# Patient Record
Sex: FEMALE | Race: WHITE | NOT HISPANIC OR LATINO | Employment: OTHER | URBAN - METROPOLITAN AREA
[De-identification: names, ages, dates, MRNs, and addresses within clinical notes are randomized per-mention and may not be internally consistent; named-entity substitution may affect disease eponyms.]

---

## 2018-03-10 ENCOUNTER — HOSPITAL ENCOUNTER (INPATIENT)
Facility: HOSPITAL | Age: 73
LOS: 3 days | Discharge: HOME | End: 2018-03-14
Attending: EMERGENCY MEDICINE | Admitting: FAMILY MEDICINE

## 2018-03-10 ENCOUNTER — APPOINTMENT (OUTPATIENT)
Dept: CT IMAGING | Facility: HOSPITAL | Age: 73
End: 2018-03-10

## 2018-03-10 ENCOUNTER — APPOINTMENT (OUTPATIENT)
Dept: GENERAL RADIOLOGY | Facility: HOSPITAL | Age: 73
End: 2018-03-10

## 2018-03-10 DIAGNOSIS — G47.30 SLEEP APNEA, UNSPECIFIED TYPE: Chronic | ICD-10-CM

## 2018-03-10 DIAGNOSIS — J96.01 ACUTE RESPIRATORY FAILURE WITH HYPOXIA AND HYPERCAPNIA (HCC): ICD-10-CM

## 2018-03-10 DIAGNOSIS — I50.21 ACUTE SYSTOLIC CONGESTIVE HEART FAILURE (HCC): ICD-10-CM

## 2018-03-10 DIAGNOSIS — I48.91 ATRIAL FIBRILLATION WITH RVR (HCC): Primary | ICD-10-CM

## 2018-03-10 DIAGNOSIS — J96.91 RESPIRATORY FAILURE WITH HYPOXIA, UNSPECIFIED CHRONICITY (HCC): ICD-10-CM

## 2018-03-10 DIAGNOSIS — I42.8 NONISCHEMIC CARDIOMYOPATHY (HCC): ICD-10-CM

## 2018-03-10 DIAGNOSIS — I48.0 PAROXYSMAL ATRIAL FIBRILLATION (HCC): ICD-10-CM

## 2018-03-10 DIAGNOSIS — I16.0 HYPERTENSIVE URGENCY: ICD-10-CM

## 2018-03-10 DIAGNOSIS — I50.9 CONGESTIVE HEART FAILURE, UNSPECIFIED CONGESTIVE HEART FAILURE CHRONICITY, UNSPECIFIED CONGESTIVE HEART FAILURE TYPE: ICD-10-CM

## 2018-03-10 DIAGNOSIS — J96.02 ACUTE RESPIRATORY FAILURE WITH HYPOXIA AND HYPERCAPNIA (HCC): ICD-10-CM

## 2018-03-10 LAB
ALBUMIN SERPL-MCNC: 4.5 G/DL (ref 3.2–4.8)
ALBUMIN/GLOB SERPL: 1.3 G/DL (ref 1.5–2.5)
ALP SERPL-CCNC: 73 U/L (ref 25–100)
ALT SERPL W P-5'-P-CCNC: 37 U/L (ref 7–40)
ANION GAP SERPL CALCULATED.3IONS-SCNC: 7 MMOL/L (ref 3–11)
AST SERPL-CCNC: 67 U/L (ref 0–33)
BASOPHILS # BLD AUTO: 0.04 10*3/MM3 (ref 0–0.2)
BASOPHILS NFR BLD AUTO: 0.4 % (ref 0–1)
BILIRUB SERPL-MCNC: 0.6 MG/DL (ref 0.3–1.2)
BNP SERPL-MCNC: 251 PG/ML (ref 0–100)
BUN BLD-MCNC: 21 MG/DL (ref 9–23)
BUN/CREAT SERPL: 21 (ref 7–25)
CALCIUM SPEC-SCNC: 9.1 MG/DL (ref 8.7–10.4)
CHLORIDE SERPL-SCNC: 106 MMOL/L (ref 99–109)
CO2 SERPL-SCNC: 26 MMOL/L (ref 20–31)
CREAT BLD-MCNC: 1 MG/DL (ref 0.6–1.3)
D-LACTATE SERPL-SCNC: 1.5 MMOL/L (ref 0.5–2)
DEPRECATED RDW RBC AUTO: 50 FL (ref 37–54)
EOSINOPHIL # BLD AUTO: 0.4 10*3/MM3 (ref 0–0.3)
EOSINOPHIL NFR BLD AUTO: 4 % (ref 0–3)
ERYTHROCYTE [DISTWIDTH] IN BLOOD BY AUTOMATED COUNT: 13.9 % (ref 11.3–14.5)
GFR SERPL CREATININE-BSD FRML MDRD: 54 ML/MIN/1.73
GLOBULIN UR ELPH-MCNC: 3.4 GM/DL
GLUCOSE BLD-MCNC: 135 MG/DL (ref 70–100)
HCT VFR BLD AUTO: 46.8 % (ref 34.5–44)
HGB BLD-MCNC: 15.4 G/DL (ref 11.5–15.5)
HOLD SPECIMEN: NORMAL
HOLD SPECIMEN: NORMAL
IMM GRANULOCYTES # BLD: 0.04 10*3/MM3 (ref 0–0.03)
IMM GRANULOCYTES NFR BLD: 0.4 % (ref 0–0.6)
LYMPHOCYTES # BLD AUTO: 1.97 10*3/MM3 (ref 0.6–4.8)
LYMPHOCYTES NFR BLD AUTO: 19.8 % (ref 24–44)
MCH RBC QN AUTO: 32.1 PG (ref 27–31)
MCHC RBC AUTO-ENTMCNC: 32.9 G/DL (ref 32–36)
MCV RBC AUTO: 97.5 FL (ref 80–99)
MONOCYTES # BLD AUTO: 0.48 10*3/MM3 (ref 0–1)
MONOCYTES NFR BLD AUTO: 4.8 % (ref 0–12)
NEUTROPHILS # BLD AUTO: 7 10*3/MM3 (ref 1.5–8.3)
NEUTROPHILS NFR BLD AUTO: 70.6 % (ref 41–71)
PLATELET # BLD AUTO: 160 10*3/MM3 (ref 150–450)
PMV BLD AUTO: 12 FL (ref 6–12)
POTASSIUM BLD-SCNC: 5.5 MMOL/L (ref 3.5–5.5)
PROT SERPL-MCNC: 7.9 G/DL (ref 5.7–8.2)
RBC # BLD AUTO: 4.8 10*6/MM3 (ref 3.89–5.14)
SODIUM BLD-SCNC: 139 MMOL/L (ref 132–146)
TROPONIN I SERPL-MCNC: 0.04 NG/ML (ref 0–0.07)
WBC NRBC COR # BLD: 9.93 10*3/MM3 (ref 3.5–10.8)
WHOLE BLOOD HOLD SPECIMEN: NORMAL
WHOLE BLOOD HOLD SPECIMEN: NORMAL

## 2018-03-10 PROCEDURE — 25010000002 FUROSEMIDE PER 20 MG: Performed by: EMERGENCY MEDICINE

## 2018-03-10 PROCEDURE — 99285 EMERGENCY DEPT VISIT HI MDM: CPT

## 2018-03-10 PROCEDURE — 0 IOPAMIDOL 61 % SOLUTION: Performed by: EMERGENCY MEDICINE

## 2018-03-10 PROCEDURE — 71045 X-RAY EXAM CHEST 1 VIEW: CPT

## 2018-03-10 PROCEDURE — 87040 BLOOD CULTURE FOR BACTERIA: CPT | Performed by: EMERGENCY MEDICINE

## 2018-03-10 PROCEDURE — 25010000002 ONDANSETRON PER 1 MG: Performed by: EMERGENCY MEDICINE

## 2018-03-10 PROCEDURE — 25010000002 FENTANYL CITRATE (PF) 100 MCG/2ML SOLUTION: Performed by: EMERGENCY MEDICINE

## 2018-03-10 PROCEDURE — 93005 ELECTROCARDIOGRAM TRACING: CPT | Performed by: EMERGENCY MEDICINE

## 2018-03-10 PROCEDURE — 84484 ASSAY OF TROPONIN QUANT: CPT

## 2018-03-10 PROCEDURE — 83880 ASSAY OF NATRIURETIC PEPTIDE: CPT | Performed by: EMERGENCY MEDICINE

## 2018-03-10 PROCEDURE — 71275 CT ANGIOGRAPHY CHEST: CPT

## 2018-03-10 PROCEDURE — 83605 ASSAY OF LACTIC ACID: CPT | Performed by: EMERGENCY MEDICINE

## 2018-03-10 PROCEDURE — 74177 CT ABD & PELVIS W/CONTRAST: CPT

## 2018-03-10 PROCEDURE — 85025 COMPLETE CBC W/AUTO DIFF WBC: CPT | Performed by: EMERGENCY MEDICINE

## 2018-03-10 PROCEDURE — 80053 COMPREHEN METABOLIC PANEL: CPT | Performed by: EMERGENCY MEDICINE

## 2018-03-10 RX ORDER — FENTANYL CITRATE 50 UG/ML
50 INJECTION, SOLUTION INTRAMUSCULAR; INTRAVENOUS
Status: COMPLETED | OUTPATIENT
Start: 2018-03-10 | End: 2018-03-11

## 2018-03-10 RX ORDER — NITROGLYCERIN 0.4 MG/1
0.4 TABLET SUBLINGUAL ONCE
Status: COMPLETED | OUTPATIENT
Start: 2018-03-10 | End: 2018-03-10

## 2018-03-10 RX ORDER — ONDANSETRON 2 MG/ML
4 INJECTION INTRAMUSCULAR; INTRAVENOUS EVERY 6 HOURS PRN
Status: DISCONTINUED | OUTPATIENT
Start: 2018-03-10 | End: 2018-03-14

## 2018-03-10 RX ORDER — SODIUM CHLORIDE 0.9 % (FLUSH) 0.9 %
10 SYRINGE (ML) INJECTION AS NEEDED
Status: DISCONTINUED | OUTPATIENT
Start: 2018-03-10 | End: 2018-03-14 | Stop reason: HOSPADM

## 2018-03-10 RX ORDER — GABAPENTIN 300 MG/1
300 CAPSULE ORAL 2 TIMES DAILY
Status: ON HOLD | COMMUNITY
End: 2018-03-14

## 2018-03-10 RX ORDER — FUROSEMIDE 10 MG/ML
40 INJECTION INTRAMUSCULAR; INTRAVENOUS ONCE
Status: COMPLETED | OUTPATIENT
Start: 2018-03-10 | End: 2018-03-10

## 2018-03-10 RX ORDER — LABETALOL HYDROCHLORIDE 5 MG/ML
10 INJECTION, SOLUTION INTRAVENOUS ONCE
Status: DISCONTINUED | OUTPATIENT
Start: 2018-03-10 | End: 2018-03-10

## 2018-03-10 RX ADMIN — ONDANSETRON 4 MG: 2 INJECTION INTRAMUSCULAR; INTRAVENOUS at 22:32

## 2018-03-10 RX ADMIN — NITROGLYCERIN 0.4 MG: 0.4 TABLET SUBLINGUAL at 23:55

## 2018-03-10 RX ADMIN — FUROSEMIDE 40 MG: 10 INJECTION, SOLUTION INTRAMUSCULAR; INTRAVENOUS at 23:56

## 2018-03-10 RX ADMIN — FENTANYL CITRATE 50 MCG: 50 INJECTION INTRAMUSCULAR; INTRAVENOUS at 22:32

## 2018-03-10 RX ADMIN — IOPAMIDOL 100 ML: 612 INJECTION, SOLUTION INTRAVENOUS at 23:43

## 2018-03-11 ENCOUNTER — APPOINTMENT (OUTPATIENT)
Dept: CARDIOLOGY | Facility: HOSPITAL | Age: 73
End: 2018-03-11

## 2018-03-11 ENCOUNTER — APPOINTMENT (OUTPATIENT)
Dept: ULTRASOUND IMAGING | Facility: HOSPITAL | Age: 73
End: 2018-03-11

## 2018-03-11 PROBLEM — I48.91 ATRIAL FIBRILLATION WITH RVR (HCC): Status: ACTIVE | Noted: 2018-03-11

## 2018-03-11 LAB
ALBUMIN SERPL-MCNC: 4.1 G/DL (ref 3.2–4.8)
ALBUMIN/GLOB SERPL: 1.4 G/DL (ref 1.5–2.5)
ALP SERPL-CCNC: 73 U/L (ref 25–100)
ALT SERPL W P-5'-P-CCNC: 28 U/L (ref 7–40)
ANION GAP SERPL CALCULATED.3IONS-SCNC: 6 MMOL/L (ref 3–11)
ARTERIAL PATENCY WRIST A: ABNORMAL
ARTICHOKE IGE QN: 109 MG/DL (ref 0–130)
AST SERPL-CCNC: 24 U/L (ref 0–33)
ATMOSPHERIC PRESS: ABNORMAL MMHG
BASE EXCESS BLDA CALC-SCNC: 2.9 MMOL/L (ref 0–2)
BASOPHILS # BLD AUTO: 0.03 10*3/MM3 (ref 0–0.2)
BASOPHILS NFR BLD AUTO: 0.3 % (ref 0–1)
BDY SITE: ABNORMAL
BH CV ECHO MEAS - AO ROOT AREA (BSA CORRECTED): 1.4
BH CV ECHO MEAS - AO ROOT AREA: 6.9 CM^2
BH CV ECHO MEAS - AO ROOT DIAM: 3 CM
BH CV ECHO MEAS - BSA(HAYCOCK): 2.2 M^2
BH CV ECHO MEAS - BSA: 2.1 M^2
BH CV ECHO MEAS - BZI_BMI: 35 KILOGRAMS/M^2
BH CV ECHO MEAS - BZI_METRIC_HEIGHT: 167.6 CM
BH CV ECHO MEAS - BZI_METRIC_WEIGHT: 98.4 KG
BH CV ECHO MEAS - CONTRAST EF (2CH): 66.7 ML/M^2
BH CV ECHO MEAS - CONTRAST EF 4CH: 38.3 ML/M^2
BH CV ECHO MEAS - EDV(CUBED): 115.2 ML
BH CV ECHO MEAS - EDV(MOD-SP2): 135 ML
BH CV ECHO MEAS - EDV(MOD-SP4): 154 ML
BH CV ECHO MEAS - EDV(TEICH): 111 ML
BH CV ECHO MEAS - EF(CUBED): 57.1 %
BH CV ECHO MEAS - EF(MOD-SP2): 66.7 %
BH CV ECHO MEAS - EF(MOD-SP4): 38.3 %
BH CV ECHO MEAS - EF(TEICH): 48.7 %
BH CV ECHO MEAS - ESV(CUBED): 49.4 ML
BH CV ECHO MEAS - ESV(MOD-SP2): 45 ML
BH CV ECHO MEAS - ESV(MOD-SP4): 95 ML
BH CV ECHO MEAS - ESV(TEICH): 57 ML
BH CV ECHO MEAS - FS: 24.6 %
BH CV ECHO MEAS - IVS/LVPW: 1
BH CV ECHO MEAS - IVSD: 1.4 CM
BH CV ECHO MEAS - LA DIMENSION: 4.2 CM
BH CV ECHO MEAS - LA/AO: 1.4
BH CV ECHO MEAS - LAT PEAK E' VEL: 7.3 CM/SEC
BH CV ECHO MEAS - LV DIASTOLIC VOL/BSA (35-75): 74.4 ML/M^2
BH CV ECHO MEAS - LV MASS(C)D: 265.7 GRAMS
BH CV ECHO MEAS - LV MASS(C)DI: 128.3 GRAMS/M^2
BH CV ECHO MEAS - LV SYSTOLIC VOL/BSA (12-30): 45.9 ML/M^2
BH CV ECHO MEAS - LVIDD: 4.9 CM
BH CV ECHO MEAS - LVIDS: 3.7 CM
BH CV ECHO MEAS - LVLD AP2: 9.3 CM
BH CV ECHO MEAS - LVLD AP4: 8.1 CM
BH CV ECHO MEAS - LVLS AP2: 6.4 CM
BH CV ECHO MEAS - LVLS AP4: 7.9 CM
BH CV ECHO MEAS - LVPWD: 1.3 CM
BH CV ECHO MEAS - MED PEAK E' VEL: 5.5 CM/SEC
BH CV ECHO MEAS - PA ACC SLOPE: 794.4 CM/SEC^2
BH CV ECHO MEAS - PA ACC TIME: 0.1 SEC
BH CV ECHO MEAS - PA PR(ACCEL): 36.2 MMHG
BH CV ECHO MEAS - RAP SYSTOLE: 15 MMHG
BH CV ECHO MEAS - RVSP: 54 MMHG
BH CV ECHO MEAS - SI(CUBED): 31.8 ML/M^2
BH CV ECHO MEAS - SI(MOD-SP2): 43.5 ML/M^2
BH CV ECHO MEAS - SI(MOD-SP4): 28.5 ML/M^2
BH CV ECHO MEAS - SI(TEICH): 26.1 ML/M^2
BH CV ECHO MEAS - SV(CUBED): 65.8 ML
BH CV ECHO MEAS - SV(MOD-SP2): 90 ML
BH CV ECHO MEAS - SV(MOD-SP4): 59 ML
BH CV ECHO MEAS - SV(TEICH): 54 ML
BH CV ECHO MEAS - TAPSE (>1.6): 0.9 CM2
BH CV ECHO MEAS - TR MAX V: 39 MMHG
BH CV ECHO MEAS - TR MAX VEL: 268.6 CM/SEC
BH CV VAS BP LEFT ARM: NORMAL MMHG
BH CV XLRA - RV BASE: 4.3 CM
BH CV XLRA - RV LENGTH: 8.3 CM
BH CV XLRA - RV MID: 2.9 CM
BH CV XLRA - TDI S': 9.8 CM/SEC
BILIRUB SERPL-MCNC: 0.8 MG/DL (ref 0.3–1.2)
BUN BLD-MCNC: 19 MG/DL (ref 9–23)
BUN/CREAT SERPL: 19 (ref 7–25)
CALCIUM SPEC-SCNC: 9 MG/DL (ref 8.7–10.4)
CHLORIDE SERPL-SCNC: 103 MMOL/L (ref 99–109)
CHOLEST SERPL-MCNC: 175 MG/DL (ref 0–200)
CO2 BLDA-SCNC: 30.9 MMOL/L (ref 22–33)
CO2 SERPL-SCNC: 31 MMOL/L (ref 20–31)
COHGB MFR BLD: 1.4 % (ref 0–2)
CREAT BLD-MCNC: 1 MG/DL (ref 0.6–1.3)
D-LACTATE SERPL-SCNC: 0.9 MMOL/L (ref 0.5–2)
DEPRECATED RDW RBC AUTO: 51.3 FL (ref 37–54)
DIGOXIN SERPL-MCNC: <0.01 NG/ML (ref 0.8–2)
EOSINOPHIL # BLD AUTO: 0.51 10*3/MM3 (ref 0–0.3)
EOSINOPHIL NFR BLD AUTO: 5.1 % (ref 0–3)
ERYTHROCYTE [DISTWIDTH] IN BLOOD BY AUTOMATED COUNT: 14.3 % (ref 11.3–14.5)
GFR SERPL CREATININE-BSD FRML MDRD: 54 ML/MIN/1.73
GLOBULIN UR ELPH-MCNC: 3 GM/DL
GLUCOSE BLD-MCNC: 131 MG/DL (ref 70–100)
GLUCOSE BLDC GLUCOMTR-MCNC: 139 MG/DL (ref 70–130)
GLUCOSE BLDC GLUCOMTR-MCNC: 146 MG/DL (ref 70–130)
GLUCOSE BLDC GLUCOMTR-MCNC: 151 MG/DL (ref 70–130)
GLUCOSE BLDC GLUCOMTR-MCNC: 178 MG/DL (ref 70–130)
HBA1C MFR BLD: 7.3 % (ref 4.8–5.6)
HCO3 BLDA-SCNC: 29.3 MMOL/L (ref 20–26)
HCT VFR BLD AUTO: 45 % (ref 34.5–44)
HCT VFR BLD CALC: 45.7 %
HDLC SERPL-MCNC: 55 MG/DL (ref 40–60)
HGB BLD-MCNC: 14.5 G/DL (ref 11.5–15.5)
HGB BLDA-MCNC: 14.9 G/DL (ref 14–18)
HOROWITZ INDEX BLD+IHG-RTO: 32 %
IMM GRANULOCYTES # BLD: 0.02 10*3/MM3 (ref 0–0.03)
IMM GRANULOCYTES NFR BLD: 0.2 % (ref 0–0.6)
LIPASE SERPL-CCNC: 60 U/L (ref 6–51)
LV EF 2D ECHO EST: 36 %
LYMPHOCYTES # BLD AUTO: 1.63 10*3/MM3 (ref 0.6–4.8)
LYMPHOCYTES NFR BLD AUTO: 16.2 % (ref 24–44)
MAGNESIUM SERPL-MCNC: 2.1 MG/DL (ref 1.3–2.7)
MAXIMAL PREDICTED HEART RATE: 147 BPM
MCH RBC QN AUTO: 31.9 PG (ref 27–31)
MCHC RBC AUTO-ENTMCNC: 32.2 G/DL (ref 32–36)
MCV RBC AUTO: 99.1 FL (ref 80–99)
METHGB BLD QL: 0.8 % (ref 0–1.5)
MODALITY: ABNORMAL
MONOCYTES # BLD AUTO: 0.65 10*3/MM3 (ref 0–1)
MONOCYTES NFR BLD AUTO: 6.5 % (ref 0–12)
NEUTROPHILS # BLD AUTO: 7.21 10*3/MM3 (ref 1.5–8.3)
NEUTROPHILS NFR BLD AUTO: 71.7 % (ref 41–71)
OXYHGB MFR BLDV: 94 % (ref 94–99)
PCO2 BLDA: 50.8 MM HG (ref 35–45)
PH BLDA: 7.37 PH UNITS (ref 7.35–7.45)
PLATELET # BLD AUTO: 124 10*3/MM3 (ref 150–450)
PMV BLD AUTO: 11.6 FL (ref 6–12)
PO2 BLDA: 84.4 MM HG (ref 83–108)
POTASSIUM BLD-SCNC: 4 MMOL/L (ref 3.5–5.5)
PROT SERPL-MCNC: 7.1 G/DL (ref 5.7–8.2)
RBC # BLD AUTO: 4.54 10*6/MM3 (ref 3.89–5.14)
SODIUM BLD-SCNC: 140 MMOL/L (ref 132–146)
STRESS TARGET HR: 125 BPM
TRIGL SERPL-MCNC: 122 MG/DL (ref 0–150)
TROPONIN I SERPL-MCNC: 0.05 NG/ML
TROPONIN I SERPL-MCNC: 0.05 NG/ML
WBC NRBC COR # BLD: 10.05 10*3/MM3 (ref 3.5–10.8)

## 2018-03-11 PROCEDURE — 63710000001 INSULIN LISPRO (HUMAN) PER 5 UNITS: Performed by: NURSE PRACTITIONER

## 2018-03-11 PROCEDURE — 25010000002 SULFUR HEXAFLUORIDE MICROSPH 60.7-25 MG RECONSTITUTED SUSPENSION: Performed by: HOSPITALIST

## 2018-03-11 PROCEDURE — 82962 GLUCOSE BLOOD TEST: CPT

## 2018-03-11 PROCEDURE — 85025 COMPLETE CBC W/AUTO DIFF WBC: CPT | Performed by: NURSE PRACTITIONER

## 2018-03-11 PROCEDURE — 25010000002 FUROSEMIDE PER 20 MG: Performed by: NURSE PRACTITIONER

## 2018-03-11 PROCEDURE — 80162 ASSAY OF DIGOXIN TOTAL: CPT | Performed by: NURSE PRACTITIONER

## 2018-03-11 PROCEDURE — 93306 TTE W/DOPPLER COMPLETE: CPT | Performed by: INTERNAL MEDICINE

## 2018-03-11 PROCEDURE — 94660 CPAP INITIATION&MGMT: CPT

## 2018-03-11 PROCEDURE — 93005 ELECTROCARDIOGRAM TRACING: CPT | Performed by: INTERNAL MEDICINE

## 2018-03-11 PROCEDURE — 82805 BLOOD GASES W/O2 SATURATION: CPT | Performed by: EMERGENCY MEDICINE

## 2018-03-11 PROCEDURE — 83735 ASSAY OF MAGNESIUM: CPT | Performed by: NURSE PRACTITIONER

## 2018-03-11 PROCEDURE — 93306 TTE W/DOPPLER COMPLETE: CPT

## 2018-03-11 PROCEDURE — 25010000002 FENTANYL CITRATE (PF) 100 MCG/2ML SOLUTION: Performed by: EMERGENCY MEDICINE

## 2018-03-11 PROCEDURE — 76705 ECHO EXAM OF ABDOMEN: CPT

## 2018-03-11 PROCEDURE — 93010 ELECTROCARDIOGRAM REPORT: CPT | Performed by: INTERNAL MEDICINE

## 2018-03-11 PROCEDURE — 36600 WITHDRAWAL OF ARTERIAL BLOOD: CPT | Performed by: EMERGENCY MEDICINE

## 2018-03-11 PROCEDURE — 25010000002 ENOXAPARIN PER 10 MG: Performed by: NURSE PRACTITIONER

## 2018-03-11 PROCEDURE — 80061 LIPID PANEL: CPT | Performed by: NURSE PRACTITIONER

## 2018-03-11 PROCEDURE — 25010000002 ONDANSETRON PER 1 MG: Performed by: NURSE PRACTITIONER

## 2018-03-11 PROCEDURE — 80053 COMPREHEN METABOLIC PANEL: CPT | Performed by: NURSE PRACTITIONER

## 2018-03-11 PROCEDURE — 83036 HEMOGLOBIN GLYCOSYLATED A1C: CPT | Performed by: NURSE PRACTITIONER

## 2018-03-11 PROCEDURE — 93005 ELECTROCARDIOGRAM TRACING: CPT | Performed by: NURSE PRACTITIONER

## 2018-03-11 PROCEDURE — 94799 UNLISTED PULMONARY SVC/PX: CPT

## 2018-03-11 PROCEDURE — 25010000002 ONDANSETRON PER 1 MG: Performed by: EMERGENCY MEDICINE

## 2018-03-11 PROCEDURE — 83690 ASSAY OF LIPASE: CPT | Performed by: NURSE PRACTITIONER

## 2018-03-11 PROCEDURE — 84484 ASSAY OF TROPONIN QUANT: CPT | Performed by: NURSE PRACTITIONER

## 2018-03-11 PROCEDURE — 83605 ASSAY OF LACTIC ACID: CPT | Performed by: HOSPITALIST

## 2018-03-11 PROCEDURE — 99223 1ST HOSP IP/OBS HIGH 75: CPT | Performed by: INTERNAL MEDICINE

## 2018-03-11 RX ORDER — DOCUSATE SODIUM 100 MG/1
100 CAPSULE, LIQUID FILLED ORAL 2 TIMES DAILY PRN
Status: DISCONTINUED | OUTPATIENT
Start: 2018-03-11 | End: 2018-03-14 | Stop reason: HOSPADM

## 2018-03-11 RX ORDER — ACETAMINOPHEN 325 MG/1
650 TABLET ORAL EVERY 4 HOURS PRN
Status: DISCONTINUED | OUTPATIENT
Start: 2018-03-11 | End: 2018-03-14 | Stop reason: HOSPADM

## 2018-03-11 RX ORDER — PANTOPRAZOLE SODIUM 40 MG/1
40 TABLET, DELAYED RELEASE ORAL
Status: DISCONTINUED | OUTPATIENT
Start: 2018-03-11 | End: 2018-03-11

## 2018-03-11 RX ORDER — ONDANSETRON 2 MG/ML
4 INJECTION INTRAMUSCULAR; INTRAVENOUS EVERY 6 HOURS PRN
Status: DISCONTINUED | OUTPATIENT
Start: 2018-03-11 | End: 2018-03-14 | Stop reason: HOSPADM

## 2018-03-11 RX ORDER — DEXTROSE MONOHYDRATE 25 G/50ML
25 INJECTION, SOLUTION INTRAVENOUS
Status: DISCONTINUED | OUTPATIENT
Start: 2018-03-11 | End: 2018-03-14 | Stop reason: HOSPADM

## 2018-03-11 RX ORDER — TRAZODONE HYDROCHLORIDE 50 MG/1
25 TABLET ORAL NIGHTLY PRN
Status: DISCONTINUED | OUTPATIENT
Start: 2018-03-11 | End: 2018-03-14 | Stop reason: HOSPADM

## 2018-03-11 RX ORDER — FUROSEMIDE 10 MG/ML
20 INJECTION INTRAMUSCULAR; INTRAVENOUS DAILY
Status: DISCONTINUED | OUTPATIENT
Start: 2018-03-11 | End: 2018-03-13

## 2018-03-11 RX ORDER — BISACODYL 5 MG/1
5 TABLET, DELAYED RELEASE ORAL DAILY PRN
Status: DISCONTINUED | OUTPATIENT
Start: 2018-03-11 | End: 2018-03-14 | Stop reason: HOSPADM

## 2018-03-11 RX ORDER — ASPIRIN 81 MG/1
81 TABLET ORAL DAILY
Status: DISCONTINUED | OUTPATIENT
Start: 2018-03-11 | End: 2018-03-13

## 2018-03-11 RX ORDER — LOSARTAN POTASSIUM 50 MG/1
50 TABLET ORAL DAILY
Status: DISCONTINUED | OUTPATIENT
Start: 2018-03-11 | End: 2018-03-13

## 2018-03-11 RX ORDER — SODIUM CHLORIDE 0.9 % (FLUSH) 0.9 %
1-10 SYRINGE (ML) INJECTION AS NEEDED
Status: DISCONTINUED | OUTPATIENT
Start: 2018-03-11 | End: 2018-03-14 | Stop reason: HOSPADM

## 2018-03-11 RX ORDER — NICOTINE POLACRILEX 4 MG
15 LOZENGE BUCCAL
Status: DISCONTINUED | OUTPATIENT
Start: 2018-03-11 | End: 2018-03-14 | Stop reason: HOSPADM

## 2018-03-11 RX ORDER — PANTOPRAZOLE SODIUM 40 MG/1
40 TABLET, DELAYED RELEASE ORAL EVERY 12 HOURS
Status: DISCONTINUED | OUTPATIENT
Start: 2018-03-11 | End: 2018-03-14 | Stop reason: HOSPADM

## 2018-03-11 RX ORDER — GABAPENTIN 300 MG/1
300 CAPSULE ORAL 2 TIMES DAILY
Status: DISCONTINUED | OUTPATIENT
Start: 2018-03-11 | End: 2018-03-12

## 2018-03-11 RX ADMIN — PANTOPRAZOLE SODIUM 40 MG: 40 TABLET, DELAYED RELEASE ORAL at 20:33

## 2018-03-11 RX ADMIN — ONDANSETRON 4 MG: 2 INJECTION INTRAMUSCULAR; INTRAVENOUS at 08:12

## 2018-03-11 RX ADMIN — ACETAMINOPHEN 650 MG: 325 TABLET ORAL at 23:55

## 2018-03-11 RX ADMIN — ONDANSETRON 4 MG: 2 INJECTION INTRAMUSCULAR; INTRAVENOUS at 23:55

## 2018-03-11 RX ADMIN — GABAPENTIN 300 MG: 300 CAPSULE ORAL at 08:10

## 2018-03-11 RX ADMIN — ONDANSETRON 4 MG: 2 INJECTION INTRAMUSCULAR; INTRAVENOUS at 16:17

## 2018-03-11 RX ADMIN — TRAZODONE HYDROCHLORIDE 25 MG: 50 TABLET, FILM COATED ORAL at 23:14

## 2018-03-11 RX ADMIN — METOPROLOL TARTRATE 25 MG: 25 TABLET ORAL at 08:10

## 2018-03-11 RX ADMIN — LOSARTAN POTASSIUM 50 MG: 50 TABLET ORAL at 08:10

## 2018-03-11 RX ADMIN — ACETAMINOPHEN 650 MG: 325 TABLET ORAL at 16:42

## 2018-03-11 RX ADMIN — ENOXAPARIN SODIUM 40 MG: 40 INJECTION SUBCUTANEOUS at 05:15

## 2018-03-11 RX ADMIN — SULFUR HEXAFLUORIDE 2 ML: KIT at 08:00

## 2018-03-11 RX ADMIN — FENTANYL CITRATE 50 MCG: 50 INJECTION INTRAMUSCULAR; INTRAVENOUS at 03:09

## 2018-03-11 RX ADMIN — GABAPENTIN 300 MG: 300 CAPSULE ORAL at 20:33

## 2018-03-11 RX ADMIN — ROPINIROLE 3 MG: 0.5 TABLET, FILM COATED ORAL at 20:33

## 2018-03-11 RX ADMIN — ACETAMINOPHEN 650 MG: 325 TABLET ORAL at 05:15

## 2018-03-11 RX ADMIN — METOPROLOL TARTRATE 25 MG: 25 TABLET ORAL at 20:33

## 2018-03-11 RX ADMIN — FUROSEMIDE 20 MG: 10 INJECTION, SOLUTION INTRAMUSCULAR; INTRAVENOUS at 08:11

## 2018-03-11 RX ADMIN — ASPIRIN 81 MG: 81 TABLET, COATED ORAL at 08:10

## 2018-03-11 RX ADMIN — PANTOPRAZOLE SODIUM 40 MG: 40 TABLET, DELAYED RELEASE ORAL at 08:10

## 2018-03-11 RX ADMIN — FENTANYL CITRATE 50 MCG: 50 INJECTION INTRAMUSCULAR; INTRAVENOUS at 00:05

## 2018-03-11 NOTE — PLAN OF CARE
Problem: Patient Care Overview  Goal: Plan of Care Review  Outcome: Ongoing (interventions implemented as appropriate)   03/11/18 0401   Coping/Psychosocial   Plan of Care Reviewed With patient   Plan of Care Review   Progress no change

## 2018-03-11 NOTE — PLAN OF CARE
Problem: Pain, Acute (Adult)  Goal: Identify Related Risk Factors and Signs and Symptoms  Outcome: Ongoing (interventions implemented as appropriate)   03/11/18 1310   Pain, Acute (Adult)   Related Risk Factors (Acute Pain) psychosocial factor;procedure/treatment;positioning;patient perception   Signs and Symptoms (Acute Pain) sleep pattern alteration;verbalization of pain descriptors       Problem: Skin Injury Risk (Adult)  Goal: Identify Related Risk Factors and Signs and Symptoms  Outcome: Ongoing (interventions implemented as appropriate)   03/11/18 1310   Skin Injury Risk (Adult)   Related Risk Factors (Skin Injury Risk) advanced age;mobility impaired

## 2018-03-11 NOTE — ED PROVIDER NOTES
"Subjective   Ms. Angelita Shay is a 72 y.o. female who presents to the ED with c/o SoA with onset 2 weeks ago. She reports that a few weeks ago she had a virus for 3 weeks with a cough and while she had the virus she developed upper abdominal distension, which was painful with coughing. However, for the last 2 weeks she has been SoA, which has worsened in the last 2-3 days and prompted presentation to the ED. She notes that her abdominal distension and pain has moved down from where it was before and that the pain feels like \"there's something blocking\" her. She also complains of left sided sharp CP, which has now resolved. She denies vomiting. She does not wear oxygen at home. She has a hx of DM, HTN, HLD, a-fib, a cardiac stent in 2012, and PNA in 2015. She has no hx of COPD, emphysema, or anxiety. No other acute complaints at this time.        History provided by:  Patient  Shortness of Breath   Severity:  Moderate  Onset quality:  Gradual  Duration:  2 weeks  Timing:  Constant  Progression:  Worsening  Chronicity:  New  Associated symptoms: abdominal pain and chest pain        Review of Systems   Respiratory: Positive for shortness of breath.    Cardiovascular: Positive for chest pain.   Gastrointestinal: Positive for abdominal distention and abdominal pain.   All other systems reviewed and are negative.      Past Medical History:   Diagnosis Date   • Anxiety    • Arthritis    • Cellulitis of both lower extremities    • CKD (chronic kidney disease), stage III    • Colon cancer 2000   • Coronary artery disease     1 stent   • Corrosive esophagitis    • Depression    • Diabetes mellitus     dx 2 years ago- checks fsbs bid   • GERD (gastroesophageal reflux disease)    • GI bleed    • Gout    • H/O cardiac catheterization    • H/O colonoscopy 2008    incomplete prep   • H/O esophagogastroduodenoscopy 2008    hiatal hernia, gastritis   • H/O hiatal hernia    • Hyperlipidemia    • Hypertension    • Knee pain    • PAF " (paroxysmal atrial fibrillation) 2007    postoperative total knee replacement   • RLS (restless legs syndrome)    • Sleep apnea    • Wears eyeglasses        Allergies   Allergen Reactions   • Oxycodone Shortness Of Breath   • Zolpidem Other (See Comments)     nightmares       Past Surgical History:   Procedure Laterality Date   • APPENDECTOMY     • CARDIAC CATHETERIZATION  08/2015    no stents   • COLON RESECTION     • COLONOSCOPY  2000   • CORONARY ANGIOPLASTY WITH STENT PLACEMENT  12/2015   • IN TOTAL KNEE ARTHROPLASTY Left 11/3/2016    Procedure: LEFT TOTAL KNEE REPLACEMENT;  Surgeon: Laurent Zuniga MD;  Location: [facility];  Service: Orthopedics   • TONSILLECTOMY     • TOTAL KNEE ARTHROPLASTY Right 2008    revision 2010   • TOTAL SHOULDER ARTHROPLASTY Bilateral        Family History   Problem Relation Age of Onset   • Colon cancer Other    • Diabetes Other    • Heart disease Other    • Hypertension Other    • Stroke Other    • Tuberculosis Other    • Hypertension Mother    • Colon cancer Father    • Stroke Father        Social History     Social History   • Marital status:    • Number of children: 5     Occupational History   • Retired       Social History Main Topics   • Smoking status: Former Smoker     Packs/day: 1.00     Years: 22.00     Types: Cigarettes   • Smokeless tobacco: Never Used      Comment: quit 1979   • Alcohol use No   • Drug use: No     Other Topics Concern   • Not on file         Objective   Physical Exam   Constitutional: She is oriented to person, place, and time. She appears well-developed and well-nourished. No distress.   Patient appears mildly anxious.   HENT:   Head: Normocephalic and atraumatic.   Nose: Nose normal.   Eyes: Conjunctivae are normal. No scleral icterus.   Neck: Normal range of motion. Neck supple.   Cardiovascular: Normal heart sounds.  An irregularly irregular rhythm present. Tachycardia present.    No murmur heard.  Mild tachycardia.    Pulmonary/Chest: Effort normal and breath sounds normal. No respiratory distress.   Patient has shallow but clear breath sounds. She appears to be in discomfort upon taking deep breaths.    Abdominal: Soft. Bowel sounds are normal. There is tenderness. There is guarding. There is no rebound.   Patient is diffusely tender. Mild guarding with no rebound.   Musculoskeletal: Normal range of motion. She exhibits edema.   1+ bilateral lower extremity edema.   Neurological: She is alert and oriented to person, place, and time.   Skin: Skin is warm and dry. She is not diaphoretic.   Psychiatric: She has a normal mood and affect. Her behavior is normal.   Nursing note and vitals reviewed.      Procedures         ED Course  ED Course   Comment By Time   Discussed the case with Dr. Ty, hospitalist, who agrees to admit the patient to a telemetry bed. -IVETT Emery 03/11 0046     Recent Results (from the past 24 hour(s))   POC Troponin, Rapid    Collection Time: 03/10/18 10:00 PM   Result Value Ref Range    Troponin I 0.04 0.00 - 0.07 ng/mL   Comprehensive Metabolic Panel    Collection Time: 03/10/18 10:01 PM   Result Value Ref Range    Glucose 135 (H) 70 - 100 mg/dL    BUN 21 9 - 23 mg/dL    Creatinine 1.00 0.60 - 1.30 mg/dL    Sodium 139 132 - 146 mmol/L    Potassium 5.5 3.5 - 5.5 mmol/L    Chloride 106 99 - 109 mmol/L    CO2 26.0 20.0 - 31.0 mmol/L    Calcium 9.1 8.7 - 10.4 mg/dL    Total Protein 7.9 5.7 - 8.2 g/dL    Albumin 4.50 3.20 - 4.80 g/dL    ALT (SGPT) 37 7 - 40 U/L    AST (SGOT) 67 (H) 0 - 33 U/L    Alkaline Phosphatase 73 25 - 100 U/L    Total Bilirubin 0.6 0.3 - 1.2 mg/dL    eGFR Non African Amer 54 (L) >60 mL/min/1.73    Globulin 3.4 gm/dL    A/G Ratio 1.3 (L) 1.5 - 2.5 g/dL    BUN/Creatinine Ratio 21.0 7.0 - 25.0    Anion Gap 7.0 3.0 - 11.0 mmol/L   Light Blue Top    Collection Time: 03/10/18 10:01 PM   Result Value Ref Range    Extra Tube hold for add-on    Green Top (Gel)    Collection Time: 03/10/18  10:01 PM   Result Value Ref Range    Extra Tube Hold for add-ons.    Lavender Top    Collection Time: 03/10/18 10:01 PM   Result Value Ref Range    Extra Tube hold for add-on    Gold Top - SST    Collection Time: 03/10/18 10:01 PM   Result Value Ref Range    Extra Tube Hold for add-ons.    CBC Auto Differential    Collection Time: 03/10/18 10:01 PM   Result Value Ref Range    WBC 9.93 3.50 - 10.80 10*3/mm3    RBC 4.80 3.89 - 5.14 10*6/mm3    Hemoglobin 15.4 11.5 - 15.5 g/dL    Hematocrit 46.8 (H) 34.5 - 44.0 %    MCV 97.5 80.0 - 99.0 fL    MCH 32.1 (H) 27.0 - 31.0 pg    MCHC 32.9 32.0 - 36.0 g/dL    RDW 13.9 11.3 - 14.5 %    RDW-SD 50.0 37.0 - 54.0 fl    MPV 12.0 6.0 - 12.0 fL    Platelets 160 150 - 450 10*3/mm3    Neutrophil % 70.6 41.0 - 71.0 %    Lymphocyte % 19.8 (L) 24.0 - 44.0 %    Monocyte % 4.8 0.0 - 12.0 %    Eosinophil % 4.0 (H) 0.0 - 3.0 %    Basophil % 0.4 0.0 - 1.0 %    Immature Grans % 0.4 0.0 - 0.6 %    Neutrophils, Absolute 7.00 1.50 - 8.30 10*3/mm3    Lymphocytes, Absolute 1.97 0.60 - 4.80 10*3/mm3    Monocytes, Absolute 0.48 0.00 - 1.00 10*3/mm3    Eosinophils, Absolute 0.40 (H) 0.00 - 0.30 10*3/mm3    Basophils, Absolute 0.04 0.00 - 0.20 10*3/mm3    Immature Grans, Absolute 0.04 (H) 0.00 - 0.03 10*3/mm3   Lactic Acid, Plasma    Collection Time: 03/10/18 10:01 PM   Result Value Ref Range    Lactate 1.5 0.5 - 2.0 mmol/L     Note: In addition to lab results from this visit, the labs listed above may include labs taken at another facility or during a different encounter within the last 24 hours. Please correlate lab times with ED admission and discharge times for further clarification of the services performed during this visit.    XR Chest 1 View   Final Result     Findings suggestive of CHF with pulmonary edema and trace pleural effusion.        Possibility of associated pneumonia cannot be excluded.  Recommend followup    to complete resolution.          THIS DOCUMENT HAS BEEN ELECTRONICALLY SIGNED  BY ILENE CRUZ MD      CT Abdomen Pelvis With Contrast    (Results Pending)   CT Angiogram Chest With Contrast    (Results Pending)     Vitals:    03/10/18 2150 03/10/18 2152 03/10/18 2200 03/10/18 2240   BP: (!) 171/120 (!) 171/120 (!) 162/122 (!) 165/118   Pulse:   106 101   Resp:       Temp:       TempSrc:       SpO2: 97% 97% 99% 96%   Weight:       Height:         Medications   sodium chloride 0.9 % flush 10 mL (not administered)   fentaNYL citrate (PF) (SUBLIMAZE) injection 50 mcg (50 mcg Intravenous Given 3/10/18 2232)   ondansetron (ZOFRAN) injection 4 mg (4 mg Intravenous Given 3/10/18 2232)   furosemide (LASIX) injection 40 mg (not administered)   nitroglycerin (NITROSTAT) SL tablet 0.4 mg (not administered)     ECG/EMG Results (last 24 hours)     Procedure Component Value Units Date/Time    ECG 12 Lead [[id number]] Collected:  03/10/18 2153     Updated:  03/10/18 2155                          [facility]    Final diagnoses:   Atrial fibrillation with RVR   Congestive heart failure, unspecified congestive heart failure chronicity, unspecified congestive heart failure type   Respiratory failure with hypoxia, unspecified chronicity   Hypertensive urgency       Documentation assistance provided by tejal Emery.  Information recorded by the scribe was done at my direction and has been verified and validated by me.     Christina Emery  03/10/18 2223       Christina Emery  03/10/18 2324       Simba Lopez DO  03/11/18 8121

## 2018-03-11 NOTE — H&P
DILEEP/ Medicine History and Physical    Primary Care Physician: Sharon Saldivar MD    Chief complaint   Exertional dyspnea.     History of Present Illness  72-year-old female presented to ER with the chief complaint of exertional dyspnea-going on for last few weeks worse over last week  Do not complain of any cough, does complain of pedal edema, no clear PND symptoms, no complain of chest pain.  No complain of palpitations lightheadedness or dizziness.  She did complain of headache after nitroglycerin she got in the ER.  Do not complain of any upper respiratory tract symptoms, no wheezing, nonsmoker since 1979.  She also complains of abdominal pain-worse in the right upper quadrant, describing a sharp not radiating, worse with food, last BM was yesterday morning-loose, she does complain of generalized abdominal cramping also.no co of worsening reflux sx.    Patient was recently seen by gastroenterology for this ongoing abdominal pain and cramping and loose bowel movements-was diagnosed with norvo virus.  Do not complain of any bleeding per GI or , did have nausea but no vomiting, decreased appetite.    Also patient states that she has not been taking any of her blood pressure medication, her long-acting insulin, her Lasix for almost last month, secondary to the side effects and transitioning her primary care.    Patient's last admission was 2016 for cellulitis of left lower leg.    Review of Systems   Complete ROS done, which is negative except as above and HPI.    Past Medical History:       Past Medical History:   Diagnosis Date   • Anxiety/depression-Amitriptyline and 100 mg daily at bedtime.                • CKD (chronic kidney disease), stage III    • Colon cancer-Status post right hemicolectomy in 2001,  2000   • Coronary artery disease/Hypertension-Aspirin 81, Lopressor 25 every 12, losartan 50 mg daily, HCTZ 25 daily.       1 stent             • Diabetes mellitus/Neuropathy-Lyrica 75 daily, Neurontin  "300 every 12      dx 2 years ago- checks fsbs bid  -Glimepiride 2 mg daily, Levemir 20 units daily at bedtime, picked those up 0.6 daily      • GERD (gastroesophageal reflux disease)-Protonix 40 daily     • GI bleed-Upper admitted to [place] in 2016.  IBS-on Linzness.    • Gout-allopurinol 100 daily            •     • Hyperlipidemia            Degenerative disease-on opiates.      • PAF (paroxysmal atrial fibrillation)-Date to 50 µg by mouth daily  2007    postoperative total knee replacement     • RLS (restless legs syndrome)    • Sleep apnea        Past Surgical History:  Past Surgical History:   Procedure Laterality Date   • APPENDECTOMY     • CARDIAC CATHETERIZATION  08/2015    no stents   • COLON RESECTION     • COLONOSCOPY  2000   • CORONARY ANGIOPLASTY WITH STENT PLACEMENT  12/2015   • [place] TOTAL KNEE ARTHROPLASTY Left 11/3/2016    Procedure: LEFT TOTAL KNEE REPLACEMENT;  Surgeon: Laurent Zuniga MD;  Location: [facility];  Service: Orthopedics   • TONSILLECTOMY     • TOTAL KNEE ARTHROPLASTY Right 2008    revision 2010   • TOTAL SHOULDER ARTHROPLASTY Bilateral        Family History:   family history includes Colon cancer in her father and other; Diabetes in her other; Heart disease in her other; Hypertension in her mother and other; Stroke in her father and other; Tuberculosis in her other.    Social History:    reports that she has quit smoking. Her smoking use included Cigarettes. She has a 22.00 pack-year smoking history. She has never used smokeless tobacco. She reports that she does not drink alcohol or use drugs.    Medications:    (Not in a hospital admission)  Allergies   Allergen Reactions   • Oxycodone Shortness Of Breath   • Zolpidem Other (See Comments)     nightmares           Physical Exam:    /87   Pulse 101   Temp 98.3 °F (36.8 °C) (Oral)   Resp (!) 30   Ht 167.6 cm (66\")   Wt 94.3 kg (208 lb)   SpO2 94%   BMI 33.57 kg/m²   VITALS-   AS ABOVE.  GENERAL-In tears, " well-nourished.  RS-inspiratory crackles at both bases, no wheezing.  CVS- s1s2 Regular, no murmur.  ABD- distended upper abdomen, the tenderness worse in the righ uq.  EXT- 1+ pedal edema, mild erythema on the left lower shin, nontender, good pulses.  NEURO- AAO-3, power 5/5 in all ext, no gross sensory deficit, cranial nerves intact.  EYES- Conjunctivae are normal. Pupils are equal, round, and reactive to light. No scleral icterus.   ENT- no external ear nose lesions, mucosa dry.  NECK-  No tracheal deviation present. No thyromegaly present,No cervical lymphadenopathy.  JOINTS/MSK- no deformity, no swelling.  SKIN- no rash , warm to touch.  PSYCHIATRIC- very anxious in tears       Sarwat Reviewed:         Data.  Lab Results   Component Value Date    GLUCOSE 135 (H) 03/10/2018    BUN 21 03/10/2018    CREATININE 1.00 03/10/2018    EGFRIFNONA 54 (L) 03/10/2018    BCR 21.0 03/10/2018    CO2 26.0 03/10/2018    CALCIUM 9.1 03/10/2018    ALBUMIN 4.50 03/10/2018    LABIL2 1.3 (L) 03/10/2018    AST 67 (H) 03/10/2018    ALT 37 03/10/2018     Lab Results   Component Value Date    WBC 9.93 03/10/2018    HGB 15.4 03/10/2018    HCT 46.8 (H) 03/10/2018    MCV 97.5 03/10/2018     03/10/2018     Lab Results   Component Value Date    CKTOTAL 1,250 (H) 11/05/2016    TROPONINI <0.006 12/11/2016       Imaging Results (last 24 hours)     Procedure Component Value Units Date/Time    CT Abdomen Pelvis With Contrast [[id number]] Collected:  03/10/18 2213     Updated:  03/11/18 0026            Impression:         Cholelithiasis without evidence of acute cholecystitis.  Followup with   sonography may be considered if clinically indicated.      Numerous other nonemergent findings as described.       THIS DOCUMENT HAS BEEN ELECTRONICALLY SIGNED BY ILENE CRUZ MD    CT Angiogram Chest With Contrast [[id number]] Collected:  03/10/18 2214     Updated:  03/11/18 0016    Narrative:                 No evidence of acute pulmonary  thromboembolism.      Findings suggestive of CHF with pulmonary edema and small bilateral pleural   effusion.      Mild to moderately enlarged predominantly hilar lymph nodes likely   inflammatory rather than neoplastic/metastatic.      Coronary arterial calcification suggestive of CAD.      Other nonemergent findings as described.       THIS DOCUMENT HAS BEEN ELECTRONICALLY SIGNED BY ILENE CRUZ MD    XR Chest 1 View [[id number]] Collected:  03/10/18 2151     Updated:  03/10/18 2306    Narrative:       EXAM:    XR Chest, 1 View    CLINICAL HISTORY:    72 years old, female; Signs and symptoms; Dyspnea; Additional info: SOA   triage protocol    TECHNIQUE:    Frontal view of the chest.    COMPARISON:    CR - XR CHEST 1 VW 2016-12-11 18:50    FINDINGS:    Confluent and focal air space opacities in the lung bases. Blunting of the   costophrenic angles suggestive of small pleural effusion/thickening.    Cardiomegaly with pulmonary vascular congestion.  Unfolding of the aortic arch   with a tortuous descending thoracic aorta.       Impression:         Findings suggestive of CHF with pulmonary edema and trace pleural effusion.      Possibility of associated pneumonia cannot be excluded.  Recommend followup   to complete resolution.       THIS DOCUMENT HAS BEEN ELECTRONICALLY SIGNED BY ILENE CRUZ MD                I have personally reviewed current lab, radiology, and ekg .      Assessment/Plan   Old records reviewed and summarized in PM hx    Abdominal pain-somewhat chronic, seen by gastroenterology recently-Differential diagnosis peptic ulcer disease versus gallstone.  Versus anxiety.    -CT abdomen-Reviewed shows gallstones without any inflammation, has thickening of distal esophagus, borderline enlarged upper abdomen and retroperitoneal lymph nodes without any bulky lymphadenopathy.  suggesting esophagitis reflux   -LFTs stable  -ultrasound in a.m. to rule out acute cholecystitis, continue PPI every 12     Dyspnea/mild  hypoxia-no cough, no white count, hypercarbic, hypoxemic-likely combination of  /atelectasis/CHF from elevated blood pressure-not taking her medications. And lasix.  -Lasix 40 given in the ER, echocardiogram in a.m., Lasix 20 IV every daily, will rule out ACS,  last cardiac catheter was in 2016 with nonobstructive coronary artery disease and her cardiologist is at [place].  -Continue O2 supplementation, does not appear to be any infectious process.    -ABG 7.36, PCO2 of 50, PO2 of 84 this is on 32% FiO2  -BNP of 251, troponin of 0.04  -EKG sinus rhythm at 97 bpm with no acute ST-T wave changes.    -Chest x-ray-blunting of costophrenic angle-suggestive of small pleural effusion versus thickening, airspace opacities worse on the left side-but similar to old chest x-ray.  -CT PE-peribronchial and septal thickening prominent in the lower lobes-atelectasis volume loss, mild dependent ground glass opacity is worse on the right side with trace pleural effusions-?  CHF, mild to moderately enlarged hilar lymph nodes, no acute PE  -Need to address her compliance issue.    -Echo from 2016 shows normal ejection fraction  • Anxiety/depression-Amitriptyline and 100 mg daily at bedtime.       History of recurrent left leg cellulitis.           • CKD (chronic kidney disease), stage III-BUN/creatinine stable, potassium of 5.5  -Recheck electrolytes in a.m.      • Colon cancer-Status post right hemicolectomy in 2001,  2000   • Coronary artery disease/Hypertension-Aspirin 81, Lopressor 25 every 12, losartan 50 mg daily, HCTZ 25 daily.       Last cardiac catheter  in 2015-shows mild nonobstructive CAD, LAD stent patent,             • Diabetes mellitus/Neuropathy-Lyrica 75 daily, Neurontin 300 every 12      dx 2 years ago- checks fsbs bid  -Glimepiride 2 mg daily, Levemir 20 units daily at bedtime, picked those up 0.6 daily   -Patient is not taking any of her medication will continue fingerstick coverage.     • GERD (gastroesophageal  reflux disease)-Protonix 40 daily     • GI bleed-Upper admitted to [place] in 2016.  IBS-on Linzness.    • Gout-allopurinol 100 daily            •     • Hyperlipidemia            Degenerative disease-on opiates.      • PAF (paroxysmal atrial fibrillation)-Date to 50 µg by mouth daily  2007    postoperative total knee replacement  -patient not taking her digoxin also, patient's first EKG there was some worry about A. fib but the second EKG is sinus rhythm with PAC, not on anticoagulation secondary to history of GI bleed,    • RLS (restless legs syndrome)    • Sleep apnea          DVT PXL  -figueroa's/scds  -lovenox.        I discussed the patients findings and my recommendations with: patient/family.    I believe this patient meets INPATIENT status due to the need for care which can only be reasonably provided in an hospital setting such as aggressive/expedited ancillary services and/or consultation services, the necessity for IV medications, close physician monitoring and/or the possible need for procedures.  In such, I feel patient’s risk for adverse outcomes and need for care warrant INPATIENT evaluation and predict the patient’s care encounter to likely last beyond 2 midnights.        Rina Ty MD  03/11/18  1:04 AM

## 2018-03-12 LAB
GLUCOSE BLDC GLUCOMTR-MCNC: 154 MG/DL (ref 70–130)
GLUCOSE BLDC GLUCOMTR-MCNC: 158 MG/DL (ref 70–130)
GLUCOSE BLDC GLUCOMTR-MCNC: 185 MG/DL (ref 70–130)
GLUCOSE BLDC GLUCOMTR-MCNC: 193 MG/DL (ref 70–130)

## 2018-03-12 PROCEDURE — 99233 SBSQ HOSP IP/OBS HIGH 50: CPT | Performed by: HOSPITALIST

## 2018-03-12 PROCEDURE — 25010000002 ONDANSETRON PER 1 MG: Performed by: NURSE PRACTITIONER

## 2018-03-12 PROCEDURE — 25010000002 FUROSEMIDE PER 20 MG: Performed by: NURSE PRACTITIONER

## 2018-03-12 PROCEDURE — 25010000002 ENOXAPARIN PER 10 MG: Performed by: NURSE PRACTITIONER

## 2018-03-12 PROCEDURE — 82962 GLUCOSE BLOOD TEST: CPT

## 2018-03-12 RX ORDER — CARVEDILOL 3.12 MG/1
3.12 TABLET ORAL EVERY 12 HOURS SCHEDULED
Status: DISCONTINUED | OUTPATIENT
Start: 2018-03-12 | End: 2018-03-12

## 2018-03-12 RX ORDER — GABAPENTIN 100 MG/1
100 CAPSULE ORAL 2 TIMES DAILY
Status: DISCONTINUED | OUTPATIENT
Start: 2018-03-12 | End: 2018-03-14 | Stop reason: HOSPADM

## 2018-03-12 RX ORDER — CYCLOBENZAPRINE HCL 10 MG
5 TABLET ORAL NIGHTLY
Status: DISCONTINUED | OUTPATIENT
Start: 2018-03-12 | End: 2018-03-13

## 2018-03-12 RX ADMIN — ACETAMINOPHEN 650 MG: 325 TABLET ORAL at 05:19

## 2018-03-12 RX ADMIN — BISACODYL 5 MG: 5 TABLET, COATED ORAL at 18:04

## 2018-03-12 RX ADMIN — ACETAMINOPHEN 650 MG: 325 TABLET ORAL at 09:37

## 2018-03-12 RX ADMIN — FUROSEMIDE 20 MG: 10 INJECTION, SOLUTION INTRAMUSCULAR; INTRAVENOUS at 09:38

## 2018-03-12 RX ADMIN — DOCUSATE SODIUM 100 MG: 100 CAPSULE, LIQUID FILLED ORAL at 18:04

## 2018-03-12 RX ADMIN — ASPIRIN 81 MG: 81 TABLET, COATED ORAL at 09:38

## 2018-03-12 RX ADMIN — INSULIN LISPRO 2 UNITS: 100 INJECTION, SOLUTION INTRAVENOUS; SUBCUTANEOUS at 18:04

## 2018-03-12 RX ADMIN — BISACODYL 5 MG: 5 TABLET, COATED ORAL at 09:38

## 2018-03-12 RX ADMIN — INSULIN LISPRO 2 UNITS: 100 INJECTION, SOLUTION INTRAVENOUS; SUBCUTANEOUS at 05:15

## 2018-03-12 RX ADMIN — LOSARTAN POTASSIUM 50 MG: 50 TABLET ORAL at 09:38

## 2018-03-12 RX ADMIN — ONDANSETRON 4 MG: 2 INJECTION INTRAMUSCULAR; INTRAVENOUS at 23:05

## 2018-03-12 RX ADMIN — METOPROLOL TARTRATE 25 MG: 25 TABLET ORAL at 20:26

## 2018-03-12 RX ADMIN — TRAZODONE HYDROCHLORIDE 25 MG: 50 TABLET, FILM COATED ORAL at 23:09

## 2018-03-12 RX ADMIN — ROPINIROLE 3 MG: 0.5 TABLET, FILM COATED ORAL at 20:26

## 2018-03-12 RX ADMIN — ONDANSETRON 4 MG: 2 INJECTION INTRAMUSCULAR; INTRAVENOUS at 05:19

## 2018-03-12 RX ADMIN — GABAPENTIN 100 MG: 100 CAPSULE ORAL at 20:26

## 2018-03-12 RX ADMIN — INSULIN LISPRO 2 UNITS: 100 INJECTION, SOLUTION INTRAVENOUS; SUBCUTANEOUS at 12:18

## 2018-03-12 RX ADMIN — METOPROLOL TARTRATE 25 MG: 25 TABLET ORAL at 09:38

## 2018-03-12 RX ADMIN — ACETAMINOPHEN 650 MG: 325 TABLET ORAL at 15:30

## 2018-03-12 RX ADMIN — PANTOPRAZOLE SODIUM 40 MG: 40 TABLET, DELAYED RELEASE ORAL at 20:26

## 2018-03-12 RX ADMIN — GABAPENTIN 300 MG: 300 CAPSULE ORAL at 09:38

## 2018-03-12 RX ADMIN — ENOXAPARIN SODIUM 40 MG: 40 INJECTION SUBCUTANEOUS at 05:14

## 2018-03-12 RX ADMIN — PANTOPRAZOLE SODIUM 40 MG: 40 TABLET, DELAYED RELEASE ORAL at 09:37

## 2018-03-12 RX ADMIN — CYCLOBENZAPRINE HYDROCHLORIDE 5 MG: 10 TABLET, FILM COATED ORAL at 20:26

## 2018-03-12 NOTE — PROGRESS NOTES
[facility] Medicine Services  PROGRESS NOTE    Patient Name: Angelita Shay  : 1945  MRN: [id number]    Date of Admission: 3/10/2018  Length of Stay: 1  Primary Care Physician: Sharon Saldivar MD    Subjective   Subjective     CC:  Shortness of breath    HPI:  Says she is claustrophobic.  No overnight events.  Says she tried machine for 2 hours    Review of Systems  Gen- No fevers, chills  CV- No chest pain, palpitations  Resp- No cough, dyspnea  GI- No N/V/D, abd pain    Otherwise ROS is negative except as mentioned in the HPI.    Objective   Objective     Vital Signs:   Temp:  [97.4 °F (36.3 °C)-98.3 °F (36.8 °C)] 97.5 °F (36.4 °C)  Heart Rate:  [74-85] 74  Resp:  [16-20] 16  BP: (122-143)/(77-94) 122/77  FiO2 (%):  [28 %] 28 %     Physical Exam:    Constitutional: No acute distress, awake, alert  HENT: NCAT, dry  Respiratory: poor inspiratory effort, distant breath sounds, no wheezes  Cardiovascular: RRR, no murmurs, rubs, or gallops, palpable pedal pulses bilaterally  Gastrointestinal: Positive bowel sounds, soft, obese abdomen, tenderness to palpation RUQ/LUQ  Musculoskeletal: No bilateral ankle edema  Psychiatric: Appropriate affect, cooperative  Neurologic: Oriented x 3, strength symmetric in all extremities, Cranial Nerves grossly intact to confrontation, speech clear  Skin: No rashes    Results Reviewed:  I have personally reviewed current lab, radiology, and data and agree.      Results from last 7 days  Lab Units 18  0511 03/10/18  2201   WBC 10*3/mm3 10.05 9.93   HEMOGLOBIN g/dL 14.5 15.4   HEMATOCRIT % 45.0* 46.8*   PLATELETS 10*3/mm3 124* 160       Results from last 7 days  Lab Units 18  1140 18  0640 03/10/18  2201   SODIUM mmol/L  --  140 139   POTASSIUM mmol/L  --  4.0 5.5   CHLORIDE mmol/L  --  103 106   CO2 mmol/L  --  31.0 26.0   BUN mg/dL  --  19 21   CREATININE mg/dL  --  1.00 1.00   GLUCOSE mg/dL  --  131* 135*   CALCIUM mg/dL  --  9.0  9.1   ALT (SGPT) U/L  --  28 37   AST (SGOT) U/L  --  24 67*   TROPONIN I ng/mL 0.047* 0.052*  --      Estimated Creatinine Clearance: 59.3 mL/min (by C-G formula based on SCr of 1 mg/dL).  BNP   Date Value Ref Range Status   03/10/2018 251.0 (H) 0.0 - 100.0 pg/mL Final     Comment:     Results may be falsely decreased if patient taking Biotin.     pH, Arterial   Date Value Ref Range Status   03/11/2018 7.369 7.350 - 7.450 pH units Final       Microbiology Results Abnormal     Procedure Component Value - Date/Time    Blood Culture - Blood, [[id number]]  (Normal) Collected:  03/10/18 2229    Lab Status:  Preliminary result Specimen:  Blood from Arm, Left Updated:  03/11/18 2346     Blood Culture No growth at 24 hours    Blood Culture - Blood, [[id number]]  (Normal) Collected:  03/10/18 2229    Lab Status:  Preliminary result Specimen:  Blood from Arm, Right Updated:  03/11/18 2346     Blood Culture No growth at 24 hours          Imaging Results (last 24 hours)     Procedure Component Value Units Date/Time    US Gallbladder [[id number]] Collected:  03/11/18 1702     Updated:  03/11/18 1820    Narrative:       EXAMINATION: US GALLBLADDER - 03/11/2018     INDICATION:  I48.91-Unspecified atrial fibrillation; I50.9-Heart  failure, unspecified; J96.91-Respiratory failure, unspecified with  hypoxia; I16.0-Hypertensive urgency.     COMPARISON: None.     FINDINGS:   1. Pancreas is somewhat limited by gaseous interference and fat. The  portions of the pancreas that are identified appear within normal  limits. There is minimal ectasia of the pancreatic duct, a typical  senile change.     2. The echo architecture of the liver is within normal limits. Focal  liver mass is not identified. There is no free fluid around the liver.     3. The gallbladder examination is abnormal. There are somewhat subtle  but definite echogenic foci near the neck of the gallbladder which move  and shadow. These are most indicative of cholelithiasis.      Gallbladder wall thickness is normal. Common bile duct is normal and  measures 4.2 mm.     4. The right kidney measures 11.4 x 4.2 x 5.2 cm. There is 1 cm of  cortical thickness. The right kidney is smooth with no evidence of mass  or hydronephrosis.       Impression:       Cholelithiasis. Normal common bile duct and normal-appearing  liver.     Incomplete pancreatic exam because of gaseous interference.      DICTATED:     03/11/2018  EDITED/ls :     03/11/2018      This report was finalized on 3/11/2018 6:18 PM by Dr. Wilbur Rosado MD.           Results for orders placed during the hospital encounter of 03/10/18   Adult Transthoracic Echo Complete W/ Cont if Necessary Per Protocol    Narrative · Left atrial cavity size is mildly dilated.  · Left ventricular wall thickness is consistent with mild concentric   hypertrophy.  · Left ventricular systolic function is moderately decreased. Estimated EF   = 36%.  · Mild mitral valve regurgitation is present  · Mild tricuspid valve regurgitation is present.  · Estimated right ventricular systolic pressure is moderately elevated   (45-55 mmHg).          I have reviewed the medications.    Assessment/Plan   Assessment / Plan     Hospital Problem List     Atrial fibrillation with RVR          Brief Hospital Course to date:  Angelita Shay is a 72 y.o. female with acute hypoxia / shortness of breath.    Assessment & Plan:    Acute Hypoxic Respiratory Failure  - possibly due to new onset systolic heart failure  Left Sided Chest Pain / Shortness of Breath  - recorded in ER records  - CTA done on admission - no PE, but findings of heart failure  New Onset Systolic CHF  - chest pain and shortness of breath  - history of CAD with coronary artery calcifications on imaging  - may need cardiac cath  - BB / diuretic  - Cardiology Consultation  Atrial Fibrillation RVR  - history of parox atrial fibrillation  - may be associated with sleep apnea  History of DENIS  - non compliant /  untreated  - encouraged CPAP therapy while here  - needs machine at home  Chronic Kidney Disease  - baseline creat 1.1-1.2  - appears to be stable  - monitor creat on lasix and gabapentin  Hypertensive Urgency  - /120 on admission  DM  - close to goal at A1C a little above 7  - insulin SS for now  Cholelithiasis  - No evidence of acute cholecystitis      DVT Prophylaxis:  Lovenox SC    CODE STATUS: Full Code    Disposition: I expect the patient to be discharged TBD      Electronically signed by Suresh Barone MD, 03/12/18, 4:39 PM.

## 2018-03-12 NOTE — PLAN OF CARE
Problem: Patient Care Overview  Goal: Plan of Care Review  Outcome: Ongoing (interventions implemented as appropriate)    Goal: Individualization and Mutuality  Outcome: Ongoing (interventions implemented as appropriate)    Goal: Discharge Needs Assessment  Outcome: Ongoing (interventions implemented as appropriate)    Goal: Interprofessional Rounds/Family Conf  Outcome: Ongoing (interventions implemented as appropriate)      Problem: Fall Risk (Adult)  Goal: Identify Related Risk Factors and Signs and Symptoms  Outcome: Ongoing (interventions implemented as appropriate)    Goal: Absence of Fall  Outcome: Ongoing (interventions implemented as appropriate)      Problem: Pain, Acute (Adult)  Goal: Identify Related Risk Factors and Signs and Symptoms  Outcome: Ongoing (interventions implemented as appropriate)    Goal: Acceptable Pain Control/Comfort Level  Outcome: Ongoing (interventions implemented as appropriate)      Problem: Skin Injury Risk (Adult)  Goal: Identify Related Risk Factors and Signs and Symptoms  Outcome: Ongoing (interventions implemented as appropriate)    Goal: Skin Health and Integrity  Outcome: Ongoing (interventions implemented as appropriate)

## 2018-03-13 PROBLEM — I42.8 NONISCHEMIC CARDIOMYOPATHY: Status: ACTIVE | Noted: 2018-03-13

## 2018-03-13 PROBLEM — I48.0 PAROXYSMAL ATRIAL FIBRILLATION: Status: ACTIVE | Noted: 2018-03-11

## 2018-03-13 LAB
ANION GAP SERPL CALCULATED.3IONS-SCNC: 12 MMOL/L (ref 3–11)
BNP SERPL-MCNC: 91 PG/ML (ref 0–100)
BUN BLD-MCNC: 24 MG/DL (ref 9–23)
BUN/CREAT SERPL: 21.8 (ref 7–25)
CALCIUM SPEC-SCNC: 9.2 MG/DL (ref 8.7–10.4)
CHLORIDE SERPL-SCNC: 99 MMOL/L (ref 99–109)
CK MB SERPL-CCNC: 2.36 NG/ML (ref 0–5)
CK SERPL-CCNC: 48 U/L (ref 26–174)
CO2 SERPL-SCNC: 27 MMOL/L (ref 20–31)
CREAT BLD-MCNC: 1.1 MG/DL (ref 0.6–1.3)
DEPRECATED RDW RBC AUTO: 51.4 FL (ref 37–54)
ERYTHROCYTE [DISTWIDTH] IN BLOOD BY AUTOMATED COUNT: 14.1 % (ref 11.3–14.5)
GFR SERPL CREATININE-BSD FRML MDRD: 49 ML/MIN/1.73
GLUCOSE BLD-MCNC: 125 MG/DL (ref 70–100)
GLUCOSE BLDC GLUCOMTR-MCNC: 117 MG/DL (ref 70–130)
GLUCOSE BLDC GLUCOMTR-MCNC: 122 MG/DL (ref 70–130)
GLUCOSE BLDC GLUCOMTR-MCNC: 144 MG/DL (ref 70–130)
GLUCOSE BLDC GLUCOMTR-MCNC: 159 MG/DL (ref 70–130)
GLUCOSE BLDC GLUCOMTR-MCNC: 174 MG/DL (ref 70–130)
GLUCOSE BLDC GLUCOMTR-MCNC: 210 MG/DL (ref 70–130)
HCT VFR BLD AUTO: 46.6 % (ref 34.5–44)
HGB BLD-MCNC: 14.8 G/DL (ref 11.5–15.5)
INR PPP: 0.95 (ref 0.91–1.09)
MAGNESIUM SERPL-MCNC: 2.4 MG/DL (ref 1.3–2.7)
MCH RBC QN AUTO: 31.8 PG (ref 27–31)
MCHC RBC AUTO-ENTMCNC: 31.8 G/DL (ref 32–36)
MCV RBC AUTO: 100.2 FL (ref 80–99)
PHOSPHATE SERPL-MCNC: 3.2 MG/DL (ref 2.4–5.1)
PLATELET # BLD AUTO: 146 10*3/MM3 (ref 150–450)
PMV BLD AUTO: 12 FL (ref 6–12)
POTASSIUM BLD-SCNC: 4.5 MMOL/L (ref 3.5–5.5)
PROTHROMBIN TIME: 10 SECONDS (ref 9.6–11.5)
RBC # BLD AUTO: 4.65 10*6/MM3 (ref 3.89–5.14)
SODIUM BLD-SCNC: 138 MMOL/L (ref 132–146)
TROPONIN I SERPL-MCNC: 0.03 NG/ML
WBC NRBC COR # BLD: 7.71 10*3/MM3 (ref 3.5–10.8)

## 2018-03-13 PROCEDURE — 0 IOPAMIDOL PER 1 ML: Performed by: INTERNAL MEDICINE

## 2018-03-13 PROCEDURE — 25010000002 FENTANYL CITRATE (PF) 100 MCG/2ML SOLUTION: Performed by: INTERNAL MEDICINE

## 2018-03-13 PROCEDURE — 93458 L HRT ARTERY/VENTRICLE ANGIO: CPT | Performed by: INTERNAL MEDICINE

## 2018-03-13 PROCEDURE — B211YZZ FLUOROSCOPY OF MULTIPLE CORONARY ARTERIES USING OTHER CONTRAST: ICD-10-PCS | Performed by: INTERNAL MEDICINE

## 2018-03-13 PROCEDURE — 94799 UNLISTED PULMONARY SVC/PX: CPT

## 2018-03-13 PROCEDURE — 4A023N7 MEASUREMENT OF CARDIAC SAMPLING AND PRESSURE, LEFT HEART, PERCUTANEOUS APPROACH: ICD-10-PCS | Performed by: INTERNAL MEDICINE

## 2018-03-13 PROCEDURE — 94660 CPAP INITIATION&MGMT: CPT

## 2018-03-13 PROCEDURE — C1894 INTRO/SHEATH, NON-LASER: HCPCS | Performed by: INTERNAL MEDICINE

## 2018-03-13 PROCEDURE — 82553 CREATINE MB FRACTION: CPT | Performed by: HOSPITALIST

## 2018-03-13 PROCEDURE — 99233 SBSQ HOSP IP/OBS HIGH 50: CPT | Performed by: HOSPITALIST

## 2018-03-13 PROCEDURE — 25010000002 HEPARIN (PORCINE) PER 1000 UNITS: Performed by: INTERNAL MEDICINE

## 2018-03-13 PROCEDURE — 85610 PROTHROMBIN TIME: CPT | Performed by: HOSPITALIST

## 2018-03-13 PROCEDURE — C1769 GUIDE WIRE: HCPCS | Performed by: INTERNAL MEDICINE

## 2018-03-13 PROCEDURE — 82962 GLUCOSE BLOOD TEST: CPT

## 2018-03-13 PROCEDURE — 80048 BASIC METABOLIC PNL TOTAL CA: CPT | Performed by: HOSPITALIST

## 2018-03-13 PROCEDURE — 25010000002 ENOXAPARIN PER 10 MG: Performed by: NURSE PRACTITIONER

## 2018-03-13 PROCEDURE — 25010000002 ONDANSETRON PER 1 MG: Performed by: NURSE PRACTITIONER

## 2018-03-13 PROCEDURE — 85027 COMPLETE CBC AUTOMATED: CPT | Performed by: HOSPITALIST

## 2018-03-13 PROCEDURE — 25010000002 MIDAZOLAM PER 1 MG: Performed by: INTERNAL MEDICINE

## 2018-03-13 PROCEDURE — 83735 ASSAY OF MAGNESIUM: CPT | Performed by: HOSPITALIST

## 2018-03-13 PROCEDURE — 84484 ASSAY OF TROPONIN QUANT: CPT | Performed by: HOSPITALIST

## 2018-03-13 PROCEDURE — 25010000002 FUROSEMIDE PER 20 MG: Performed by: NURSE PRACTITIONER

## 2018-03-13 PROCEDURE — S0260 H&P FOR SURGERY: HCPCS | Performed by: INTERNAL MEDICINE

## 2018-03-13 PROCEDURE — 82550 ASSAY OF CK (CPK): CPT | Performed by: HOSPITALIST

## 2018-03-13 PROCEDURE — 84100 ASSAY OF PHOSPHORUS: CPT | Performed by: HOSPITALIST

## 2018-03-13 PROCEDURE — 83880 ASSAY OF NATRIURETIC PEPTIDE: CPT | Performed by: HOSPITALIST

## 2018-03-13 RX ORDER — LIDOCAINE HYDROCHLORIDE 10 MG/ML
INJECTION, SOLUTION EPIDURAL; INFILTRATION; INTRACAUDAL; PERINEURAL AS NEEDED
Status: DISCONTINUED | OUTPATIENT
Start: 2018-03-13 | End: 2018-03-13 | Stop reason: HOSPADM

## 2018-03-13 RX ORDER — METHOCARBAMOL 750 MG/1
1500 TABLET, FILM COATED ORAL EVERY 8 HOURS SCHEDULED
Status: DISCONTINUED | OUTPATIENT
Start: 2018-03-13 | End: 2018-03-14 | Stop reason: HOSPADM

## 2018-03-13 RX ORDER — FUROSEMIDE 20 MG/1
20 TABLET ORAL 3 TIMES WEEKLY
Status: ON HOLD | COMMUNITY
End: 2018-03-14

## 2018-03-13 RX ORDER — FENTANYL CITRATE 50 UG/ML
INJECTION, SOLUTION INTRAMUSCULAR; INTRAVENOUS AS NEEDED
Status: DISCONTINUED | OUTPATIENT
Start: 2018-03-13 | End: 2018-03-13 | Stop reason: HOSPADM

## 2018-03-13 RX ORDER — FUROSEMIDE 40 MG/1
40 TABLET ORAL DAILY
Status: DISCONTINUED | OUTPATIENT
Start: 2018-03-14 | End: 2018-03-14

## 2018-03-13 RX ORDER — SODIUM CHLORIDE 9 MG/ML
INJECTION, SOLUTION INTRAVENOUS CONTINUOUS PRN
Status: DISCONTINUED | OUTPATIENT
Start: 2018-03-13 | End: 2018-03-13 | Stop reason: HOSPADM

## 2018-03-13 RX ORDER — CARVEDILOL 12.5 MG/1
12.5 TABLET ORAL EVERY 12 HOURS SCHEDULED
Status: DISCONTINUED | OUTPATIENT
Start: 2018-03-13 | End: 2018-03-14 | Stop reason: HOSPADM

## 2018-03-13 RX ORDER — MIDAZOLAM HYDROCHLORIDE 1 MG/ML
INJECTION INTRAMUSCULAR; INTRAVENOUS AS NEEDED
Status: DISCONTINUED | OUTPATIENT
Start: 2018-03-13 | End: 2018-03-13 | Stop reason: HOSPADM

## 2018-03-13 RX ORDER — ASPIRIN 81 MG/1
324 TABLET, CHEWABLE ORAL ONCE
Status: COMPLETED | OUTPATIENT
Start: 2018-03-13 | End: 2018-03-13

## 2018-03-13 RX ORDER — HYDROCODONE BITARTRATE AND ACETAMINOPHEN 5; 325 MG/1; MG/1
1 TABLET ORAL ONCE AS NEEDED
Status: COMPLETED | OUTPATIENT
Start: 2018-03-13 | End: 2018-03-13

## 2018-03-13 RX ORDER — ASPIRIN 81 MG/1
81 TABLET ORAL DAILY
Status: DISCONTINUED | OUTPATIENT
Start: 2018-03-14 | End: 2018-03-14 | Stop reason: HOSPADM

## 2018-03-13 RX ORDER — TRAZODONE HYDROCHLORIDE 50 MG/1
50 TABLET ORAL NIGHTLY
Status: ON HOLD | COMMUNITY
End: 2018-03-14

## 2018-03-13 RX ADMIN — METOPROLOL TARTRATE 25 MG: 25 TABLET ORAL at 09:31

## 2018-03-13 RX ADMIN — ASPIRIN 81 MG 324 MG: 81 TABLET ORAL at 12:45

## 2018-03-13 RX ADMIN — ACETAMINOPHEN 650 MG: 325 TABLET ORAL at 21:33

## 2018-03-13 RX ADMIN — FUROSEMIDE 20 MG: 10 INJECTION, SOLUTION INTRAMUSCULAR; INTRAVENOUS at 09:31

## 2018-03-13 RX ADMIN — ASPIRIN 81 MG: 81 TABLET, COATED ORAL at 09:31

## 2018-03-13 RX ADMIN — TRAZODONE HYDROCHLORIDE 25 MG: 50 TABLET, FILM COATED ORAL at 23:23

## 2018-03-13 RX ADMIN — GABAPENTIN 100 MG: 100 CAPSULE ORAL at 09:31

## 2018-03-13 RX ADMIN — APIXABAN 5 MG: 5 TABLET, FILM COATED ORAL at 21:12

## 2018-03-13 RX ADMIN — LOSARTAN POTASSIUM 50 MG: 50 TABLET ORAL at 09:31

## 2018-03-13 RX ADMIN — INSULIN LISPRO 2 UNITS: 100 INJECTION, SOLUTION INTRAVENOUS; SUBCUTANEOUS at 00:26

## 2018-03-13 RX ADMIN — ENOXAPARIN SODIUM 40 MG: 40 INJECTION SUBCUTANEOUS at 05:17

## 2018-03-13 RX ADMIN — INSULIN LISPRO 3 UNITS: 100 INJECTION, SOLUTION INTRAVENOUS; SUBCUTANEOUS at 23:50

## 2018-03-13 RX ADMIN — ONDANSETRON 4 MG: 2 INJECTION INTRAMUSCULAR; INTRAVENOUS at 09:43

## 2018-03-13 RX ADMIN — DOCUSATE SODIUM 100 MG: 100 CAPSULE, LIQUID FILLED ORAL at 09:40

## 2018-03-13 RX ADMIN — HYDROCODONE BITARTRATE AND ACETAMINOPHEN 1 TABLET: 5; 325 TABLET ORAL at 23:23

## 2018-03-13 RX ADMIN — PANTOPRAZOLE SODIUM 40 MG: 40 TABLET, DELAYED RELEASE ORAL at 21:13

## 2018-03-13 RX ADMIN — CARVEDILOL 12.5 MG: 12.5 TABLET, FILM COATED ORAL at 21:17

## 2018-03-13 RX ADMIN — PANTOPRAZOLE SODIUM 40 MG: 40 TABLET, DELAYED RELEASE ORAL at 09:31

## 2018-03-13 RX ADMIN — BISACODYL 5 MG: 5 TABLET, COATED ORAL at 09:40

## 2018-03-13 RX ADMIN — ROPINIROLE 3 MG: 0.5 TABLET, FILM COATED ORAL at 21:43

## 2018-03-13 RX ADMIN — GABAPENTIN 100 MG: 100 CAPSULE ORAL at 21:12

## 2018-03-13 RX ADMIN — METHOCARBAMOL 1500 MG: 750 TABLET ORAL at 21:18

## 2018-03-13 NOTE — CONSULTS
Angelita Shay  [id number]  1945   LOS: 2 days   Patient Care Team:  PHYSICIAN: Sharon TURNER MD   REMOTE CARDIOLOGIST: Christian Lacey MD  ANGIOGRAPHER: Robbie Bowie MD, [facility]  ORTHOPAEDIC SURGEON: Laurent Zuniga MD  GASTROENTEROLOGIST:   NEUROLOGIST: Wilbur Chino MD    Ms. Shay is a 72-year-old  white female from [place], [place]    Chief Complaint:  SOB, atrial fib    Problem List:  1. Systolic heart failure  1. Echocardiogram 2016: Normal EF   2. Echocardiogram 3/11/18: LVEF 0.36, mild MR, TR, RVSP 45-55 mmHg, LA mildly dilated, LV wall thickness consistent with mild concentric hypertrophy  2. Paroxysmal atrial fibrillation  1. Postoperatively after total knee replacement in 2007   2. CHADsVasc 5, no anticoagulation  3. Coronary artery disease  1. Left heart catheterization with PCI in 2012-data deficit, Dr. Lacey  2. Left heart catheterization 8/2/15: Mild nonobstructive coronary artery disease, previously placed stent in the LAD was widely patent, normal LVEF, mildly elevated systemic hypertension and mildly elevated LV filling pressures   3. CCS class I chest discomfort/NYHA class III dyspnea on exertion  4. Hypertension  5. Hyperlipidemia  6. Type 2 diabetes mellitus  7. Obstructive sleep apnea  8. GERD  9. IBS  10. Gout  11. Degenerative disc disease  12. Anxiety and depression  13. Restless leg syndrome  14. Chronic kidney disease stage III  15. Moderate obesity: BMI 35  16. History of cellulitis  17. History of colon cancer with chemotherapy  18. Chronic knee pain  19. Claustrophobia  20. Surgical history  1. Tonsillectomy  2. Appendectomy  3. Knee replacement surgeries ×2, right 2008, left in 2016  4. Shoulder surgeries ×2  5. Hemicolectomy    Allergies   Allergen Reactions   • Oxycodone Shortness Of Breath   • Zolpidem Other (See Comments)     nightmares     Prescriptions Prior to Admission   Medication Sig Dispense Refill Last Dose   • aspirin 81 MG EC tablet Take 1 tablet by  mouth Daily. Resume in 2 weeks      • furosemide (LASIX) 40 MG tablet Take 40 mg by mouth Daily.   11/2/2016 at 0900   • gabapentin (NEURONTIN) 300 MG capsule Take 300 mg by mouth 2 (Two) Times a Day.      • insulin aspart (NOVOLOG) 100 UNIT/ML injection Inject  under the skin 3 (Three) Times a Day Before Meals.   11/1/2016   • polyethylene glycol (MIRALAX) powder Take 17 g by mouth Daily.   11/1/2016   • pregabalin (LYRICA) 75 MG capsule Take 75 mg by mouth Daily.   11/1/2016   • rOPINIRole (REQUIP) 3 MG tablet Take 3 mg by mouth Every Night. Pt states she takes 1-3 pills nightly of the 1mg, but usually take 2mg      • TRAZODONE HCL PO Take  by mouth.      • allopurinol (ZYLOPRIM) 100 MG tablet Take 100 mg by mouth Daily.   11/2/2016 at 2230   • amitriptyline (ELAVIL) 100 MG tablet Take 100 mg by mouth Every Night.   11/2/2016 at 2230   • cefTRIAXone (ROCEPHIN) 40 MG/ML IVPB Infuse 50 mL into a venous catheter Daily. Indications: Skin and Soft Tissue Infection  0    • colchicine 0.6 MG tablet Take 0.6 mg by mouth Daily.   11/2/2016 at 2230   • digoxin (LANOXIN) 250 MCG tablet Take 1 tablet by mouth Daily. 30 tablet 0    • fluocinonide (LIDEX) 0.05 % cream Apply 1 application topically Daily. Apply sparingly to affected area(s) two times a day   11/2/2016 at 2230   • glimepiride (AMARYL) 2 MG tablet Take 2 mg by mouth Daily.   11/1/2016   • HYDROcodone-acetaminophen (NORCO) 5-325 MG per tablet Take 1 tablet by mouth Every 4 (Four) Hours As Needed.      • insulin detemir (LEVEMIR) 100 UNIT/ML injection Inject 20 Units under the skin Every Night.   11/2/2016 at 2230   • linaclotide (LINZESS) 145 MCG capsule capsule Take 145 mcg by mouth Every Morning.   11/2/2016 at 2230   • Liraglutide (VICTOZA) 18 MG/3ML solution pen-injector Inject 0.6 mg under the skin Daily.      • losartan (COZAAR) 50 MG tablet Take 1 tablet by mouth Daily. 30 tablet 0    • metoprolol tartrate (LOPRESSOR) 25 MG tablet Take 1 tablet by mouth  Every 12 (Twelve) Hours. 60 tablet 0    • ondansetron (ZOFRAN) 8 MG tablet Take 8 mg by mouth Every 8 (Eight) Hours As Needed for nausea or vomiting.   Past Week at Unknown time   • pantoprazole (PROTONIX) 40 MG EC tablet Take 40 mg by mouth Daily.      • potassium chloride (KLOR-CON) 10 MEQ CR tablet Take 10 mEq by mouth Daily As Needed.   11/2/2016 at 0900   • triamterene-hydrochlorothiazide (MAXZIDE-25) 37.5-25 MG per tablet Take 1 tablet by mouth Daily.      • Vancomycin HCl in NaCl 1.5-0.9 GM/500ML-% solution IVPB Infuse 500 mL into a venous catheter Daily. Indications: Skin and Soft Tissue Infection 25176 mL       Scheduled Meds:  aspirin 81 mg Oral Daily   cyclobenzaprine 5 mg Oral Nightly   enoxaparin 40 mg Subcutaneous Q AM   furosemide 20 mg Intravenous Daily   gabapentin 100 mg Oral BID   insulin lispro 0-7 Units Subcutaneous Q6H   losartan 50 mg Oral Daily   metoprolol tartrate 25 mg Oral Q12H   pantoprazole 40 mg Oral Q12H   rOPINIRole 3 mg Oral Nightly        History of Present Illness:    This is an 72-year-old white female who presented to [facility] ED 3/10/18 for exertional dyspnea that had worsened over the past week.  She denies any cough, chest pain, palpitations, presyncope, or syncope.  She had mild pedal edema. She denies wheezing, and has not smoked since 1979.  Today she has green sputum production. She has right upper quadrant abdominal pain that worsens with by mouth intake with last bowel movement 3/9/18.  Patient was seen by gastroenterology recently for ongoing abdominal pain and cramping is bowel movement was diagnosed with norvo virus.  She denies any melena, vomiting, decreased by mouth intake, but did have nausea.  She has not taken any of her blood pressure medication, insulin, Lasix for almost one month secondary to side effects and transitioning her primary care.She states that metoprolol makes her nauseous and her losartan she was on makes her hands tingle.  The patient has a history  "of colon cancer and has had chemotherapy before.  She is scheduled to have an outpatient colonoscopy in April 2018 with Dr. Cash.  She has some tenderness in her abdomen and the pain is fleeting.  She is awaiting GI to speak with her.  Since starting back on her Lasix, her breathing has improved.  She has not seen a cardiologist since her heart catheterization in 2015.      Cardiac risk factors: advanced age (older than 55 for men, 65 for women), diabetes mellitus, dyslipidemia, hypertension, obesity (BMI >= 30 kg/m2) and sedentary lifestyle.    Social History     Social History   • Marital status:      Spouse name: N/A   • Number of children: 5   • Years of education: N/A     Occupational History   • Retired       Social History Main Topics   • Smoking status: Former Smoker     Packs/day: 1.00     Years: 22.00     Types: Cigarettes   • Smokeless tobacco: Never Used      Comment: quit 1979   • Alcohol use No   • Drug use: No   • Sexual activity: Not on file     Other Topics Concern   • Not on file     Social History Narrative   • No narrative on file     Family History   Problem Relation Age of Onset   • Colon cancer Other    • Diabetes Other    • Heart disease Other    • Hypertension Other    • Stroke Other    • Tuberculosis Other    • Hypertension Mother    • Colon cancer Father    • Stroke Father        Review of Systems  Pertinent items are noted in HPI and problem list.     Objective:       Physical Exam  /78   Pulse 93   Temp 98.1 °F (36.7 °C)   Resp 18   Ht 167.6 cm (66\")   Wt 98.6 kg (217 lb 6.4 oz)   SpO2 95%   BMI 35.09 kg/m²   1    03/10/18  2143 03/11/18  0354   Weight: 94.3 kg (208 lb) 98.6 kg (217 lb 6.4 oz)     Body mass index is 35.09 kg/m².    Intake/Output Summary (Last 24 hours) at 03/13/18 0958  Last data filed at 03/13/18 0850   Gross per 24 hour   Intake             1240 ml   Output             2800 ml   Net            -1560 ml       General Appearance:  " Alert, cooperative, no distress, appears stated age   Head:  Normocephalic, without obvious abnormality, atraumatic   Neck: Supple, symmetrical, trachea midline, no adenopathy, thyroid: not enlarged, symmetric, no tenderness/mass/nodules, no carotid bruit or JVD   Lungs:   Bibasilar rales to auscultation bilaterally, respirations unlabored, 4 L O2 NC   Heart:  Regular rate and rhythm, S1, S2 normal, no murmur, rub or gallop   Abdomen:   Soft, non-tender, no masses, no organomegaly, bowel sounds audible x4   Extremities: No edema, normal range of motion   Pulses: 2+ and symmetric   Skin: Skin  texture, turgor normal,bilateral venous stasis   Neurologic: Normal       Cardiographics  · EKG:3/11/18:Sinus rhythm with 1st degree AV block with occasional premature ventricular  complexes and premature atrial complexes  Otherwise normal ECG  When compared with ECG of 11-MAR-2018 02:14, (Unconfirmed)  No significant change was found  · Gallbladder ultrasound 3/11/18:Cholelithiasis. Normal common bile duct and normal-appearing liver. Incomplete pancreatic exam because of gaseous interference.  · Echocardiogram 3/11/18:  · Left atrial cavity size is mildly dilated.  · Left ventricular wall thickness is consistent with mild concentric hypertrophy.  · Left ventricular systolic function is moderately decreased. Estimated EF = 36%.  · Mild mitral valve regurgitation is present  · Mild tricuspid valve regurgitation is present.  · Estimated right ventricular systolic pressure is moderately elevated (45-55 mmHg).      Imaging  · Chest x-ray:3/10/18:Findings suggestive of CHF with pulmonary edema and trace pleural effusion.  Possibility of associated pneumonia cannot be excluded.  Recommend followup   to complete resolution    Lab Review     Results from last 7 days  Lab Units 03/13/18  0439 03/11/18  0640 03/10/18  2201   SODIUM mmol/L 138 140 139   POTASSIUM mmol/L 4.5 4.0 5.5   CHLORIDE mmol/L 99 103 106   CO2 mmol/L 27.0 31.0 26.0    BUN mg/dL 24* 19 21   CREATININE mg/dL 1.10 1.00 1.00   GLUCOSE mg/dL 125* 131* 135*   CALCIUM mg/dL 9.2 9.0 9.1       Results from last 7 days  Lab Units 03/13/18  0439 03/11/18  0511 03/10/18  2201   WBC 10*3/mm3 7.71 10.05 9.93   HEMOGLOBIN g/dL 14.8 14.5 15.4   HEMATOCRIT % 46.6* 45.0* 46.8*   PLATELETS 10*3/mm3 146* 124* 160       Results from last 7 days  Lab Units 03/11/18  0640   CHOLESTEROL mg/dL 175   TRIGLYCERIDES mg/dL 122   HDL CHOL mg/dL 55   LDL CHOL mg/dL 109       Results from last 7 days  Lab Units 03/11/18  0511   HEMOGLOBIN A1C % 7.30*       Results from last 7 days  Lab Units 03/13/18  0439 03/11/18  1140 03/11/18  0640   CK TOTAL U/L 48  --   --    TROPONIN I ng/mL 0.030 0.047* 0.052*         Assessment:    Patient with CHF exacerbation in the setting of mild to moderate cardiomyopathy. She will need an ischemic evaluation to rule out causes for her EF decreasing to 36% in the past 2 years.  Left heart catheterization procedure, risks, complications discussed with patient and she is agreeable to proceed.          Plan:   1. Lasix 40 mg daily  2. Zaroxolyn 5 mg x once for increased shortness of breath  3. Discontinue metoprolol  4.  Coreg 12.5 mg twice a day  5.  Nothing by mouth after midnight except meds  6. LHC +/- CBI tomorrow  7. Discontinue losartan and begin Entresto 24/26 mg twice a day tomorrow      Scribed for Pierre Jones MD by Kristine Salinas, APRN. 3/13/2018  10:23 AM     I,Pierre Jones M.D., personally performed the services described in this documentation as scribed by the above named individual in my presence, and it is both accurate and complete.  3/13/2018  1:05 PM

## 2018-03-13 NOTE — PROGRESS NOTES
[facility]  PROGRESS NOTE    Patient Name: Angelita Shay  : 1945  MRN: [id number]    Date of Admission: 3/10/2018  Length of Stay: 2  Primary Care Physician: Sharon Saldivar MD    Subjective   Subjective     CC:  Shortness of breath    HPI:  Sleeping today.  No family.  Concerned about abdominal pain.    Review of Systems  Gen- No fevers, chills  CV- No chest pain, palpitations  Resp- No cough, dyspnea  GI- No N/V/D, abd pain    Otherwise ROS is negative except as mentioned in the HPI.    Objective   Objective     Vital Signs:   Temp:  [98.1 °F (36.7 °C)-98.3 °F (36.8 °C)] 98.1 °F (36.7 °C)  Heart Rate:  [] 90  Resp:  [18-20] 20  BP: (125-154)/(78-94) 125/78  FiO2 (%):  [28 %-32 %] 32 %     Physical Exam:    Constitutional: No acute distress, awake, alert  HENT: NCAT, dry  Respiratory: poor inspiratory effort, distant breath sounds, no wheezes  Cardiovascular: RRR, no murmurs, rubs, or gallops, palpable pedal pulses bilaterally  Gastrointestinal: Positive bowel sounds, soft, obese abdomen, tenderness to palpation RUQ/LUQ  Musculoskeletal: No bilateral ankle edema  Psychiatric: Appropriate affect, cooperative  Neurologic: Oriented x 3, strength symmetric in all extremities, Cranial Nerves grossly intact to confrontation, speech clear  Skin: No rashes    Results Reviewed:  I have personally reviewed current lab, radiology, and data and agree.      Results from last 7 days  Lab Units 18  04318  0511 03/10/18  2201   WBC 10*3/mm3 7.71 10.05 9.93   HEMOGLOBIN g/dL 14.8 14.5 15.4   HEMATOCRIT % 46.6* 45.0* 46.8*   PLATELETS 10*3/mm3 146* 124* 160   INR  0.95  --   --        Results from last 7 days  Lab Units 18  0439 18  1140 18  0640 03/10/18  2201   SODIUM mmol/L 138  --  140 139   POTASSIUM mmol/L 4.5  --  4.0 5.5   CHLORIDE mmol/L 99  --  103 106   CO2 mmol/L 27.0  --  31.0 26.0   BUN mg/dL 24*  --  19 21   CREATININE mg/dL  1.10  --  1.00 1.00   GLUCOSE mg/dL 125*  --  131* 135*   CALCIUM mg/dL 9.2  --  9.0 9.1   ALT (SGPT) U/L  --   --  28 37   AST (SGOT) U/L  --   --  24 67*   TROPONIN I ng/mL 0.030 0.047* 0.052*  --      Estimated Creatinine Clearance: 53.9 mL/min (by C-G formula based on SCr of 1.1 mg/dL).  BNP   Date Value Ref Range Status   03/13/2018 91.0 0.0 - 100.0 pg/mL Final     Comment:     Results may be falsely decreased if patient taking Biotin.     pH, Arterial   Date Value Ref Range Status   03/11/2018 7.369 7.350 - 7.450 pH units Final       Microbiology Results Abnormal     Procedure Component Value - Date/Time    Blood Culture - Blood, [[id number]]  (Normal) Collected:  03/10/18 2229    Lab Status:  Preliminary result Specimen:  Blood from Arm, Left Updated:  03/12/18 2346     Blood Culture No growth at 2 days    Blood Culture - Blood, [[id number]]  (Normal) Collected:  03/10/18 2229    Lab Status:  Preliminary result Specimen:  Blood from Arm, Right Updated:  03/12/18 2346     Blood Culture No growth at 2 days          Imaging Results (last 24 hours)     ** No results found for the last 24 hours. **        Results for orders placed during the hospital encounter of 03/10/18   Adult Transthoracic Echo Complete W/ Cont if Necessary Per Protocol    Narrative · Left atrial cavity size is mildly dilated.  · Left ventricular wall thickness is consistent with mild concentric   hypertrophy.  · Left ventricular systolic function is moderately decreased. Estimated EF   = 36%.  · Mild mitral valve regurgitation is present  · Mild tricuspid valve regurgitation is present.  · Estimated right ventricular systolic pressure is moderately elevated   (45-55 mmHg).          I have reviewed the medications.    Assessment/Plan   Assessment / Plan     Hospital Problem List     * (Principal)Acute systolic congestive heart failure    Overview Addendum 3/13/2018  5:17 PM by Festus Bowie IV, MD     · Echocardiogram (3/11/18):  LVEF  36%, mild MR  · Cardiac catheterization (3/13/18): Mild to moderate nonobstructive CAD. Previously placed LAD stent widely patent.         Atrial fibrillation with RVR    Nonischemic cardiomyopathy    Overview Signed 3/13/2018  5:17 PM by Festus Bowie IV, MD     · Echocardiogram (3/11/18):  LVEF 36%, mild MR               Brief Hospital Course to date:  Angelita Shay is a 72 y.o. female with acute hypoxia / shortness of breath.    Assessment & Plan:    Acute Hypoxic Respiratory Failure  - possibly due to new onset systolic heart failure  Left Sided Chest Pain / Shortness of Breath  - recorded in ER records  - CTA done on admission - no PE, but findings of heart failure  New Onset Systolic CHF  - chest pain and shortness of breath  - history of CAD with coronary artery calcifications on imaging  - may need cardiac cath  - BB / diuretic  - Cardiology Consultation  Atrial Fibrillation RVR  - history of parox atrial fibrillation  - may be associated with sleep apnea  History of DENIS  - non compliant / untreated  - encouraged CPAP therapy while here  - needs machine at home  Chronic Kidney Disease  - baseline creat 1.1-1.2  - appears to be stable  - monitor creat on lasix and gabapentin  Hypertensive Urgency  - /120 on admission  DM  - close to goal at A1C a little above 7  - insulin SS for now  Cholelithiasis  - No evidence of acute cholecystitis    Trial of skeletal muscle relaxant  Cardiac Cath today    DVT Prophylaxis:  Lovenox SC    CODE STATUS: Full Code    Disposition: I expect the patient to be discharged TBD      Electronically signed by Suresh Barone MD, 03/13/18, 5:24 PM.

## 2018-03-14 VITALS
BODY MASS INDEX: 35.03 KG/M2 | HEIGHT: 66 IN | HEART RATE: 75 BPM | WEIGHT: 218 LBS | RESPIRATION RATE: 16 BRPM | OXYGEN SATURATION: 91 % | DIASTOLIC BLOOD PRESSURE: 82 MMHG | TEMPERATURE: 98 F | SYSTOLIC BLOOD PRESSURE: 134 MMHG

## 2018-03-14 PROBLEM — I42.8 NONISCHEMIC CARDIOMYOPATHY: Chronic | Status: ACTIVE | Noted: 2018-03-13

## 2018-03-14 PROBLEM — K80.50 BILIARY COLIC: Status: ACTIVE | Noted: 2018-03-14

## 2018-03-14 PROBLEM — K80.20 CHOLELITHIASIS: Status: ACTIVE | Noted: 2018-03-14

## 2018-03-14 PROBLEM — I48.0 PAROXYSMAL ATRIAL FIBRILLATION: Chronic | Status: ACTIVE | Noted: 2018-03-11

## 2018-03-14 PROBLEM — K80.20 CHOLELITHIASIS: Chronic | Status: ACTIVE | Noted: 2018-03-14

## 2018-03-14 PROBLEM — K80.50 BILIARY COLIC: Chronic | Status: ACTIVE | Noted: 2018-03-14

## 2018-03-14 LAB
ANION GAP SERPL CALCULATED.3IONS-SCNC: 4 MMOL/L (ref 3–11)
BUN BLD-MCNC: 30 MG/DL (ref 9–23)
BUN/CREAT SERPL: 25 (ref 7–25)
CALCIUM SPEC-SCNC: 8.6 MG/DL (ref 8.7–10.4)
CHLORIDE SERPL-SCNC: 97 MMOL/L (ref 99–109)
CO2 SERPL-SCNC: 34 MMOL/L (ref 20–31)
CREAT BLD-MCNC: 1.2 MG/DL (ref 0.6–1.3)
GFR SERPL CREATININE-BSD FRML MDRD: 44 ML/MIN/1.73
GLUCOSE BLD-MCNC: 206 MG/DL (ref 70–100)
GLUCOSE BLDC GLUCOMTR-MCNC: 142 MG/DL (ref 70–130)
GLUCOSE BLDC GLUCOMTR-MCNC: 178 MG/DL (ref 70–130)
MAGNESIUM SERPL-MCNC: 2.1 MG/DL (ref 1.3–2.7)
POTASSIUM BLD-SCNC: 4.3 MMOL/L (ref 3.5–5.5)
SODIUM BLD-SCNC: 135 MMOL/L (ref 132–146)

## 2018-03-14 PROCEDURE — 25010000002 ONDANSETRON PER 1 MG: Performed by: NURSE PRACTITIONER

## 2018-03-14 PROCEDURE — 82962 GLUCOSE BLOOD TEST: CPT

## 2018-03-14 PROCEDURE — 99239 HOSP IP/OBS DSCHRG MGMT >30: CPT | Performed by: FAMILY MEDICINE

## 2018-03-14 PROCEDURE — 80048 BASIC METABOLIC PNL TOTAL CA: CPT | Performed by: INTERNAL MEDICINE

## 2018-03-14 PROCEDURE — 99232 SBSQ HOSP IP/OBS MODERATE 35: CPT | Performed by: INTERNAL MEDICINE

## 2018-03-14 PROCEDURE — 83735 ASSAY OF MAGNESIUM: CPT | Performed by: INTERNAL MEDICINE

## 2018-03-14 RX ORDER — SPIRONOLACTONE 25 MG/1
25 TABLET ORAL DAILY
Qty: 30 TABLET | Refills: 5 | Status: SHIPPED | OUTPATIENT
Start: 2018-03-14 | End: 2018-03-30 | Stop reason: HOSPADM

## 2018-03-14 RX ORDER — TRAZODONE HYDROCHLORIDE 50 MG/1
50 TABLET ORAL NIGHTLY
Qty: 30 TABLET | Refills: 2 | Status: SHIPPED | OUTPATIENT
Start: 2018-03-14 | End: 2018-03-21 | Stop reason: SDUPTHER

## 2018-03-14 RX ORDER — FUROSEMIDE 20 MG/1
20 TABLET ORAL DAILY
Qty: 30 TABLET | Refills: 5 | Status: SHIPPED | OUTPATIENT
Start: 2018-03-14 | End: 2018-03-30 | Stop reason: HOSPADM

## 2018-03-14 RX ORDER — SPIRONOLACTONE 25 MG/1
25 TABLET ORAL DAILY
Status: DISCONTINUED | OUTPATIENT
Start: 2018-03-14 | End: 2018-03-14 | Stop reason: HOSPADM

## 2018-03-14 RX ORDER — GABAPENTIN 300 MG/1
300 CAPSULE ORAL 2 TIMES DAILY
Qty: 60 CAPSULE | Refills: 2 | Status: SHIPPED | OUTPATIENT
Start: 2018-03-14 | End: 2018-04-06 | Stop reason: SDUPTHER

## 2018-03-14 RX ORDER — ROPINIROLE 1 MG/1
1-3 TABLET, FILM COATED ORAL NIGHTLY
Qty: 90 TABLET | Refills: 1 | Status: SHIPPED | OUTPATIENT
Start: 2018-03-14 | End: 2018-09-14 | Stop reason: SDUPTHER

## 2018-03-14 RX ORDER — POTASSIUM CHLORIDE 750 MG/1
40 CAPSULE, EXTENDED RELEASE ORAL AS NEEDED
Status: DISCONTINUED | OUTPATIENT
Start: 2018-03-14 | End: 2018-03-14 | Stop reason: HOSPADM

## 2018-03-14 RX ORDER — MAGNESIUM SULFATE HEPTAHYDRATE 40 MG/ML
4 INJECTION, SOLUTION INTRAVENOUS AS NEEDED
Status: DISCONTINUED | OUTPATIENT
Start: 2018-03-14 | End: 2018-03-14 | Stop reason: HOSPADM

## 2018-03-14 RX ORDER — FUROSEMIDE 20 MG/1
20 TABLET ORAL 3 TIMES WEEKLY
COMMUNITY
End: 2018-03-14 | Stop reason: HOSPADM

## 2018-03-14 RX ORDER — DIGOXIN 250 MCG
250 TABLET ORAL
Qty: 30 TABLET | Refills: 2 | Status: ON HOLD | OUTPATIENT
Start: 2018-03-14 | End: 2018-03-29

## 2018-03-14 RX ORDER — MAGNESIUM SULFATE HEPTAHYDRATE 40 MG/ML
2 INJECTION, SOLUTION INTRAVENOUS AS NEEDED
Status: DISCONTINUED | OUTPATIENT
Start: 2018-03-14 | End: 2018-03-14 | Stop reason: HOSPADM

## 2018-03-14 RX ORDER — HYDROCODONE BITARTRATE AND ACETAMINOPHEN 5; 325 MG/1; MG/1
1 TABLET ORAL ONCE
Status: COMPLETED | OUTPATIENT
Start: 2018-03-14 | End: 2018-03-14

## 2018-03-14 RX ORDER — FUROSEMIDE 20 MG/1
20 TABLET ORAL DAILY
Status: DISCONTINUED | OUTPATIENT
Start: 2018-03-14 | End: 2018-03-14 | Stop reason: HOSPADM

## 2018-03-14 RX ORDER — TRAZODONE HYDROCHLORIDE 50 MG/1
50 TABLET ORAL NIGHTLY
COMMUNITY
End: 2018-03-21 | Stop reason: SDUPTHER

## 2018-03-14 RX ORDER — ATORVASTATIN CALCIUM 20 MG/1
20 TABLET, FILM COATED ORAL NIGHTLY
Qty: 30 TABLET | Refills: 5 | Status: SHIPPED | OUTPATIENT
Start: 2018-03-14 | End: 2018-06-21

## 2018-03-14 RX ORDER — POTASSIUM CHLORIDE 1.5 G/1.77G
40 POWDER, FOR SOLUTION ORAL AS NEEDED
Status: DISCONTINUED | OUTPATIENT
Start: 2018-03-14 | End: 2018-03-14 | Stop reason: HOSPADM

## 2018-03-14 RX ORDER — POTASSIUM CHLORIDE 750 MG/1
10 TABLET, EXTENDED RELEASE ORAL DAILY
Qty: 30 TABLET | Refills: 5 | Status: SHIPPED | OUTPATIENT
Start: 2018-03-14 | End: 2018-03-14 | Stop reason: HOSPADM

## 2018-03-14 RX ORDER — ATORVASTATIN CALCIUM 20 MG/1
20 TABLET, FILM COATED ORAL NIGHTLY
Status: DISCONTINUED | OUTPATIENT
Start: 2018-03-14 | End: 2018-03-14 | Stop reason: HOSPADM

## 2018-03-14 RX ORDER — CARVEDILOL 12.5 MG/1
12.5 TABLET ORAL 2 TIMES DAILY WITH MEALS
Qty: 60 TABLET | Refills: 5 | Status: SHIPPED | OUTPATIENT
Start: 2018-03-14 | End: 2018-03-30 | Stop reason: HOSPADM

## 2018-03-14 RX ADMIN — SPIRONOLACTONE 25 MG: 25 TABLET, FILM COATED ORAL at 09:52

## 2018-03-14 RX ADMIN — METHOCARBAMOL 1500 MG: 750 TABLET ORAL at 13:47

## 2018-03-14 RX ADMIN — ONDANSETRON 4 MG: 2 INJECTION INTRAMUSCULAR; INTRAVENOUS at 04:44

## 2018-03-14 RX ADMIN — INSULIN LISPRO 2 UNITS: 100 INJECTION, SOLUTION INTRAVENOUS; SUBCUTANEOUS at 06:33

## 2018-03-14 RX ADMIN — APIXABAN 5 MG: 5 TABLET, FILM COATED ORAL at 08:30

## 2018-03-14 RX ADMIN — HYDROCODONE BITARTRATE AND ACETAMINOPHEN 1 TABLET: 5; 325 TABLET ORAL at 13:47

## 2018-03-14 RX ADMIN — PANTOPRAZOLE SODIUM 40 MG: 40 TABLET, DELAYED RELEASE ORAL at 08:30

## 2018-03-14 RX ADMIN — ACETAMINOPHEN 650 MG: 325 TABLET ORAL at 05:24

## 2018-03-14 RX ADMIN — ASPIRIN 81 MG: 81 TABLET, COATED ORAL at 08:30

## 2018-03-14 RX ADMIN — GABAPENTIN 100 MG: 100 CAPSULE ORAL at 08:31

## 2018-03-14 RX ADMIN — CARVEDILOL 12.5 MG: 12.5 TABLET, FILM COATED ORAL at 08:31

## 2018-03-14 RX ADMIN — FUROSEMIDE 20 MG: 40 TABLET ORAL at 08:31

## 2018-03-14 RX ADMIN — METHOCARBAMOL 1500 MG: 750 TABLET ORAL at 05:24

## 2018-03-14 NOTE — PLAN OF CARE
Problem: Patient Care Overview  Goal: Plan of Care Review  Outcome: Ongoing (interventions implemented as appropriate)   03/14/18 6066   Coping/Psychosocial   Plan of Care Reviewed With patient   Plan of Care Review   Progress no change   OTHER   Outcome Summary Pt. arrived to the floor around 22:00; had heart cath without intervention; L radial site has gauze & tegaderm; Pt.'s SBP had a 34 point difference; c/o upper abdominal pain; Loratab 5 ordered and administered; seems to have been effective; using 3L of oxygen via nc; remaining VSS; utilizing CPAP for 4 hours last night; continue to monitor.

## 2018-03-14 NOTE — PROGRESS NOTES
Continued Stay Note  [facility]     Patient Name: Angelita Shay  MRN: [id number]  Today's Date: 3/14/2018    Admit Date: 3/10/2018          Discharge Plan     Row Name 03/14/18 1304       Plan    Plan Comments Pt qualifies for oxygen and had no preference of DME company. ROMEL sent oxygen order to [employer]. ROMEL called [employer] to make sure order was received. [employer] will be bringing pt a portable oxygen tank to go home on and setting pt up with oxygen concentrator at home.                Discharge Codes    No documentation.       Expected Discharge Date and Time     Expected Discharge Date Expected Discharge Time    Mar 14, 2018             VAL Rodriguez

## 2018-03-14 NOTE — PROGRESS NOTES
[facility] at [facility]  Cardiology Progress Note      Chief Complaint/Reason for service:    · Acute systolic heart failure  · Nonischemic cardiomyopathy  · Nonobstructive coronary artery disease  · Atrial fibrillation         Patient is breathing better. She has been having abdominal pain. GI is supposed to evaluate this morning. No orthopnea, PND.    Past medical, surgical, social and family history reviewed in the patient's electronic medical record.         Vital Sign Min/Max for last 24 hours  Temp  Min: 97.4 °F (36.3 °C)  Max: 99.2 °F (37.3 °C)   BP  Min: 108/63  Max: 173/81   Pulse  Min: 66  Max: 109   Resp  Min: 18  Max: 26   SpO2  Min: 85 %  Max: 98 %   Flow (L/min)  Min: 2  Max: 4      Intake/Output Summary (Last 24 hours) at 03/14/18 0811  Last data filed at 03/13/18 0931   Gross per 24 hour   Intake              600 ml   Output              300 ml   Net              300 ml           Physical Exam   Constitutional: She is oriented to person, place, and time. She appears well-developed and well-nourished.   HENT:   Head: Normocephalic and atraumatic.   Cardiovascular: Normal rate and regular rhythm.    No murmur heard.  Pulmonary/Chest: Effort normal.   Neurological: She is alert and oriented to person, place, and time.   Skin: Skin is warm.     Telemetry: Sinus    Results Review:   I reviewed the patient's recent labs in the electronic medical record.        Results from last 7 days  Lab Units 03/13/18  0439 03/11/18  0640 03/10/18  2201   SODIUM mmol/L 138 140 139   POTASSIUM mmol/L 4.5 4.0 5.5   CHLORIDE mmol/L 99 103 106   BUN mg/dL 24* 19 21   CREATININE mg/dL 1.10 1.00 1.00   MAGNESIUM mg/dL 2.4 2.1  --        Results from last 7 days  Lab Units 03/13/18  0439 03/11/18  1140 03/11/18  0640   TROPONIN I ng/mL 0.030 0.047* 0.052*       Results from last 7 days  Lab Units 03/13/18  0439 03/11/18  0511 03/10/18  2201   WBC 10*3/mm3 7.71 10.05 9.93   HEMOGLOBIN g/dL 14.8 14.5  15.4   HEMATOCRIT % 46.6* 45.0* 46.8*   PLATELETS 10*3/mm3 146* 124* 160       Lab Results   Component Value Date    HGBA1C 7.30 (H) 03/11/2018       Lab Results   Component Value Date    CHOL 175 03/11/2018    TRIG 122 03/11/2018    HDL 55 03/11/2018    AST 24 03/11/2018    ALT 28 03/11/2018            Hospital Problem List     * (Principal)Acute systolic congestive heart failure    Overview Addendum 3/13/2018  5:17 PM by Festus Bowie IV, MD     · Echocardiogram (3/11/18):  LVEF 36%, mild MR  · Cardiac catheterization (3/13/18): Mild to moderate nonobstructive CAD. Previously placed LAD stent widely patent.         Paroxysmal atrial fibrillation    Overview Addendum 3/13/2018  6:14 PM by Festus Bowie IV, MD     · Chads VASC = 6 (age, female, CHF, CAD, HTN, DM)         Anxiety    DM type 2 (diabetes mellitus, type 2), on insulin therapy    Coronary artery disease involving native coronary artery of native heart with angina pectoris    Overview Addendum 3/13/2018  5:16 PM by Festus Bowie IV, MD     · Previous PCI to the LAD  · Cardiac catheterization (8/2/2015): Mild nonobstructive CAD. Widely patent LAD stent. Normal LVEF.  · Cardiac catheterization (3/13/18): Mild to moderate nonobstructive CAD. Previously placed LAD stent widely patent.         Sleep apnea    CKD (chronic kidney disease) stage 3, GFR 30-59 ml/min    Nonischemic cardiomyopathy    Overview Signed 3/13/2018  5:17 PM by Festus Bowie IV, MD     · Echocardiogram (3/11/18):  LVEF 36%, mild MR                      · Continue carvedilol and Entresto  · Eliquis for atrial fibrillation  · CHF nurse navigator to see today  · Follow-up with Linnea Cháevz in 4 weeks    Festus Bowie IV, MD  3/14/2018

## 2018-03-14 NOTE — DISCHARGE PLACEMENT REQUEST
"Angelita Siegel (72 y.o. Female)     Date of Birth Social Security Number Address Home Phone MRN    1945  [address]  [place] [id number] [phone] [id number]    Syn[employer] Marital Status          Church        Admission Date Admission Type Admitting Provider Attending Provider Department, Room/Bed    3/10/18 Emergency Marielos Amaro MD Hall, [place], MD [facility] 6A, N618/1    Discharge Date Discharge Disposition Discharge Destination         Home or Self Care              Attending Provider:  Marielos Amaro MD    Allergies:  Oxycodone, Zolpidem    Isolation:  None   Infection:  None   Code Status:  FULL    Ht:  167.6 cm (66\")   Wt:  98.9 kg (218 lb)    Admission Cmt:  None   Principal Problem:  Acute systolic congestive heart failure [I50.21] More...                 Active Insurance as of 3/10/2018     Primary Coverage     Payor Plan Insurance Group Employer/Plan Group    HUMANA MEDICARE REPLACEMENT HUMANA MEDICARE REPL [id number]     Payor Plan Address Payor Plan Phone Number Effective From Effective To    [address] [address] [phone] 1/1/2018     [place] [address]       Subscriber Name Subscriber Birth Date Member ID       ANGELITA SIEGEL 1945 [id number]           Secondary Coverage     Payor Plan Insurance Group Employer/Plan Group    [place]      Payor Plan Address Payor Plan Phone Number Effective From Effective To    [address] [phone] 10/27/2016     [place] [address]       Subscriber Name Subscriber Birth Date Member ID       ANGELITA SIEGEL 1945 [id number]                 Emergency Contacts      (Rel.) Home Phone Work Phone Mobile Phone    Medina Flynn (Daughter) [phone] -- --    Pat Peraza (Daughter) [phone] -- --        Oxygen Therapy [EQ60] (Order [id number])   Order   Date: 3/14/2018 Department: [address] Ordering/Authorizing: Marielos Amaro MD   Order History   Outpatient "   Date/Time Action Taken User Additional Information   03/14/18 1117 Sign Roz Howard RN Ordering Mode: Per protocol: cosign required   03/14/18 1215 Cosign Marielos Amaro MD    Order Details     Frequency Duration Priority Order Class   None None Routine External   Start Date/Time     Start Date   03/14/18   Order Questions     Question Answer Comment   Delivery Modality Nasal Cannula    Liters Per Minute: 2    Duration: Continuous    Equipment  Oxygen Concentrator &  &  Portable Gaseous Oxygen System & Portable Oxygen Contents Gaseous &  Conserving Regulator    The face to face evaluation was performed on 3/14/2018    Length of Need (99 Months = Lifetime) 99 Months = Lifetime    Note:  Custom values to enter length of need for DME          Verbal Order Info     Action Created on Order Mode Entered by Responsible Provider Signed by Signed on   Ordering 03/14/18 1117 Per protocol: cosign required Roz Howard RN  Signature Not Required    Cosign Order Info     Action Created on Responsible Provider Signed by Signed on   Ordering 03/14/18 1117 MD Marielos Mosqueda MD 03/14/18 1215   Source Order Set / Preference List     Preference List   AMB SUPPLIES [1416]   Clinical Indications      ICD-10-CM ICD-9-CM   Congestive heart failure, unspecified congestive heart failure chronicity, unspecified congestive heart failure type     I50.9 428.0   Respiratory failure with hypoxia, unspecified chronicity     J96.91 518.81   Reprint Order Requisition     [facility] (Order #[id number]) on 3/14/18   Encounter-Level Cardiology Documents:     There are no encounter-level cardiology documents.      Encounter     View Encounter               Order Provider Info         Office phone Pager E-mail   Ordering User Roz Howard RN -- -- --   Authorizing Provider Marielos Amaro [phone] -- --   Attending Provider Marielos Aamro [phone] -- --   Entered By Roz Howard RN -- -- --   Ordering  "Provider Marielos Amaro [phone] -- --   Ordering Provider Marielos Amaro [phone] -- --   Cosigned By (on 2018 1215) Marielos Amaro [phone] -- --   Linked Charges     No charges linked to this procedure   Order Transmittal Tracking     [facility] (Order #[id number]) on 3/14/18   Authorized by:  Marielos Amaro MD  (NPI: [id number])       Lab Component SmartPhrase Guide     [facility]YGEN THERAP[facility] (Order #[id number]) on 3/14/18          [address] [address]  Phone:  [phone]  Fax:   Date Ordered: Mar 14, 2018         Patient:  Angelita Shay MRN:  [id number]   [address] [facility] [address] :  1945  SSN:    Phone: [phone] Sex:  F     Weight: 98.9 kg (218 lb)         Ht Readings from Last 1 Encounters:   18 167.6 cm (66\")         Oxygen Therapy         (Order ID: [id number])    Diagnosis:  Congestive heart failure, unspecified congestive heart failure chronicity, unspecified congestive heart failure type (I50.9 [ICD-10-CM] 428.0 [ICD-9-CM])  Respiratory failure with hypoxia, unspecified chronicity (J96.91 [ICD-10-CM] 518.81 [ICD-9-CM])   Quantity:  1     Delivery Modality: Nasal Cannula  Liters Per Minute: 2  Duration: Continuous  Equipment:  Oxygen Concentrator &  &  Portable Gaseous Oxygen System & Portable Oxygen Contents Gaseous &  Conserving Regulator  The face to face evaluation was performed on: 3/14/2018  Length of Need (99 Months = Lifetime): 99 Months = Lifetime            Verbal Order Mode: Per protocol: cosign required  Authorizing Provider: Marielos Amaro MD  Authorizing Provider's NPI: [id number]     Order Entered By: Roz Howard RN 3/14/2018 11:17 AM     Electronically signed by: Marielos Amaro MD 3/14/2018 12:15 PM         18 0825  --  92  --  --  resting room air   84            History & Physical      Rina Ty MD at 3/11/2018  1:04 AM          DILEEP/ Medicine History " and Physical    Primary Care Physician: Sharon Saldivar MD    Chief complaint   Exertional dyspnea.     History of Present Illness  72-year-old female presented to ER with the chief complaint of exertional dyspnea-going on for last few weeks worse over last week  Do not complain of any cough, does complain of pedal edema, no clear PND symptoms, no complain of chest pain.  No complain of palpitations lightheadedness or dizziness.  She did complain of headache after nitroglycerin she got in the ER.  Do not complain of any upper respiratory tract symptoms, no wheezing, nonsmoker since 1979.  She also complains of abdominal pain-worse in the right upper quadrant, describing a sharp not radiating, worse with food, last BM was yesterday morning-loose, she does complain of generalized abdominal cramping also.no co of worsening reflux sx.    Patient was recently seen by gastroenterology for this ongoing abdominal pain and cramping and loose bowel movements-was diagnosed with norvo virus.  Do not complain of any bleeding per GI or , did have nausea but no vomiting, decreased appetite.    Also patient states that she has not been taking any of her blood pressure medication, her long-acting insulin, her Lasix for almost last month, secondary to the side effects and transitioning her primary care.    Patient's last admission was 2016 for cellulitis of left lower leg.    Review of Systems   Complete ROS done, which is negative except as above and HPI.    Past Medical History:       Past Medical History:   Diagnosis Date   • Anxiety/depression-Amitriptyline and 100 mg daily at bedtime.                • CKD (chronic kidney disease), stage III    • Colon cancer-Status post right hemicolectomy in 2001,  2000   • Coronary artery disease/Hypertension-Aspirin 81, Lopressor 25 every 12, losartan 50 mg daily, HCTZ 25 daily.       1 stent             • Diabetes mellitus/Neuropathy-Lyrica 75 daily, Neurontin 300 every 12      dx 2  "years ago- checks fsbs bid  -Glimepiride 2 mg daily, Levemir 20 units daily at bedtime, picked those up 0.6 daily      • GERD (gastroesophageal reflux disease)-Protonix 40 daily     • GI bleed-Upper admitted to [facility] in 2016.  IBS-on Linzness.    • Gout-allopurinol 100 daily            •     • Hyperlipidemia            Degenerative disease-on opiates.      • PAF (paroxysmal atrial fibrillation)-Date to 50 µg by mouth daily  2007    postoperative total knee replacement     • RLS (restless legs syndrome)    • Sleep apnea        Past Surgical History:  Past Surgical History:   Procedure Laterality Date   • APPENDECTOMY     • CARDIAC CATHETERIZATION  08/2015    no stents   • COLON RESECTION     • COLONOSCOPY  2000   • CORONARY ANGIOPLASTY WITH STENT PLACEMENT  12/2015   • OK TOTAL KNEE ARTHROPLASTY Left 11/3/2016    Procedure: LEFT TOTAL KNEE REPLACEMENT;  Surgeon: Laurent Zuniga MD;  Location: [facility];  Service: Orthopedics   • TONSILLECTOMY     • TOTAL KNEE ARTHROPLASTY Right 2008    revision 2010   • TOTAL SHOULDER ARTHROPLASTY Bilateral        Family History:   family history includes Colon cancer in her father and other; Diabetes in her other; Heart disease in her other; Hypertension in her mother and other; Stroke in her father and other; Tuberculosis in her other.    Social History:    reports that she has quit smoking. Her smoking use included Cigarettes. She has a 22.00 pack-year smoking history. She has never used smokeless tobacco. She reports that she does not drink alcohol or use drugs.    Medications:    (Not in a hospital admission)  Allergies   Allergen Reactions   • Oxycodone Shortness Of Breath   • Zolpidem Other (See Comments)     nightmares           Physical Exam:    /87   Pulse 101   Temp 98.3 °F (36.8 °C) (Oral)   Resp (!) 30   Ht 167.6 cm (66\")   Wt 94.3 kg (208 lb)   SpO2 94%   BMI 33.57 kg/m²    VITALS-   AS ABOVE.  GENERAL-In tears, well-nourished.  RS-inspiratory " crackles at both bases, no wheezing.  CVS- s1s2 Regular, no murmur.  ABD- distended upper abdomen, the tenderness worse in the righ uq.  EXT- 1+ pedal edema, mild erythema on the left lower shin, nontender, good pulses.  NEURO- AAO-3, power 5/5 in all ext, no gross sensory deficit, cranial nerves intact.  EYES- Conjunctivae are normal. Pupils are equal, round, and reactive to light. No scleral icterus.   ENT- no external ear nose lesions, mucosa dry.  NECK-  No tracheal deviation present. No thyromegaly present,No cervical lymphadenopathy.  JOINTS/MSK- no deformity, no swelling.  SKIN- no rash , warm to touch.  PSYCHIATRIC- very anxious in tears       Sarwat Reviewed:         Data.  Lab Results   Component Value Date    GLUCOSE 135 (H) 03/10/2018    BUN 21 03/10/2018    CREATININE 1.00 03/10/2018    EGFRIFNONA 54 (L) 03/10/2018    BCR 21.0 03/10/2018    CO2 26.0 03/10/2018    CALCIUM 9.1 03/10/2018    ALBUMIN 4.50 03/10/2018    LABIL2 1.3 (L) 03/10/2018    AST 67 (H) 03/10/2018    ALT 37 03/10/2018     Lab Results   Component Value Date    WBC 9.93 03/10/2018    HGB 15.4 03/10/2018    HCT 46.8 (H) 03/10/2018    MCV 97.5 03/10/2018     03/10/2018     Lab Results   Component Value Date    CKTOTAL 1,250 (H) 11/05/2016    TROPONINI <0.006 12/11/2016       Imaging Results (last 24 hours)     Procedure Component Value Units Date/Time    CT Abdomen Pelvis With Contrast [[id number]] Collected:  03/10/18 2213     Updated:  03/11/18 0026            Impression:         Cholelithiasis without evidence of acute cholecystitis.  Followup with   sonography may be considered if clinically indicated.      Numerous other nonemergent findings as described.       THIS DOCUMENT HAS BEEN ELECTRONICALLY SIGNED BY ILENE CRUZ MD    CT Angiogram Chest With Contrast [[id number]] Collected:  03/10/18 2214     Updated:  03/11/18 0016    Narrative:                 No evidence of acute pulmonary thromboembolism.      Findings suggestive  of CHF with pulmonary edema and small bilateral pleural   effusion.      Mild to moderately enlarged predominantly hilar lymph nodes likely   inflammatory rather than neoplastic/metastatic.      Coronary arterial calcification suggestive of CAD.      Other nonemergent findings as described.       THIS DOCUMENT HAS BEEN ELECTRONICALLY SIGNED BY ILENE CRUZ MD    XR Chest 1 View [[id number]] Collected:  03/10/18 2151     Updated:  03/10/18 2306    Narrative:       EXAM:    XR Chest, 1 View    CLINICAL HISTORY:    72 years old, female; Signs and symptoms; Dyspnea; Additional info: SOA   triage protocol    TECHNIQUE:    Frontal view of the chest.    COMPARISON:    [facility] - XR CHEST 1 VW 2016-12-11 18:50    FINDINGS:    Confluent and focal air space opacities in the lung bases. Blunting of the   costophrenic angles suggestive of small pleural effusion/thickening.    Cardiomegaly with pulmonary vascular congestion.  Unfolding of the aortic arch   with a tortuous descending thoracic aorta.       Impression:         Findings suggestive of CHF with pulmonary edema and trace pleural effusion.      Possibility of associated pneumonia cannot be excluded.  Recommend followup   to complete resolution.       THIS DOCUMENT HAS BEEN ELECTRONICALLY SIGNED BY ILENE CRUZ MD                I have personally reviewed current lab, radiology, and ekg .      Assessment/Plan   Old records reviewed and summarized in PM hx    Abdominal pain-somewhat chronic, seen by gastroenterology recently-Differential diagnosis peptic ulcer disease versus gallstone.  Versus anxiety.    -CT abdomen-Reviewed shows gallstones without any inflammation, has thickening of distal esophagus, borderline enlarged upper abdomen and retroperitoneal lymph nodes without any bulky lymphadenopathy.  suggesting esophagitis reflux   -LFTs stable  -ultrasound in a.m. to rule out acute cholecystitis, continue PPI every 12     Dyspnea/mild hypoxia-no cough, no white count,  hypercarbic, hypoxemic-likely combination of  /atelectasis/CHF from elevated blood pressure-not taking her medications. And lasix.  -Lasix 40 given in the ER, echocardiogram in a.m., Lasix 20 IV every daily, will rule out ACS,  last cardiac catheter was in 2016 with nonobstructive coronary artery disease and her cardiologist is at [place].  -Continue O2 supplementation, does not appear to be any infectious process.    -ABG 7.36, PCO2 of 50, PO2 of 84 this is on 32% FiO2  -BNP of 251, troponin of 0.04  -EKG sinus rhythm at 97 bpm with no acute ST-T wave changes.    -Chest x-ray-blunting of costophrenic angle-suggestive of small pleural effusion versus thickening, airspace opacities worse on the left side-but similar to old chest x-ray.  -CT PE-peribronchial and septal thickening prominent in the lower lobes-atelectasis volume loss, mild dependent ground glass opacity is worse on the right side with trace pleural effusions-?  CHF, mild to moderately enlarged hilar lymph nodes, no acute PE  -Need to address her compliance issue.    -Echo from 2016 shows normal ejection fraction  • Anxiety/depression-Amitriptyline and 100 mg daily at bedtime.       History of recurrent left leg cellulitis.           • CKD (chronic kidney disease), stage III-BUN/creatinine stable, potassium of 5.5  -Recheck electrolytes in a.m.      • Colon cancer-Status post right hemicolectomy in 2001,  2000   • Coronary artery disease/Hypertension-Aspirin 81, Lopressor 25 every 12, losartan 50 mg daily, HCTZ 25 daily.       Last cardiac catheter  in 2015-shows mild nonobstructive CAD, LAD stent patent,             • Diabetes mellitus/Neuropathy-Lyrica 75 daily, Neurontin 300 every 12      dx 2 years ago- checks fsbs bid  -Glimepiride 2 mg daily, Levemir 20 units daily at bedtime, picked those up 0.6 daily   -Patient is not taking any of her medication will continue fingerstick coverage.     • GERD (gastroesophageal reflux disease)-Protonix 40 daily      • GI bleed-Upper admitted to [facility] in 2016.  IBS-on Linzness.    • Gout-allopurinol 100 daily            •     • Hyperlipidemia            Degenerative disease-on opiates.      • PAF (paroxysmal atrial fibrillation)-Date to 50 µg by mouth daily  2007    postoperative total knee replacement  -patient not taking her digoxin also, patient's first EKG there was some worry about A. fib but the second EKG is sinus rhythm with PAC, not on anticoagulation secondary to history of GI bleed,    • RLS (restless legs syndrome)    • Sleep apnea          DVT PXL  -figueroa's/scds  -lovenox.        I discussed the patients findings and my recommendations with: patient/family.    I believe this patient meets INPATIENT status due to the need for care which can only be reasonably provided in an hospital setting such as aggressive/expedited ancillary services and/or consultation services, the necessity for IV medications, close physician monitoring and/or the possible need for procedures.  In such, I feel patient’s risk for adverse outcomes and need for care warrant INPATIENT evaluation and predict the patient’s care encounter to likely last beyond 2 midnights.        Rina Ty MD  03/11/18  1:04 AM            Electronically signed by Rina Ty MD at 3/11/2018  7:50 AM

## 2018-03-14 NOTE — PROGRESS NOTES
Continued Stay Note  [facility]     Patient Name: Angelita Shay  MRN: [id number]  Today's Date: 3/14/2018    Admit Date: 3/10/2018          Discharge Plan     Row Name 03/14/18 1032       Plan    Plan Comments SW was contacted by retail pharmacy explaining that they received prescriptions for pt from Cardiologist and the Lanoxin and Entresto will require a prior authorization. Prior authorizations have been completed through Cover [employer] Med[employer] and have both been approved. Approval and prior authorization number for the Entresto is [id number]. The prior authorization number for the Lanoxin is [id number]. Pharmacy also provided pt with a 30 day free trial of Entresto.              Discharge Codes    No documentation.       Expected Discharge Date and Time     Expected Discharge Date Expected Discharge Time    Mar 15, 2018             VAL Rodriguez     I will STOP taking the medications listed below when I get home from the hospital:    Keflex 500 mg oral capsule  -- 1 cap(s) by mouth 2 times a day  -- Finish all this medication unless otherwise directed by prescriber.

## 2018-03-14 NOTE — PROGRESS NOTES
Continued Stay Note  [facility]     Patient Name: Angelita Shay  MRN: [id number]  Today's Date: 3/14/2018    Admit Date: 3/10/2018          Discharge Plan  Consent obtained for the participation in the [facility] Transitions Program. Vilma Villeda RN       Row Name 03/14/18 1304       Plan    Plan Comments Pt qualifies for oxygen and had no preference of DME company.               Discharge Codes    No documentation.       Expected Discharge Date and Time     Expected Discharge Date Expected Discharge Time    Mar 14, 2018             Vilma Villeda RN

## 2018-03-14 NOTE — DISCHARGE PLACEMENT REQUEST
"Angelita Siegel (72 y.o. Female)     Date of Birth Social Security Number Address Home Phone MRN    1945  [address]  [place] [id number] [phone] [id number]    Jehovah's witness Marital Status          Mosque        Admission Date Admission Type Admitting Provider Attending Provider Department, Room/Bed    3/10/18 Emergency Marielos Amaro MD Hall, [place], MD [facility] 6A, N618/1    Discharge Date Discharge Disposition Discharge Destination                       Attending Provider:  Marielos Amaro MD    Allergies:  Oxycodone, Zolpidem    Isolation:  None   Infection:  None   Code Status:  FULL    Ht:  167.6 cm (66\")   Wt:  98.9 kg (218 lb)    Admission Cmt:  None   Principal Problem:  Acute systolic congestive heart failure [I50.21] More...                 Active Insurance as of 3/10/2018     Primary Coverage     Payor Plan Insurance Group Employer/Plan Group    HUMANA MEDICARE REPLACEMENT HUMANA MEDICARE REPL [id number]     Payor Plan Address Payor Plan Phone Number Effective From Effective To    [address] [address] [phone] 1/1/2018     [place] [address]       Subscriber Name Subscriber Birth Date Member ID       ANGELITA SIEGEL 1945 [id number]           Secondary Coverage     Payor Plan Insurance Group Employer/Plan Group    [place]      Payor Plan Address Payor Plan Phone Number Effective From Effective To    [address] [phone] 10/27/2016     [place] [address]       Subscriber Name Subscriber Birth Date Member ID       ANGELITA SIEGEL 1945 [id number]                 Emergency Contacts      (Rel.) Home Phone Work Phone Mobile Phone    Medina Flynn (Daughter) [phone] -- --    Pat Peraza (Daughter) [phone] -- --               History & Physical      Rina Ty MD at 3/11/2018  1:04 AM          DILEEP/ Medicine History and Physical    Primary Care Physician: Sharon Saldivar MD    Chief complaint "   Exertional dyspnea.     History of Present Illness  72-year-old female presented to ER with the chief complaint of exertional dyspnea-going on for last few weeks worse over last week  Do not complain of any cough, does complain of pedal edema, no clear PND symptoms, no complain of chest pain.  No complain of palpitations lightheadedness or dizziness.  She did complain of headache after nitroglycerin she got in the ER.  Do not complain of any upper respiratory tract symptoms, no wheezing, nonsmoker since 1979.  She also complains of abdominal pain-worse in the right upper quadrant, describing a sharp not radiating, worse with food, last BM was yesterday morning-loose, she does complain of generalized abdominal cramping also.no co of worsening reflux sx.    Patient was recently seen by gastroenterology for this ongoing abdominal pain and cramping and loose bowel movements-was diagnosed with norvo virus.  Do not complain of any bleeding per GI or , did have nausea but no vomiting, decreased appetite.    Also patient states that she has not been taking any of her blood pressure medication, her long-acting insulin, her Lasix for almost last month, secondary to the side effects and transitioning her primary care.    Patient's last admission was 2016 for cellulitis of left lower leg.    Review of Systems   Complete ROS done, which is negative except as above and HPI.    Past Medical History:       Past Medical History:   Diagnosis Date   • Anxiety/depression-Amitriptyline and 100 mg daily at bedtime.                • CKD (chronic kidney disease), stage III    • Colon cancer-Status post right hemicolectomy in 2001,  2000   • Coronary artery disease/Hypertension-Aspirin 81, Lopressor 25 every 12, losartan 50 mg daily, HCTZ 25 daily.       1 stent             • Diabetes mellitus/Neuropathy-Lyrica 75 daily, Neurontin 300 every 12      dx 2 years ago- checks fsbs bid  -Glimepiride 2 mg daily, Levemir 20 units daily at  "bedtime, picked those up 0.6 daily      • GERD (gastroesophageal reflux disease)-Protonix 40 daily     • GI bleed-Upper admitted to [facility] in 2016.  IBS-on [facility].    • Gout-allopurinol 100 daily            •     • Hyperlipidemia            Degenerative disease-on opiates.      • PAF (paroxysmal atrial fibrillation)-Date to 50 µg by mouth daily  2007    postoperative total knee replacement     • RLS (restless legs syndrome)    • Sleep apnea        Past Surgical History:  Past Surgical History:   Procedure Laterality Date   • APPENDECTOMY     • CARDIAC CATHETERIZATION  08/2015    no stents   • COLON RESECTION     • COLONOSCOPY  2000   • CORONARY ANGIOPLASTY WITH STENT PLACEMENT  12/2015   • NM TOTAL KNEE ARTHROPLASTY Left 11/3/2016    Procedure: LEFT TOTAL KNEE REPLACEMENT;  Surgeon: Laurent Zuniga MD;  Location: [facility];  Service: Orthopedics   • TONSILLECTOMY     • TOTAL KNEE ARTHROPLASTY Right 2008    revision 2010   • TOTAL SHOULDER ARTHROPLASTY Bilateral        Family History:   family history includes Colon cancer in her father and other; Diabetes in her other; Heart disease in her other; Hypertension in her mother and other; Stroke in her father and other; Tuberculosis in her other.    Social History:    reports that she has quit smoking. Her smoking use included Cigarettes. She has a 22.00 pack-year smoking history. She has never used smokeless tobacco. She reports that she does not drink alcohol or use drugs.    Medications:    (Not in a hospital admission)  Allergies   Allergen Reactions   • Oxycodone Shortness Of Breath   • Zolpidem Other (See Comments)     nightmares           Physical Exam:    /87   Pulse 101   Temp 98.3 °F (36.8 °C) (Oral)   Resp (!) 30   Ht 167.6 cm (66\")   Wt 94.3 kg (208 lb)   SpO2 94%   BMI 33.57 kg/m²    VITALS-   AS ABOVE.  GENERAL-In tears, well-nourished.  RS-inspiratory crackles at both bases, no wheezing.  CVS- s1s2 Regular, no murmur.  ABD- " distended upper abdomen, the tenderness worse in the righ uq.  EXT- 1+ pedal edema, mild erythema on the left lower shin, nontender, good pulses.  NEURO- AAO-3, power 5/5 in all ext, no gross sensory deficit, cranial nerves intact.  EYES- Conjunctivae are normal. Pupils are equal, round, and reactive to light. No scleral icterus.   ENT- no external ear nose lesions, mucosa dry.  NECK-  No tracheal deviation present. No thyromegaly present,No cervical lymphadenopathy.  JOINTS/MSK- no deformity, no swelling.  SKIN- no rash , warm to touch.  PSYCHIATRIC- very anxious in tears       R.esults Reviewed:         Data.  Lab Results   Component Value Date    GLUCOSE 135 (H) 03/10/2018    BUN 21 03/10/2018    CREATININE 1.00 03/10/2018    EGFRIFNONA 54 (L) 03/10/2018    BCR 21.0 03/10/2018    CO2 26.0 03/10/2018    CALCIUM 9.1 03/10/2018    ALBUMIN 4.50 03/10/2018    LABIL2 1.3 (L) 03/10/2018    AST 67 (H) 03/10/2018    ALT 37 03/10/2018     Lab Results   Component Value Date    WBC 9.93 03/10/2018    HGB 15.4 03/10/2018    HCT 46.8 (H) 03/10/2018    MCV 97.5 03/10/2018     03/10/2018     Lab Results   Component Value Date    CKTOTAL 1,250 (H) 11/05/2016    TROPONINI <0.006 12/11/2016       Imaging Results (last 24 hours)     Procedure Component Value Units Date/Time    CT Abdomen Pelvis With Contrast [[id number]] Collected:  03/10/18 2213     Updated:  03/11/18 0026            Impression:         Cholelithiasis without evidence of acute cholecystitis.  Followup with   sonography may be considered if clinically indicated.      Numerous other nonemergent findings as described.       THIS DOCUMENT HAS BEEN ELECTRONICALLY SIGNED BY ILENE CRUZ MD    CT Angiogram Chest With Contrast [[id number]] Collected:  03/10/18 2214     Updated:  03/11/18 0016    Narrative:                 No evidence of acute pulmonary thromboembolism.      Findings suggestive of CHF with pulmonary edema and small bilateral pleural   effusion.       Mild to moderately enlarged predominantly hilar lymph nodes likely   inflammatory rather than neoplastic/metastatic.      Coronary arterial calcification suggestive of CAD.      Other nonemergent findings as described.       THIS DOCUMENT HAS BEEN ELECTRONICALLY SIGNED BY ILENE CRUZ MD    XR Chest 1 View [[id number]] Collected:  03/10/18 2151     Updated:  03/10/18 2306    Narrative:       EXAM:    XR Chest, 1 View    CLINICAL HISTORY:    72 years old, female; Signs and symptoms; Dyspnea; Additional info: SOA   triage protocol    TECHNIQUE:    Frontal view of the chest.    COMPARISON:    CR - XR CHEST 1 VW 2016-12-11 18:50    FINDINGS:    Confluent and focal air space opacities in the lung bases. Blunting of the   costophrenic angles suggestive of small pleural effusion/thickening.    Cardiomegaly with pulmonary vascular congestion.  Unfolding of the aortic arch   with a tortuous descending thoracic aorta.       Impression:         Findings suggestive of CHF with pulmonary edema and trace pleural effusion.      Possibility of associated pneumonia cannot be excluded.  Recommend followup   to complete resolution.       THIS DOCUMENT HAS BEEN ELECTRONICALLY SIGNED BY ILENE CRUZ MD                I have personally reviewed current lab, radiology, and ekg .      Assessment/Plan   Old records reviewed and summarized in PM hx    Abdominal pain-somewhat chronic, seen by gastroenterology recently-Differential diagnosis peptic ulcer disease versus gallstone.  Versus anxiety.    -CT abdomen-Reviewed shows gallstones without any inflammation, has thickening of distal esophagus, borderline enlarged upper abdomen and retroperitoneal lymph nodes without any bulky lymphadenopathy.  suggesting esophagitis reflux   -LFTs stable  -ultrasound in a.m. to rule out acute cholecystitis, continue PPI every 12     Dyspnea/mild hypoxia-no cough, no white count, hypercarbic, hypoxemic-likely combination of  /atelectasis/CHF from elevated  blood pressure-not taking her medications. And lasix.  -Lasix 40 given in the ER, echocardiogram in a.m., Lasix 20 IV every daily, will rule out ACS,  last cardiac catheter was in 2016 with nonobstructive coronary artery disease and her cardiologist is at [place].  -Continue O2 supplementation, does not appear to be any infectious process.    -ABG 7.36, PCO2 of 50, PO2 of 84 this is on 32% FiO2  -BNP of 251, troponin of 0.04  -EKG sinus rhythm at 97 bpm with no acute ST-T wave changes.    -Chest x-ray-blunting of costophrenic angle-suggestive of small pleural effusion versus thickening, airspace opacities worse on the left side-but similar to old chest x-ray.  -CT PE-peribronchial and septal thickening prominent in the lower lobes-atelectasis volume loss, mild dependent ground glass opacity is worse on the right side with trace pleural effusions-?  CHF, mild to moderately enlarged hilar lymph nodes, no acute PE  -Need to address her compliance issue.    -Echo from 2016 shows normal ejection fraction  • Anxiety/depression-Amitriptyline and 100 mg daily at bedtime.       History of recurrent left leg cellulitis.           • CKD (chronic kidney disease), stage III-BUN/creatinine stable, potassium of 5.5  -Recheck electrolytes in a.m.      • Colon cancer-Status post right hemicolectomy in 2001,  2000   • Coronary artery disease/Hypertension-Aspirin 81, Lopressor 25 every 12, losartan 50 mg daily, HCTZ 25 daily.       Last cardiac catheter  in 2015-shows mild nonobstructive CAD, LAD stent patent,             • Diabetes mellitus/Neuropathy-Lyrica 75 daily, Neurontin 300 every 12      dx 2 years ago- checks fsbs bid  -Glimepiride 2 mg daily, Levemir 20 units daily at bedtime, picked those up 0.6 daily   -Patient is not taking any of her medication will continue fingerstick coverage.     • GERD (gastroesophageal reflux disease)-Protonix 40 daily     • GI bleed-Upper admitted to [place] in 2016.  IBS-on [facility].    •  "Gout-allopurinol 100 daily            •     • Hyperlipidemia            Degenerative disease-on opiates.      • PAF (paroxysmal atrial fibrillation)-Date to 50 µg by mouth daily      postoperative total knee replacement  -patient not taking her digoxin also, patient's first EKG there was some worry about A. fib but the second EKG is sinus rhythm with PAC, not on anticoagulation secondary to history of GI bleed,    • RLS (restless legs syndrome)    • Sleep apnea          DVT PXL  -figueroa's/scds  -lovenox.        I discussed the patients findings and my recommendations with: patient/family.    I believe this patient meets INPATIENT status due to the need for care which can only be reasonably provided in an hospital setting such as aggressive/expedited ancillary services and/or consultation services, the necessity for IV medications, close physician monitoring and/or the possible need for procedures.  In such, I feel patient’s risk for adverse outcomes and need for care warrant INPATIENT evaluation and predict the patient’s care encounter to likely last beyond 2 midnights.        Rina Ty MD  18  1:04 AM            Electronically signed by Rina Ty MD at 3/11/2018  7:50 AM         [address] [address]  Phone:  [phone]  Fax:   Date Ordered: Mar 14, 2018         Patient:  Angelita Shay MRN:  [id number]   [address] [facility] [address] :  1945  SSN:    Phone: [phone] Sex:  F     Weight: 98.9 kg (218 lb)         Ht Readings from Last 1 Encounters:   18 167.6 cm (66\")         Oxygen Therapy         (Order ID: [id number])    Diagnosis:  Congestive heart failure, unspecified congestive heart failure chronicity, unspecified congestive heart failure type (I50.9 [ICD-10-CM] 428.0 [ICD-9-CM])  Respiratory failure with hypoxia, unspecified chronicity (J96.91 [ICD-10-CM] 518.81 [ICD-9-CM])   Quantity:  " 1     Delivery Modality: Nasal Cannula  Liters Per Minute: 2  Duration: Continuous  Equipment:  Oxygen Concentrator &  &  Portable Gaseous Oxygen System & Portable Oxygen Contents Gaseous &  Conserving Regulator  The face to face evaluation was performed on: 3/14/2018  Length of Need (99 Months = Lifetime): 99 Months = Lifetime            Verbal Order Mode: Per protocol: cosign required  Authorizing Provider: Marielos Amaro MD  Authorizing Provider's NPI: [id number]     Order Entered By: Roz Howard RN 3/14/2018 11:17 AM     Electronically signed by:      Oxygen Therapy [EQ60] (Order [id number])   Order   Date: 3/14/2018 Department: [address] Ordering/Authorizing: Marielos Amaro MD   Order History   Outpatient   Date/Time Action Taken User Additional Information   03/14/18 1117 Sign Roz Howard RN Ordering Mode: Per protocol: cosign required   Order Details     Frequency Duration Priority Order Class   None None Routine External   Start Date/Time     Start Date   03/14/18   Order Questions     Question Answer Comment   Delivery Modality Nasal Cannula    Liters Per Minute: 2    Duration: Continuous    Equipment  Oxygen Concentrator &  &  Portable Gaseous Oxygen System & Portable Oxygen Contents Gaseous &  Conserving Regulator    The face to face evaluation was performed on 3/14/2018    Length of Need (99 Months = Lifetime) 99 Months = Lifetime    Note:  Custom values to enter length of need for DME          Verbal Order Info     Action Created on Order Mode Entered by Responsible Provider Signed by Signed on   Ordering 03/14/18 1117 Per protocol: cosign required Roz Howard RN  Signature Not Required    Cosign Order Info     Action Created on Responsible Provider Signed by Signed on   Ordering 03/14/18 1117 Marielos Amaro MD     Source Order Set / Preference List     Preference List   AMB SUPPLIES [[id number]]   Clinical Indications      ICD-10-CM ICD-9-CM    Congestive heart failure, unspecified congestive heart failure chronicity, unspecified congestive heart failure type     I50.9 428.0   Respiratory failure with hypoxia, unspecified chronicity     J96.91 518.81   Reprint Order Requisition     [facility] (Order #[id number]) on 3/14/18   Encounter-Level Cardiology Documents:     There are no encounter-level cardiology documents.      Encounter     View Encounter               Order Provider Info         Office phone Pager E-mail   Ordering User Roz Howard RN -- -- --   Authorizing Provider Marielos Amaro [phone] -- --   Attending Provider Marielos Amaro [phone] -- --   Entered By Roz Howard RN -- -- --   Ordering Provider Marielos Amaro [phone] -- --   Ordering Provider Marielos Amaro [phone] -- --   Cosign Message Sent to Marielos Amaro [phone] -- --   Linked Charges     No charges linked to this procedure   Order Transmittal Tracking     [facility] (Order #[id number]) on 3/14/18   Authorized by:  Marielos Amaro MD  (NPI: [id number])       Lab Component SmartPhrase Guide     [facility] (Order #[id number]) on 3/14/18        03/14/18 0825  --  92  --  --  resting room air   84

## 2018-03-14 NOTE — DISCHARGE SUMMARY
[facility] Medicine Services  DISCHARGE SUMMARY    Patient Name: Angelita Shay  : 1945  MRN: [id number]    Date of Admission: 3/10/2018  Date of Discharge:  18    Primary Care Physician: Sharon Saldivar MD    Consults     Date and Time Order Name Status Description    3/12/2018 1630 Inpatient Cardiology Consult Completed         Hospital Course     Presenting Problem:   Atrial fibrillation with RVR [I48.91]    Active Hospital Problems (** Indicates Principal Problem)    Diagnosis Date Noted   • **Acute systolic congestive heart failure [I50.21] 2016   • Biliary colic [K80.50] 2018   • Cholelithiasis [K80.20] 2018   • Nonischemic cardiomyopathy [I42.8] 2018   • Paroxysmal atrial fibrillation [I48.0] 2018   • Coronary artery disease involving native coronary artery of native heart with angina pectoris [I25.119] 2016   • CKD (chronic kidney disease) stage 3, GFR 30-59 ml/min [N18.3] 2016   • Sleep apnea [G47.30] 2016   • DM type 2 (diabetes mellitus, type 2), on insulin therapy [E11.9] 2016   • Anxiety [F41.9] 2016      Resolved Hospital Problems    Diagnosis Date Noted Date Resolved   No resolved problems to display.          Hospital Course:  Angelita Shay is a 72 y.o. female who presented to [facility] with hypoxia and SOA.  ECHO revealed EF 36% and severe pulmonary HTN with RVSP 45-55 mmHg of unclear chronicity but was markedly different than her prior ECHO from 2016 with normal EF >65% and no elevation of RVSP.  Patient was successfully treated with diuretics for her resumed acute systolic CHF with good improvement.  Due to new CHF symptoms she underwent LHC on 3/13/18 by Dr. Jones of Cardiology which showed:   · Patent LAD stent  · Mild nonobstructive disease.  · Mildly elevated LVEDP  · No aortic stenosis    Her cardiac medications have been optimized and new prescriptions filled with pharmacy, planning  for f/u with Cardiology in 1 month.      Procedure(s):  Left Heart Cath 3/13/18      Day of Discharge     HPI:   No CP or SOA/AYERS.  Still with dull RUQ pain consistent with biliary colic.  PAtietn advised of CT abd/pelv finding s with cholelithiasis but no cholecystitis and she will need to see PCP for outpt referral to Gen Surg for consideration of elective CCY.     Review of Systems  Otherwise ROS is negative except as mentioned in the HPI.    Vital Signs:   Temp:  [97.4 °F (36.3 °C)-99.2 °F (37.3 °C)] 98 °F (36.7 °C)  Heart Rate:  [66-93] 75  Resp:  [16-26] 16  BP: (108-173)/(63-98) 134/82     Physical Exam:  Constitutional: No acute distress, awake, alert, nontoxic, obese body habitus  Respiratory: Clear to auscultation bilaterally, good effort, nonlabored respirations   Cardiovascular: RRR, no murmur  Gastrointestinal: Soft, RUQ dull TTP, nondistended  Musculoskeletal: No peripheral edema, normal muscle tone for age  Psychiatric: Appropriate affect, good insight and judgement, cooperative        Pertinent  and/or Most Recent Results       Results from last 7 days  Lab Units 03/14/18  0927 03/13/18  0439 03/11/18  0640 03/11/18  0511 03/10/18  2201   WBC 10*3/mm3  --  7.71  --  10.05 9.93   HEMOGLOBIN g/dL  --  14.8  --  14.5 15.4   HEMATOCRIT %  --  46.6*  --  45.0* 46.8*   PLATELETS 10*3/mm3  --  146*  --  124* 160   SODIUM mmol/L 135 138 140  --  139   POTASSIUM mmol/L 4.3 4.5 4.0  --  5.5   CHLORIDE mmol/L 97* 99 103  --  106   CO2 mmol/L 34.0* 27.0 31.0  --  26.0   BUN mg/dL 30* 24* 19  --  21   CREATININE mg/dL 1.20 1.10 1.00  --  1.00   GLUCOSE mg/dL 206* 125* 131*  --  135*   CALCIUM mg/dL 8.6* 9.2 9.0  --  9.1       Results from last 7 days  Lab Units 03/13/18  0439 03/11/18  0640 03/10/18  2201   BILIRUBIN mg/dL  --  0.8 0.6   ALK PHOS U/L  --  73 73   ALT (SGPT) U/L  --  28 37   AST (SGOT) U/L  --  24 67*   PROTIME Seconds 10.0  --   --    INR  0.95  --   --        Results from last 7 days  Lab Units  03/11/18  0640   CHOLESTEROL mg/dL 175   TRIGLYCERIDES mg/dL 122   HDL CHOL mg/dL 55       Results from last 7 days  Lab Units 03/13/18  0439 03/11/18  1140 03/11/18  0640 03/11/18  0511 03/10/18  2229   HEMOGLOBIN A1C %  --   --   --  7.30*  --    BNP pg/mL 91.0  --   --   --  251.0*   TROPONIN I ng/mL 0.030 0.047* 0.052*  --   --      Brief Urine Lab Results     None          Microbiology Results Abnormal     Procedure Component Value - Date/Time    Blood Culture - Blood, [[id number]]  (Normal) Collected:  03/10/18 2229    Lab Status:  Preliminary result Specimen:  Blood from Arm, Left Updated:  03/13/18 2346     Blood Culture No growth at 3 days    Blood Culture - Blood, [[id number]]  (Normal) Collected:  03/10/18 2229    Lab Status:  Preliminary result Specimen:  Blood from Arm, Right Updated:  03/13/18 2346     Blood Culture No growth at 3 days          Imaging Results (all)     Procedure Component Value Units Date/Time    US Gallbladder [[id number]] Collected:  03/11/18 1702     Updated:  03/11/18 1820    Narrative:       EXAMINATION: US GALLBLADDER - 03/11/2018     INDICATION:  I48.91-Unspecified atrial fibrillation; I50.9-Heart  failure, unspecified; J96.91-Respiratory failure, unspecified with  hypoxia; I16.0-Hypertensive urgency.     COMPARISON: None.     FINDINGS:   1. Pancreas is somewhat limited by gaseous interference and fat. The  portions of the pancreas that are identified appear within normal  limits. There is minimal ectasia of the pancreatic duct, a typical  senile change.     2. The echo architecture of the liver is within normal limits. Focal  liver mass is not identified. There is no free fluid around the liver.     3. The gallbladder examination is abnormal. There are somewhat subtle  but definite echogenic foci near the neck of the gallbladder which move  and shadow. These are most indicative of cholelithiasis.     Gallbladder wall thickness is normal. Common bile duct is normal and  measures  4.2 mm.     4. The right kidney measures 11.4 x 4.2 x 5.2 cm. There is 1 cm of  cortical thickness. The right kidney is smooth with no evidence of mass  or hydronephrosis.       Impression:       Cholelithiasis. Normal common bile duct and normal-appearing  liver.     Incomplete pancreatic exam because of gaseous interference.      DICTATED:     03/11/2018  EDITED/ls :     03/11/2018      This report was finalized on 3/11/2018 6:18 PM by Dr. Wilbur Rosado MD.       CT Abdomen Pelvis With Contrast [[id number]] Collected:  03/10/18 2213     Updated:  03/11/18 0026    Narrative:       EXAM:    CT Abdomen and Pelvis With Intravenous  Contrast    CLINICAL HISTORY:    72 years old, female; Unspecified atrial fibrillation; Heart failure,   unspecified; Respiratory failure, unspecified with hypoxia; Hypertensive   urgency; Pain; Abdominal pain; Generalized; Prior surgery; Surgery date: <1   month; Additional info: Abdominal pain, unspecified, recent bowel resection for   colon ca    TECHNIQUE:    Axial computed tomography images of the abdomen and pelvis with intravenous   contrast.  All CT scans at this facility use one or more dose reduction   techniques, viz.: automated exposure control; ma/kV adjustment per patient size   (including targeted exams where dose is matched to indication; i.e. head); or   iterative reconstruction technique.    Coronal reformatted images were created and reviewed.    CONTRAST:    90 mL of [id number] administered intravenously.    COMPARISON:    No relevant prior studies available.    FINDINGS:    PANCREATICOHEPATOBILIARY: The liver is normal without a focal intrahepatic   mass or abnormality. No significant intra-or extrahepatic ductal dilation.   Gallbladder demonstrates gallstone(s) without evidence of surrounding   inflammation. Pancreas and spleen are unremarkable.       GENITOURINARY: No adrenal mass.  Renal cortical scarring, focal atrophy and   indeterminate renal cortical cystic  nodules.  Kidneys are otherwise   unremarkable without hydronephrosis.  Urinary bladder is within normal limits.   Uterus is normal, ovarian cysts/follicles.  No free fluid in the pelvis.      GASTROINTESTINAL:  A few colonic diverticula without evidence of acute   diverticulitis.  Evidence of RIGHT colon resection with unremarkable surgical   site.  A small size hiatal hernia with nonspecific wall thickening of the   distal thoracic esophagus suggestive of esophagitis/reflux.  No evidence of   free intraperitoneal air or fluid collection.       OTHER STRUCTURES: Atherosclerotic disease of the aorta without evidence of   aneurysm.  Normal size and borderline enlarged upper abdominal/retroperitoneal   lymph nodes without bulky abdominal or pelvic lymph node enlargement.  Small   periumbilical and supraumbilical/epigastric hernias containing fat.   Advanced   chronic degenerative changes in the lumbar spine.       Impression:         Cholelithiasis without evidence of acute cholecystitis.  Followup with   sonography may be considered if clinically indicated.      Numerous other nonemergent findings as described.       THIS DOCUMENT HAS BEEN ELECTRONICALLY SIGNED BY ILENE CRUZ MD    CT Angiogram Chest With Contrast [[id number]] Collected:  03/10/18 2214     Updated:  03/11/18 0016    Narrative:       EXAM:    CT Angiography Chest With Intravenous Contrast    CLINICAL HISTORY:    72 years old, female; Unspecified atrial fibrillation; Heart failure,   unspecified; Respiratory failure, unspecified with hypoxia; Hypertensive   urgency; Pain; Chest pain; Type not specified; Additional info: Dyspnea,   cardiac origin suspected    TECHNIQUE:    Axial computed tomographic angiography images of the chest with intravenous   contrast using pulmonary embolism protocol.  All CT scans at this facility use   one or more dose reduction techniques, viz.: automated exposure control; ma/kV   adjustment per patient size (including  targeted exams where dose is matched to   indication; i.e. head); or iterative reconstruction technique.    MIP reconstructed images were created and reviewed.    Coronal reformatted images were created and reviewed.    CONTRAST:    90 mL of [id number] administered intravenously.    COMPARISON:    CT ANGIOGRAM CHEST W CONTRAST 2016-12-10 05:56    FINDINGS:    CT ANGIOGRAM: The main, right, and left pulmonary arteries and their   visualized branches show normal enhancement.  Atherosclerotic disease of the   aorta without aortic aneurysm or dissection.  Enlarged main, right and left   pulmonary arteries with the main pulmonary artery measuring 4.1 cm suggestive   of pulmonary arterial hypertension.       LUNGS/PLEURA:  Peribronchial and septal thickening predominantly in the lower   lobe/lung bases with marked narrowing of the subsegmental airways, atelectasis   and resultant volume loss.  Subpleural bleb/air trapping in the LEFT lower   lobe.  Mild dependent ground glass opacities and trace pleural effusions. No   central obstructive endobronchial mass or abnormality.       MEDIASTINUM: Cardiomegaly without pericardial effusion.  Moderate/severe   coronary arterial calcification.  Mild to moderately enlarged mediastinal/hilar   lymph nodes.  Note is made of a hiatal hernia with wall thickening of the   thoracic esophagus suggestive of esophagitis/reflux.       OTHER STRUCTURES: Degenerative changes in the visualized spine.       Impression:         No evidence of acute pulmonary thromboembolism.      Findings suggestive of CHF with pulmonary edema and small bilateral pleural   effusion.      Mild to moderately enlarged predominantly hilar lymph nodes likely   inflammatory rather than neoplastic/metastatic.      Coronary arterial calcification suggestive of CAD.      Other nonemergent findings as described.       THIS DOCUMENT HAS BEEN ELECTRONICALLY SIGNED BY ILENE AYERS Chest 1 View [[id number]] Collected:   03/10/18 2151     Updated:  03/10/18 2306    Narrative:       EXAM:    XR Chest, 1 View    CLINICAL HISTORY:    72 years old, female; Signs and symptoms; Dyspnea; Additional info: SOA   triage protocol    TECHNIQUE:    Frontal view of the chest.    COMPARISON:    CR - XR CHEST 1  2016-12-11 18:50    FINDINGS:    Confluent and focal air space opacities in the lung bases. Blunting of the   costophrenic angles suggestive of small pleural effusion/thickening.    Cardiomegaly with pulmonary vascular congestion.  Unfolding of the aortic arch   with a tortuous descending thoracic aorta.       Impression:         Findings suggestive of CHF with pulmonary edema and trace pleural effusion.      Possibility of associated pneumonia cannot be excluded.  Recommend followup   to complete resolution.       THIS DOCUMENT HAS BEEN ELECTRONICALLY SIGNED BY ILENE CRUZ MD          Results for orders placed during the hospital encounter of 12/09/16   Duplex Venous Lower Extremity - Bilateral CAR    Narrative · Normal bilateral lower extremity venous duplex scan.  · SUBMITTED FOR INTERPRETATION 12 DECEMBER 2016.          Results for orders placed during the hospital encounter of 12/09/16   Duplex Venous Lower Extremity - Bilateral CAR    Narrative · Normal bilateral lower extremity venous duplex scan.  · SUBMITTED FOR INTERPRETATION 12 DECEMBER 2016.          Results for orders placed during the hospital encounter of 03/10/18   Adult Transthoracic Echo Complete W/ Cont if Necessary Per Protocol    Narrative · Left atrial cavity size is mildly dilated.  · Left ventricular wall thickness is consistent with mild concentric   hypertrophy.  · Left ventricular systolic function is moderately decreased. Estimated EF   = 36%.  · Mild mitral valve regurgitation is present  · Mild tricuspid valve regurgitation is present.  · Estimated right ventricular systolic pressure is moderately elevated   (45-55 mmHg).           Order Current Status     Blood Culture - Blood, Preliminary result    Blood Culture - Blood, Preliminary result        Discharge Details      Angelita Shay   Home Medication Instructions SUSAN:[id number]    Printed on:03/14/18 2651   Medication Information                      apixaban (ELIQUIS) 5 MG tablet tablet  Take 1 tablet by mouth Every 12 (Twelve) Hours for 90 days.             aspirin 81 MG EC tablet  Take 1 tablet by mouth Daily. Resume in 2 weeks             atorvastatin (LIPITOR) 20 MG tablet  Take 1 tablet by mouth Every Night for 180 days.             carvedilol (COREG) 12.5 MG tablet  Take 1 tablet by mouth 2 (Two) Times a Day With Meals for 180 days.             digoxin (LANOXIN) 250 MCG tablet  Take 1 tablet by mouth Daily for 90 days.             furosemide (LASIX) 20 MG tablet  Take 1 tablet by mouth Daily on Mondays, Wednesdays, Fridays             gabapentin (NEURONTIN) 300 MG capsule  Take 1 capsule by mouth 2 (Two) Times a Day.             insulin aspart (NOVOLOG) 100 UNIT/ML injection  **Per patient's Sliding scale**             linaclotide (LINZESS) 290 MCG capsule capsule  Take 1 capsule by mouth Every Morning Before Breakfast.             polyethylene glycol (MIRALAX) powder  Take 17 g by mouth Daily As Needed.             rOPINIRole (REQUIP) 3 MG tablet  Take 1 tablet by mouth Every Night. Pt states she takes 1-3 pills nightly of the 1mg, but usually take 2mg             sacubitril-valsartan (ENTRESTO) 24-26 MG tablet  Take 1 tablet by mouth Every 12 (Twelve) Hours for 180 days.             spironolactone (ALDACTONE) 25 MG tablet  Take 1 tablet by mouth Daily for 180 days.             traZODone (DESYREL) 50 MG tablet  Take 1 tablet by mouth Every Night.                   Discharge Disposition:  Home or Self Care    Discharge Diet:  Cardiac diabetic diet    Discharge Activity:   As tolerates      Future Appointments  Date Time Provider Department Center   4/26/2018 11:00 AM Sharon TURNER MD [facility] None    5/8/2018 2:15 PM STONEY Brewer [facility] None       Additional Instructions for the Follow-ups that You Need to Schedule     Discharge Follow-up with PCP    As directed      Follow Up Details:  has an already schedule appt with Sharon Chau for 4/26/18         Discharge Follow-up with Specialty: Linnea Chávez; 1 Month    As directed      Specialty:  Linnea Chávez    Follow Up:  1 Month    Follow Up Details:  Hosptial followup for acute systolic CHF and afib               Time Spent on Discharge:  40 minutes    Electronically signed by Marielos Amaro MD, 03/14/18, 12:15 PM.

## 2018-03-15 ENCOUNTER — TRANSITIONAL CARE MANAGEMENT TELEPHONE ENCOUNTER (OUTPATIENT)
Dept: INTERNAL MEDICINE | Facility: CLINIC | Age: 73
End: 2018-03-15

## 2018-03-15 LAB
BACTERIA SPEC AEROBE CULT: NORMAL
BACTERIA SPEC AEROBE CULT: NORMAL

## 2018-03-20 ENCOUNTER — OFFICE VISIT (OUTPATIENT)
Dept: CARDIOLOGY | Facility: HOSPITAL | Age: 73
End: 2018-03-20

## 2018-03-20 VITALS
DIASTOLIC BLOOD PRESSURE: 75 MMHG | RESPIRATION RATE: 18 BRPM | HEIGHT: 66 IN | HEART RATE: 72 BPM | TEMPERATURE: 97.8 F | WEIGHT: 212 LBS | OXYGEN SATURATION: 94 % | SYSTOLIC BLOOD PRESSURE: 122 MMHG | BODY MASS INDEX: 34.07 KG/M2

## 2018-03-20 DIAGNOSIS — I50.22 CHRONIC SYSTOLIC CONGESTIVE HEART FAILURE (HCC): Primary | ICD-10-CM

## 2018-03-20 DIAGNOSIS — I10 ESSENTIAL HYPERTENSION: ICD-10-CM

## 2018-03-20 DIAGNOSIS — I48.0 PAROXYSMAL ATRIAL FIBRILLATION (HCC): ICD-10-CM

## 2018-03-20 DIAGNOSIS — I42.8 NONISCHEMIC CARDIOMYOPATHY (HCC): ICD-10-CM

## 2018-03-20 PROCEDURE — 99214 OFFICE O/P EST MOD 30 MIN: CPT | Performed by: NURSE PRACTITIONER

## 2018-03-20 NOTE — PROGRESS NOTES
Encounter Date:03/20/2018      Patient ID: Angelita Shay is a 72 y.o. female.        Subjective:     Chief Complaint: Establish Care (s/p Hospitalization)   SHF  History of Present Illness patient presents to the heart and valve center today for ongoing evaluation of her systolic heart failure. Patient notes fatigue but reports that she is feeling much better overall. She notes compliance with her medications. She notes that her dyspnea,abdominal fullness and pedal edema has resolved. She denies chest pain.    Patient Active Problem List   Diagnosis   • Essential hypertension   • Anxiety   • DM type 2 (diabetes mellitus, type 2), on insulin therapy   • GERD (gastroesophageal reflux disease)   • Coronary artery disease involving native coronary artery of native heart with angina pectoris   • Sleep apnea   • RLS (restless legs syndrome)   • Gout   • CKD (chronic kidney disease) stage 3, GFR 30-59 ml/min   • Acute systolic congestive heart failure   • Paroxysmal atrial fibrillation   • Nonischemic cardiomyopathy   • Biliary colic   • Cholelithiasis       Past Surgical History:   Procedure Laterality Date   • APPENDECTOMY     • CARDIAC CATHETERIZATION  08/2015    no stents   • CARDIAC CATHETERIZATION  03/13/2018   • CARDIAC CATHETERIZATION N/A 3/13/2018    Procedure: Left Heart Cath;  Surgeon: Pierre Jones III, MD;  Location:  [facility];  Service: Cardiovascular   • COLON RESECTION     • COLONOSCOPY  2000   • CORONARY ANGIOPLASTY WITH STENT PLACEMENT  12/2015   • [place] TOTAL KNEE ARTHROPLASTY Left 11/3/2016    Procedure: LEFT TOTAL KNEE REPLACEMENT;  Surgeon: Laurent Zuniga MD;  Location: [facility] OR;  Service: Orthopedics   • TONSILLECTOMY     • TOTAL KNEE ARTHROPLASTY Right 2008    revision 2010   • TOTAL SHOULDER ARTHROPLASTY Bilateral        Allergies   Allergen Reactions   • Oxycodone Shortness Of Breath   • Zolpidem Other (See Comments)     nightmares         Current Outpatient  Prescriptions:   •  apixaban (ELIQUIS) 5 MG tablet tablet, Take 1 tablet by mouth Every 12 (Twelve) Hours for 90 days., Disp: 60 tablet, Rfl: 2  •  aspirin 81 MG EC tablet, Take 1 tablet by mouth Daily. Resume in 2 weeks, Disp: , Rfl:   •  atorvastatin (LIPITOR) 20 MG tablet, Take 1 tablet by mouth Every Night for 180 days., Disp: 30 tablet, Rfl: 5  •  carvedilol (COREG) 12.5 MG tablet, Take 1 tablet by mouth 2 (Two) Times a Day With Meals for 180 days., Disp: 60 tablet, Rfl: 5  •  digoxin (LANOXIN) 250 MCG tablet, Take 1 tablet by mouth Daily for 90 days., Disp: 30 tablet, Rfl: 2  •  furosemide (LASIX) 20 MG tablet, Take 1 tablet by mouth Daily on Mondays, Wednesdays, Fridays, Disp: 30 tablet, Rfl: 5  •  gabapentin (NEURONTIN) 300 MG capsule, Take 1 capsule by mouth 2 (Two) Times a Day., Disp: 60 capsule, Rfl: 2  •  insulin aspart (NOVOLOG) 100 UNIT/ML injection, **Per patient's Sliding scale**, Disp: 10 mL, Rfl: 2  •  linaclotide (LINZESS) 290 MCG capsule capsule, Take 1 capsule by mouth Every Morning Before Breakfast., Disp: 30 capsule, Rfl: 1  •  linaclotide (LINZESS) 290 MCG capsule capsule, Take 290 mcg by mouth Every Morning Before Breakfast., Disp: , Rfl:   •  polyethylene glycol (MIRALAX) powder, Take 17 g by mouth Daily As Needed., Disp: , Rfl:   •  rOPINIRole (REQUIP) 1 MG tablet, Take 1-3 tablets by mouth Every Night. Take 1 hour before bedtime., Disp: 90 tablet, Rfl: 1  •  sacubitril-valsartan (ENTRESTO) 24-26 MG tablet, Take 1 tablet by mouth Every 12 (Twelve) Hours for 180 days., Disp: 60 tablet, Rfl: 5  •  spironolactone (ALDACTONE) 25 MG tablet, Take 1 tablet by mouth Daily for 180 days., Disp: 30 tablet, Rfl: 5  •  traZODone (DESYREL) 50 MG tablet, Take 1 tablet by mouth Every Night., Disp: 10 tablet, Rfl: 0    The following portions of the chart were reviewed and updated as appropriate: Allergies, current medications, past family history, social history, past medical history.     Review of Systems  "  Constitution: Positive for malaise/fatigue. Negative for chills, decreased appetite, diaphoresis, fever, weakness, night sweats, weight gain and weight loss.   HENT: Negative for congestion, hearing loss, hoarse voice and nosebleeds.         Postnasal drip   Eyes: Positive for blurred vision. Negative for visual disturbance and visual halos.   Cardiovascular: Negative for chest pain, claudication, cyanosis, dyspnea on exertion, irregular heartbeat, leg swelling, near-syncope, orthopnea, palpitations, paroxysmal nocturnal dyspnea and syncope.   Respiratory: Positive for cough and snoring. Negative for hemoptysis, shortness of breath, sleep disturbances due to breathing, sputum production and wheezing.    Hematologic/Lymphatic: Negative for bleeding problem. Does not bruise/bleed easily.   Skin: Negative for dry skin, itching and rash.   Musculoskeletal: Negative for arthritis, falls, joint pain, joint swelling and myalgias.   Gastrointestinal: Positive for abdominal pain, constipation and nausea. Negative for diarrhea, flatus, heartburn, hematemesis, hematochezia, melena and vomiting. Bloating: improved.   Genitourinary: Negative for dysuria, frequency, hematuria, nocturia and urgency.   Neurological: Positive for excessive daytime sleepiness. Negative for dizziness, headaches, light-headedness and loss of balance.   Psychiatric/Behavioral: Positive for depression. The patient has insomnia. The patient is not nervous/anxious.    Allergic/Immunologic:        Seasonal allergies           Objective:     Vitals:    03/20/18 1443 03/20/18 1445 03/20/18 1446   BP: 137/77 101/63 122/75   BP Location: Right arm Left arm Left arm   Patient Position: Sitting Sitting Standing   Cuff Size: Adult     Pulse: 63 65 72   Resp: 18     Temp: 97.8 °F (36.6 °C)     TempSrc: Temporal Artery      SpO2: 94%     Weight: 96.2 kg (212 lb)     Height: 167.6 cm (66\")           Physical Exam   Constitutional: She is oriented to person, place, " and time. She appears well-developed and well-nourished. She is active and cooperative. No distress.   HENT:   Head: Normocephalic and atraumatic.   Mouth/Throat: Oropharynx is clear and moist.   Eyes: Conjunctivae and EOM are normal. Pupils are equal, round, and reactive to light.   Neck: Normal range of motion. Neck supple. No JVD present. No tracheal deviation present. No thyromegaly present.   Cardiovascular: Normal rate, regular rhythm, normal heart sounds and intact distal pulses.    Pulmonary/Chest: Effort normal and breath sounds normal.   Abdominal: Soft. Bowel sounds are normal. She exhibits no distension. There is no tenderness.   Musculoskeletal: Normal range of motion.   Neurological: She is alert and oriented to person, place, and time.   Skin: Skin is warm, dry and intact.   Psychiatric: She has a normal mood and affect. Her behavior is normal.   Nursing note and vitals reviewed.      Lab and Diagnostic Review:      Lab Results   Component Value Date    GLUCOSE 206 (H) 03/14/2018    CALCIUM 8.6 (L) 03/14/2018     03/14/2018    K 4.3 03/14/2018    CO2 34.0 (H) 03/14/2018    CL 97 (L) 03/14/2018    BUN 30 (H) 03/14/2018    CREATININE 1.20 03/14/2018    EGFRIFNONA 44 (L) 03/14/2018    BCR 25.0 03/14/2018    ANIONGAP 4.0 03/14/2018     Lab Results   Component Value Date    WBC 7.71 03/13/2018    HGB 14.8 03/13/2018    HCT 46.6 (H) 03/13/2018    .2 (H) 03/13/2018     (L) 03/13/2018         Assessment and Plan:         1. Chronic systolic congestive heart failure  euvolemic  Heart failure education today including signs and symptoms, the role of the heart failure center, daily weights, low sodium diet (less than 1500 mg per day), and daily physical activity. Reviewed HF Zones with patient.  Patient to continue current medications as previously ordered.     2. Nonischemic cardiomyopathy  Continue coreg, lasix, entresto, aldactone  3. Paroxysmal atrial fibrillation  Maintaining  nsr  Continue BB  CHADS-VASc Risk Assessment            5       Total Score        1 CHF    1 Hypertension    1 DM    1 Age 65-74    1 Sex: Female        Criteria that do not apply:    Age >/= 75    PRIOR STROKE/TIA/THROMBO    Vascular Disease      Anticoagulated with eliquis and denies any s/s of bleeding    4. Essential hypertension  Well controlled  HTN Education provided today including signs and symptoms, medication management, daily blood pressure monitoring. Patient encouraged to call the Heart and Valve center with any abnormal readings.       It has been a pleasure to participate in the care of this patient.  Patient was instructed to call the [facility] Val[facility] Center with any questions, concerns, or worsening symptoms.      * Please note that portions of this note were completed with a voice recognition program. Efforts were made to edit the dictation but occasionally words are transcribed.

## 2018-03-21 RX ORDER — TRAZODONE HYDROCHLORIDE 50 MG/1
50 TABLET ORAL NIGHTLY
Qty: 10 TABLET | Refills: 0 | Status: SHIPPED | OUTPATIENT
Start: 2018-03-21 | End: 2018-04-06 | Stop reason: SDUPTHER

## 2018-03-28 ENCOUNTER — OFFICE VISIT (OUTPATIENT)
Dept: INTERNAL MEDICINE | Facility: CLINIC | Age: 73
End: 2018-03-28

## 2018-03-28 ENCOUNTER — APPOINTMENT (OUTPATIENT)
Dept: GENERAL RADIOLOGY | Facility: HOSPITAL | Age: 73
End: 2018-03-28

## 2018-03-28 ENCOUNTER — HOSPITAL ENCOUNTER (INPATIENT)
Facility: HOSPITAL | Age: 73
LOS: 2 days | Discharge: HOME | End: 2018-03-30
Attending: EMERGENCY MEDICINE | Admitting: FAMILY MEDICINE

## 2018-03-28 ENCOUNTER — APPOINTMENT (OUTPATIENT)
Dept: ULTRASOUND IMAGING | Facility: HOSPITAL | Age: 73
End: 2018-03-28

## 2018-03-28 VITALS
HEART RATE: 54 BPM | TEMPERATURE: 97.8 F | BODY MASS INDEX: 33.17 KG/M2 | SYSTOLIC BLOOD PRESSURE: 92 MMHG | OXYGEN SATURATION: 96 % | HEIGHT: 66 IN | DIASTOLIC BLOOD PRESSURE: 52 MMHG | WEIGHT: 206.4 LBS

## 2018-03-28 DIAGNOSIS — I95.9 HYPOTENSION, UNSPECIFIED HYPOTENSION TYPE: ICD-10-CM

## 2018-03-28 DIAGNOSIS — R00.1 BRADYCARDIA: ICD-10-CM

## 2018-03-28 DIAGNOSIS — E87.5 HYPERKALEMIA: Primary | ICD-10-CM

## 2018-03-28 DIAGNOSIS — N18.30 CKD (CHRONIC KIDNEY DISEASE) STAGE 3, GFR 30-59 ML/MIN (HCC): Chronic | ICD-10-CM

## 2018-03-28 DIAGNOSIS — K80.20 CALCULUS OF GALLBLADDER WITHOUT CHOLECYSTITIS WITHOUT OBSTRUCTION: Chronic | ICD-10-CM

## 2018-03-28 DIAGNOSIS — K81.9 CHOLECYSTITIS: ICD-10-CM

## 2018-03-28 DIAGNOSIS — M54.6 ACUTE MIDLINE THORACIC BACK PAIN: ICD-10-CM

## 2018-03-28 DIAGNOSIS — I50.22 CHRONIC SYSTOLIC CONGESTIVE HEART FAILURE (HCC): ICD-10-CM

## 2018-03-28 DIAGNOSIS — N28.9 ACUTE ON CHRONIC RENAL INSUFFICIENCY: ICD-10-CM

## 2018-03-28 DIAGNOSIS — R10.11 RIGHT UPPER QUADRANT ABDOMINAL PAIN: ICD-10-CM

## 2018-03-28 DIAGNOSIS — I48.0 PAROXYSMAL ATRIAL FIBRILLATION (HCC): Primary | Chronic | ICD-10-CM

## 2018-03-28 DIAGNOSIS — N18.9 ACUTE ON CHRONIC RENAL INSUFFICIENCY: ICD-10-CM

## 2018-03-28 PROBLEM — E11.9 DIABETES MELLITUS (HCC): Status: ACTIVE | Noted: 2018-03-28

## 2018-03-28 PROBLEM — R42 DIZZINESS: Status: ACTIVE | Noted: 2018-03-28

## 2018-03-28 PROBLEM — I42.8 NONISCHEMIC CARDIOMYOPATHY: Status: ACTIVE | Noted: 2018-03-28

## 2018-03-28 PROBLEM — E11.9 DIABETES MELLITUS (HCC): Chronic | Status: ACTIVE | Noted: 2018-03-28

## 2018-03-28 PROBLEM — N17.9 ACUTE KIDNEY INJURY SUPERIMPOSED ON CHRONIC KIDNEY DISEASE (HCC): Status: ACTIVE | Noted: 2018-03-28

## 2018-03-28 PROBLEM — R10.9 ABDOMINAL PAIN: Status: ACTIVE | Noted: 2018-03-28

## 2018-03-28 LAB
ALBUMIN SERPL-MCNC: 4.7 G/DL (ref 3.2–4.8)
ALBUMIN/GLOB SERPL: 1.5 G/DL (ref 1.5–2.5)
ALP SERPL-CCNC: 82 U/L (ref 25–100)
ALT SERPL W P-5'-P-CCNC: 24 U/L (ref 7–40)
ANION GAP SERPL CALCULATED.3IONS-SCNC: 8 MMOL/L (ref 3–11)
AST SERPL-CCNC: 22 U/L (ref 0–33)
BASOPHILS # BLD AUTO: 0.04 10*3/MM3 (ref 0–0.2)
BASOPHILS NFR BLD AUTO: 0.5 % (ref 0–1)
BILIRUB SERPL-MCNC: 0.6 MG/DL (ref 0.3–1.2)
BNP SERPL-MCNC: 10 PG/ML (ref 0–100)
BUN BLD-MCNC: 69 MG/DL (ref 9–23)
BUN/CREAT SERPL: 34.5 (ref 7–25)
CALCIUM SPEC-SCNC: 9.8 MG/DL (ref 8.7–10.4)
CHLORIDE SERPL-SCNC: 105 MMOL/L (ref 99–109)
CO2 SERPL-SCNC: 21 MMOL/L (ref 20–31)
CREAT BLD-MCNC: 2 MG/DL (ref 0.6–1.3)
DEPRECATED RDW RBC AUTO: 48.3 FL (ref 37–54)
DIGOXIN SERPL-MCNC: 0.95 NG/ML (ref 0.8–2)
EOSINOPHIL # BLD AUTO: 0.2 10*3/MM3 (ref 0–0.3)
EOSINOPHIL NFR BLD AUTO: 2.4 % (ref 0–3)
ERYTHROCYTE [DISTWIDTH] IN BLOOD BY AUTOMATED COUNT: 13.6 % (ref 11.3–14.5)
GFR SERPL CREATININE-BSD FRML MDRD: 24 ML/MIN/1.73
GLOBULIN UR ELPH-MCNC: 3.2 GM/DL
GLUCOSE BLD-MCNC: 142 MG/DL (ref 70–100)
HCT VFR BLD AUTO: 48 % (ref 34.5–44)
HGB BLD-MCNC: 15.9 G/DL (ref 11.5–15.5)
HOLD SPECIMEN: NORMAL
HOLD SPECIMEN: NORMAL
IMM GRANULOCYTES # BLD: 0.02 10*3/MM3 (ref 0–0.03)
IMM GRANULOCYTES NFR BLD: 0.2 % (ref 0–0.6)
LYMPHOCYTES # BLD AUTO: 1.4 10*3/MM3 (ref 0.6–4.8)
LYMPHOCYTES NFR BLD AUTO: 16.7 % (ref 24–44)
MAGNESIUM SERPL-MCNC: 3.1 MG/DL (ref 1.3–2.7)
MCH RBC QN AUTO: 32.2 PG (ref 27–31)
MCHC RBC AUTO-ENTMCNC: 33.1 G/DL (ref 32–36)
MCV RBC AUTO: 97.2 FL (ref 80–99)
MONOCYTES # BLD AUTO: 0.42 10*3/MM3 (ref 0–1)
MONOCYTES NFR BLD AUTO: 5 % (ref 0–12)
NEUTROPHILS # BLD AUTO: 6.28 10*3/MM3 (ref 1.5–8.3)
NEUTROPHILS NFR BLD AUTO: 75.2 % (ref 41–71)
PLATELET # BLD AUTO: 164 10*3/MM3 (ref 150–450)
PMV BLD AUTO: 11.8 FL (ref 6–12)
POTASSIUM BLD-SCNC: 6.3 MMOL/L (ref 3.5–5.5)
PROT SERPL-MCNC: 7.9 G/DL (ref 5.7–8.2)
RBC # BLD AUTO: 4.94 10*6/MM3 (ref 3.89–5.14)
SODIUM BLD-SCNC: 134 MMOL/L (ref 132–146)
TROPONIN I SERPL-MCNC: 0.01 NG/ML (ref 0–0.07)
TROPONIN I SERPL-MCNC: 0.02 NG/ML (ref 0–0.07)
WBC NRBC COR # BLD: 8.36 10*3/MM3 (ref 3.5–10.8)
WHOLE BLOOD HOLD SPECIMEN: NORMAL
WHOLE BLOOD HOLD SPECIMEN: NORMAL

## 2018-03-28 PROCEDURE — 99214 OFFICE O/P EST MOD 30 MIN: CPT | Performed by: FAMILY MEDICINE

## 2018-03-28 PROCEDURE — G0378 HOSPITAL OBSERVATION PER HR: HCPCS

## 2018-03-28 PROCEDURE — 80053 COMPREHEN METABOLIC PANEL: CPT

## 2018-03-28 PROCEDURE — 99223 1ST HOSP IP/OBS HIGH 75: CPT | Performed by: HOSPITALIST

## 2018-03-28 PROCEDURE — 93005 ELECTROCARDIOGRAM TRACING: CPT | Performed by: EMERGENCY MEDICINE

## 2018-03-28 PROCEDURE — 71045 X-RAY EXAM CHEST 1 VIEW: CPT

## 2018-03-28 PROCEDURE — 83735 ASSAY OF MAGNESIUM: CPT

## 2018-03-28 PROCEDURE — 85025 COMPLETE CBC W/AUTO DIFF WBC: CPT

## 2018-03-28 PROCEDURE — 25010000002 CALCIUM GLUCONATE PER 10 ML: Performed by: PHYSICIAN ASSISTANT

## 2018-03-28 PROCEDURE — 84484 ASSAY OF TROPONIN QUANT: CPT

## 2018-03-28 PROCEDURE — 63710000001 INSULIN REGULAR HUMAN PER 5 UNITS: Performed by: PHYSICIAN ASSISTANT

## 2018-03-28 PROCEDURE — 76705 ECHO EXAM OF ABDOMEN: CPT

## 2018-03-28 PROCEDURE — 25010000002 PIPERACILLIN-TAZOBACTAM: Performed by: PHYSICIAN ASSISTANT

## 2018-03-28 PROCEDURE — 99284 EMERGENCY DEPT VISIT MOD MDM: CPT

## 2018-03-28 PROCEDURE — 93005 ELECTROCARDIOGRAM TRACING: CPT

## 2018-03-28 PROCEDURE — 93000 ELECTROCARDIOGRAM COMPLETE: CPT | Performed by: FAMILY MEDICINE

## 2018-03-28 PROCEDURE — 83880 ASSAY OF NATRIURETIC PEPTIDE: CPT | Performed by: PHYSICIAN ASSISTANT

## 2018-03-28 PROCEDURE — 80162 ASSAY OF DIGOXIN TOTAL: CPT

## 2018-03-28 RX ORDER — SODIUM CHLORIDE 9 MG/ML
75 INJECTION, SOLUTION INTRAVENOUS CONTINUOUS
Status: ACTIVE | OUTPATIENT
Start: 2018-03-28 | End: 2018-03-29

## 2018-03-28 RX ORDER — HYDROCODONE BITARTRATE AND ACETAMINOPHEN 5; 325 MG/1; MG/1
1 TABLET ORAL EVERY 6 HOURS PRN
Status: DISCONTINUED | OUTPATIENT
Start: 2018-03-28 | End: 2018-03-30 | Stop reason: HOSPADM

## 2018-03-28 RX ORDER — ONDANSETRON 2 MG/ML
4 INJECTION INTRAMUSCULAR; INTRAVENOUS EVERY 6 HOURS PRN
Status: DISCONTINUED | OUTPATIENT
Start: 2018-03-28 | End: 2018-03-30 | Stop reason: HOSPADM

## 2018-03-28 RX ORDER — MAGNESIUM GLUCONATE 27 MG(500)
27 TABLET ORAL DAILY
COMMUNITY
End: 2018-06-21

## 2018-03-28 RX ORDER — ONDANSETRON 4 MG/1
4 TABLET, FILM COATED ORAL EVERY 6 HOURS PRN
Status: DISCONTINUED | OUTPATIENT
Start: 2018-03-28 | End: 2018-03-30 | Stop reason: HOSPADM

## 2018-03-28 RX ORDER — SODIUM POLYSTYRENE SULFONATE 15 G/60ML
15 SUSPENSION ORAL; RECTAL ONCE
Status: COMPLETED | OUTPATIENT
Start: 2018-03-28 | End: 2018-03-28

## 2018-03-28 RX ORDER — MORPHINE SULFATE 2 MG/ML
1 INJECTION, SOLUTION INTRAMUSCULAR; INTRAVENOUS EVERY 4 HOURS PRN
Status: DISCONTINUED | OUTPATIENT
Start: 2018-03-28 | End: 2018-03-30 | Stop reason: HOSPADM

## 2018-03-28 RX ORDER — ACETAMINOPHEN 325 MG/1
650 TABLET ORAL EVERY 4 HOURS PRN
Status: DISCONTINUED | OUTPATIENT
Start: 2018-03-28 | End: 2018-03-30 | Stop reason: HOSPADM

## 2018-03-28 RX ORDER — GABAPENTIN 300 MG/1
300 CAPSULE ORAL 2 TIMES DAILY
Qty: 60 CAPSULE | Refills: 2 | Status: CANCELLED | OUTPATIENT
Start: 2018-03-28

## 2018-03-28 RX ORDER — CALCIUM GLUCONATE 94 MG/ML
1 INJECTION, SOLUTION INTRAVENOUS ONCE
Status: COMPLETED | OUTPATIENT
Start: 2018-03-28 | End: 2018-03-28

## 2018-03-28 RX ORDER — NALOXONE HCL 0.4 MG/ML
0.4 VIAL (ML) INJECTION
Status: DISCONTINUED | OUTPATIENT
Start: 2018-03-28 | End: 2018-03-30 | Stop reason: HOSPADM

## 2018-03-28 RX ORDER — DEXTROSE MONOHYDRATE 25 G/50ML
50 INJECTION, SOLUTION INTRAVENOUS ONCE
Status: COMPLETED | OUTPATIENT
Start: 2018-03-28 | End: 2018-03-28

## 2018-03-28 RX ORDER — SODIUM CHLORIDE 0.9 % (FLUSH) 0.9 %
10 SYRINGE (ML) INJECTION AS NEEDED
Status: DISCONTINUED | OUTPATIENT
Start: 2018-03-28 | End: 2018-03-30 | Stop reason: HOSPADM

## 2018-03-28 RX ORDER — ATORVASTATIN CALCIUM 40 MG/1
20 TABLET, FILM COATED ORAL NIGHTLY
Status: DISCONTINUED | OUTPATIENT
Start: 2018-03-28 | End: 2018-03-30 | Stop reason: HOSPADM

## 2018-03-28 RX ORDER — SODIUM CHLORIDE 0.9 % (FLUSH) 0.9 %
1-10 SYRINGE (ML) INJECTION AS NEEDED
Status: DISCONTINUED | OUTPATIENT
Start: 2018-03-28 | End: 2018-03-30 | Stop reason: HOSPADM

## 2018-03-28 RX ORDER — TRAZODONE HYDROCHLORIDE 50 MG/1
50 TABLET ORAL NIGHTLY
Status: DISCONTINUED | OUTPATIENT
Start: 2018-03-28 | End: 2018-03-30 | Stop reason: HOSPADM

## 2018-03-28 RX ORDER — GABAPENTIN 100 MG/1
200 CAPSULE ORAL 2 TIMES DAILY
Status: DISCONTINUED | OUTPATIENT
Start: 2018-03-28 | End: 2018-03-30 | Stop reason: HOSPADM

## 2018-03-28 RX ORDER — ROPINIROLE 0.5 MG/1
1 TABLET, FILM COATED ORAL NIGHTLY
Status: DISCONTINUED | OUTPATIENT
Start: 2018-03-28 | End: 2018-03-30 | Stop reason: HOSPADM

## 2018-03-28 RX ADMIN — SODIUM CHLORIDE 75 ML/HR: 9 INJECTION, SOLUTION INTRAVENOUS at 22:53

## 2018-03-28 RX ADMIN — CALCIUM GLUCONATE 1 G: 94 INJECTION, SOLUTION INTRAVENOUS at 21:11

## 2018-03-28 RX ADMIN — TRAZODONE HYDROCHLORIDE 50 MG: 50 TABLET ORAL at 22:52

## 2018-03-28 RX ADMIN — INSULIN HUMAN 10 UNITS: 100 INJECTION, SOLUTION PARENTERAL at 21:32

## 2018-03-28 RX ADMIN — TAZOBACTAM SODIUM AND PIPERACILLIN SODIUM 4.5 G: .5; 4 INJECTION, POWDER, LYOPHILIZED, FOR SOLUTION INTRAVENOUS at 21:31

## 2018-03-28 RX ADMIN — HYDROCODONE BITARTRATE AND ACETAMINOPHEN 1 TABLET: 5; 325 TABLET ORAL at 22:52

## 2018-03-28 RX ADMIN — SODIUM POLYSTYRENE SULFONATE 15 G: 15 SUSPENSION ORAL; RECTAL at 21:16

## 2018-03-28 RX ADMIN — ROPINIROLE 1 MG: 0.5 TABLET, FILM COATED ORAL at 22:52

## 2018-03-28 RX ADMIN — ATORVASTATIN CALCIUM 20 MG: 40 TABLET, FILM COATED ORAL at 22:52

## 2018-03-28 RX ADMIN — DEXTROSE MONOHYDRATE 50 ML: 25 INJECTION, SOLUTION INTRAVENOUS at 21:07

## 2018-03-28 RX ADMIN — GABAPENTIN 200 MG: 100 CAPSULE ORAL at 22:52

## 2018-03-28 RX ADMIN — SODIUM BICARBONATE 50 MEQ: 84 INJECTION INTRAVENOUS at 21:12

## 2018-03-28 NOTE — PROGRESS NOTES
Transitional Care Follow Up Visit  Subjective     Angelita Shay is a 72 y.o. female who presents for a transitional care management visit.    Within 48 business hours after discharge our office contacted her via telephone to coordinate her care and needs.      I reviewed and discussed the details of that call along with the discharge summary, hospital problems, inpatient lab results, inpatient diagnostic studies, and consultation reports with Angelita.     Current outpatient and discharge medications have been reconciled for the patient.    Date of TCM Phone Call 3/15/2018   [facility]   Date of Admission 3/11/2018   Date of Discharge 3/14/2018   Discharge Disposition Home or Self Care     Risk for Readmission (LACE) Score: 9 (3/29/2018  6:00 AM)    History of Present Illness     Pt recently in hospital.   Pt has hx of gallstones, without inflammation of GB by CT.  Pt has pain in the upper abdomen today.Pt has hx of CHF and low EF.  Pt has been having back pain today. Last fall 1 year ago.  Pt has DM, CKD and peripheral neuropathy.  Pt has only needed insulin 2 times in the past month.   Pt has colonoscopy with Dr Cash on 4/18/18.  Pt has 3 polyps removed in 2015.  Pt has hx of partial colectomy for colon CA.  Pt has hx of a fib.       Hospital summary below.    ___________________________________________________________________________        Course During Hospital Stay:     Date and Time Order Name Status Description     3/12/2018 1630 Inpatient Cardiology Consult Completed            Hospital Course      Presenting Problem:   Atrial fibrillation with RVR [I48.91]           Active Hospital Problems (** Indicates Principal Problem)     Diagnosis Date Noted   • **Acute systolic congestive heart failure [I50.21] 11/04/2016   • Biliary colic [K80.50] 03/14/2018   • Cholelithiasis [K80.20] 03/14/2018   • Nonischemic cardiomyopathy [I42.8] 03/13/2018   • Paroxysmal atrial fibrillation [I48.0]  03/11/2018   • Coronary artery disease involving native coronary artery of native heart with angina pectoris [I25.119] 11/04/2016   • CKD (chronic kidney disease) stage 3, GFR 30-59 ml/min [N18.3] 11/04/2016   • Sleep apnea [G47.30] 11/04/2016   • DM type 2 (diabetes mellitus, type 2), on insulin therapy [E11.9] 11/03/2016   • Anxiety [F41.9] 11/03/2016       Resolved Hospital Problems     Diagnosis Date Noted Date Resolved   No resolved problems to display.            Hospital Course:  Angelita Shay is a 72 y.o. female who presented to [facility] with hypoxia and SOA.  ECHO revealed EF 36% and severe pulmonary HTN with RVSP 45-55 mmHg of unclear chronicity but was markedly different than her prior ECHO from November of 2016 with normal EF >65% and no elevation of RVSP.  Patient was successfully treated with diuretics for her resumed acute systolic CHF with good improvement.  Due to new CHF symptoms she underwent LHC on 3/13/18 by Dr. Jones of Cardiology which showed:   · Patent LAD stent  · Mild nonobstructive disease.  · Mildly elevated LVEDP  · No aortic stenosis     Her cardiac medications have been optimized and new prescriptions filled with pharmacy, planning for f/u with Cardiology in 1 month.        Procedure(s):  Left Heart Cath 3/13/18      Day of Discharge      HPI:   No CP or SOA/AYERS.  Still with dull RUQ pain consistent with biliary colic.  PAtietn advised of CT abd/pelv finding s with cholelithiasis but no cholecystitis and she will need to see PCP for outpt referral to Gen Surg for consideration of elective CCY.      Review of Systems  Otherwise ROS is negative except as mentioned in the HPI.     Vital Signs:   Temp:  [97.4 °F (36.3 °C)-99.2 °F (37.3 °C)] 98 °F (36.7 °C)  Heart Rate:  [66-93] 75  Resp:  [16-26] 16  BP: (108-173)/(63-98) 134/82      Physical Exam:  Constitutional: No acute distress, awake, alert, nontoxic, obese body habitus  Respiratory: Clear to auscultation bilaterally, good effort,  "nonlabored respirations   Cardiovascular: RRR, no murmur  Gastrointestinal: Soft, RUQ dull TTP, nondistended  Musculoskeletal: No peripheral edema, normal muscle tone for age  Psychiatric: Appropriate affect, good insight and judgement, cooperative\"     _______________________________________________________________________________________________          The following portions of the patient's history were reviewed and updated as appropriate: allergies, current medications, past family history, past medical history, past social history, past surgical history and problem list.    Past Medical History:   Diagnosis Date   • Anxiety    • Arthritis    • Cellulitis of both lower extremities    • CKD (chronic kidney disease), stage III    • Colon cancer 2000   • Colon polyp 2015    x 3 adenomatous   • Coronary artery disease     1 stent   • Corrosive esophagitis    • Depression    • Diabetes mellitus     dx 2 years ago- checks fsbs bid   • GERD (gastroesophageal reflux disease)    • GI bleed    • Gout    • H/O cardiac catheterization    • H/O colonoscopy 2008    incomplete prep   • H/O echocardiogram 03/11/2018    EF 36%,LVH, mild MR, mild TR   • H/O esophagogastroduodenoscopy 2008    hiatal hernia, gastritis   • H/O hiatal hernia    • H/O mammogram 2012   • Hyperlipidemia    • Hypertension    • Knee pain    • PAF (paroxysmal atrial fibrillation) 2007    postoperative total knee replacement   • Pap smear for cervical cancer screening 2013   • RLS (restless legs syndrome)    • Sleep apnea    • Wears eyeglasses      Past Surgical History:   Procedure Laterality Date   • APPENDECTOMY  05/1963   • CARDIAC CATHETERIZATION  08/2015    no stents   • CARDIAC CATHETERIZATION  03/13/2018   • CARDIAC CATHETERIZATION N/A 3/13/2018    Procedure: Left Heart Cath;  Surgeon: Pierre Jones III, MD;  Location: [facility] INVASIVE LOCATION;  Service: Cardiovascular   • COLON RESECTION  04/08/2000    colon cancer   • COLONOSCOPY  2015   • " CORONARY ANGIOPLASTY WITH STENT PLACEMENT  12/2015   • [place] TOTAL KNEE ARTHROPLASTY Left 11/3/2016    Procedure: LEFT TOTAL KNEE REPLACEMENT;  Surgeon: Laurent Zuniga MD;  Location: [facility];  Service: Orthopedics   • TONSILLECTOMY  12/1961   • TOTAL KNEE ARTHROPLASTY Right 2008    revision 2010   • TOTAL SHOULDER ARTHROPLASTY Bilateral     right 2014, left 2015     Allergies   Allergen Reactions   • Oxycodone Shortness Of Breath   • Zolpidem Other (See Comments)     nightmares     Family History   Problem Relation Age of Onset   • Colon cancer Other    • Diabetes Other    • Heart disease Other    • Hypertension Other    • Stroke Other    • Tuberculosis Other    • Hypertension Mother    • Colon cancer Father    • Stroke Father    • Diabetes Father    • Colon cancer Sister    • Diabetes Sister    • Diabetes Maternal Grandmother    • Coronary artery disease Maternal Grandfather    • No Known Problems Paternal Grandmother    • No Known Problems Paternal Grandfather      Social History     Social History   • Marital status:      Spouse name: N/A   • Number of children: 5   • Years of education: N/A     Occupational History   • Retired       Social History Main Topics   • Smoking status: Former Smoker     Packs/day: 1.00     Years: 22.00     Types: Cigarettes   • Smokeless tobacco: Never Used      Comment: quit 1979   • Alcohol use No   • Drug use: No   • Sexual activity: Defer     Other Topics Concern   • Not on file     Social History Narrative    Caffeine: 0-1 servings per day    Patient lives at her home with her daughter and her family       Review of Systems   Constitutional: Positive for activity change (weak today, cleaned house yesterday), appetite change (decreased), fatigue and unexpected weight change (loosing weight). Negative for chills, diaphoresis and fever.   HENT: Positive for rhinorrhea and sneezing. Negative for congestion, dental problem, drooling, ear discharge, ear  pain, facial swelling, hearing loss, mouth sores, nosebleeds, postnasal drip, sinus pain, sinus pressure, sore throat, tinnitus, trouble swallowing and voice change.    Eyes: Negative.  Negative for photophobia, pain, discharge, redness, itching and visual disturbance.   Respiratory: Positive for apnea (has sleep study on June 28th) and cough. Negative for choking, chest tightness, shortness of breath, wheezing and stridor.    Cardiovascular: Negative.  Negative for chest pain, palpitations and leg swelling.   Gastrointestinal: Positive for abdominal pain, diarrhea and vomiting (vomited green yesterday and today, thin fluid). Negative for abdominal distention, anal bleeding, blood in stool, constipation, nausea and rectal pain.   Endocrine: Negative.  Negative for cold intolerance, heat intolerance, polydipsia, polyphagia and polyuria.   Genitourinary: Negative.  Negative for decreased urine volume, difficulty urinating, dyspareunia, dysuria, enuresis, flank pain, frequency, genital sores, hematuria, menstrual problem, pelvic pain, urgency, vaginal bleeding, vaginal discharge and vaginal pain.   Musculoskeletal: Positive for arthralgias and myalgias (left arm). Negative for gait problem, joint swelling, neck pain and neck stiffness. Back pain: today.   Skin: Negative.  Negative for color change, pallor, rash and wound.   Allergic/Immunologic: Negative.  Negative for environmental allergies, food allergies and immunocompromised state.   Neurological: Positive for dizziness, weakness and light-headedness. Negative for tremors, seizures, syncope, facial asymmetry, speech difficulty, numbness and headaches.   Hematological: Negative for adenopathy. Bruises/bleeds easily.   Psychiatric/Behavioral: Negative.  Negative for agitation, behavioral problems, confusion, decreased concentration, dysphoric mood, hallucinations, self-injury, sleep disturbance and suicidal ideas. The patient is not nervous/anxious and is not  hyperactive.        Objective     Physical Exam   Constitutional: She is oriented to person, place, and time. She appears well-developed.   HENT:   Head: Normocephalic.   Right Ear: External ear normal.   Left Ear: External ear normal.   Nose: Nose normal.   Mouth/Throat: Oropharynx is clear and moist.   Eyes: Conjunctivae and EOM are normal. Pupils are equal, round, and reactive to light.   Neck: Trachea normal and normal range of motion. Neck supple. Carotid bruit is not present. No thyroid mass and no thyromegaly present.   Cardiovascular: An irregular rhythm present. Bradycardia present.    No murmur heard.  Pulmonary/Chest: Effort normal and breath sounds normal.   Abdominal: Soft. Bowel sounds are normal. There is tenderness in the right upper quadrant and epigastric area.   Musculoskeletal: Normal range of motion.        Thoracic back: She exhibits tenderness.        Back:    Neurological: She is alert and oriented to person, place, and time.   Skin: Skin is warm and dry.   Psychiatric: She has a normal mood and affect. Her behavior is normal.   Nursing note and vitals reviewed.      Assessment/Plan   Angelita was seen today for establish care, atrial fibrillation, congestive heart failure, abdominal pain and peripheral neuropathy.    Diagnoses and all orders for this visit:    Paroxysmal atrial fibrillation  -     ECG 12 Lead    Calculus of gallbladder without cholecystitis without obstruction    CKD (chronic kidney disease) stage 3, GFR 30-59 ml/min    Bradycardia  -     ECG 12 Lead    Hypotension, unspecified hypotension type  -     ECG 12 Lead    Acute midline thoracic back pain    Other orders  -     Cancel: gabapentin (NEURONTIN) 300 MG capsule; Take 1 capsule by mouth 2 (Two) Times a Day.  -     SCANNED EKG      To ER       No current facility-administered medications for this visit.   No current outpatient prescriptions on file.    Facility-Administered Medications Ordered in Other Visits:   •   acetaminophen (TYLENOL) tablet 650 mg, 650 mg, Oral, Q4H PRN, Marli TURNER MD  •  aspirin EC tablet 81 mg, 81 mg, Oral, Daily, Festus Bowie IV, MD  •  atorvastatin (LIPITOR) tablet 20 mg, 20 mg, Oral, Nightly, Marli TURNER MD, 20 mg at 03/28/18 2252  •  benzonatate (TESSALON) capsule 100 mg, 100 mg, Oral, TID PRN, Marli TURNER MD, 100 mg at 03/29/18 0303  •  carvedilol (COREG) tablet 6.25 mg, 6.25 mg, Oral, Q12H, Festus Bowie IV, MD  •  enoxaparin (LOVENOX) syringe 40 mg, 40 mg, Subcutaneous, Q24H, Marielos Amaro MD  •  gabapentin (NEURONTIN) capsule 200 mg, 200 mg, Oral, BID, Marli TURNER MD, 200 mg at 03/29/18 1001  •  HYDROcodone-acetaminophen (NORCO) 5-325 MG per tablet 1 tablet, 1 tablet, Oral, Q6H PRN, Marli TURNER MD, 1 tablet at 03/28/18 2252  •  influenza vac split quad (FLUZONE,FLUARIX,AFLURIA) injection 0.5 mL, 0.5 mL, Intramuscular, During Hospitalization, Marli TURNER MD  •  Morphine sulfate (PF) injection 1 mg, 1 mg, Intravenous, Q4H PRN **AND** naloxone (NARCAN) injection 0.4 mg, 0.4 mg, Intravenous, Q5 Min PRN, Marli TURNER MD  •  ondansetron (ZOFRAN) tablet 4 mg, 4 mg, Oral, Q6H PRN **OR** ondansetron (ZOFRAN) injection 4 mg, 4 mg, Intravenous, Q6H PRN, Marli TURNER MD  •  piperacillin-tazobactam (ZOSYN) 3.375 g in iso-osmotic dextrose 50 ml (premix), 3.375 g, Intravenous, Q8H, Marli TURNER MD, 3.375 g at 03/29/18 0507  •  pneumococcal conj. 13-valent (PREVNAR-13) vaccine 0.5 mL, 0.5 mL, Intramuscular, During Hospitalization, Yoni Rios IV, [facility]  •  polyethylene glycol 3350 powder (packet), 17 g, Oral, Daily PRN, Marli TURNER MD  •  rOPINIRole (REQUIP) tablet 1 mg, 1 mg, Oral, Nightly, Marli TURNER MD, 1 mg at 03/28/18 7261  •  sodium chloride 0.9 % flush 1-10 mL, 1-10 mL, Intravenous, PRN, Marli TURNER MD  •  sodium chloride 0.9 % flush 10 mL, 10 mL, Intravenous, PRN, Triage Protocol Emergency, MD  •  traZODone (DESYREL) tablet 50 mg,  50 mg, Oral, Nightly, Marli TURNER MD, 50 mg at 03/28/18 9494    Return if symptoms worsen or fail to improve, for Medicare Wellness, Recheck post hospital.        ECG 12 Lead  Date/Time: 3/28/2018 4:32 PM  Performed by: DAY OLIVER  Authorized by: DAY OLIVER   Comparison: compared with previous ECG from 3/11/2018  Comparison to previous ECG: Bradycardia with sinus arrhythmia   Upright 3 with inverted T lead 3  Rhythm: sinus bradycardia  Rate: bradycardic  BPM: 43  Conduction: 1st degree  T depression: III (inverted T lead 3)  QRS axis: normal (P, R, T: 8, 58, -12)  Clinical impression: abnormal ECG  Comments: Abnormal QRS-T angle (QRS-T angle differecnt>60)  SD int 273 ms  QRS dur 100 ms  QT/QTc 400/345

## 2018-03-29 ENCOUNTER — APPOINTMENT (OUTPATIENT)
Dept: NUCLEAR MEDICINE | Facility: HOSPITAL | Age: 73
End: 2018-03-29
Attending: SURGERY

## 2018-03-29 PROBLEM — R00.1 BRADYCARDIA: Status: RESOLVED | Noted: 2018-03-28 | Resolved: 2018-03-29

## 2018-03-29 PROBLEM — R42 DIZZINESS: Status: RESOLVED | Noted: 2018-03-28 | Resolved: 2018-03-29

## 2018-03-29 PROBLEM — E87.5 HYPERKALEMIA: Status: RESOLVED | Noted: 2018-03-28 | Resolved: 2018-03-29

## 2018-03-29 PROBLEM — R10.9 ABDOMINAL PAIN: Status: RESOLVED | Noted: 2018-03-28 | Resolved: 2018-03-29

## 2018-03-29 PROBLEM — K76.1 CHRONIC PASSIVE HEPATIC CONGESTION: Status: ACTIVE | Noted: 2018-03-28

## 2018-03-29 LAB
ALBUMIN SERPL-MCNC: 3.8 G/DL (ref 3.2–4.8)
ALBUMIN/GLOB SERPL: 1.4 G/DL (ref 1.5–2.5)
ALP SERPL-CCNC: 65 U/L (ref 25–100)
ALT SERPL W P-5'-P-CCNC: 20 U/L (ref 7–40)
ANION GAP SERPL CALCULATED.3IONS-SCNC: 6 MMOL/L (ref 3–11)
AST SERPL-CCNC: 15 U/L (ref 0–33)
BILIRUB SERPL-MCNC: 0.6 MG/DL (ref 0.3–1.2)
BILIRUB UR QL STRIP: NEGATIVE
BNP SERPL-MCNC: 10 PG/ML (ref 0–100)
BUN BLD-MCNC: 60 MG/DL (ref 9–23)
BUN/CREAT SERPL: 35.3 (ref 7–25)
CALCIUM SPEC-SCNC: 9.2 MG/DL (ref 8.7–10.4)
CHLORIDE SERPL-SCNC: 107 MMOL/L (ref 99–109)
CLARITY UR: CLEAR
CO2 SERPL-SCNC: 24 MMOL/L (ref 20–31)
COLOR UR: YELLOW
CREAT BLD-MCNC: 1.7 MG/DL (ref 0.6–1.3)
DEPRECATED RDW RBC AUTO: 48.5 FL (ref 37–54)
ERYTHROCYTE [DISTWIDTH] IN BLOOD BY AUTOMATED COUNT: 13.5 % (ref 11.3–14.5)
GFR SERPL CREATININE-BSD FRML MDRD: 29 ML/MIN/1.73
GLOBULIN UR ELPH-MCNC: 2.8 GM/DL
GLUCOSE BLD-MCNC: 77 MG/DL (ref 70–100)
GLUCOSE BLDC GLUCOMTR-MCNC: 107 MG/DL (ref 70–130)
GLUCOSE BLDC GLUCOMTR-MCNC: 88 MG/DL (ref 70–130)
GLUCOSE UR STRIP-MCNC: NEGATIVE MG/DL
HCT VFR BLD AUTO: 43.2 % (ref 34.5–44)
HGB BLD-MCNC: 14.3 G/DL (ref 11.5–15.5)
HGB UR QL STRIP.AUTO: NEGATIVE
KETONES UR QL STRIP: NEGATIVE
LEUKOCYTE ESTERASE UR QL STRIP.AUTO: NEGATIVE
MCH RBC QN AUTO: 32.1 PG (ref 27–31)
MCHC RBC AUTO-ENTMCNC: 33.1 G/DL (ref 32–36)
MCV RBC AUTO: 97.1 FL (ref 80–99)
NITRITE UR QL STRIP: NEGATIVE
PH UR STRIP.AUTO: <=5 [PH] (ref 5–8)
PLATELET # BLD AUTO: 146 10*3/MM3 (ref 150–450)
PMV BLD AUTO: 11.6 FL (ref 6–12)
POTASSIUM BLD-SCNC: 4.7 MMOL/L (ref 3.5–5.5)
POTASSIUM BLD-SCNC: 4.9 MMOL/L (ref 3.5–5.5)
PROT SERPL-MCNC: 6.6 G/DL (ref 5.7–8.2)
PROT UR QL STRIP: NEGATIVE
RBC # BLD AUTO: 4.45 10*6/MM3 (ref 3.89–5.14)
SODIUM BLD-SCNC: 137 MMOL/L (ref 132–146)
SP GR UR STRIP: 1.02 (ref 1–1.03)
UROBILINOGEN UR QL STRIP: NORMAL
WBC NRBC COR # BLD: 6.17 10*3/MM3 (ref 3.5–10.8)

## 2018-03-29 PROCEDURE — 81003 URINALYSIS AUTO W/O SCOPE: CPT

## 2018-03-29 PROCEDURE — 93005 ELECTROCARDIOGRAM TRACING: CPT | Performed by: FAMILY MEDICINE

## 2018-03-29 PROCEDURE — 0 TECHNETIUM TC 99M MEBROFENIN KIT: Performed by: FAMILY MEDICINE

## 2018-03-29 PROCEDURE — 99233 SBSQ HOSP IP/OBS HIGH 50: CPT | Performed by: FAMILY MEDICINE

## 2018-03-29 PROCEDURE — A9537 TC99M MEBROFENIN: HCPCS | Performed by: FAMILY MEDICINE

## 2018-03-29 PROCEDURE — 78226 HEPATOBILIARY SYSTEM IMAGING: CPT

## 2018-03-29 PROCEDURE — 85027 COMPLETE CBC AUTOMATED: CPT | Performed by: HOSPITALIST

## 2018-03-29 PROCEDURE — 80053 COMPREHEN METABOLIC PANEL: CPT | Performed by: HOSPITALIST

## 2018-03-29 PROCEDURE — 82962 GLUCOSE BLOOD TEST: CPT

## 2018-03-29 PROCEDURE — 84132 ASSAY OF SERUM POTASSIUM: CPT | Performed by: HOSPITALIST

## 2018-03-29 PROCEDURE — 99222 1ST HOSP IP/OBS MODERATE 55: CPT | Performed by: INTERNAL MEDICINE

## 2018-03-29 PROCEDURE — 83880 ASSAY OF NATRIURETIC PEPTIDE: CPT | Performed by: HOSPITALIST

## 2018-03-29 PROCEDURE — 93010 ELECTROCARDIOGRAM REPORT: CPT | Performed by: INTERNAL MEDICINE

## 2018-03-29 PROCEDURE — 25010000002 PIPERACILLIN SOD-TAZOBACTAM PER 1 G: Performed by: HOSPITALIST

## 2018-03-29 RX ORDER — ASPIRIN 81 MG/1
81 TABLET ORAL DAILY
Status: DISCONTINUED | OUTPATIENT
Start: 2018-03-29 | End: 2018-03-30 | Stop reason: HOSPADM

## 2018-03-29 RX ORDER — CARVEDILOL 6.25 MG/1
6.25 TABLET ORAL EVERY 12 HOURS SCHEDULED
Status: DISCONTINUED | OUTPATIENT
Start: 2018-03-29 | End: 2018-03-30 | Stop reason: HOSPADM

## 2018-03-29 RX ORDER — BENZONATATE 100 MG/1
100 CAPSULE ORAL 3 TIMES DAILY PRN
Status: DISCONTINUED | OUTPATIENT
Start: 2018-03-29 | End: 2018-03-30 | Stop reason: HOSPADM

## 2018-03-29 RX ORDER — KIT FOR THE PREPARATION OF TECHNETIUM TC 99M MEBROFENIN 45 MG/10ML
1 INJECTION, POWDER, LYOPHILIZED, FOR SOLUTION INTRAVENOUS
Status: COMPLETED | OUTPATIENT
Start: 2018-03-29 | End: 2018-03-29

## 2018-03-29 RX ADMIN — APIXABAN 5 MG: 5 TABLET, FILM COATED ORAL at 20:27

## 2018-03-29 RX ADMIN — POLYETHYLENE GLYCOL (3350) 17 G: 17 POWDER, FOR SOLUTION ORAL at 12:23

## 2018-03-29 RX ADMIN — GABAPENTIN 200 MG: 100 CAPSULE ORAL at 10:01

## 2018-03-29 RX ADMIN — BENZONATATE 100 MG: 100 CAPSULE, LIQUID FILLED ORAL at 12:23

## 2018-03-29 RX ADMIN — HYDROCODONE BITARTRATE AND ACETAMINOPHEN 1 TABLET: 5; 325 TABLET ORAL at 12:23

## 2018-03-29 RX ADMIN — MEBROFENIN 1 DOSE: 45 INJECTION, POWDER, LYOPHILIZED, FOR SOLUTION INTRAVENOUS at 10:50

## 2018-03-29 RX ADMIN — CARVEDILOL 6.25 MG: 6.25 TABLET, FILM COATED ORAL at 12:23

## 2018-03-29 RX ADMIN — ROPINIROLE 1 MG: 0.5 TABLET, FILM COATED ORAL at 20:27

## 2018-03-29 RX ADMIN — APIXABAN 5 MG: 5 TABLET, FILM COATED ORAL at 12:31

## 2018-03-29 RX ADMIN — CARVEDILOL 6.25 MG: 6.25 TABLET, FILM COATED ORAL at 20:27

## 2018-03-29 RX ADMIN — BENZONATATE 100 MG: 100 CAPSULE, LIQUID FILLED ORAL at 03:03

## 2018-03-29 RX ADMIN — GABAPENTIN 200 MG: 100 CAPSULE ORAL at 20:27

## 2018-03-29 RX ADMIN — HYDROCODONE BITARTRATE AND ACETAMINOPHEN 1 TABLET: 5; 325 TABLET ORAL at 20:27

## 2018-03-29 RX ADMIN — ASPIRIN 81 MG: 81 TABLET, COATED ORAL at 12:23

## 2018-03-29 RX ADMIN — TAZOBACTAM SODIUM AND PIPERACILLIN SODIUM 3.38 G: 375; 3 INJECTION, SOLUTION INTRAVENOUS at 03:03

## 2018-03-29 RX ADMIN — TRAZODONE HYDROCHLORIDE 50 MG: 50 TABLET ORAL at 20:27

## 2018-03-29 RX ADMIN — ATORVASTATIN CALCIUM 20 MG: 40 TABLET, FILM COATED ORAL at 20:27

## 2018-03-30 VITALS
RESPIRATION RATE: 18 BRPM | SYSTOLIC BLOOD PRESSURE: 149 MMHG | WEIGHT: 210.4 LBS | HEART RATE: 64 BPM | TEMPERATURE: 97.7 F | HEIGHT: 66 IN | BODY MASS INDEX: 33.82 KG/M2 | DIASTOLIC BLOOD PRESSURE: 80 MMHG | OXYGEN SATURATION: 96 %

## 2018-03-30 LAB
ANION GAP SERPL CALCULATED.3IONS-SCNC: 6 MMOL/L (ref 3–11)
BUN BLD-MCNC: 36 MG/DL (ref 9–23)
BUN/CREAT SERPL: 32.7 (ref 7–25)
CALCIUM SPEC-SCNC: 9.2 MG/DL (ref 8.7–10.4)
CHLORIDE SERPL-SCNC: 102 MMOL/L (ref 99–109)
CO2 SERPL-SCNC: 30 MMOL/L (ref 20–31)
CREAT BLD-MCNC: 1.1 MG/DL (ref 0.6–1.3)
GFR SERPL CREATININE-BSD FRML MDRD: 49 ML/MIN/1.73
GLUCOSE BLD-MCNC: 208 MG/DL (ref 70–100)
GLUCOSE BLDC GLUCOMTR-MCNC: 215 MG/DL (ref 70–130)
POTASSIUM BLD-SCNC: 4.8 MMOL/L (ref 3.5–5.5)
SODIUM BLD-SCNC: 138 MMOL/L (ref 132–146)

## 2018-03-30 PROCEDURE — 82962 GLUCOSE BLOOD TEST: CPT

## 2018-03-30 PROCEDURE — 93010 ELECTROCARDIOGRAM REPORT: CPT | Performed by: INTERNAL MEDICINE

## 2018-03-30 PROCEDURE — 93005 ELECTROCARDIOGRAM TRACING: CPT | Performed by: FAMILY MEDICINE

## 2018-03-30 PROCEDURE — 93005 ELECTROCARDIOGRAM TRACING: CPT | Performed by: PHYSICIAN ASSISTANT

## 2018-03-30 PROCEDURE — 80048 BASIC METABOLIC PNL TOTAL CA: CPT | Performed by: FAMILY MEDICINE

## 2018-03-30 PROCEDURE — 99232 SBSQ HOSP IP/OBS MODERATE 35: CPT | Performed by: NURSE PRACTITIONER

## 2018-03-30 PROCEDURE — 99239 HOSP IP/OBS DSCHRG MGMT >30: CPT | Performed by: PHYSICIAN ASSISTANT

## 2018-03-30 RX ORDER — ONDANSETRON 4 MG/1
4 TABLET, FILM COATED ORAL EVERY 6 HOURS PRN
Qty: 21 TABLET | Refills: 0 | Status: SHIPPED | OUTPATIENT
Start: 2018-03-30 | End: 2020-07-13

## 2018-03-30 RX ORDER — DOCUSATE SODIUM 100 MG/1
100 CAPSULE, LIQUID FILLED ORAL 2 TIMES DAILY
Status: DISCONTINUED | OUTPATIENT
Start: 2018-03-30 | End: 2018-03-30 | Stop reason: HOSPADM

## 2018-03-30 RX ORDER — CARVEDILOL 6.25 MG/1
6.25 TABLET ORAL EVERY 12 HOURS SCHEDULED
Qty: 180 TABLET | Refills: 3 | Status: SHIPPED | OUTPATIENT
Start: 2018-03-30 | End: 2018-03-30

## 2018-03-30 RX ORDER — CARVEDILOL 6.25 MG/1
6.25 TABLET ORAL EVERY 12 HOURS SCHEDULED
Qty: 30 TABLET | Refills: 0 | Status: SHIPPED | OUTPATIENT
Start: 2018-03-30 | End: 2018-05-03

## 2018-03-30 RX ADMIN — ONDANSETRON 4 MG: 4 TABLET, FILM COATED ORAL at 11:57

## 2018-03-30 RX ADMIN — HYDROCODONE BITARTRATE AND ACETAMINOPHEN 1 TABLET: 5; 325 TABLET ORAL at 11:55

## 2018-03-30 RX ADMIN — SACUBITRIL AND VALSARTAN 1 TABLET: 24; 26 TABLET, FILM COATED ORAL at 11:55

## 2018-03-30 RX ADMIN — ASPIRIN 81 MG: 81 TABLET, COATED ORAL at 08:33

## 2018-03-30 RX ADMIN — APIXABAN 5 MG: 5 TABLET, FILM COATED ORAL at 08:34

## 2018-03-30 RX ADMIN — CARVEDILOL 6.25 MG: 6.25 TABLET, FILM COATED ORAL at 08:33

## 2018-03-30 RX ADMIN — BENZONATATE 100 MG: 100 CAPSULE, LIQUID FILLED ORAL at 01:50

## 2018-03-30 RX ADMIN — GABAPENTIN 200 MG: 100 CAPSULE ORAL at 08:34

## 2018-03-30 RX ADMIN — POLYETHYLENE GLYCOL (3350) 17 G: 17 POWDER, FOR SOLUTION ORAL at 08:38

## 2018-03-30 RX ADMIN — HYDROCODONE BITARTRATE AND ACETAMINOPHEN 1 TABLET: 5; 325 TABLET ORAL at 03:02

## 2018-03-30 RX ADMIN — DOCUSATE SODIUM 100 MG: 100 CAPSULE, LIQUID FILLED ORAL at 08:33

## 2018-04-02 ENCOUNTER — TRANSITIONAL CARE MANAGEMENT TELEPHONE ENCOUNTER (OUTPATIENT)
Dept: INTERNAL MEDICINE | Facility: CLINIC | Age: 73
End: 2018-04-02

## 2018-04-03 ENCOUNTER — TELEPHONE (OUTPATIENT)
Dept: INTERNAL MEDICINE | Facility: CLINIC | Age: 73
End: 2018-04-03

## 2018-04-03 NOTE — TELEPHONE ENCOUNTER
Let patient know that Dr. Saldivar is not in the office.  She has enough meds to get her through until Friday which is her appointment.  She will get filled at that time.

## 2018-04-03 NOTE — OUTREACH NOTE
[facility] call completed.  Please refer to TCM call flowsheet for call documentation.      *Please see refill request in routing comments.  Pt requests refill for trazodone*

## 2018-04-05 ENCOUNTER — HOSPITAL ENCOUNTER (OUTPATIENT)
Dept: CARDIOLOGY | Facility: HOSPITAL | Age: 73
Discharge: HOME | End: 2018-04-05
Admitting: NURSE PRACTITIONER

## 2018-04-05 ENCOUNTER — OFFICE VISIT (OUTPATIENT)
Dept: CARDIOLOGY | Facility: HOSPITAL | Age: 73
End: 2018-04-05

## 2018-04-05 VITALS
BODY MASS INDEX: 34.42 KG/M2 | DIASTOLIC BLOOD PRESSURE: 73 MMHG | WEIGHT: 214.2 LBS | HEART RATE: 70 BPM | OXYGEN SATURATION: 96 % | RESPIRATION RATE: 18 BRPM | TEMPERATURE: 99 F | HEIGHT: 66 IN | SYSTOLIC BLOOD PRESSURE: 137 MMHG

## 2018-04-05 DIAGNOSIS — I48.0 PAF (PAROXYSMAL ATRIAL FIBRILLATION) (HCC): ICD-10-CM

## 2018-04-05 DIAGNOSIS — I50.23 ACUTE ON CHRONIC SYSTOLIC HEART FAILURE (HCC): Primary | ICD-10-CM

## 2018-04-05 DIAGNOSIS — N28.9 RENAL INSUFFICIENCY: ICD-10-CM

## 2018-04-05 DIAGNOSIS — I10 ESSENTIAL HYPERTENSION: ICD-10-CM

## 2018-04-05 LAB
ALBUMIN SERPL-MCNC: 4.1 G/DL (ref 3.2–4.8)
ALBUMIN/GLOB SERPL: 1.7 G/DL (ref 1.5–2.5)
ALP SERPL-CCNC: 70 U/L (ref 25–100)
ALT SERPL W P-5'-P-CCNC: 25 U/L (ref 7–40)
ANION GAP SERPL CALCULATED.3IONS-SCNC: 4 MMOL/L (ref 3–11)
AST SERPL-CCNC: 22 U/L (ref 0–33)
BILIRUB SERPL-MCNC: 0.4 MG/DL (ref 0.3–1.2)
BNP SERPL-MCNC: 150 PG/ML (ref 0–100)
BUN BLD-MCNC: 14 MG/DL (ref 9–23)
BUN/CREAT SERPL: 15.6 (ref 7–25)
CALCIUM SPEC-SCNC: 9.1 MG/DL (ref 8.7–10.4)
CHLORIDE SERPL-SCNC: 106 MMOL/L (ref 99–109)
CO2 SERPL-SCNC: 30 MMOL/L (ref 20–31)
CREAT BLD-MCNC: 0.9 MG/DL (ref 0.6–1.3)
GFR SERPL CREATININE-BSD FRML MDRD: 61 ML/MIN/1.73
GLOBULIN UR ELPH-MCNC: 2.4 GM/DL
GLUCOSE BLD-MCNC: 132 MG/DL (ref 70–100)
POTASSIUM BLD-SCNC: 4.6 MMOL/L (ref 3.5–5.5)
PROT SERPL-MCNC: 6.5 G/DL (ref 5.7–8.2)
SODIUM BLD-SCNC: 140 MMOL/L (ref 132–146)

## 2018-04-05 PROCEDURE — 93005 ELECTROCARDIOGRAM TRACING: CPT | Performed by: NURSE PRACTITIONER

## 2018-04-05 PROCEDURE — 99214 OFFICE O/P EST MOD 30 MIN: CPT | Performed by: NURSE PRACTITIONER

## 2018-04-05 PROCEDURE — 93010 ELECTROCARDIOGRAM REPORT: CPT | Performed by: INTERNAL MEDICINE

## 2018-04-05 PROCEDURE — 80053 COMPREHEN METABOLIC PANEL: CPT | Performed by: NURSE PRACTITIONER

## 2018-04-05 PROCEDURE — 83880 ASSAY OF NATRIURETIC PEPTIDE: CPT | Performed by: NURSE PRACTITIONER

## 2018-04-05 NOTE — PROGRESS NOTES
Encounter Date:04/05/2018      Patient ID: Angelita Shay is a 72 y.o. female.        Subjective:     Chief Complaint: Follow-up   [facility]   History of Present Illness patient presents to the office today for ongoing evaluation of her chronic systolic heart failure. Patient was recently hospitalized at [facility] 3/28/18-3/30/18 for GORDON on CKD, cholelithiasis. She was seen by Dr Marion in the hospital and will be following up with him on an outpatient basis. Digoxin, lasix and aldactone held at discharge.  She notes generalized weakness and fatigue since discharge from the hospital. She also reports a 7 lb weight gain since discharge. She notes pedal edema and abdominal fullness. She also notes intermittent vomiting since discharge from the hospital. She denies chest pain. She notes compliance with her medications. She is scheduled with Dr Cash on 4/18/18 for a colonoscopy (hx of colon cancer)    Patient Active Problem List   Diagnosis   • Essential hypertension   • Anxiety   • DM type 2 (diabetes mellitus, type 2), on insulin therapy   • GERD (gastroesophageal reflux disease)   • Coronary artery disease involving native coronary artery of native heart with angina pectoris   • Sleep apnea   • RLS (restless legs syndrome)   • Gout   • Chronic systolic congestive heart failure   • Paroxysmal atrial fibrillation   • Nonischemic cardiomyopathy   • Biliary colic   • Cholelithiasis   • Acute kidney injury superimposed on chronic kidney disease stage 3   • Chronic passive hepatic congestion       Past Surgical History:   Procedure Laterality Date   • APPENDECTOMY  05/1963   • CARDIAC CATHETERIZATION  08/2015    no stents   • CARDIAC CATHETERIZATION  03/13/2018   • CARDIAC CATHETERIZATION N/A 3/13/2018    Procedure: Left Heart Cath;  Surgeon: Pierre Jones III, MD;  Location: [facility] INVASIVE LOCATION;  Service: Cardiovascular   • COLON RESECTION  04/08/2000    colon cancer   • COLONOSCOPY  2015   • CORONARY ANGIOPLASTY WITH  STENT PLACEMENT  12/2015   • DE TOTAL KNEE ARTHROPLASTY Left 11/3/2016    Procedure: LEFT TOTAL KNEE REPLACEMENT;  Surgeon: Laurent Zuniga MD;  Location: [facility];  Service: Orthopedics   • TONSILLECTOMY  12/1961   • TOTAL KNEE ARTHROPLASTY Right 2008    revision 2010   • TOTAL SHOULDER ARTHROPLASTY Bilateral     right 2014, left 2015       Allergies   Allergen Reactions   • Oxycodone Shortness Of Breath   • Zolpidem Other (See Comments)     nightmares         Current Outpatient Prescriptions:   •  apixaban (ELIQUIS) 5 MG tablet tablet, Take 1 tablet by mouth Every 12 (Twelve) Hours for 90 days., Disp: 60 tablet, Rfl: 2  •  aspirin 81 MG EC tablet, Take 1 tablet by mouth Daily. Resume in 2 weeks, Disp: , Rfl:   •  atorvastatin (LIPITOR) 20 MG tablet, Take 1 tablet by mouth Every Night for 180 days., Disp: 30 tablet, Rfl: 5  •  carvedilol (COREG) 6.25 MG tablet, Take 1 tablet by mouth Every 12 (Twelve) Hours., Disp: 30 tablet, Rfl: 0  •  gabapentin (NEURONTIN) 300 MG capsule, Take 1 capsule by mouth 2 (Two) Times a Day., Disp: 60 capsule, Rfl: 2  •  insulin aspart (NOVOLOG) 100 UNIT/ML injection, **Per patient's Sliding scale**, Disp: 10 mL, Rfl: 2  •  linaclotide (LINZESS) 290 MCG capsule capsule, Take 1 capsule by mouth Every Morning Before Breakfast., Disp: 30 capsule, Rfl: 1  •  magnesium gluconate (MAGONATE) 500 MG tablet, Take 27 mg by mouth Daily. OTC , Disp: , Rfl:   •  magnesium hydroxide (MILK OF MAGNESIA) 400 MG/5ML suspension, Take 15 mL by mouth Daily As Needed for Constipation., Disp: 473 mL, Rfl: 0  •  ondansetron (ZOFRAN) 4 MG tablet, Take 1 tablet by mouth Every 6 (Six) Hours As Needed for Nausea or Vomiting., Disp: 21 tablet, Rfl: 0  •  polyethylene glycol (MIRALAX) powder, Take 17 g by mouth Daily As Needed., Disp: , Rfl:   •  rOPINIRole (REQUIP) 1 MG tablet, Take 1-3 tablets by mouth Every Night. Take 1 hour before bedtime., Disp: 90 tablet, Rfl: 1  •  sacubitril-valsartan (ENTRESTO)  24-26 MG tablet, Take 1 tablet by mouth Every 12 (Twelve) Hours for 180 days., Disp: 60 tablet, Rfl: 5  •  traZODone (DESYREL) 50 MG tablet, Take 1 tablet by mouth Every Night., Disp: 10 tablet, Rfl: 0  •  furosemide (LASIX) 20 MG tablet, Take 1 tablet by mouth Daily., Disp: 30 tablet, Rfl: 0    The following portions of the chart were reviewed and updated as appropriate: Allergies, current medications, past family history, social history, past medical history.     Review of Systems   Constitution: Positive for weakness, malaise/fatigue and weight gain. Negative for chills, decreased appetite, diaphoresis, fever, night sweats and weight loss.   HENT: Negative for congestion, hearing loss, hoarse voice and nosebleeds.    Eyes: Negative for blurred vision, visual disturbance and visual halos.   Cardiovascular: Positive for leg swelling. Negative for chest pain, claudication, cyanosis, dyspnea on exertion, irregular heartbeat, near-syncope, orthopnea, palpitations, paroxysmal nocturnal dyspnea and syncope.   Respiratory: Positive for cough. Negative for hemoptysis, shortness of breath, sleep disturbances due to breathing, snoring, sputum production and wheezing.    Hematologic/Lymphatic: Negative for bleeding problem. Bruises/bleeds easily.   Skin: Negative for dry skin, itching and rash.   Musculoskeletal: Positive for muscle weakness. Negative for arthritis, falls, joint pain, joint swelling and myalgias.   Gastrointestinal: Positive for abdominal pain, nausea and vomiting. Negative for bloating, constipation, diarrhea, flatus, heartburn, hematemesis, hematochezia and melena.   Genitourinary: Negative for dysuria, frequency, hematuria, nocturia and urgency.   Neurological: Positive for excessive daytime sleepiness, dizziness and headaches. Negative for light-headedness and loss of balance.   Psychiatric/Behavioral: Positive for depression. The patient is nervous/anxious. The patient does not have insomnia.   "  Allergic/Immunologic: Positive for environmental allergies.           Objective:     Vitals:    04/05/18 1429 04/05/18 1430 04/05/18 1432   BP: 143/77 140/74 137/73   BP Location: Right arm Left arm Left arm   Patient Position: Sitting Sitting Standing   Pulse: 66 66 70   Resp: 18     Temp: 99 °F (37.2 °C)     TempSrc: Temporal Artery      SpO2: 96%     Weight: 97.2 kg (214 lb 3.2 oz)     Height: 167.6 cm (66\")           Physical Exam   Constitutional: She is oriented to person, place, and time. She appears well-developed and well-nourished. She is active and cooperative. No distress.   HENT:   Head: Normocephalic and atraumatic.   Mouth/Throat: Oropharynx is clear and moist.   Eyes: Conjunctivae and EOM are normal. Pupils are equal, round, and reactive to light.   Neck: Normal range of motion. Neck supple. No JVD present. No tracheal deviation present. No thyromegaly present.   Cardiovascular: Normal rate, regular rhythm, normal heart sounds and intact distal pulses.    Pulmonary/Chest: Effort normal. She has rales (faint).   Abdominal: Soft. Bowel sounds are normal. She exhibits distension. There is no tenderness.   Musculoskeletal: Normal range of motion. She exhibits edema (1+ pitting edema noted BLEs).   Neurological: She is alert and oriented to person, place, and time.   Skin: Skin is warm, dry and intact.   Psychiatric: She has a normal mood and affect. Her behavior is normal.   Nursing note and vitals reviewed.      Lab and Diagnostic Review:      Results for orders placed or performed in visit on 04/05/18   BNP   Result Value Ref Range    .0 (H) 0.0 - 100.0 pg/mL   Comprehensive Metabolic Panel   Result Value Ref Range    Glucose 132 (H) 70 - 100 mg/dL    BUN 14 9 - 23 mg/dL    Creatinine 0.90 0.60 - 1.30 mg/dL    Sodium 140 132 - 146 mmol/L    Potassium 4.6 3.5 - 5.5 mmol/L    Chloride 106 99 - 109 mmol/L    CO2 30.0 20.0 - 31.0 mmol/L    Calcium 9.1 8.7 - 10.4 mg/dL    Total Protein 6.5 5.7 - " 8.2 g/dL    Albumin 4.10 3.20 - 4.80 g/dL    ALT (SGPT) 25 7 - 40 U/L    AST (SGOT) 22 0 - 33 U/L    Alkaline Phosphatase 70 25 - 100 U/L    Total Bilirubin 0.4 0.3 - 1.2 mg/dL    eGFR Non African Amer 61 >60 mL/min/1.73    Globulin 2.4 gm/dL    A/G Ratio 1.7 1.5 - 2.5 g/dL    BUN/Creatinine Ratio 15.6 7.0 - 25.0    Anion Gap 4.0 3.0 - 11.0 mmol/L   EKG: sinus with 1 st degree av block at 63 bpm    Assessment and Plan:         1. Acute on chronic systolic heart failure  Begin lasix 20 mg qd for the next 3 days  Will consider re-initiation of aldactone in the future  Heart failure education today including signs and symptoms, the role of the heart failure center, daily weights, low sodium diet (less than 1500 mg per day), and daily physical activity. Reviewed HF Zones with patient and family.  Patient to continue current medications as previously ordered.   - BNP  - Comprehensive Metabolic Panel    2. PAF (paroxysmal atrial fibrillation)  Maintaining nsr on coreg  Anticoagulated with eliquis and denies any s/s   - ECG 12 Lead; Future    3. Renal insufficiency  Resolved  Will continue to closely monitor  - Comprehensive Metabolic Panel    4. Essential hypertension  Well controlled  HTN Education provided today including signs and symptoms, medication management, daily blood pressure monitoring. Patient encouraged to call the [facility] with any abnormal readings.       It has been a pleasure to participate in the care of this patient.  Patient was instructed to call the [facility] with any questions, concerns, or worsening symptoms.  * Please note that portions of this note were completed with a voice recognition program. Efforts were made to edit the dictation but occasionally words are transcribed.

## 2018-04-06 ENCOUNTER — TELEPHONE (OUTPATIENT)
Dept: CARDIOLOGY | Facility: HOSPITAL | Age: 73
End: 2018-04-06

## 2018-04-06 ENCOUNTER — OFFICE VISIT (OUTPATIENT)
Dept: INTERNAL MEDICINE | Facility: CLINIC | Age: 73
End: 2018-04-06

## 2018-04-06 VITALS
SYSTOLIC BLOOD PRESSURE: 140 MMHG | TEMPERATURE: 98.9 F | HEART RATE: 67 BPM | WEIGHT: 213.4 LBS | HEIGHT: 66 IN | BODY MASS INDEX: 34.3 KG/M2 | OXYGEN SATURATION: 93 % | DIASTOLIC BLOOD PRESSURE: 80 MMHG

## 2018-04-06 DIAGNOSIS — K80.20 CALCULUS OF GALLBLADDER WITHOUT CHOLECYSTITIS WITHOUT OBSTRUCTION: Chronic | ICD-10-CM

## 2018-04-06 DIAGNOSIS — K76.1 CHRONIC PASSIVE HEPATIC CONGESTION: ICD-10-CM

## 2018-04-06 DIAGNOSIS — J02.9 SORE THROAT: ICD-10-CM

## 2018-04-06 DIAGNOSIS — R53.83 OTHER FATIGUE: ICD-10-CM

## 2018-04-06 DIAGNOSIS — R05.9 COUGH: ICD-10-CM

## 2018-04-06 DIAGNOSIS — E11.42 DIABETIC POLYNEUROPATHY ASSOCIATED WITH TYPE 2 DIABETES MELLITUS (HCC): Primary | ICD-10-CM

## 2018-04-06 LAB
EXPIRATION DATE: NORMAL
INTERNAL CONTROL: NORMAL
Lab: NORMAL
S PYO AG THROAT QL: NEGATIVE

## 2018-04-06 PROCEDURE — 99496 TRANSJ CARE MGMT HIGH F2F 7D: CPT | Performed by: FAMILY MEDICINE

## 2018-04-06 PROCEDURE — 87880 STREP A ASSAY W/OPTIC: CPT | Performed by: FAMILY MEDICINE

## 2018-04-06 RX ORDER — TRAZODONE HYDROCHLORIDE 50 MG/1
50 TABLET ORAL NIGHTLY
Qty: 30 TABLET | Refills: 0 | Status: SHIPPED | OUTPATIENT
Start: 2018-04-06 | End: 2018-05-02 | Stop reason: SDUPTHER

## 2018-04-06 RX ORDER — FUROSEMIDE 20 MG/1
20 TABLET ORAL DAILY
Qty: 30 TABLET | Refills: 0 | Status: SHIPPED | OUTPATIENT
Start: 2018-04-06 | End: 2018-04-23 | Stop reason: SDUPTHER

## 2018-04-06 RX ORDER — GABAPENTIN 300 MG/1
300 CAPSULE ORAL 3 TIMES DAILY
Qty: 90 CAPSULE | Refills: 0 | Status: SHIPPED | OUTPATIENT
Start: 2018-04-06 | End: 2018-05-07 | Stop reason: SDUPTHER

## 2018-04-06 RX ORDER — GABAPENTIN 300 MG/1
300 CAPSULE ORAL 3 TIMES DAILY
Qty: 90 CAPSULE | Refills: 2 | Status: SHIPPED | OUTPATIENT
Start: 2018-04-06 | End: 2018-04-06 | Stop reason: SDUPTHER

## 2018-04-06 NOTE — PROGRESS NOTES
Transitional Care Follow Up Visit  Subjective     Angelita Shay is a 72 y.o. female who presents for a transitional care management visit.    Within 48 business hours after discharge our office contacted her via telephone to coordinate her care and needs.      I reviewed and discussed the details of that call along with the discharge summary, hospital problems, inpatient lab results, inpatient diagnostic studies, and consultation reports with Angelita.     Current outpatient and discharge medications have been reconciled for the patient.    Date of TCM Phone Call 3/15/2018 4/3/2018   [facility]   Date of Admission 3/11/2018 3/28/2018   Date of Discharge 3/14/2018 3/30/2018   Discharge Disposition Home or Self Care Home or Self Care     Risk for Readmission (LACE) Score: 13 (3/30/2018  6:00 AM)    History of Present Illness   Course During Hospital Stay:  [facility] Medicine Services  DISCHARGE SUMMARY     Patient Name: Angelita Shay  : 1945  MRN: [id number]     Date of Admission: 3/28/2018  Date of Discharge:  3/30/18  Primary Care Physician: Sharon Saldivar MD            Consults      Date and Time Order Name Status Description     3/29/2018 0030 Inpatient General Surgery Consult Completed       3/29/2018 0030 Inpatient Cardiology Consult Completed       3/12/2018 1630 Inpatient Cardiology Consult Completed            Hospital Course      Presenting Problem:   Hyperkalemia [E87.5]           Active Hospital Problems (** Indicates Principal Problem)     Diagnosis Date Noted   • Acute kidney injury superimposed on chronic kidney disease stage 3 [N17.9, N18.9] 2018   • Chronic passive hepatic congestion [K76.1] 2018   • Cholelithiasis [K80.20] 2018   • Nonischemic cardiomyopathy [I42.8] 2018   • Paroxysmal atrial fibrillation [I48.0] 2018   • Chronic systolic congestive heart failure [I50.22] 2016   •  GERD (gastroesophageal reflux disease) [K21.9] 11/04/2016   • DM type 2 (diabetes mellitus, type 2), on insulin therapy [E11.9] 11/03/2016   • Essential hypertension [I10] 11/03/2016   • Anxiety [F41.9] 11/03/2016       Resolved Hospital Problems     Diagnosis Date Noted Date Resolved   • Hyperkalemia [E87.5] 03/28/2018 03/29/2018   • Bradycardia [R00.1] 03/28/2018 03/29/2018   • Abdominal pain [R10.9] 03/28/2018 03/29/2018   • Dizziness [R42] 03/28/2018 03/29/2018      Hospital Course:  Ms. Angelita Shay is a 73yo female with PMH significant for HTN, DMII, PAF (on Eliquis), non-ischemic cardiomyopathy (EF 36% on 3/11/18) with severe pulmonary hypertension (RVSP 45-55 mmHg) and CKD III. She was admitted to [facility] 3/10/-3/14/18 for CHF exacerbation. She was started on Entresto 26/26, started on Coreg 12.5mg BID She was also noted to have cholelithiasis and was instructed to follow up with PCP for general surgery evaluation for elective CCY.      She followed up with the heart failure clinic on 3/20 and was doing well. She returned to [facility] ED on 3/28/18 with complaints of generalized weakness and fatigue and worsening RUQ pain that radiated into her back. She was found to have GORDON with creatinine 2.0 (1.2 at d/c last admission). GB US showed cholelithiasis with thickened GB, concerning for cholecystitis.      General surgery was consulted. Dr. Marion evaluated the patient and and felt that the patient did not have cholecysititis and that her GB wall thickening was chronic. He also felt that Ms. Shay's abdominal pain may be secondary to capsular stretch from hepatic congestion. He recommended follow up in 4 weeks to arrange for elective CCY.      Cardiolgy was consulted. Lasix and Aldactone were held. Coreg was decreased to 6.25mg BID and digoxin was discontinued secondary to bradycardia. At discharge, will resume Entresto 24/26.      Follow up with heart & valve clinic 4/5 @ 2:15 pm. BMP same day prior to appt.    PCP  follow up next week.  Follow up with Dr. Robbie Bowie in 3-4 weeks.      _______________________________________________________________________       Pt has sore throat, and has had nausea and vomiting.  Pt has numb feet and burning in feet and would like to increase her gabapentin.   Pt is having general abdominal pain that is worse RUQ. Pt is unable to bend over because of pain in abdomen.   Pt's BNP was elevated and lasix was started by OLIVIA Gonzalez earlier today.   Kidney function has improved.  Pt is interested in going off of insulin, will need to wait until kidneys stable as creatinine 2.0 initially when in hospital.  Recent blood sugars have been ok.  Recent severe cough.         The following portions of the patient's history were reviewed and updated as appropriate: allergies, current medications, past family history, past medical history, past social history, past surgical history and problem list.    Past Medical History:   Diagnosis Date   • Anxiety    • Arthritis    • Cellulitis of both lower extremities    • CKD (chronic kidney disease), stage III    • Colon cancer 2000   • Colon polyp 2015    x 3 adenomatous   • Coronary artery disease     1 stent   • Corrosive esophagitis    • Depression    • Diabetes mellitus     dx 2 years ago- checks fsbs bid   • GERD (gastroesophageal reflux disease)    • GI bleed    • Gout    • H/O cardiac catheterization    • H/O colonoscopy 2008    incomplete prep   • H/O echocardiogram 03/11/2018    EF 36%,LVH, mild MR, mild TR   • H/O esophagogastroduodenoscopy 2008    hiatal hernia, gastritis   • H/O hiatal hernia    • H/O mammogram 2012   • Hyperlipidemia    • Hypertension    • Knee pain    • PAF (paroxysmal atrial fibrillation) 2007    postoperative total knee replacement   • Pap smear for cervical cancer screening 2013   • RLS (restless legs syndrome)    • Sleep apnea    • Wears eyeglasses      Past Surgical History:   Procedure Laterality Date   • APPENDECTOMY   05/1963   • CARDIAC CATHETERIZATION  08/2015    no stents   • CARDIAC CATHETERIZATION  03/13/2018   • CARDIAC CATHETERIZATION N/A 3/13/2018    Procedure: Left Heart Cath;  Surgeon: Pierre Jones III, MD;  Location:  [facility];  Service: Cardiovascular   • COLON RESECTION  04/08/2000    colon cancer   • COLONOSCOPY  2015   • CORONARY ANGIOPLASTY WITH STENT PLACEMENT  12/2015   • VT TOTAL KNEE ARTHROPLASTY Left 11/3/2016    Procedure: LEFT TOTAL KNEE REPLACEMENT;  Surgeon: Laurent Zuniga MD;  Location:  [facility] OR;  Service: Orthopedics   • TONSILLECTOMY  12/1961   • TOTAL KNEE ARTHROPLASTY Right 2008    revision 2010   • TOTAL SHOULDER ARTHROPLASTY Bilateral     right 2014, left 2015     Allergies   Allergen Reactions   • Oxycodone Shortness Of Breath   • Zolpidem Other (See Comments)     nightmares     Family History   Problem Relation Age of Onset   • Colon cancer Other    • Diabetes Other    • Heart disease Other    • Hypertension Other    • Stroke Other    • Tuberculosis Other    • Hypertension Mother    • Colon cancer Father    • Stroke Father    • Diabetes Father    • Colon cancer Sister    • Diabetes Sister    • Diabetes Maternal Grandmother    • Coronary artery disease Maternal Grandfather    • No Known Problems Paternal Grandmother    • No Known Problems Paternal Grandfather      Social History     Social History   • Marital status:      Spouse name: N/A   • Number of children: 5   • Years of education: N/A     Occupational History   • Retired       Social History Main Topics   • Smoking status: Former Smoker     Packs/day: 1.00     Years: 22.00     Types: Cigarettes     Quit date: 1979   • Smokeless tobacco: Never Used      Comment: quit 1979   • Alcohol use No   • Drug use: No   • Sexual activity: Defer     Other Topics Concern   • Not on file     Social History Narrative    Caffeine: 0-1 servings per day    Patient lives at her home with her daughter and her  family       Review of Systems   Constitutional: Positive for fever (100.1 then down to 99.8). Negative for chills, diaphoresis and fatigue.   HENT: Positive for postnasal drip, rhinorrhea and sore throat. Negative for ear pain, nosebleeds, sinus pressure and sneezing.    Eyes: Positive for redness and itching.   Respiratory: Positive for cough (which induces dry heaves and vomiting). Negative for shortness of breath and wheezing.    Cardiovascular: Negative.  Negative for chest pain and palpitations.   Gastrointestinal: Positive for abdominal pain, constipation, nausea and vomiting. Negative for diarrhea.   Endocrine: Negative.  Negative for cold intolerance and heat intolerance.   Genitourinary: Negative.  Negative for dysuria, frequency, hematuria and urgency.   Musculoskeletal: Positive for back pain. Negative for arthralgias and neck pain.   Skin: Negative.  Negative for color change and rash.   Allergic/Immunologic: Positive for environmental allergies.   Neurological: Positive for dizziness. Negative for syncope, light-headedness and headaches.   Hematological: Negative.  Negative for adenopathy. Does not bruise/bleed easily.   Psychiatric/Behavioral: Positive for dysphoric mood and sleep disturbance. The patient is not nervous/anxious.        Objective   Physical Exam   Constitutional: She is oriented to person, place, and time. She appears well-developed.   HENT:   Head: Normocephalic.   Right Ear: Tympanic membrane, external ear and ear canal normal.   Left Ear: Tympanic membrane, external ear and ear canal normal.   Nose: Nose normal. Right sinus exhibits no maxillary sinus tenderness and no frontal sinus tenderness. Left sinus exhibits no maxillary sinus tenderness and no frontal sinus tenderness.   Mouth/Throat: Uvula is midline and mucous membranes are normal. Posterior oropharyngeal erythema (trace) present. No oropharyngeal exudate or posterior oropharyngeal edema.   Eyes: Conjunctivae and EOM are  normal. Pupils are equal, round, and reactive to light.   Neck: Trachea normal and normal range of motion. Neck supple. Carotid bruit is not present. No thyroid mass and no thyromegaly present.   Cardiovascular: Normal rate and regular rhythm.    No murmur heard.  Pulmonary/Chest: Effort normal and breath sounds normal. She has no decreased breath sounds. She has no wheezes. She has no rhonchi. She has no rales.   Abdominal: Soft. Bowel sounds are normal. There is generalized tenderness and tenderness in the right upper quadrant.   Musculoskeletal: Normal range of motion.   Neurological: She is alert and oriented to person, place, and time.   Skin: Skin is warm and dry.   Psychiatric: She has a normal mood and affect. Her behavior is normal.   Nursing note and vitals reviewed.      Assessment/Plan   Angelita was seen today for transitional care management and med refill.    Diagnoses and all orders for this visit:    Diabetic polyneuropathy associated with type 2 diabetes mellitus  -     Vitamin B12  -     Folate  -     TSH  -     T4, Free  -     Vitamin B1, Whole Blood    Calculus of gallbladder without cholecystitis without obstruction    Chronic passive hepatic congestion    Sore throat  -     POC Rapid Strep A    Other fatigue  -     Vitamin B12  -     Folate  -     TSH  -     T4, Free  -     Vitamin B1, Whole Blood    Cough    Other orders  -     Discontinue: gabapentin (NEURONTIN) 300 MG capsule; Take 1 capsule by mouth 3 (Three) Times a Day.  -     traZODone (DESYREL) 50 MG tablet; Take 1 tablet by mouth Every Night.  -     gabapentin (NEURONTIN) 300 MG capsule; Take 1 capsule by mouth 3 (Three) Times a Day.  -     HYDROcod Polst-CPM Polst ER (TUSSIONEX PENNKINETIC ER) 10-8 MG/5ML ER suspension; Take 5 mL by mouth Every 12 (Twelve) Hours As Needed for Cough.        Increase gabapentin to tid.  Reviewed recent lab with pt.  Refill trazodone.       Current Outpatient Prescriptions:   •  apixaban (ELIQUIS) 5 MG  tablet tablet, Take 1 tablet by mouth Every 12 (Twelve) Hours for 90 days., Disp: 60 tablet, Rfl: 2  •  aspirin 81 MG EC tablet, Take 1 tablet by mouth Daily. Resume in 2 weeks, Disp: , Rfl:   •  atorvastatin (LIPITOR) 20 MG tablet, Take 1 tablet by mouth Every Night for 180 days., Disp: 30 tablet, Rfl: 5  •  carvedilol (COREG) 6.25 MG tablet, Take 1 tablet by mouth Every 12 (Twelve) Hours. (Patient taking differently: Take 3.125 mg by mouth Every 12 (Twelve) Hours.), Disp: 30 tablet, Rfl: 0  •  furosemide (LASIX) 20 MG tablet, Take 1 tablet by mouth Daily., Disp: 30 tablet, Rfl: 0  •  gabapentin (NEURONTIN) 300 MG capsule, Take 1 capsule by mouth 3 (Three) Times a Day., Disp: 90 capsule, Rfl: 0  •  insulin aspart (NOVOLOG) 100 UNIT/ML injection, **Per patient's Sliding scale**, Disp: 10 mL, Rfl: 2  •  linaclotide (LINZESS) 290 MCG capsule capsule, Take 1 capsule by mouth Every Morning Before Breakfast., Disp: 30 capsule, Rfl: 1  •  magnesium gluconate (MAGONATE) 500 MG tablet, Take 27 mg by mouth Daily. OTC , Disp: , Rfl:   •  magnesium hydroxide (MILK OF MAGNESIA) 400 MG/5ML suspension, Take 15 mL by mouth Daily As Needed for Constipation., Disp: 473 mL, Rfl: 0  •  ondansetron (ZOFRAN) 4 MG tablet, Take 1 tablet by mouth Every 6 (Six) Hours As Needed for Nausea or Vomiting., Disp: 21 tablet, Rfl: 0  •  polyethylene glycol (MIRALAX) powder, Take 17 g by mouth Daily As Needed., Disp: , Rfl:   •  rOPINIRole (REQUIP) 1 MG tablet, Take 1-3 tablets by mouth Every Night. Take 1 hour before bedtime., Disp: 90 tablet, Rfl: 1  •  sacubitril-valsartan (ENTRESTO) 24-26 MG tablet, Take 1 tablet by mouth Every 12 (Twelve) Hours for 180 days., Disp: 60 tablet, Rfl: 5  •  traZODone (DESYREL) 50 MG tablet, Take 1 tablet by mouth Every Night., Disp: 30 tablet, Rfl: 0  •  HYDROcod Polst-CPM Polst ER (TUSSIONEX PENNKINETIC ER) 10-8 MG/5ML ER suspension, Take 5 mL by mouth Every 12 (Twelve) Hours As Needed for Cough., Disp: 60 mL,  Rfl: 0       Recent Results (from the past 168 hour(s))   Comprehensive Metabolic Panel    Collection Time: 04/05/18  3:26 PM   Result Value Ref Range    Glucose 132 (H) 70 - 100 mg/dL    BUN 14 9 - 23 mg/dL    Creatinine 0.90 0.60 - 1.30 mg/dL    Sodium 140 132 - 146 mmol/L    Potassium 4.6 3.5 - 5.5 mmol/L    Chloride 106 99 - 109 mmol/L    CO2 30.0 20.0 - 31.0 mmol/L    Calcium 9.1 8.7 - 10.4 mg/dL    Total Protein 6.5 5.7 - 8.2 g/dL    Albumin 4.10 3.20 - 4.80 g/dL    ALT (SGPT) 25 7 - 40 U/L    AST (SGOT) 22 0 - 33 U/L    Alkaline Phosphatase 70 25 - 100 U/L    Total Bilirubin 0.4 0.3 - 1.2 mg/dL    eGFR Non African Amer 61 >60 mL/min/1.73    Globulin 2.4 gm/dL    A/G Ratio 1.7 1.5 - 2.5 g/dL    BUN/Creatinine Ratio 15.6 7.0 - 25.0    Anion Gap 4.0 3.0 - 11.0 mmol/L   BNP    Collection Time: 04/05/18  3:27 PM   Result Value Ref Range    .0 (H) 0.0 - 100.0 pg/mL   POC Rapid Strep A    Collection Time: 04/06/18  3:11 PM   Result Value Ref Range    Rapid Strep A Screen Negative Negative, VALID, INVALID, Not Performed    Internal Control Passed Passed    Lot Number [id number]     Expiration Date 3/31/2019      Radiology Images   Study Result     EXAMINATION: NM HEPATOBILIARY WITHOUT CCK-03/29/2018:     INDICATION: RUQ pain, cholecystitis suspected; E87.5-Hyperkalemia;  K81.9-Cholecystitis, unspecified; R10.11-Right upper quadrant pain;  N28.9-Disorder of kidney and ureter, unspecified; N18.9-Chronic kidney  disease, unspecified; R00.1-Bradycardia, unspecified.         TECHNIQUE: Radiopharmaceutical: 7.4 mCi of Technetium 99m Choletec, IV.     COMPARISON: Gallbladder ultrasound 03/28/2018.     FINDINGS: There is uniform radiotracer uptake by the liver. Gallbladder  activity is visible at 10 minutes, and small bowel activity visible at  25 minutes. There is continued accumulation of activity in the  gallbladder and excretion into the small bowel through the remainder of  the exam.     IMPRESSION:  Patent  common bile duct and cystic duct.     D:  03/29/2018  E:  03/29/2018            This report was finalized on 3/31/2018 12:01 AM by DR. Ozzie Khan MD.        Marichuy Garcia on 3/28/2018  7:16 PM   N/v//epigastric/ruq pain since January//diarrhea//h/o colon resection due to cancer//appy//      Appointment Information     PACS Images     Radiology Images   Study Result     EXAM:    US Abdomen Limited, Right Upper Quadrant     CLINICAL HISTORY:    72 years old, female; Pain; Abdominal pain; Additional info: Ruq pain     TECHNIQUE:    Real-time ultrasound of the right upper quadrant with image documentation.     COMPARISON:    US GALLBLADDER 2018-03-11 10:29     FINDINGS:    Liver:  The liver unremarkable.  No intrahepatic bile duct dilation.    Gallbladder:  There is cholelithiasis.  The gallbladder wall is thickened,   measures approximately 0.48 cm.    Common bile duct:  The common bile duct measures 0.42 cm.  No stones.  No   dilation.    Pancreas:  Visualized pancreas is unremarkable.    Right kidney:  The right kidney is unremarkable. The kidney measures 10.44 x   3.93 x 4.49 cm.  No stones.  No hydronephrosis.     IMPRESSION:    Cholelithiasis with thickened gallbladder wall suggesting cholecystitis,   likely chronic. Positive Paulino's sign was elicited.        THIS DOCUMENT HAS BEEN ELECTRONICALLY SIGNED BY ARLETTE DELEON MD          EXAMINATION: XR CHEST, SINGLE VIEW - 03/28/2018     INDICATION: Weakness, dizziness, altered mental status.     COMPARISON: 03/10/2018 portable chest radiograph.     FINDINGS: Bilateral shoulder joint prosthesis is seen. The heart is  enlarged, unchanged. The vasculature appears normal. Hazy interstitial  opacity in the perihilar regions noted on the prior study is no longer  identified. Lungs appear grossly clear.         IMPRESSION:  Cardiomegaly without evidence of congestive failure.     DICTATED:     03/28/2018  EDITED/ls :     03/28/2018      This report was finalized on  3/28/2018 9:01 PM by DR. Ozzie Khan MD.

## 2018-04-09 ENCOUNTER — TELEPHONE (OUTPATIENT)
Dept: INTERNAL MEDICINE | Facility: CLINIC | Age: 73
End: 2018-04-09

## 2018-04-09 DIAGNOSIS — E11.42 DIABETIC POLYNEUROPATHY ASSOCIATED WITH TYPE 2 DIABETES MELLITUS (HCC): Primary | ICD-10-CM

## 2018-04-09 LAB
FOLATE SERPL-MCNC: >20 NG/ML
SPECIMEN STATUS: NORMAL
T4 FREE SERPL-MCNC: 1.03 NG/DL (ref 0.82–1.77)
TSH SERPL DL<=0.005 MIU/L-ACNC: 1.18 UIU/ML (ref 0.45–4.5)
VIT B1 BLD-SCNC: NORMAL NMOL/L
VIT B12 SERPL-MCNC: 675 PG/ML (ref 232–1245)

## 2018-04-13 ENCOUNTER — TELEPHONE (OUTPATIENT)
Dept: CARDIOLOGY | Facility: HOSPITAL | Age: 73
End: 2018-04-13

## 2018-04-13 NOTE — TELEPHONE ENCOUNTER
----- Message from Angela Campos sent at 4/13/2018 11:59 AM EDT -----  Patient call to report 9 lbs weight gain In 6 days, she feels short of breath, has chest tightness and swelling in her hands, feet and legs.    Per STONEY Manley instructions,  (she will double up her diuretic for 3 days), the patient will take 40 mg of Lasix for 3 days and call  The clinic Monday Monday to left us know if she needs to be seen sooner than her already scheduled appointment on Wednesday.    Angela

## 2018-04-23 ENCOUNTER — OFFICE VISIT (OUTPATIENT)
Dept: INTERNAL MEDICINE | Facility: CLINIC | Age: 73
End: 2018-04-23

## 2018-04-23 VITALS
DIASTOLIC BLOOD PRESSURE: 82 MMHG | HEIGHT: 66 IN | TEMPERATURE: 98.7 F | SYSTOLIC BLOOD PRESSURE: 132 MMHG | HEART RATE: 95 BPM | OXYGEN SATURATION: 98 %

## 2018-04-23 DIAGNOSIS — H53.9 VISION DISTURBANCE: ICD-10-CM

## 2018-04-23 DIAGNOSIS — Z01.419 ENCOUNTER FOR ROUTINE GYNECOLOGIC EXAMINATION IN MEDICARE PATIENT: ICD-10-CM

## 2018-04-23 DIAGNOSIS — Z00.00 MEDICARE ANNUAL WELLNESS VISIT, SUBSEQUENT: Primary | ICD-10-CM

## 2018-04-23 DIAGNOSIS — Z12.31 ENCOUNTER FOR SCREENING MAMMOGRAM FOR MALIGNANT NEOPLASM OF BREAST: ICD-10-CM

## 2018-04-23 DIAGNOSIS — N18.2 TYPE 2 DIABETES MELLITUS WITH STAGE 2 CHRONIC KIDNEY DISEASE, WITH LONG-TERM CURRENT USE OF INSULIN (HCC): Chronic | ICD-10-CM

## 2018-04-23 DIAGNOSIS — Z79.4 TYPE 2 DIABETES MELLITUS WITH STAGE 2 CHRONIC KIDNEY DISEASE, WITH LONG-TERM CURRENT USE OF INSULIN (HCC): Chronic | ICD-10-CM

## 2018-04-23 DIAGNOSIS — E11.22 TYPE 2 DIABETES MELLITUS WITH STAGE 2 CHRONIC KIDNEY DISEASE, WITH LONG-TERM CURRENT USE OF INSULIN (HCC): Chronic | ICD-10-CM

## 2018-04-23 DIAGNOSIS — N84.1 ENDOCERVICAL POLYP: ICD-10-CM

## 2018-04-23 DIAGNOSIS — E11.42 DIABETIC POLYNEUROPATHY ASSOCIATED WITH TYPE 2 DIABETES MELLITUS (HCC): ICD-10-CM

## 2018-04-23 DIAGNOSIS — Z78.0 MENOPAUSE: ICD-10-CM

## 2018-04-23 LAB
BILIRUB BLD-MCNC: NEGATIVE MG/DL
CLARITY, POC: CLEAR
COLOR UR: YELLOW
DEVELOPER EXPIRATION DATE: NORMAL
DEVELOPER LOT NUMBER: NORMAL
EXPIRATION DATE: NORMAL
FECAL OCCULT BLOOD SCREEN, POC: NEGATIVE
GLUCOSE UR STRIP-MCNC: NEGATIVE MG/DL
KETONES UR QL: NEGATIVE
LEUKOCYTE EST, POC: NEGATIVE
Lab: NORMAL
NEGATIVE CONTROL: NEGATIVE
NITRITE UR-MCNC: NEGATIVE MG/ML
PH UR: 6 [PH] (ref 5–8)
POSITIVE CONTROL: POSITIVE
PROT UR STRIP-MCNC: NEGATIVE MG/DL
RBC # UR STRIP: NEGATIVE /UL
SP GR UR: 1.02 (ref 1–1.03)
UROBILINOGEN UR QL: NORMAL

## 2018-04-23 PROCEDURE — 81003 URINALYSIS AUTO W/O SCOPE: CPT | Performed by: FAMILY MEDICINE

## 2018-04-23 PROCEDURE — G0101 CA SCREEN;PELVIC/BREAST EXAM: HCPCS | Performed by: FAMILY MEDICINE

## 2018-04-23 PROCEDURE — G0439 PPPS, SUBSEQ VISIT: HCPCS | Performed by: FAMILY MEDICINE

## 2018-04-23 PROCEDURE — 96160 PT-FOCUSED HLTH RISK ASSMT: CPT | Performed by: FAMILY MEDICINE

## 2018-04-23 PROCEDURE — 99397 PER PM REEVAL EST PAT 65+ YR: CPT | Performed by: FAMILY MEDICINE

## 2018-04-23 PROCEDURE — 82270 OCCULT BLOOD FECES: CPT | Performed by: FAMILY MEDICINE

## 2018-04-23 RX ORDER — FUROSEMIDE 20 MG/1
40 TABLET ORAL DAILY
Qty: 60 TABLET | Refills: 3 | Status: SHIPPED | OUTPATIENT
Start: 2018-04-23 | End: 2018-05-03

## 2018-04-23 NOTE — PROGRESS NOTES
QUICK REFERENCE INFORMATION:  The ABCs of the Annual Wellness Visit    Subsequent Medicare Wellness Visit    HEALTH RISK ASSESSMENT    1945    Recent Hospitalizations:  Recently treated at the following:  [facility].        Current Medical Providers:  Patient Care Team:  Sharon TURNER MD as PCP - General (Family Medicine)  Festus Bowie IV, MD as Cardiologist (Interventional Cardiology)  STONEY Douglass as Nurse Practitioner (Cardiology)  Barndon Cash MD as Consulting Physician (Gastroenterology)        Smoking Status:  History   Smoking Status   • Former Smoker   • Packs/day: 1.00   • Years: 22.00   • Types: Cigarettes   • Quit date: 1979   Smokeless Tobacco   • Never Used     Comment: quit 1979       Alcohol Consumption:  History   Alcohol Use No       Depression Screen:   PHQ-2/PHQ-9 Depression Screening 4/23/2018   Little interest or pleasure in doing things 0   Feeling down, depressed, or hopeless 1   Total Score 1       Health Habits and Functional and Cognitive Screening:  Functional & Cognitive Status 4/23/2018   Do you have difficulty preparing food and eating? No   Do you have difficulty bathing yourself, getting dressed or grooming yourself? No   Do you have difficulty using the toilet? No   Do you have difficulty moving around from place to place? No   Do you have trouble with steps or getting out of a bed or a chair? No   In the past year have you fallen or experienced a near fall? No   Current Diet Well Balanced Diet   Dental Exam Not up to date   Eye Exam Not up to date   Exercise (times per week) 4 times per week   Current Exercise Activities Include Housecleaning   Do you need help using the phone?  No   Are you deaf or do you have serious difficulty hearing?  No   Do you need help with transportation? No   Do you need help shopping? No   Do you need help preparing meals?  No   Do you need help with housework?  No   Do you need help with laundry? No   Do  you need help taking your medications? No   Do you need help managing money? No   Do you ever drive or ride in a car without wearing a seat belt? No           Does the patient have evidence of cognitive impairment? No    Aspirin use counseling: Taking ASA appropriately as indicated      Recent Lab Results:  CMP:  Lab Results   Component Value Date    BUN 14 04/05/2018    CREATININE 0.90 04/05/2018    EGFRIFNONA 61 04/05/2018    BCR 15.6 04/05/2018     04/05/2018    K 4.6 04/05/2018    CO2 30.0 04/05/2018    CALCIUM 9.1 04/05/2018    ALBUMIN 4.10 04/05/2018    LABIL2 1.7 04/05/2018    BILITOT 0.4 04/05/2018    ALKPHOS 70 04/05/2018    AST 22 04/05/2018    ALT 25 04/05/2018     Lipid Panel:  Lab Results   Component Value Date    CHOL 175 03/11/2018    TRIG 122 03/11/2018    HDL 55 03/11/2018     HbA1c:  Lab Results   Component Value Date    HGBA1C 7.30 (H) 03/11/2018       Visual Acuity:  No exam data present    Age-appropriate Screening Schedule:  Refer to the list below for future screening recommendations based on patient's age, sex and/or medical conditions. Orders for these recommended tests are listed in the plan section. The patient has been provided with a written plan.    Health Maintenance   Topic Date Due   • DIABETIC FOOT EXAM  1945   • DIABETIC EYE EXAM  1945   • URINE MICROALBUMIN  1945   • TDAP/TD VACCINES (1 - Tdap) 09/28/1963   • PNEUMOCOCCAL VACCINES (65+ LOW/MEDIUM RISK) (1 of 2 - PCV13) 09/28/2009   • ZOSTER VACCINE  05/11/2016   • MAMMOGRAM  08/17/2017   • INFLUENZA VACCINE  08/01/2018   • HEMOGLOBIN A1C  09/11/2018   • LIPID PANEL  03/11/2019   • COLONOSCOPY  04/18/2028        Subjective   History of Present Illness  Gynecologic Exam   The patient's pertinent negatives include no genital itching, genital lesions, genital odor, genital rash, missed menses, pelvic pain or vaginal discharge. The patient is experiencing no pain. Associated symptoms include abdominal pain  (RUQ-hx of gallstones), back pain and headaches (pt attibutes to sinus). Pertinent negatives include no anorexia, chills, constipation, diarrhea, discolored urine, dysuria, fever, flank pain, frequency, hematuria, joint pain, joint swelling, nausea, painful intercourse, rash, sore throat, urgency or vomiting. She uses abstinence for contraception. She is postmenopausal. Her past medical history is significant for an abdominal surgery. There is no history of a  section, an ectopic pregnancy, endometriosis, a gynecological surgery, herpes simplex, menorrhagia, metrorrhagia, miscarriage, ovarian cysts, perineal abscess, PID, an STD, a terminated pregnancy or vaginosis.       Angelita Shay is a 72 y.o. female who presents for an Subsequent Wellness Visit.    The following portions of the patient's history were reviewed and updated as appropriate: allergies, current medications, past family history, past medical history, past social history, past surgical history and problem list.    Past Medical History:   Diagnosis Date   • Anxiety    • Arthritis    • Cellulitis of both lower extremities    • CKD (chronic kidney disease), stage III    • Colon cancer    • Colon polyp 2015    x 3 adenomatous   • Colon polyp 04/18/2018    x 5 Dr Cash   • Coronary artery disease     1 stent   • Corrosive esophagitis    • Depression    • Diabetes mellitus     dx 2 years ago- checks fsbs bid   • GERD (gastroesophageal reflux disease)    • GI bleed    • Gout    • H/O cardiac catheterization    • H/O colonoscopy     incomplete prep   • H/O echocardiogram 2018    EF 36%,LVH, mild MR, mild TR   • H/O esophagogastroduodenoscopy     hiatal hernia, gastritis   • H/O hiatal hernia    • H/O mammogram    • Hyperlipidemia    • Hypertension    • Knee pain    • PAF (paroxysmal atrial fibrillation)     postoperative total knee replacement   • Pap smear for cervical cancer screening    • RLS (restless legs  syndrome)    • Sleep apnea    • Wears eyeglasses        Past Surgical History:   Procedure Laterality Date   • APPENDECTOMY  05/1963   • CARDIAC CATHETERIZATION  08/2015    no stents   • CARDIAC CATHETERIZATION  03/13/2018   • CARDIAC CATHETERIZATION N/A 3/13/2018    Procedure: Left Heart Cath;  Surgeon: Pierre Jones III, MD;  Location:  [facility];  Service: Cardiovascular   • COLON RESECTION  04/08/2000    colon cancer   • COLONOSCOPY  2015   • COLONOSCOPY  04/18/2018    with 5 polyps removed 1yr f/u. Dr Cash   • CORONARY ANGIOPLASTY WITH STENT PLACEMENT  12/2015   • RI TOTAL KNEE ARTHROPLASTY Left 11/3/2016    Procedure: LEFT TOTAL KNEE REPLACEMENT;  Surgeon: aLurent Zuniga MD;  Location:  [facility];  Service: Orthopedics   • TONSILLECTOMY  12/1961   • TOTAL KNEE ARTHROPLASTY Right 2008    revision 2010   • TOTAL SHOULDER ARTHROPLASTY Bilateral     right 2014, left 2015       Allergies   Allergen Reactions   • Oxycodone Shortness Of Breath   • Zolpidem Other (See Comments)     nightmares       Family History   Problem Relation Age of Onset   • Colon cancer Other    • Diabetes Other    • Heart disease Other    • Hypertension Other    • Stroke Other    • Tuberculosis Other    • Hypertension Mother    • Colon cancer Father    • Stroke Father    • Diabetes Father    • Colon cancer Sister    • Diabetes Sister    • Diabetes Maternal Grandmother    • Coronary artery disease Maternal Grandfather    • No Known Problems Paternal Grandmother    • No Known Problems Paternal Grandfather        Social History     Social History   • Marital status:      Spouse name: N/A   • Number of children: 5   • Years of education: N/A     Occupational History   • Retired       Social History Main Topics   • Smoking status: Former Smoker     Packs/day: 1.00     Years: 22.00     Types: Cigarettes     Quit date: 1979   • Smokeless tobacco: Never Used      Comment: quit 1979   • Alcohol use  No   • Drug use: No   • Sexual activity: Defer     Other Topics Concern   • Not on file     Social History Narrative    Caffeine: 0-1 servings per day    Patient lives at her home with her daughter and her family       Outpatient Medications Prior to Visit   Medication Sig Dispense Refill   • apixaban (ELIQUIS) 5 MG tablet tablet Take 1 tablet by mouth Every 12 (Twelve) Hours for 90 days. 60 tablet 2   • aspirin 81 MG EC tablet Take 1 tablet by mouth Daily. Resume in 2 weeks     • atorvastatin (LIPITOR) 20 MG tablet Take 1 tablet by mouth Every Night for 180 days. 30 tablet 5   • carvedilol (COREG) 6.25 MG tablet Take 1 tablet by mouth Every 12 (Twelve) Hours. (Patient taking differently: Take 3.125 mg by mouth Every 12 (Twelve) Hours.) 30 tablet 0   • gabapentin (NEURONTIN) 300 MG capsule Take 1 capsule by mouth 3 (Three) Times a Day. 90 capsule 0   • insulin aspart (NOVOLOG) 100 UNIT/ML injection **Per patient's Sliding scale** 10 mL 2   • linaclotide (LINZESS) 290 MCG capsule capsule Take 1 capsule by mouth Every Morning Before Breakfast. 30 capsule 1   • magnesium gluconate (MAGONATE) 500 MG tablet Take 27 mg by mouth Daily. OTC      • magnesium hydroxide (MILK OF MAGNESIA) 400 MG/5ML suspension Take 15 mL by mouth Daily As Needed for Constipation. 473 mL 0   • ondansetron (ZOFRAN) 4 MG tablet Take 1 tablet by mouth Every 6 (Six) Hours As Needed for Nausea or Vomiting. 21 tablet 0   • polyethylene glycol (MIRALAX) powder Take 17 g by mouth Daily As Needed.     • rOPINIRole (REQUIP) 1 MG tablet Take 1-3 tablets by mouth Every Night. Take 1 hour before bedtime. 90 tablet 1   • sacubitril-valsartan (ENTRESTO) 24-26 MG tablet Take 1 tablet by mouth Every 12 (Twelve) Hours for 180 days. 60 tablet 5   • traZODone (DESYREL) 50 MG tablet Take 1 tablet by mouth Every Night. 30 tablet 0   • furosemide (LASIX) 20 MG tablet Take 1 tablet by mouth Daily. (Patient taking differently: Take 40 mg by mouth Daily.) 30 tablet 0    • HYDROcod Polst-CPM Polst ER (TUSSIONEX PENNKINETIC ER) 10-8 MG/5ML ER suspension Take 5 mL by mouth Every 12 (Twelve) Hours As Needed for Cough. 60 mL 0     No facility-administered medications prior to visit.        Patient Active Problem List   Diagnosis   • Essential hypertension   • Anxiety   • DM type 2 (diabetes mellitus, type 2), on insulin therapy   • GERD (gastroesophageal reflux disease)   • Coronary artery disease involving native coronary artery of native heart with angina pectoris   • Sleep apnea   • RLS (restless legs syndrome)   • Gout   • Chronic systolic congestive heart failure   • Paroxysmal atrial fibrillation   • Nonischemic cardiomyopathy   • Biliary colic   • Cholelithiasis   • Acute kidney injury superimposed on chronic kidney disease stage 3   • Chronic passive hepatic congestion   • Diabetic polyneuropathy associated with type 2 diabetes mellitus       Advance Care Planning:  has NO advance directive - information provided to the patient today    Identification of Risk Factors:  Risk factors include: weight , cardiovascular risk and polypharmacy.    Review of Systems   Constitutional: Positive for fatigue and unexpected weight change. Negative for activity change, appetite change, chills, diaphoresis and fever.   HENT: Positive for rhinorrhea and sneezing. Negative for congestion, dental problem, drooling, ear discharge, ear pain, facial swelling, hearing loss, mouth sores, nosebleeds, postnasal drip, sinus pain, sinus pressure, sore throat, tinnitus, trouble swallowing and voice change.    Eyes: Positive for visual disturbance (left eye blurry vision). Negative for photophobia, pain, discharge, redness and itching.   Respiratory: Positive for apnea (pt has appt June 23). Negative for cough, choking, chest tightness, shortness of breath, wheezing and stridor.    Cardiovascular: Positive for chest pain (pt gets a sharp shooting pain when she has swelling in her legs.) and leg swelling.  Negative for palpitations.   Gastrointestinal: Positive for abdominal pain (RUQ-hx of gallstones). Negative for abdominal distention, anal bleeding, anorexia, blood in stool, constipation, diarrhea, nausea, rectal pain and vomiting.   Endocrine: Negative.  Negative for cold intolerance, heat intolerance, polydipsia, polyphagia and polyuria.   Genitourinary: Negative.  Negative for decreased urine volume, difficulty urinating, dyspareunia, dysuria, enuresis, flank pain, frequency, genital sores, hematuria, menorrhagia, menstrual problem, missed menses, pelvic pain, urgency, vaginal bleeding, vaginal discharge and vaginal pain.   Musculoskeletal: Positive for back pain and joint swelling. Negative for arthralgias, gait problem, joint pain, myalgias, neck pain and neck stiffness.   Skin: Negative.  Negative for color change, pallor, rash and wound.   Allergic/Immunologic: Positive for environmental allergies. Negative for food allergies and immunocompromised state.   Neurological: Positive for dizziness and headaches (pt attibutes to sinus). Negative for tremors, seizures, syncope, facial asymmetry, speech difficulty, weakness, light-headedness and numbness.   Hematological: Negative for adenopathy. Bruises/bleeds easily.   Psychiatric/Behavioral: Positive for dysphoric mood (mild from financial stress). Negative for agitation, behavioral problems, confusion, decreased concentration, hallucinations, self-injury, sleep disturbance and suicidal ideas. The patient is not nervous/anxious and is not hyperactive.        Compared to one year ago, the patient feels her physical health is better.  Compared to one year ago, the patient feels her mental health is better.    Objective     Physical Exam   Constitutional: She is oriented to person, place, and time. She appears well-developed and well-nourished. No distress.   HENT:   Head: Normocephalic and atraumatic.   Right Ear: External ear and ear canal normal. Tympanic  membrane is scarred. Tympanic membrane is not erythematous.   Left Ear: External ear and ear canal normal. Tympanic membrane is scarred. Tympanic membrane is not erythematous.   Nose: Nose normal.   Mouth/Throat: Uvula is midline, oropharynx is clear and moist and mucous membranes are normal. Normal dentition. No oropharyngeal exudate, posterior oropharyngeal edema or posterior oropharyngeal erythema.   Eyes: Conjunctivae and EOM are normal. Pupils are equal, round, and reactive to light. Right eye exhibits no chemosis, no discharge, no exudate and no hordeolum. No foreign body present in the right eye. Left eye exhibits no chemosis, no discharge, no exudate and no hordeolum. No foreign body present in the left eye. Right conjunctiva is not injected. Right conjunctiva has no hemorrhage. Left conjunctiva is not injected. Left conjunctiva has no hemorrhage. No scleral icterus. Right eye exhibits normal extraocular motion and no nystagmus. Left eye exhibits normal extraocular motion and no nystagmus.   Fundoscopic exam:       The right eye shows AV nicking. The right eye shows no exudate, no hemorrhage and no papilledema. The right eye shows red reflex.        The left eye shows red reflex.       Neck: Trachea normal and normal range of motion. Neck supple. Carotid bruit is not present. No tracheal deviation present. No thyroid mass and no thyromegaly present.   Cardiovascular: Normal rate, regular rhythm, normal heart sounds and intact distal pulses.  Exam reveals no gallop and no friction rub.    No murmur heard.  Pulses:       Dorsalis pedis pulses are 2+ on the right side, and 2+ on the left side.        Posterior tibial pulses are 2+ on the right side, and 2+ on the left side.   Trace edema on left foot   Pulmonary/Chest: Effort normal and breath sounds normal. No respiratory distress. She has no decreased breath sounds. She has no wheezes. She has no rhonchi. She has no rales. Chest wall is not dull to  percussion. She exhibits no tenderness. Right breast exhibits no inverted nipple, no mass, no nipple discharge, no skin change and no tenderness. Left breast exhibits no inverted nipple, no mass, no nipple discharge, no skin change and no tenderness.   Abdominal: Soft. Bowel sounds are normal. She exhibits no distension and no mass. There is no hepatosplenomegaly. There is tenderness in the right upper quadrant and suprapubic area. There is no rebound and no guarding. No hernia. Hernia confirmed negative in the right inguinal area and confirmed negative in the left inguinal area.   Genitourinary: Rectum normal, vagina normal and uterus normal. Rectal exam shows no external hemorrhoid, no internal hemorrhoid, no fissure, no mass, no tenderness, anal tone normal and guaiac negative stool. Pelvic exam was performed with patient supine. There is no rash, tenderness, lesion or injury on the right labia. There is no rash, tenderness, lesion or injury on the left labia. Uterus is not deviated, not enlarged, not fixed and not tender. Cervix exhibits no motion tenderness, no discharge and no friability. Right adnexum displays tenderness. Right adnexum displays no mass and no fullness. Left adnexum displays tenderness. Left adnexum displays no mass and no fullness. No erythema, tenderness or bleeding in the vagina. No foreign body in the vagina. No vaginal discharge found.       Musculoskeletal: Normal range of motion. She exhibits no edema or deformity.        Lumbar back: She exhibits tenderness and spasm.        Back:         Legs:  Lymphadenopathy:        Head (right side): No submandibular adenopathy present.        Head (left side): No submandibular adenopathy present.     She has no cervical adenopathy.        Right cervical: No superficial cervical, no deep cervical and no posterior cervical adenopathy present.       Left cervical: No superficial cervical, no deep cervical and no posterior cervical adenopathy present.  "    She has no axillary adenopathy.        Right axillary: No pectoral and no lateral adenopathy present.        Left axillary: No pectoral and no lateral adenopathy present.       Right: No inguinal and no supraclavicular adenopathy present.        Left: No inguinal and no supraclavicular adenopathy present.   Neurological: She is alert and oriented to person, place, and time. She has normal strength and normal reflexes. No cranial nerve deficit or sensory deficit. Coordination normal.   Reflex Scores:       Bicep reflexes are 2+ on the right side and 2+ on the left side.       Brachioradialis reflexes are 2+ on the right side and 2+ on the left side.       Patellar reflexes are 2+ on the right side and 2+ on the left side.  Skin: Skin is warm and dry. No rash noted. She is not diaphoretic. No cyanosis. Nails show no clubbing.   Psychiatric: She has a normal mood and affect. Her speech is normal and behavior is normal. Judgment and thought content normal. Cognition and memory are normal.   Nursing note and vitals reviewed.      Vitals:    04/23/18 1058   BP: 132/82   Pulse: 95   Temp: 98.7 °F (37.1 °C)   SpO2: 98%   Height: 167.6 cm (66\")   PainSc: Comment: scale not working       Patient's There is no height or weight on file to calculate BMI. BMI is above normal parameters. Follow-up plan includes:  educational material and exercise counseling.      Assessment/Plan   Patient Self-Management and Personalized Health Advice  The patient has been provided with information about: diet, exercise, fall prevention and designing advance directives and preventive services including:   · Advance directive, Bone densitometry screening, Exercise counseling provided, Fall Risk assessment done, Fall Risk plan of care done, Glaucoma screening recommended, Screening mammography, referral placed, Urinary Incontinence assessment done, discussed vaccines with pt.    Visit Diagnoses:    ICD-10-CM ICD-9-CM   1. Medicare annual " wellness visit, subsequent Z00.00 V70.0   2. Encounter for screening mammogram for malignant neoplasm of breast Z12.31 V76.12   3. Menopause Z78.0 627.2   4. Vision disturbance H53.9 368.9   5. Type 2 diabetes mellitus with stage 2 chronic kidney disease, with long-term current use of insulin E11.22 250.40    N18.2 585.2    Z79.4 V58.67   6. Diabetic polyneuropathy associated with type 2 diabetes mellitus E11.42 250.60     357.2   7. Encounter for routine gynecologic examination in Medicare patient Z01.419 V72.31   8. Endocervical polyp N84.1 622.7       Orders Placed This Encounter   Procedures   • Mammo Screening Digital Tomosynthesis Bilateral With CAD     Standing Status:   Future     Standing Expiration Date:   4/23/2019     Order Specific Question:   Reason for Exam:     Answer:   screening   • DEXA Bone Density Axial     Standing Status:   Future     Standing Expiration Date:   4/23/2019     Order Specific Question:   Reason for Exam:     Answer:   menopause   • Microalbumin / Creatinine Urine Ratio - Urine, Clean Catch   • Vitamin B1, Whole Blood   • Ambulatory Referral to Gynecology     Referral Priority:   Routine     Referral Type:   Consultation     Referral Reason:   Specialty Services Required     Requested Specialty:   Gynecology     Number of Visits Requested:   1   • POCT urinalysis dipstick, automated   • POC Occult Blood Stool       Outpatient Encounter Prescriptions as of 4/23/2018   Medication Sig Dispense Refill   • apixaban (ELIQUIS) 5 MG tablet tablet Take 1 tablet by mouth Every 12 (Twelve) Hours for 90 days. 60 tablet 2   • aspirin 81 MG EC tablet Take 1 tablet by mouth Daily. Resume in 2 weeks     • atorvastatin (LIPITOR) 20 MG tablet Take 1 tablet by mouth Every Night for 180 days. 30 tablet 5   • carvedilol (COREG) 6.25 MG tablet Take 1 tablet by mouth Every 12 (Twelve) Hours. (Patient taking differently: Take 3.125 mg by mouth Every 12 (Twelve) Hours.) 30 tablet 0   • furosemide (LASIX)  20 MG tablet Take 2 tablets by mouth Daily. 60 tablet 3   • gabapentin (NEURONTIN) 300 MG capsule Take 1 capsule by mouth 3 (Three) Times a Day. 90 capsule 0   • insulin aspart (NOVOLOG) 100 UNIT/ML injection **Per patient's Sliding scale** 10 mL 2   • linaclotide (LINZESS) 290 MCG capsule capsule Take 1 capsule by mouth Every Morning Before Breakfast. 30 capsule 1   • magnesium gluconate (MAGONATE) 500 MG tablet Take 27 mg by mouth Daily. OTC      • magnesium hydroxide (MILK OF MAGNESIA) 400 MG/5ML suspension Take 15 mL by mouth Daily As Needed for Constipation. 473 mL 0   • ondansetron (ZOFRAN) 4 MG tablet Take 1 tablet by mouth Every 6 (Six) Hours As Needed for Nausea or Vomiting. 21 tablet 0   • polyethylene glycol (MIRALAX) powder Take 17 g by mouth Daily As Needed.     • rOPINIRole (REQUIP) 1 MG tablet Take 1-3 tablets by mouth Every Night. Take 1 hour before bedtime. 90 tablet 1   • sacubitril-valsartan (ENTRESTO) 24-26 MG tablet Take 1 tablet by mouth Every 12 (Twelve) Hours for 180 days. 60 tablet 5   • traZODone (DESYREL) 50 MG tablet Take 1 tablet by mouth Every Night. 30 tablet 0   • [DISCONTINUED] furosemide (LASIX) 20 MG tablet Take 1 tablet by mouth Daily. (Patient taking differently: Take 40 mg by mouth Daily.) 30 tablet 0   • [DISCONTINUED] HYDROcod Polst-CPM Polst ER (TUSSIONEX PENNKINETIC ER) 10-8 MG/5ML ER suspension Take 5 mL by mouth Every 12 (Twelve) Hours As Needed for Cough. 60 mL 0     No facility-administered encounter medications on file as of 4/23/2018.        Reviewed use of high risk medication in the elderly: not applicable  Reviewed for potential of harmful drug interactions in the elderly: not applicable    Follow Up:  Return in about 3 months (around 7/23/2018), or if symptoms worsen or fail to improve, for Recheck.     An After Visit Summary and PPPS with all of these plans were given to the patient.         Recent Results (from the past 1344 hour(s))   POC Troponin, Rapid     Collection Time: 03/10/18 10:00 PM   Result Value Ref Range    Troponin I 0.04 0.00 - 0.07 ng/mL   Comprehensive Metabolic Panel    Collection Time: 03/10/18 10:01 PM   Result Value Ref Range    Glucose 135 (H) 70 - 100 mg/dL    BUN 21 9 - 23 mg/dL    Creatinine 1.00 0.60 - 1.30 mg/dL    Sodium 139 132 - 146 mmol/L    Potassium 5.5 3.5 - 5.5 mmol/L    Chloride 106 99 - 109 mmol/L    CO2 26.0 20.0 - 31.0 mmol/L    Calcium 9.1 8.7 - 10.4 mg/dL    Total Protein 7.9 5.7 - 8.2 g/dL    Albumin 4.50 3.20 - 4.80 g/dL    ALT (SGPT) 37 7 - 40 U/L    AST (SGOT) 67 (H) 0 - 33 U/L    Alkaline Phosphatase 73 25 - 100 U/L    Total Bilirubin 0.6 0.3 - 1.2 mg/dL    eGFR Non African Amer 54 (L) >60 mL/min/1.73    Globulin 3.4 gm/dL    A/G Ratio 1.3 (L) 1.5 - 2.5 g/dL    BUN/Creatinine Ratio 21.0 7.0 - 25.0    Anion Gap 7.0 3.0 - 11.0 mmol/L   Light Blue Top    Collection Time: 03/10/18 10:01 PM   Result Value Ref Range    Extra Tube hold for add-on    Green Top (Gel)    Collection Time: 03/10/18 10:01 PM   Result Value Ref Range    Extra Tube Hold for add-ons.    Lavender Top    Collection Time: 03/10/18 10:01 PM   Result Value Ref Range    Extra Tube hold for add-on    Gold Top - SST    Collection Time: 03/10/18 10:01 PM   Result Value Ref Range    Extra Tube Hold for add-ons.    CBC Auto Differential    Collection Time: 03/10/18 10:01 PM   Result Value Ref Range    WBC 9.93 3.50 - 10.80 10*3/mm3    RBC 4.80 3.89 - 5.14 10*6/mm3    Hemoglobin 15.4 11.5 - 15.5 g/dL    Hematocrit 46.8 (H) 34.5 - 44.0 %    MCV 97.5 80.0 - 99.0 fL    MCH 32.1 (H) 27.0 - 31.0 pg    MCHC 32.9 32.0 - 36.0 g/dL    RDW 13.9 11.3 - 14.5 %    RDW-SD 50.0 37.0 - 54.0 fl    MPV 12.0 6.0 - 12.0 fL    Platelets 160 150 - 450 10*3/mm3    Neutrophil % 70.6 41.0 - 71.0 %    Lymphocyte % 19.8 (L) 24.0 - 44.0 %    Monocyte % 4.8 0.0 - 12.0 %    Eosinophil % 4.0 (H) 0.0 - 3.0 %    Basophil % 0.4 0.0 - 1.0 %    Immature Grans % 0.4 0.0 - 0.6 %    Neutrophils, Absolute  7.00 1.50 - 8.30 10*3/mm3    Lymphocytes, Absolute 1.97 0.60 - 4.80 10*3/mm3    Monocytes, Absolute 0.48 0.00 - 1.00 10*3/mm3    Eosinophils, Absolute 0.40 (H) 0.00 - 0.30 10*3/mm3    Basophils, Absolute 0.04 0.00 - 0.20 10*3/mm3    Immature Grans, Absolute 0.04 (H) 0.00 - 0.03 10*3/mm3   Lactic Acid, Plasma    Collection Time: 03/10/18 10:01 PM   Result Value Ref Range    Lactate 1.5 0.5 - 2.0 mmol/L   BNP    Collection Time: 03/10/18 10:29 PM   Result Value Ref Range    .0 (H) 0.0 - 100.0 pg/mL   Blood Culture - Blood,    Collection Time: 03/10/18 10:29 PM   Result Value Ref Range    Blood Culture No growth at 5 days    Blood Culture - Blood,    Collection Time: 03/10/18 10:29 PM   Result Value Ref Range    Blood Culture No growth at 5 days    Blood Gas, Arterial    Collection Time: 03/11/18 12:09 AM   Result Value Ref Range    Site Arterial: right radial     Ronan's Test N/A     pH, Arterial 7.369 7.350 - 7.450 pH units    pCO2, Arterial 50.8 (H) 35.0 - 45.0 mm Hg    pO2, Arterial 84.4 83.0 - 108.0 mm Hg    HCO3, Arterial 29.3 (H) 20.0 - 26.0 mmol/L    Base Excess, Arterial 2.9 (H) 0.0 - 2.0 mmol/L    Hemoglobin, Blood Gas 14.9 14 - 18 g/dL    Hematocrit, Blood Gas 45.7 %    Oxyhemoglobin 94.0 94 - 99 %    Methemoglobin 0.80 0.00 - 1.50 %    Carboxyhemoglobin 1.4 0 - 2 %    CO2 Content 30.9 22 - 33    Barometric Pressure for Blood Gas  mmHg    Modality Cannula - Nasal     FIO2 32 %   CBC Auto Differential    Collection Time: 03/11/18  5:11 AM   Result Value Ref Range    WBC 10.05 3.50 - 10.80 10*3/mm3    RBC 4.54 3.89 - 5.14 10*6/mm3    Hemoglobin 14.5 11.5 - 15.5 g/dL    Hematocrit 45.0 (H) 34.5 - 44.0 %    MCV 99.1 (H) 80.0 - 99.0 fL    MCH 31.9 (H) 27.0 - 31.0 pg    MCHC 32.2 32.0 - 36.0 g/dL    RDW 14.3 11.3 - 14.5 %    RDW-SD 51.3 37.0 - 54.0 fl    MPV 11.6 6.0 - 12.0 fL    Platelets 124 (L) 150 - 450 10*3/mm3    Neutrophil % 71.7 (H) 41.0 - 71.0 %    Lymphocyte % 16.2 (L) 24.0 - 44.0 %    Monocyte %  6.5 0.0 - 12.0 %    Eosinophil % 5.1 (H) 0.0 - 3.0 %    Basophil % 0.3 0.0 - 1.0 %    Immature Grans % 0.2 0.0 - 0.6 %    Neutrophils, Absolute 7.21 1.50 - 8.30 10*3/mm3    Lymphocytes, Absolute 1.63 0.60 - 4.80 10*3/mm3    Monocytes, Absolute 0.65 0.00 - 1.00 10*3/mm3    Eosinophils, Absolute 0.51 (H) 0.00 - 0.30 10*3/mm3    Basophils, Absolute 0.03 0.00 - 0.20 10*3/mm3    Immature Grans, Absolute 0.02 0.00 - 0.03 10*3/mm3   Hemoglobin A1c    Collection Time: 03/11/18  5:11 AM   Result Value Ref Range    Hemoglobin A1C 7.30 (H) 4.80 - 5.60 %   Digoxin Level    Collection Time: 03/11/18  6:40 AM   Result Value Ref Range    Digoxin <0.01 (L) 0.80 - 2.00 ng/mL   Comprehensive Metabolic Panel    Collection Time: 03/11/18  6:40 AM   Result Value Ref Range    Glucose 131 (H) 70 - 100 mg/dL    BUN 19 9 - 23 mg/dL    Creatinine 1.00 0.60 - 1.30 mg/dL    Sodium 140 132 - 146 mmol/L    Potassium 4.0 3.5 - 5.5 mmol/L    Chloride 103 99 - 109 mmol/L    CO2 31.0 20.0 - 31.0 mmol/L    Calcium 9.0 8.7 - 10.4 mg/dL    Total Protein 7.1 5.7 - 8.2 g/dL    Albumin 4.10 3.20 - 4.80 g/dL    ALT (SGPT) 28 7 - 40 U/L    AST (SGOT) 24 0 - 33 U/L    Alkaline Phosphatase 73 25 - 100 U/L    Total Bilirubin 0.8 0.3 - 1.2 mg/dL    eGFR Non African Amer 54 (L) >60 mL/min/1.73    Globulin 3.0 gm/dL    A/G Ratio 1.4 (L) 1.5 - 2.5 g/dL    BUN/Creatinine Ratio 19.0 7.0 - 25.0    Anion Gap 6.0 3.0 - 11.0 mmol/L   Magnesium    Collection Time: 03/11/18  6:40 AM   Result Value Ref Range    Magnesium 2.1 1.3 - 2.7 mg/dL   Lipase    Collection Time: 03/11/18  6:40 AM   Result Value Ref Range    Lipase 60 (H) 6 - 51 U/L   Lipid Panel    Collection Time: 03/11/18  6:40 AM   Result Value Ref Range    Total Cholesterol 175 0 - 200 mg/dL    Triglycerides 122 0 - 150 mg/dL    HDL Cholesterol 55 40 - 60 mg/dL    LDL Cholesterol  109 0 - 130 mg/dL   Troponin    Collection Time: 03/11/18  6:40 AM   Result Value Ref Range    Troponin I 0.052 (H) <=0.039 ng/mL    POC Glucose Once    Collection Time: 03/11/18  7:49 AM   Result Value Ref Range    Glucose 139 (H) 70 - 130 mg/dL   Lactic Acid, Plasma    Collection Time: 03/11/18  9:23 AM   Result Value Ref Range    Lactate 0.9 0.5 - 2.0 mmol/L   Adult Transthoracic Echo Complete W/ Cont if Necessary Per Protocol    Collection Time: 03/11/18 10:28 AM   Result Value Ref Range    BSA 2.1 m^2    IVSd 1.4 cm    LVIDd 4.9 cm    LVIDs 3.7 cm    LVPWd 1.3 cm    IVS/LVPW 1.0     FS 24.6 %    EDV(Teich) 111.0 ml    ESV(Teich) 57.0 ml    EF(Teich) 48.7 %    EDV(cubed) 115.2 ml    ESV(cubed) 49.4 ml    EF(cubed) 57.1 %    LV mass(C)d 265.7 grams    LV mass(C)dI 128.3 grams/m^2    SV(Teich) 54.0 ml    SI(Teich) 26.1 ml/m^2    SV(cubed) 65.8 ml    SI(cubed) 31.8 ml/m^2    Ao root diam 3.0 cm    Ao root area 6.9 cm^2    LA dimension 4.2 cm    LA/Ao 1.4     LVLd ap4 8.1 cm    EDV(MOD-sp4) 154.0 ml    LVLs ap4 7.9 cm    ESV(MOD-sp4) 95.0 ml    EF(MOD-sp4) 38.3 %    LVLd ap2 9.3 cm    EDV(MOD-sp2) 135.0 ml    LVLs ap2 6.4 cm    ESV(MOD-sp2) 45.0 ml    EF(MOD-sp2) 66.7 %    SV(MOD-sp4) 59.0 ml    SI(MOD-sp4) 28.5 ml/m^2    SV(MOD-sp2) 90.0 ml    SI(MOD-sp2) 43.5 ml/m^2    Ao root area (BSA corrected) 1.4     Ao root area (BSA corrected) 66.7 ml/m^2    CONTRAST EF 4CH 38.3 ml/m^2    LV Diastolic corrected for BSA 74.4 ml/m^2    LV Systolic corrected for BSA 45.9 ml/m^2    PA acc slope 794.4 cm/sec^2    PA acc time 0.1 sec    TR max aram 268.6 cm/sec    PA pr(Accel) 36.2 mmHg    BH CV ECHO CY - BZI_BMI 35.0 kilograms/m^2    BH CV ECHO CY - BSA(HAYCOCK) 2.2 m^2    BH CV ECHO CY - BZI_METRIC_WEIGHT 98.4 kg    BH CV ECHO CY - BZI_METRIC_HEIGHT 167.6 cm     CV VAS BP LEFT /113 mmHg    TDI S' 9.80 cm/sec    RV Base 4.30 cm    RV Length 8.30 cm    RV Mid 2.90 cm    Lat Peak E' Aram 7.3 cm/sec    Med Peak E' Aram 5.50 cm/sec    RAP systole 15 mmHg    RVSP(TR) 54 mmHg    TR max grad 39 mmHg    TAPSE (>1.6) 0.90 cm2    Target HR (85%) 125 bpm     Max. Pred. HR (100%) 147 bpm    Echo EF Estimated 36 %   Troponin    Collection Time: 03/11/18 11:40 AM   Result Value Ref Range    Troponin I 0.047 (H) <=0.039 ng/mL   POC Glucose Once    Collection Time: 03/11/18 11:41 AM   Result Value Ref Range    Glucose 151 (H) 70 - 130 mg/dL   POC Glucose Once    Collection Time: 03/11/18  6:12 PM   Result Value Ref Range    Glucose 146 (H) 70 - 130 mg/dL   POC Glucose Once    Collection Time: 03/11/18  8:10 PM   Result Value Ref Range    Glucose 178 (H) 70 - 130 mg/dL   POC Glucose Once    Collection Time: 03/12/18 12:03 AM   Result Value Ref Range    Glucose 154 (H) 70 - 130 mg/dL   POC Glucose Once    Collection Time: 03/12/18  5:09 AM   Result Value Ref Range    Glucose 193 (H) 70 - 130 mg/dL   POC Glucose Once    Collection Time: 03/12/18 11:48 AM   Result Value Ref Range    Glucose 158 (H) 70 - 130 mg/dL   POC Glucose Once    Collection Time: 03/12/18  6:04 PM   Result Value Ref Range    Glucose 185 (H) 70 - 130 mg/dL   POC Glucose Once    Collection Time: 03/13/18 12:18 AM   Result Value Ref Range    Glucose 174 (H) 70 - 130 mg/dL   CBC (No Diff)    Collection Time: 03/13/18  4:39 AM   Result Value Ref Range    WBC 7.71 3.50 - 10.80 10*3/mm3    RBC 4.65 3.89 - 5.14 10*6/mm3    Hemoglobin 14.8 11.5 - 15.5 g/dL    Hematocrit 46.6 (H) 34.5 - 44.0 %    .2 (H) 80.0 - 99.0 fL    MCH 31.8 (H) 27.0 - 31.0 pg    MCHC 31.8 (L) 32.0 - 36.0 g/dL    RDW 14.1 11.3 - 14.5 %    RDW-SD 51.4 37.0 - 54.0 fl    MPV 12.0 6.0 - 12.0 fL    Platelets 146 (L) 150 - 450 10*3/mm3   Basic Metabolic Panel    Collection Time: 03/13/18  4:39 AM   Result Value Ref Range    Glucose 125 (H) 70 - 100 mg/dL    BUN 24 (H) 9 - 23 mg/dL    Creatinine 1.10 0.60 - 1.30 mg/dL    Sodium 138 132 - 146 mmol/L    Potassium 4.5 3.5 - 5.5 mmol/L    Chloride 99 99 - 109 mmol/L    CO2 27.0 20.0 - 31.0 mmol/L    Calcium 9.2 8.7 - 10.4 mg/dL    eGFR Non African Amer 49 (L) >60 mL/min/1.73    BUN/Creatinine  Ratio 21.8 7.0 - 25.0    Anion Gap 12.0 (H) 3.0 - 11.0 mmol/L   Magnesium    Collection Time: 03/13/18  4:39 AM   Result Value Ref Range    Magnesium 2.4 1.3 - 2.7 mg/dL   Phosphorus    Collection Time: 03/13/18  4:39 AM   Result Value Ref Range    Phosphorus 3.2 2.4 - 5.1 mg/dL   BNP    Collection Time: 03/13/18  4:39 AM   Result Value Ref Range    BNP 91.0 0.0 - 100.0 pg/mL   CK Total & CKMB    Collection Time: 03/13/18  4:39 AM   Result Value Ref Range    CKMB 2.36 0.00 - 5.00 ng/mL    Creatine Kinase 48 26 - 174 U/L   Troponin    Collection Time: 03/13/18  4:39 AM   Result Value Ref Range    Troponin I 0.030 <=0.039 ng/mL   Protime-INR    Collection Time: 03/13/18  4:39 AM   Result Value Ref Range    Protime 10.0 9.6 - 11.5 Seconds    INR 0.95 0.91 - 1.09   POC Glucose Once    Collection Time: 03/13/18  5:15 AM   Result Value Ref Range    Glucose 144 (H) 70 - 130 mg/dL   POC Glucose Once    Collection Time: 03/13/18 12:22 PM   Result Value Ref Range    Glucose 117 70 - 130 mg/dL   POC Glucose Once    Collection Time: 03/13/18  5:29 PM   Result Value Ref Range    Glucose 122 70 - 130 mg/dL   POC Glucose Once    Collection Time: 03/13/18  9:16 PM   Result Value Ref Range    Glucose 159 (H) 70 - 130 mg/dL   POC Glucose Once    Collection Time: 03/13/18 11:38 PM   Result Value Ref Range    Glucose 210 (H) 70 - 130 mg/dL   POC Glucose Once    Collection Time: 03/14/18  5:24 AM   Result Value Ref Range    Glucose 178 (H) 70 - 130 mg/dL   Basic Metabolic Panel    Collection Time: 03/14/18  9:27 AM   Result Value Ref Range    Glucose 206 (H) 70 - 100 mg/dL    BUN 30 (H) 9 - 23 mg/dL    Creatinine 1.20 0.60 - 1.30 mg/dL    Sodium 135 132 - 146 mmol/L    Potassium 4.3 3.5 - 5.5 mmol/L    Chloride 97 (L) 99 - 109 mmol/L    CO2 34.0 (H) 20.0 - 31.0 mmol/L    Calcium 8.6 (L) 8.7 - 10.4 mg/dL    eGFR Non African Amer 44 (L) >60 mL/min/1.73    BUN/Creatinine Ratio 25.0 7.0 - 25.0    Anion Gap 4.0 3.0 - 11.0 mmol/L   Magnesium     Collection Time: 03/14/18  9:27 AM   Result Value Ref Range    Magnesium 2.1 1.3 - 2.7 mg/dL   POC Glucose Once    Collection Time: 03/14/18 11:33 AM   Result Value Ref Range    Glucose 142 (H) 70 - 130 mg/dL   POC Troponin, Rapid    Collection Time: 03/28/18  6:23 PM   Result Value Ref Range    Troponin I 0.02 0.00 - 0.07 ng/mL   Comprehensive Metabolic Panel    Collection Time: 03/28/18  6:24 PM   Result Value Ref Range    Glucose 142 (H) 70 - 100 mg/dL    BUN 69 (H) 9 - 23 mg/dL    Creatinine 2.00 (H) 0.60 - 1.30 mg/dL    Sodium 134 132 - 146 mmol/L    Potassium 6.3 (C) 3.5 - 5.5 mmol/L    Chloride 105 99 - 109 mmol/L    CO2 21.0 20.0 - 31.0 mmol/L    Calcium 9.8 8.7 - 10.4 mg/dL    Total Protein 7.9 5.7 - 8.2 g/dL    Albumin 4.70 3.20 - 4.80 g/dL    ALT (SGPT) 24 7 - 40 U/L    AST (SGOT) 22 0 - 33 U/L    Alkaline Phosphatase 82 25 - 100 U/L    Total Bilirubin 0.6 0.3 - 1.2 mg/dL    eGFR Non African Amer 24 (L) >60 mL/min/1.73    Globulin 3.2 gm/dL    A/G Ratio 1.5 1.5 - 2.5 g/dL    BUN/Creatinine Ratio 34.5 (H) 7.0 - 25.0    Anion Gap 8.0 3.0 - 11.0 mmol/L   Magnesium    Collection Time: 03/28/18  6:24 PM   Result Value Ref Range    Magnesium 3.1 (H) 1.3 - 2.7 mg/dL   Digoxin Level    Collection Time: 03/28/18  6:24 PM   Result Value Ref Range    Digoxin 0.95 0.80 - 2.00 ng/mL   Light Blue Top    Collection Time: 03/28/18  6:24 PM   Result Value Ref Range    Extra Tube hold for add-on    Green Top (Gel)    Collection Time: 03/28/18  6:24 PM   Result Value Ref Range    Extra Tube Hold for add-ons.    Lavender Top    Collection Time: 03/28/18  6:24 PM   Result Value Ref Range    Extra Tube hold for add-on    Gold Top - SST    Collection Time: 03/28/18  6:24 PM   Result Value Ref Range    Extra Tube Hold for add-ons.    CBC Auto Differential    Collection Time: 03/28/18  6:24 PM   Result Value Ref Range    WBC 8.36 3.50 - 10.80 10*3/mm3    RBC 4.94 3.89 - 5.14 10*6/mm3    Hemoglobin 15.9 (H) 11.5 - 15.5 g/dL     Hematocrit 48.0 (H) 34.5 - 44.0 %    MCV 97.2 80.0 - 99.0 fL    MCH 32.2 (H) 27.0 - 31.0 pg    MCHC 33.1 32.0 - 36.0 g/dL    RDW 13.6 11.3 - 14.5 %    RDW-SD 48.3 37.0 - 54.0 fl    MPV 11.8 6.0 - 12.0 fL    Platelets 164 150 - 450 10*3/mm3    Neutrophil % 75.2 (H) 41.0 - 71.0 %    Lymphocyte % 16.7 (L) 24.0 - 44.0 %    Monocyte % 5.0 0.0 - 12.0 %    Eosinophil % 2.4 0.0 - 3.0 %    Basophil % 0.5 0.0 - 1.0 %    Immature Grans % 0.2 0.0 - 0.6 %    Neutrophils, Absolute 6.28 1.50 - 8.30 10*3/mm3    Lymphocytes, Absolute 1.40 0.60 - 4.80 10*3/mm3    Monocytes, Absolute 0.42 0.00 - 1.00 10*3/mm3    Eosinophils, Absolute 0.20 0.00 - 0.30 10*3/mm3    Basophils, Absolute 0.04 0.00 - 0.20 10*3/mm3    Immature Grans, Absolute 0.02 0.00 - 0.03 10*3/mm3   BNP    Collection Time: 03/28/18  6:24 PM   Result Value Ref Range    BNP 10.0 0.0 - 100.0 pg/mL   POC Troponin, Rapid    Collection Time: 03/28/18  8:44 PM   Result Value Ref Range    Troponin I 0.01 0.00 - 0.07 ng/mL   Potassium    Collection Time: 03/29/18  1:27 AM   Result Value Ref Range    Potassium 4.9 3.5 - 5.5 mmol/L   Comprehensive Metabolic Panel    Collection Time: 03/29/18  4:44 AM   Result Value Ref Range    Glucose 77 70 - 100 mg/dL    BUN 60 (H) 9 - 23 mg/dL    Creatinine 1.70 (H) 0.60 - 1.30 mg/dL    Sodium 137 132 - 146 mmol/L    Potassium 4.7 3.5 - 5.5 mmol/L    Chloride 107 99 - 109 mmol/L    CO2 24.0 20.0 - 31.0 mmol/L    Calcium 9.2 8.7 - 10.4 mg/dL    Total Protein 6.6 5.7 - 8.2 g/dL    Albumin 3.80 3.20 - 4.80 g/dL    ALT (SGPT) 20 7 - 40 U/L    AST (SGOT) 15 0 - 33 U/L    Alkaline Phosphatase 65 25 - 100 U/L    Total Bilirubin 0.6 0.3 - 1.2 mg/dL    eGFR Non African Amer 29 (L) >60 mL/min/1.73    Globulin 2.8 gm/dL    A/G Ratio 1.4 (L) 1.5 - 2.5 g/dL    BUN/Creatinine Ratio 35.3 (H) 7.0 - 25.0    Anion Gap 6.0 3.0 - 11.0 mmol/L   CBC (No Diff)    Collection Time: 03/29/18  4:44 AM   Result Value Ref Range    WBC 6.17 3.50 - 10.80 10*3/mm3    RBC 4.45  3.89 - 5.14 10*6/mm3    Hemoglobin 14.3 11.5 - 15.5 g/dL    Hematocrit 43.2 34.5 - 44.0 %    MCV 97.1 80.0 - 99.0 fL    MCH 32.1 (H) 27.0 - 31.0 pg    MCHC 33.1 32.0 - 36.0 g/dL    RDW 13.5 11.3 - 14.5 %    RDW-SD 48.5 37.0 - 54.0 fl    MPV 11.6 6.0 - 12.0 fL    Platelets 146 (L) 150 - 450 10*3/mm3   BNP    Collection Time: 03/29/18  4:44 AM   Result Value Ref Range    BNP 10.0 0.0 - 100.0 pg/mL   POC Glucose Once    Collection Time: 03/29/18  7:26 AM   Result Value Ref Range    Glucose 88 70 - 130 mg/dL   POC Glucose Once    Collection Time: 03/29/18 11:52 AM   Result Value Ref Range    Glucose 107 70 - 130 mg/dL   Urinalysis With / Culture If Indicated - Urine, Clean Catch    Collection Time: 03/29/18 12:18 PM   Result Value Ref Range    Color, UA Yellow Yellow, Straw    Appearance, UA Clear Clear    pH, UA <=5.0 5.0 - 8.0    Specific Gravity, UA 1.016 1.001 - 1.030    Glucose, UA Negative Negative    Ketones, UA Negative Negative    Bilirubin, UA Negative Negative    Blood, UA Negative Negative    Protein, UA Negative Negative    Leuk Esterase, UA Negative Negative    Nitrite, UA Negative Negative    Urobilinogen, UA 0.2 E.U./dL 0.2 - 1.0 E.U./dL   POC Glucose Once    Collection Time: 03/30/18  1:38 AM   Result Value Ref Range    Glucose 215 (H) 70 - 130 mg/dL   Basic Metabolic Panel    Collection Time: 03/30/18  5:09 AM   Result Value Ref Range    Glucose 208 (H) 70 - 100 mg/dL    BUN 36 (H) 9 - 23 mg/dL    Creatinine 1.10 0.60 - 1.30 mg/dL    Sodium 138 132 - 146 mmol/L    Potassium 4.8 3.5 - 5.5 mmol/L    Chloride 102 99 - 109 mmol/L    CO2 30.0 20.0 - 31.0 mmol/L    Calcium 9.2 8.7 - 10.4 mg/dL    eGFR Non African Amer 49 (L) >60 mL/min/1.73    BUN/Creatinine Ratio 32.7 (H) 7.0 - 25.0    Anion Gap 6.0 3.0 - 11.0 mmol/L   Comprehensive Metabolic Panel    Collection Time: 04/05/18  3:26 PM   Result Value Ref Range    Glucose 132 (H) 70 - 100 mg/dL    BUN 14 9 - 23 mg/dL    Creatinine 0.90 0.60 - 1.30 mg/dL     Sodium 140 132 - 146 mmol/L    Potassium 4.6 3.5 - 5.5 mmol/L    Chloride 106 99 - 109 mmol/L    CO2 30.0 20.0 - 31.0 mmol/L    Calcium 9.1 8.7 - 10.4 mg/dL    Total Protein 6.5 5.7 - 8.2 g/dL    Albumin 4.10 3.20 - 4.80 g/dL    ALT (SGPT) 25 7 - 40 U/L    AST (SGOT) 22 0 - 33 U/L    Alkaline Phosphatase 70 25 - 100 U/L    Total Bilirubin 0.4 0.3 - 1.2 mg/dL    eGFR Non African Amer 61 >60 mL/min/1.73    Globulin 2.4 gm/dL    A/G Ratio 1.7 1.5 - 2.5 g/dL    BUN/Creatinine Ratio 15.6 7.0 - 25.0    Anion Gap 4.0 3.0 - 11.0 mmol/L   BNP    Collection Time: 04/05/18  3:27 PM   Result Value Ref Range    .0 (H) 0.0 - 100.0 pg/mL   Vitamin B12    Collection Time: 04/06/18  2:59 PM   Result Value Ref Range    Vitamin B-12 675 232 - 1,245 pg/mL   Folate    Collection Time: 04/06/18  2:59 PM   Result Value Ref Range    Folate >20.0 >3.0 ng/mL   TSH    Collection Time: 04/06/18  2:59 PM   Result Value Ref Range    TSH 1.180 0.450 - 4.500 uIU/mL   T4, Free    Collection Time: 04/06/18  2:59 PM   Result Value Ref Range    Free T4 1.03 0.82 - 1.77 ng/dL   Vitamin B1, Whole Blood    Collection Time: 04/06/18  2:59 PM   Result Value Ref Range    Vitamin B1, Whole Blood CANCELED nmol/L   Specimen Status Report    Collection Time: 04/06/18  2:59 PM   Result Value Ref Range    Specimen Status CANCELED    POC Rapid Strep A    Collection Time: 04/06/18  3:11 PM   Result Value Ref Range    Rapid Strep A Screen Negative Negative, VALID, INVALID, Not Performed    Internal Control Passed Passed    Lot Number [id number]     Expiration Date 3/31/2019    POCT urinalysis dipstick, automated    Collection Time: 04/23/18 12:31 PM   Result Value Ref Range    Color Yellow Yellow, Straw, Dark Yellow, Miryam    Clarity, UA Clear Clear    Glucose, UA Negative Negative, 1000 mg/dL (3+) mg/dL    Bilirubin Negative Negative    Ketones, UA Negative Negative    Specific Gravity  1.020 1.005 - 1.030    Blood, UA Negative Negative    pH, Urine 6.0 5.0  - 8.0    Protein, POC Negative Negative mg/dL    Urobilinogen, UA Normal Normal    Leukocytes Negative Negative    Nitrite, UA Negative Negative   POC Occult Blood Stool    Collection Time: 04/23/18  3:58 PM   Result Value Ref Range    Fecal Occult Blood Negative Negative    Lot Number [id number]     Expiration Date 6/30/2020     DEVELOPER LOT NUMBER [id number]     DEVELOPER EXPIRATION DATE 11/30/18     Positive Control Positive Positive    Negative Control Negative Negative

## 2018-04-23 NOTE — PATIENT INSTRUCTIONS
Exercising to Stay Healthy  Exercising regularly is important. It has many health benefits, such as:  · Improving your overall fitness, flexibility, and endurance.  · Increasing your bone density.  · Helping with weight control.  · Decreasing your body fat.  · Increasing your muscle strength.  · Reducing stress and tension.  · Improving your overall health.  In order to become healthy and stay healthy, it is recommended that you do moderate-intensity and vigorous-intensity exercise. You can tell that you are exercising at a moderate intensity if you have a higher heart rate and faster breathing, but you are still able to hold a conversation. You can tell that you are exercising at a vigorous intensity if you are breathing much harder and faster and cannot hold a conversation while exercising.  How often should I exercise?  Choose an activity that you enjoy and set realistic goals. Your health care provider can help you to make an activity plan that works for you. Exercise regularly as directed by your health care provider. This may include:  · Doing resistance training twice each week, such as:  ¨ Push-ups.  ¨ Sit-ups.  ¨ Lifting weights.  ¨ Using resistance bands.  · Doing a given intensity of exercise for a given amount of time. Choose from these options:  ¨ 150 minutes of moderate-intensity exercise every week.  ¨ 75 minutes of vigorous-intensity exercise every week.  ¨ A mix of moderate-intensity and vigorous-intensity exercise every week.  Children, pregnant women, people who are out of shape, people who are overweight, and older adults may need to consult a health care provider for individual recommendations. If you have any sort of medical condition, be sure to consult your health care provider before starting a new exercise program.  What are some exercise ideas?  Some moderate-intensity exercise ideas include:  · Walking at a rate of 1 mile in 15 minutes.  · Biking.  · Hiking.  · Golfing.  · Dancing.  Some  vigorous-intensity exercise ideas include:  · Walking at a rate of at least 4.5 miles per hour.  · Jogging or running at a rate of 5 miles per hour.  · Biking at a rate of at least 10 miles per hour.  · Lap swimming.  · Roller-skating or in-line skating.  · Cross-country skiing.  · Vigorous competitive sports, such as football, basketball, and soccer.  · Jumping rope.  · Aerobic dancing.  What are some everyday activities that can help me to get exercise?  · Yard work, such as:  ¨ Pushing a .  ¨ Raking and bagging leaves.  · Washing and waxing your car.  · Pushing a stroller.  · Shoveling snow.  · Gardening.  · Washing windows or floors.  How can I be more active in my day-to-day activities?  · Use the stairs instead of the elevator.  · Take a walk during your lunch break.  · If you drive, park your car farther away from work or school.  · If you take public transportation, get off one stop early and walk the rest of the way.  · Make all of your phone calls while standing up and walking around.  · Get up, stretch, and walk around every 30 minutes throughout the day.  What guidelines should I follow while exercising?  · Do not exercise so much that you hurt yourself, feel dizzy, or get very short of breath.  · Consult your health care provider before starting a new exercise program.  · Wear comfortable clothes and shoes with good support.  · Drink plenty of water while you exercise to prevent dehydration or heat stroke. Body water is lost during exercise and must be replaced.  · Work out until you breathe faster and your heart beats faster.  This information is not intended to replace advice given to you by your health care provider. Make sure you discuss any questions you have with your health care provider.  Document Released: 01/20/2012 Document Revised: 05/25/2017 Document Reviewed: 05/21/2015  [employer] Interactive Patient Education © 2017 [employer]  Calorie Counting for Weight Loss  Calories are  units of energy. Your body needs a certain amount of calories from food to keep you going throughout the day. When you eat more calories than your body needs, your body stores the extra calories as fat. When you eat fewer calories than your body needs, your body burns fat to get the energy it needs.  Calorie counting means keeping track of how many calories you eat and drink each day. Calorie counting can be helpful if you need to lose weight. If you make sure to eat fewer calories than your body needs, you should lose weight. Ask your health care provider what a healthy weight is for you.  For calorie counting to work, you will need to eat the right number of calories in a day in order to lose a healthy amount of weight per week. A dietitian can help you determine how many calories you need in a day and will give you suggestions on how to reach your calorie goal.  · A healthy amount of weight to lose per week is usually 1-2 lb (0.5-0.9 kg). This usually means that your daily calorie intake should be reduced by 500-750 calories.  · Eating 1,200 - 1,500 calories per day can help most women lose weight.  · Eating 1,500 - 1,800 calories per day can help most men lose weight.  What is my plan?  My goal is to have __________ calories per day.  If I have this many calories per day, I should lose around __________ pounds per week.  What do I need to know about calorie counting?  In order to meet your daily calorie goal, you will need to:  · Find out how many calories are in each food you would like to eat. Try to do this before you eat.  · Decide how much of the food you plan to eat.  · Write down what you ate and how many calories it had. Doing this is called keeping a food log.  To successfully lose weight, it is important to balance calorie counting with a healthy lifestyle that includes regular activity. Aim for 150 minutes of moderate exercise (such as walking) or 75 minutes of vigorous exercise (such as running) each  week.  Where do I find calorie information?     The number of calories in a food can be found on a Nutrition Facts label. If a food does not have a Nutrition Facts label, try to look up the calories online or ask your dietitian for help.  Remember that calories are listed per serving. If you choose to have more than one serving of a food, you will have to multiply the calories per serving by the amount of servings you plan to eat. For example, the label on a package of bread might say that a serving size is 1 slice and that there are 90 calories in a serving. If you eat 1 slice, you will have eaten 90 calories. If you eat 2 slices, you will have eaten 180 calories.  How do I keep a food log?  Immediately after each meal, record the following information in your food log:  · What you ate. Don't forget to include toppings, sauces, and other extras on the food.  · How much you ate. This can be measured in cups, ounces, or number of items.  · How many calories each food and drink had.  · The total number of calories in the meal.  Keep your food log near you, such as in a small notebook in your pocket, or use a mobile trever or website. Some programs will calculate calories for you and show you how many calories you have left for the day to meet your goal.  What are some calorie counting tips?  · Use your calories on foods and drinks that will fill you up and not leave you hungry:  ¨ Some examples of foods that fill you up are nuts and nut butters, vegetables, lean proteins, and high-fiber foods like whole grains. High-fiber foods are foods with more than 5 g fiber per serving.  ¨ Drinks such as sodas, specialty coffee drinks, alcohol, and juices have a lot of calories, yet do not fill you up.  · Eat nutritious foods and avoid empty calories. Empty calories are calories you get from foods or beverages that do not have many vitamins or protein, such as candy, sweets, and soda. It is better to have a nutritious high-calorie  "food (such as an avocado) than a food with few nutrients (such as a bag of chips).  · Know how many calories are in the foods you eat most often. This will help you calculate calorie counts faster.  · Pay attention to calories in drinks. Low-calorie drinks include water and unsweetened drinks.  · Pay attention to nutrition labels for \"low fat\" or \"fat free\" foods. These foods sometimes have the same amount of calories or more calories than the full fat versions. They also often have added sugar, starch, or salt, to make up for flavor that was removed with the fat.  · Find a way of tracking calories that works for you. Get creative. Try different apps or programs if writing down calories does not work for you.  What are some portion control tips?  · Know how many calories are in a serving. This will help you know how many servings of a certain food you can have.  · Use a measuring cup to measure serving sizes. You could also try weighing out portions on a kitchen scale. With time, you will be able to estimate serving sizes for some foods.  · Take some time to put servings of different foods on your favorite plates, bowls, and cups so you know what a serving looks like.  · Try not to eat straight from a bag or box. Doing this can lead to overeating. Put the amount you would like to eat in a cup or on a plate to make sure you are eating the right portion.  · Use smaller plates, glasses, and bowls to prevent overeating.  · Try not to multitask (for example, watch TV or use your computer) while eating. If it is time to eat, sit down at a table and enjoy your food. This will help you to know when you are full. It will also help you to be aware of what you are eating and how much you are eating.  What are tips for following this plan?  Reading food labels   · Check the calorie count compared to the serving size. The serving size may be smaller than what you are used to eating.  · Check the source of the calories. Make sure " the food you are eating is high in vitamins and protein and low in saturated and trans fats.  Shopping   · Read nutrition labels while you shop. This will help you make healthy decisions before you decide to purchase your food.  · Make a grocery list and stick to it.  Cooking   · Try to cook your favorite foods in a healthier way. For example, try baking instead of frying.  · Use low-fat dairy products.  Meal planning   · Use more fruits and vegetables. Half of your plate should be fruits and vegetables.  · Include lean proteins like poultry and fish.  How do I count calories when eating out?  · Ask for smaller portion sizes.  · Consider sharing an entree and sides instead of getting your own entree.  · If you get your own entree, eat only half. Ask for a box at the beginning of your meal and put the rest of your entree in it so you are not tempted to eat it.  · If calories are listed on the menu, choose the lower calorie options.  · Choose dishes that include vegetables, fruits, whole grains, low-fat dairy products, and lean protein.  · Choose items that are boiled, broiled, grilled, or steamed. Stay away from items that are buttered, battered, fried, or served with cream sauce. Items labeled “crispy” are usually fried, unless stated otherwise.  · Choose water, low-fat milk, unsweetened iced tea, or other drinks without added sugar. If you want an alcoholic beverage, choose a lower calorie option such as a glass of wine or light beer.  · Ask for dressings, sauces, and syrups on the side. These are usually high in calories, so you should limit the amount you eat.  · If you want a salad, choose a garden salad and ask for grilled meats. Avoid extra toppings like means, cheese, or fried items. Ask for the dressing on the side, or ask for olive oil and vinegar or lemon to use as dressing.  · Estimate how many servings of a food you are given. For example, a serving of cooked rice is ½ cup or about the size of half a  baseball. Knowing serving sizes will help you be aware of how much food you are eating at restaurants. The list below tells you how big or small some common portion sizes are based on everyday objects:  ¨ 1 oz--4 stacked dice.  ¨ 3 oz--1 deck of cards.  ¨ 1 tsp--1 die.  ¨ 1 Tbsp--½ a ping-pong ball.  ¨ 2 Tbsp--1 ping-pong ball.  ¨ ½ cup--½ baseball.  ¨ 1 cup--1 baseball.  Summary  · Calorie counting means keeping track of how many calories you eat and drink each day. If you eat fewer calories than your body needs, you should lose weight.  · A healthy amount of weight to lose per week is usually 1-2 lb (0.5-0.9 kg). This usually means reducing your daily calorie intake by 500-750 calories.  · The number of calories in a food can be found on a Nutrition Facts label. If a food does not have a Nutrition Facts label, try to look up the calories online or ask your dietitian for help.  · Use your calories on foods and drinks that will fill you up, and not on foods and drinks that will leave you hungry.  · Use smaller plates, glasses, and bowls to prevent overeating.  This information is not intended to replace advice given to you by your health care provider. Make sure you discuss any questions you have with your health care provider.  Document Released: 12/18/2006 Document Revised: 11/17/2017 Document Reviewed: 11/17/2017  [employer] Interactive Patient Education © 2017 [employer]  Fall Prevention in the Home  Falls can cause injuries and can affect people from all age groups. There are many simple things that you can do to make your home safe and to help prevent falls.  What can I do on the outside of my home?  · Regularly repair the edges of walkways and driveways and fix any cracks.  · Remove high doorway thresholds.  · Trim any shrubbery on the main path into your home.  · Use bright outdoor lighting.  · Clear walkways of debris and clutter, including tools and rocks.  · Regularly check that handrails are securely  fastened and in good repair. Both sides of any steps should have handrails.  · Install guardrails along the edges of any raised decks or porches.  · Have leaves, snow, and ice cleared regularly.  · Use sand or salt on walkways during winter months.  · In the garage, clean up any spills right away, including grease or oil spills.  What can I do in the bathroom?  · Use night lights.  · Install grab bars by the toilet and in the tub and shower. Do not use towel bars as grab bars.  · Use non-skid mats or decals on the floor of the tub or shower.  · If you need to sit down while you are in the shower, use a plastic, non-slip stool.  · Keep the floor dry. Immediately clean up any water that spills on the floor.  · Remove soap buildup in the tub or shower on a regular basis.  · Attach bath mats securely with double-sided non-slip rug tape.  · Remove throw rugs and other tripping hazards from the floor.  What can I do in the bedroom?  · Use night lights.  · Make sure that a bedside light is easy to reach.  · Do not use oversized bedding that drapes onto the floor.  · Have a firm chair that has side arms to use for getting dressed.  · Remove throw rugs and other tripping hazards from the floor.  What can I do in the kitchen?  · Clean up any spills right away.  · Avoid walking on wet floors.  · Place frequently used items in easy-to-reach places.  · If you need to reach for something above you, use a sturdy step stool that has a grab bar.  · Keep electrical cables out of the way.  · Do not use floor polish or wax that makes floors slippery. If you have to use wax, make sure that it is non-skid floor wax.  · Remove throw rugs and other tripping hazards from the floor.  What can I do in the stairways?  · Do not leave any items on the stairs.  · Make sure that there are handrails on both sides of the stairs. Fix handrails that are broken or loose. Make sure that handrails are as long as the stairways.  · Check any carpeting to  make sure that it is firmly attached to the stairs. Fix any carpet that is loose or worn.  · Avoid having throw rugs at the top or bottom of stairways, or secure the rugs with carpet tape to prevent them from moving.  · Make sure that you have a light switch at the top of the stairs and the bottom of the stairs. If you do not have them, have them installed.  What are some other fall prevention tips?  · Wear closed-toe shoes that fit well and support your feet. Wear shoes that have rubber soles or low heels.  · When you use a stepladder, make sure that it is completely opened and that the sides are firmly locked. Have someone hold the ladder while you are using it. Do not climb a closed stepladder.  · Add color or contrast paint or tape to grab bars and handrails in your home. Place contrasting color strips on the first and last steps.  · Use mobility aids as needed, such as canes, walkers, scooters, and crutches.  · Turn on lights if it is dark. Replace any light bulbs that burn out.  · Set up furniture so that there are clear paths. Keep the furniture in the same spot.  · Fix any uneven floor surfaces.  · Choose a carpet design that does not hide the edge of steps of a stairway.  · Be aware of any and all pets.  · Review your medicines with your healthcare provider. Some medicines can cause dizziness or changes in blood pressure, which increase your risk of falling.  Talk with your health care provider about other ways that you can decrease your risk of falls. This may include working with a physical therapist or  to improve your strength, balance, and endurance.  This information is not intended to replace advice given to you by your health care provider. Make sure you discuss any questions you have with your health care provider.  Document Released: 12/08/2003 Document Revised: 05/16/2017 Document Reviewed: 01/22/2016  [employer] Interactive Patient Education © 2017 [employer]  Advance Directive  Advance  directives are legal documents that let you make choices ahead of time about your health care and medical treatment in case you become unable to communicate for yourself. Advance directives are a way for you to communicate your wishes to family, friends, and health care providers. This can help convey your decisions about end-of-life care if you become unable to communicate.  Discussing and writing advance directives should happen over time rather than all at once. Advance directives can be changed depending on your situation and what you want, even after you have signed the advance directives.  If you do not have an advance directive, some states assign family decision makers to act on your behalf based on how closely you are related to them. Each state has its own laws regarding advance directives. You may want to check with your health care provider, , or state representative about the laws in your state. There are different types of advance directives, such as:  · Medical power of .  · Living will.  · Do not resuscitate (DNR) or do not attempt resuscitation (DNAR) order.  Health care proxy and medical power of   A health care proxy, also called a health care agent, is a person who is appointed to make medical decisions for you in cases in which you are unable to make the decisions yourself. Generally, people choose someone they know well and trust to represent their preferences. Make sure to ask this person for an agreement to act as your proxy. A proxy may have to exercise judgment in the event of a medical decision for which your wishes are not known.  A medical power of  is a legal document that names your health care proxy. Depending on the laws in your state, after the document is written, it may also need to be:  · Signed.  · Notarized.  · Dated.  · Copied.  · Witnessed.  · Incorporated into your medical record.  You may also want to appoint someone to manage your financial  affairs in a situation in which you are unable to do so. This is called a durable power of  for finances. It is a separate legal document from the durable power of  for health care. You may choose the same person or someone different from your health care proxy to act as your agent in financial matters.  If you do not appoint a proxy, or if there is a concern that the proxy is not acting in your best interests, a court-appointed guardian may be designated to act on your behalf.  Living will  A living will is a set of instructions documenting your wishes about medical care when you cannot express them yourself. Health care providers should keep a copy of your living will in your medical record. You may want to give a copy to family members or friends. To alert caregivers in case of an emergency, you can place a card in your wallet to let them know that you have a living will and where they can find it. A living will is used if you become:  · Terminally ill.  · Incapacitated.  · Unable to communicate or make decisions.  Items to consider in your living will include:  · The use or non-use of life-sustaining equipment, such as dialysis machines and breathing machines (ventilators).  · A DNR or DNAR order, which is the instruction not to use cardiopulmonary resuscitation (CPR) if breathing or heartbeat stops.  · The use or non-use of tube feeding.  · Withholding of food and fluids.  · Comfort (palliative) care when the goal becomes comfort rather than a cure.  · Organ and tissue donation.  A living will does not give instructions for distributing your money and property if you should pass away. It is recommended that you seek the advice of a  when writing a will. Decisions about taxes, beneficiaries, and asset distribution will be legally binding. This process can relieve your family and friends of any concerns surrounding disputes or questions that may come up about the distribution of your  assets.  DNR or DNAR  A DNR or DNAR order is a request not to have CPR in the event that your heart stops beating or you stop breathing. If a DNR or DNAR order has not been made and shared, a health care provider will try to help any patient whose heart has stopped or who has stopped breathing. If you plan to have surgery, talk with your health care provider about how your DNR or DNAR order will be followed if problems occur.  Summary  · Advance directives are the legal documents that allow you to make choices ahead of time about your health care and medical treatment in case you become unable to communicate for yourself.  · The process of discussing and writing advance directives should happen over time. You can change the advance directives, even after you have signed them.  · Advance directives include DNR or DNAR orders, living mendez, and designating an agent as your medical power of .  This information is not intended to replace advice given to you by your health care provider. Make sure you discuss any questions you have with your health care provider.  Document Released: 2009 Document Revised: 2017 Document Reviewed: 2017  [employer] Interactive Patient Education © 2017 [employer]    Medicare Wellness  Personal Prevention Plan of Service     Date of Office Visit:  2018  Encounter Provider:  Sharon Saldivar MD  Place of Service:  [facility]  Patient Name: Angelita Shay  :  1945    As part of the Medicare Wellness portion of your visit today, we are providing you with this personalized preventive plan of services (PPPS). This plan is based upon recommendations of the [place] Preventive Services Task Force (USPSTF) and the Advisory Committee on Immunization Practices (ACIP).    This lists the preventive care services that should be considered, and provides dates of when you are due. Items listed as completed are up-to-date and  do not require any further intervention.    Health Maintenance   Topic Date Due   • DIABETIC FOOT EXAM  1945   • DIABETIC EYE EXAM  1945   • URINE MICROALBUMIN  1945   • TDAP/TD VACCINES (1 - Tdap) 09/28/1963   • PNEUMOCOCCAL VACCINES (65+ LOW/MEDIUM RISK) (1 of 2 - PCV13) 09/28/2009   • MEDICARE ANNUAL WELLNESS  05/11/2016   • ZOSTER VACCINE  05/11/2016   • MAMMOGRAM  08/17/2017   • INFLUENZA VACCINE  08/01/2018   • HEMOGLOBIN A1C  09/11/2018   • LIPID PANEL  03/11/2019   • COLONOSCOPY  04/18/2028       Orders Placed This Encounter   Procedures   • POCT urinalysis dipstick, automated       No Follow-up on file.

## 2018-04-26 LAB
ALBUMIN/CREAT UR: 304.6 MG/G CREAT (ref 0–30)
CREAT UR-MCNC: 17.3 MG/DL
MICROALBUMIN UR-MCNC: 52.7 UG/ML
VIT B1 BLD-SCNC: 174 NMOL/L (ref 66.5–200)

## 2018-05-02 RX ORDER — TRAZODONE HYDROCHLORIDE 50 MG/1
TABLET ORAL
Qty: 30 TABLET | Refills: 0 | Status: SHIPPED | OUTPATIENT
Start: 2018-05-02 | End: 2018-05-03 | Stop reason: SDUPTHER

## 2018-05-03 ENCOUNTER — HOSPITAL ENCOUNTER (OUTPATIENT)
Dept: CARDIOLOGY | Facility: HOSPITAL | Age: 73
Discharge: HOME | End: 2018-05-03
Admitting: NURSE PRACTITIONER

## 2018-05-03 ENCOUNTER — OFFICE VISIT (OUTPATIENT)
Dept: CARDIOLOGY | Facility: HOSPITAL | Age: 73
End: 2018-05-03

## 2018-05-03 DIAGNOSIS — I50.23 ACUTE ON CHRONIC SYSTOLIC CONGESTIVE HEART FAILURE (HCC): Primary | ICD-10-CM

## 2018-05-03 DIAGNOSIS — I48.0 PAF (PAROXYSMAL ATRIAL FIBRILLATION) (HCC): ICD-10-CM

## 2018-05-03 DIAGNOSIS — I10 ESSENTIAL HYPERTENSION: ICD-10-CM

## 2018-05-03 DIAGNOSIS — K80.20 CALCULUS OF GALLBLADDER WITHOUT CHOLECYSTITIS WITHOUT OBSTRUCTION: ICD-10-CM

## 2018-05-03 PROCEDURE — 93010 ELECTROCARDIOGRAM REPORT: CPT | Performed by: INTERNAL MEDICINE

## 2018-05-03 PROCEDURE — 99214 OFFICE O/P EST MOD 30 MIN: CPT | Performed by: NURSE PRACTITIONER

## 2018-05-03 PROCEDURE — 93005 ELECTROCARDIOGRAM TRACING: CPT | Performed by: NURSE PRACTITIONER

## 2018-05-03 RX ORDER — CARVEDILOL 25 MG/1
25 TABLET ORAL 2 TIMES DAILY WITH MEALS
COMMUNITY
End: 2019-05-14 | Stop reason: SDUPTHER

## 2018-05-03 RX ORDER — FUROSEMIDE 40 MG/1
40 TABLET ORAL DAILY
Qty: 30 TABLET | Refills: 11 | Status: SHIPPED | OUTPATIENT
Start: 2018-05-03 | End: 2019-05-14 | Stop reason: SDUPTHER

## 2018-05-03 RX ORDER — TRAZODONE HYDROCHLORIDE 50 MG/1
TABLET ORAL
Qty: 60 TABLET | Refills: 2 | Status: SHIPPED | OUTPATIENT
Start: 2018-05-03 | End: 2018-07-31 | Stop reason: SDUPTHER

## 2018-05-03 RX ORDER — FUROSEMIDE 40 MG/1
40 TABLET ORAL DAILY
COMMUNITY
End: 2018-05-03 | Stop reason: SDUPTHER

## 2018-05-03 NOTE — PROGRESS NOTES
Encounter Date:05/03/2018      Patient ID: Angelita Shay is a 72 y.o. female.        Subjective:     Chief Complaint: Follow-up   [facility]  History of Present Illness patient presents to the office today for ongoing evaluation of her chronic systolic heart failure. She notes that she had significant improvement in her edema but notes that she has been out of her lasix for the last few days. She notes since being out of lasix, her ankles have started swelling again. She also notes abdominal fulless, fatigue, dyspnea on exertion and a weight gain of 5 lbs.She notes that her weight got down to 206 lbs.  She notes compliance with her medications but states that her blood pressure has been elevated over the last few days. She notes that she recently had a colonoscopy with Dr Cash which showed 5 noncancerous polpys. She reports that she will have yearly colonoscopies. She notes that she has been evaluated by Dr Trey Ulloa in [place] for cholecystitis and will be scheduled for choley in the near future.     Patient Active Problem List   Diagnosis   • Essential hypertension   • Anxiety   • DM type 2 (diabetes mellitus, type 2), on insulin therapy   • GERD (gastroesophageal reflux disease)   • Coronary artery disease involving native coronary artery of native heart with angina pectoris   • Sleep apnea   • RLS (restless legs syndrome)   • Gout   • Chronic systolic congestive heart failure   • Paroxysmal atrial fibrillation   • Nonischemic cardiomyopathy   • Biliary colic   • Cholelithiasis   • Acute kidney injury superimposed on chronic kidney disease stage 3   • Chronic passive hepatic congestion   • Diabetic polyneuropathy associated with type 2 diabetes mellitus       Past Surgical History:   Procedure Laterality Date   • APPENDECTOMY  05/1963   • CARDIAC CATHETERIZATION  08/2015    no stents   • CARDIAC CATHETERIZATION  03/13/2018   • CARDIAC CATHETERIZATION N/A 3/13/2018    Procedure: Left Heart Cath;  Surgeon: Pierre PERRY  Robert QUISPE MD;  Location:  [facility];  Service: Cardiovascular   • COLON RESECTION  04/08/2000    colon cancer   • COLONOSCOPY  2015   • COLONOSCOPY  04/18/2018    with 5 polyps removed 1yr f/u. Dr Cash- REPEAT YEARLY   • CORONARY ANGIOPLASTY WITH STENT PLACEMENT  12/2015   • RI TOTAL KNEE ARTHROPLASTY Left 11/3/2016    Procedure: LEFT TOTAL KNEE REPLACEMENT;  Surgeon: Laurent Zuniga MD;  Location:  [facility] OR;  Service: Orthopedics   • TONSILLECTOMY  12/1961   • TOTAL KNEE ARTHROPLASTY Right 2008    revision 2010   • TOTAL SHOULDER ARTHROPLASTY Bilateral     right 2014, left 2015       Allergies   Allergen Reactions   • Oxycodone Shortness Of Breath   • Zolpidem Other (See Comments)     nightmares         Current Outpatient Prescriptions:   •  apixaban (ELIQUIS) 5 MG tablet tablet, Take 1 tablet by mouth Every 12 (Twelve) Hours for 90 days., Disp: 60 tablet, Rfl: 2  •  aspirin 81 MG EC tablet, Take 1 tablet by mouth Daily. Resume in 2 weeks, Disp: , Rfl:   •  atorvastatin (LIPITOR) 20 MG tablet, Take 1 tablet by mouth Every Night for 180 days., Disp: 30 tablet, Rfl: 5  •  BIOTIN PO, Take 2 tablets by mouth Daily., Disp: , Rfl:   •  carvedilol (COREG) 3.125 MG tablet, Take 6.25 mg by mouth 2 (Two) Times a Day With Meals., Disp: , Rfl:   •  furosemide (LASIX) 40 MG tablet, Take 1 tablet by mouth Daily., Disp: 30 tablet, Rfl: 11  •  insulin aspart (NOVOLOG) 100 UNIT/ML injection, **Per patient's Sliding scale**, Disp: 10 mL, Rfl: 2  •  magnesium gluconate (MAGONATE) 500 MG tablet, Take 27 mg by mouth Daily. OTC , Disp: , Rfl:   •  magnesium hydroxide (MILK OF MAGNESIA) 400 MG/5ML suspension, Take 15 mL by mouth Daily As Needed for Constipation., Disp: 473 mL, Rfl: 0  •  ondansetron (ZOFRAN) 4 MG tablet, Take 1 tablet by mouth Every 6 (Six) Hours As Needed for Nausea or Vomiting., Disp: 21 tablet, Rfl: 0  •  polyethylene glycol (MIRALAX) powder, Take 17 g by mouth Daily As Needed., Disp: ,  Rfl:   •  rOPINIRole (REQUIP) 1 MG tablet, Take 1-3 tablets by mouth Every Night. Take 1 hour before bedtime., Disp: 90 tablet, Rfl: 1  •  sacubitril-valsartan (ENTRESTO) 24-26 MG tablet, Take 1 tablet by mouth Every 12 (Twelve) Hours for 180 days., Disp: 60 tablet, Rfl: 5  •  traZODone (DESYREL) 50 MG tablet, Take 1-2 tablets at bedtime, Disp: 60 tablet, Rfl: 2  •  gabapentin (NEURONTIN) 300 MG capsule, TAKE ONE CAPSULE BY MOUTH THREE TIMES A DAY, Disp: 90 capsule, Rfl: 0  •  linaclotide (LINZESS) 290 MCG capsule capsule, Take 1 capsule by mouth Every Morning Before Breakfast., Disp: 30 capsule, Rfl: 1    The following portions of the chart were reviewed and updated as appropriate: Allergies, current medications, past family history, social history, past medical history.     Review of Systems   Constitution: Positive for malaise/fatigue and weight gain. Negative for chills, decreased appetite, diaphoresis, fever, weakness, night sweats and weight loss.   HENT: Negative for congestion, hearing loss, hoarse voice and nosebleeds.         Post nasal drip     Eyes: Positive for blurred vision. Negative for visual disturbance and visual halos.   Cardiovascular: Positive for chest pain, dyspnea on exertion and leg swelling. Negative for claudication, cyanosis, irregular heartbeat, near-syncope, orthopnea, palpitations, paroxysmal nocturnal dyspnea and syncope.   Respiratory: Positive for shortness of breath and snoring. Negative for cough, hemoptysis, sleep disturbances due to breathing, sputum production and wheezing.    Hematologic/Lymphatic: Negative for bleeding problem. Does not bruise/bleed easily.   Skin: Negative for dry skin, itching and rash.   Musculoskeletal: Negative for arthritis, falls, joint pain, joint swelling and myalgias.   Gastrointestinal: Positive for bloating, abdominal pain, constipation and nausea. Negative for diarrhea, flatus, heartburn, hematemesis, hematochezia, melena and vomiting.  "  Genitourinary: Positive for nocturia. Negative for dysuria, frequency, hematuria and urgency.   Neurological: Positive for excessive daytime sleepiness, dizziness and loss of balance. Negative for headaches and light-headedness.   Psychiatric/Behavioral: Positive for depression. The patient has insomnia. The patient is not nervous/anxious.    Allergic/Immunologic: Positive for environmental allergies.           Objective:     Vitals:    05/03/18 1458 05/03/18 1504 05/03/18 1507 05/03/18 1524   BP: (!) 164/114 170/98 (!) 189/96 148/78   BP Location: Right arm Left arm Left arm Left arm   Patient Position: Sitting Sitting Standing Sitting   Pulse: 73 71 92    Resp: 20      Temp: 98.8 °F (37.1 °C)      TempSrc: Temporal Artery       SpO2: 95%      Weight: 99.2 kg (218 lb 12.8 oz)      Height: 167.6 cm (66\")            Physical Exam   Constitutional: She is oriented to person, place, and time. She appears well-developed and well-nourished. She is active and cooperative. No distress.   HENT:   Head: Normocephalic and atraumatic.   Mouth/Throat: Oropharynx is clear and moist.   Eyes: Conjunctivae and EOM are normal. Pupils are equal, round, and reactive to light.   Neck: Normal range of motion. Neck supple. No JVD present. No tracheal deviation present. No thyromegaly present.   Cardiovascular: Normal rate, regular rhythm, normal heart sounds and intact distal pulses.    Pulmonary/Chest: Effort normal. Wheezes: faint. She has rales.   Abdominal: Bowel sounds are normal. She exhibits distension. There is tenderness.   Musculoskeletal: Normal range of motion. She exhibits edema (2 + pitting edema noted BLEs).   Neurological: She is alert and oriented to person, place, and time.   Skin: Skin is warm, dry and intact.   Psychiatric: She has a normal mood and affect. Her behavior is normal.   Nursing note and vitals reviewed.      Lab and Diagnostic Review:      Lab Results   Component Value Date    GLUCOSE 132 (H) 04/05/2018 "    CALCIUM 9.1 04/05/2018     04/05/2018    K 4.6 04/05/2018    CO2 30.0 04/05/2018     04/05/2018    BUN 14 04/05/2018    CREATININE 0.90 04/05/2018    EGFRIFNONA 61 04/05/2018    BCR 15.6 04/05/2018    ANIONGAP 4.0 04/05/2018     EKG: SR with sinus arrhythmia with 1 st degree av block at 74 bpm     Assessment and Plan:         1. Acute on chronic systolic congestive heart failure  Resume lasix 40 mg qd for the next 3 days   Heart failure education today including signs and symptoms, the role of the heart failure center, daily weights, low sodium diet (less than 1500 mg per day), and daily physical activity. Reviewed HF Zones with patient and family.  Patient to continue current medications as previously ordered.   2. PAF (paroxysmal atrial fibrillation)  Maintaining nsr  CHADS-VASc Risk Assessment            5       Total Score        1 CHF    1 Hypertension    1 DM    1 Age 65-74    1 Sex: Female        Criteria that do not apply:    Age >/= 75    PRIOR STROKE/TIA/THROMBO    Vascular Disease      Anticoagulated with eliquis and denies any s/s of bleeding.   - ECG 12 Lead; Future  - ECG 12 Lead; Future    3. Essential hypertension  Elevated today due to fluid overload  HTN Education provided today including signs and symptoms, medication management, daily blood pressure monitoring. Patient encouraged to call the [facility] with any abnormal readings.     4. Calculus of gallbladder without cholecystitis without obstruction  Patient has been evaluated by Dr Ulloa in [place] and will undergo choley after she receives cardiac clearance from Dr Bowie.     It has been a pleasure to participate in the care of this patient.  Patient was instructed to call the [facility] with any questions, concerns, or worsening symptoms.    * Please note that portions of this note were completed with a voice recognition program. Efforts were made to edit the dictation but occasionally words are  transcribed.

## 2018-05-03 NOTE — PATIENT INSTRUCTIONS
Begin 40 mg of lasix for the next 4 days Friday, Saturday and Sunday and Monday  Tosha will call you on Tuesday to review s/s

## 2018-05-08 VITALS
WEIGHT: 218.8 LBS | HEIGHT: 66 IN | DIASTOLIC BLOOD PRESSURE: 78 MMHG | HEART RATE: 92 BPM | TEMPERATURE: 98.8 F | OXYGEN SATURATION: 95 % | SYSTOLIC BLOOD PRESSURE: 148 MMHG | RESPIRATION RATE: 20 BRPM | BODY MASS INDEX: 35.17 KG/M2

## 2018-05-08 RX ORDER — GABAPENTIN 300 MG/1
CAPSULE ORAL
Qty: 90 CAPSULE | Refills: 0 | Status: SHIPPED | OUTPATIENT
Start: 2018-05-08 | End: 2018-06-11 | Stop reason: SDUPTHER

## 2018-05-08 NOTE — TELEPHONE ENCOUNTER
PN that script is ready to be picked up in office.  She wants [facility].  Called in

## 2018-05-09 ENCOUNTER — TELEPHONE (OUTPATIENT)
Dept: CARDIOLOGY | Facility: HOSPITAL | Age: 73
End: 2018-05-09

## 2018-05-09 NOTE — TELEPHONE ENCOUNTER
Patient to continue monitoring blood pressure. If it remains elevated will double entresto.       ----- Message from Angela Campos sent at 5/8/2018  4:43 PM EDT -----  Patient reports to be doing great, she is down 8 lbs, still having some   Swelling in her legs and abdominal bloat but it has definitely much improved from before.  She also reports that she has been able to sleep and denies any shortness of air.  /82   HR   83    Angela Campos      ----- Message -----  From: STONEY Douglass  Sent: 5/8/2018  10:43 AM  To: Angela Manuel, can you call her and see how she is doing on the lasix? Thanks  Please also ask if she has checked her blood pressure?

## 2018-05-14 ENCOUNTER — TELEPHONE (OUTPATIENT)
Dept: CARDIOLOGY | Facility: HOSPITAL | Age: 73
End: 2018-05-14

## 2018-05-14 NOTE — TELEPHONE ENCOUNTER
----- Message from Angela Campos sent at 5/14/2018  4:01 PM EDT -----  Day before yesterday: 170/85     HR  83  Yesterday :159/80        HR    83  Today:       150/79  HR 84  Swelling almost gone and her weight is 208 lbs  (-10 lbs)   Lasix M,W,F,SUN taking 40mg   Tuesday, Thursday and Sat taking 20mg     She states that she feels Great!    Increase entresto to 49/51 bid. RX sent to pharmacy

## 2018-05-15 ENCOUNTER — HOSPITAL ENCOUNTER (INPATIENT)
Facility: HOSPITAL | Age: 73
LOS: 5 days | Discharge: HOME | End: 2018-05-20
Attending: EMERGENCY MEDICINE | Admitting: INTERNAL MEDICINE

## 2018-05-15 ENCOUNTER — APPOINTMENT (OUTPATIENT)
Dept: CT IMAGING | Facility: HOSPITAL | Age: 73
End: 2018-05-15

## 2018-05-15 ENCOUNTER — APPOINTMENT (OUTPATIENT)
Dept: GENERAL RADIOLOGY | Facility: HOSPITAL | Age: 73
End: 2018-05-15

## 2018-05-15 DIAGNOSIS — R59.0 INGUINAL ADENOPATHY: ICD-10-CM

## 2018-05-15 DIAGNOSIS — R51.9 NONINTRACTABLE HEADACHE, UNSPECIFIED CHRONICITY PATTERN, UNSPECIFIED HEADACHE TYPE: Primary | ICD-10-CM

## 2018-05-15 DIAGNOSIS — K80.20 CALCULUS OF GALLBLADDER WITHOUT CHOLECYSTITIS WITHOUT OBSTRUCTION: ICD-10-CM

## 2018-05-15 DIAGNOSIS — D72.829 LEUKOCYTOSIS, UNSPECIFIED TYPE: ICD-10-CM

## 2018-05-15 DIAGNOSIS — R05.9 COUGH: ICD-10-CM

## 2018-05-15 DIAGNOSIS — R73.9 HYPERGLYCEMIA: ICD-10-CM

## 2018-05-15 DIAGNOSIS — J18.9 COMMUNITY ACQUIRED PNEUMONIA OF RIGHT MIDDLE LOBE OF LUNG: ICD-10-CM

## 2018-05-15 LAB
ALBUMIN SERPL-MCNC: 4.8 G/DL (ref 3.2–4.8)
ALBUMIN/GLOB SERPL: 1.4 G/DL (ref 1.5–2.5)
ALP SERPL-CCNC: 84 U/L (ref 25–100)
ALT SERPL W P-5'-P-CCNC: 34 U/L (ref 7–40)
ANION GAP SERPL CALCULATED.3IONS-SCNC: 7 MMOL/L (ref 3–11)
AST SERPL-CCNC: 25 U/L (ref 0–33)
BASOPHILS # BLD AUTO: 0.04 10*3/MM3 (ref 0–0.2)
BASOPHILS NFR BLD AUTO: 0.1 % (ref 0–1)
BILIRUB SERPL-MCNC: 1 MG/DL (ref 0.3–1.2)
BNP SERPL-MCNC: 92 PG/ML (ref 0–100)
BUN BLD-MCNC: 25 MG/DL (ref 9–23)
BUN/CREAT SERPL: 19.2 (ref 7–25)
CALCIUM SPEC-SCNC: 9.9 MG/DL (ref 8.7–10.4)
CHLORIDE SERPL-SCNC: 97 MMOL/L (ref 99–109)
CO2 SERPL-SCNC: 30 MMOL/L (ref 20–31)
CREAT BLD-MCNC: 1.3 MG/DL (ref 0.6–1.3)
DEPRECATED RDW RBC AUTO: 48.1 FL (ref 37–54)
EOSINOPHIL # BLD AUTO: 0.03 10*3/MM3 (ref 0–0.3)
EOSINOPHIL NFR BLD AUTO: 0.1 % (ref 0–3)
ERYTHROCYTE [DISTWIDTH] IN BLOOD BY AUTOMATED COUNT: 13.6 % (ref 11.3–14.5)
GFR SERPL CREATININE-BSD FRML MDRD: 40 ML/MIN/1.73
GLOBULIN UR ELPH-MCNC: 3.5 GM/DL
GLUCOSE BLD-MCNC: 247 MG/DL (ref 70–100)
HCT VFR BLD AUTO: 50.4 % (ref 34.5–44)
HGB BLD-MCNC: 16.8 G/DL (ref 11.5–15.5)
HOLD SPECIMEN: NORMAL
HOLD SPECIMEN: NORMAL
IMM GRANULOCYTES # BLD: 0.18 10*3/MM3 (ref 0–0.03)
IMM GRANULOCYTES NFR BLD: 0.6 % (ref 0–0.6)
LYMPHOCYTES # BLD AUTO: 1.61 10*3/MM3 (ref 0.6–4.8)
LYMPHOCYTES NFR BLD AUTO: 5.7 % (ref 24–44)
MCH RBC QN AUTO: 32.6 PG (ref 27–31)
MCHC RBC AUTO-ENTMCNC: 33.3 G/DL (ref 32–36)
MCV RBC AUTO: 97.7 FL (ref 80–99)
MONOCYTES # BLD AUTO: 1.84 10*3/MM3 (ref 0–1)
MONOCYTES NFR BLD AUTO: 6.5 % (ref 0–12)
NEUTROPHILS # BLD AUTO: 24.5 10*3/MM3 (ref 1.5–8.3)
NEUTROPHILS NFR BLD AUTO: 87 % (ref 41–71)
PLATELET # BLD AUTO: 155 10*3/MM3 (ref 150–450)
PMV BLD AUTO: 10.9 FL (ref 6–12)
POTASSIUM BLD-SCNC: 4.5 MMOL/L (ref 3.5–5.5)
PROT SERPL-MCNC: 8.3 G/DL (ref 5.7–8.2)
RBC # BLD AUTO: 5.16 10*6/MM3 (ref 3.89–5.14)
SODIUM BLD-SCNC: 134 MMOL/L (ref 132–146)
TROPONIN I SERPL-MCNC: 0.01 NG/ML (ref 0–0.07)
WBC NRBC COR # BLD: 28.2 10*3/MM3 (ref 3.5–10.8)
WHOLE BLOOD HOLD SPECIMEN: NORMAL
WHOLE BLOOD HOLD SPECIMEN: NORMAL

## 2018-05-15 PROCEDURE — 84484 ASSAY OF TROPONIN QUANT: CPT

## 2018-05-15 PROCEDURE — 87040 BLOOD CULTURE FOR BACTERIA: CPT | Performed by: EMERGENCY MEDICINE

## 2018-05-15 PROCEDURE — 74176 CT ABD & PELVIS W/O CONTRAST: CPT

## 2018-05-15 PROCEDURE — 80053 COMPREHEN METABOLIC PANEL: CPT | Performed by: EMERGENCY MEDICINE

## 2018-05-15 PROCEDURE — 84145 PROCALCITONIN (PCT): CPT | Performed by: EMERGENCY MEDICINE

## 2018-05-15 PROCEDURE — 93005 ELECTROCARDIOGRAM TRACING: CPT

## 2018-05-15 PROCEDURE — 85025 COMPLETE CBC W/AUTO DIFF WBC: CPT

## 2018-05-15 PROCEDURE — 25010000002 ONDANSETRON PER 1 MG: Performed by: EMERGENCY MEDICINE

## 2018-05-15 PROCEDURE — 71045 X-RAY EXAM CHEST 1 VIEW: CPT

## 2018-05-15 PROCEDURE — 99285 EMERGENCY DEPT VISIT HI MDM: CPT

## 2018-05-15 PROCEDURE — 25010000002 CEFTRIAXONE PER 250 MG: Performed by: EMERGENCY MEDICINE

## 2018-05-15 PROCEDURE — 93005 ELECTROCARDIOGRAM TRACING: CPT | Performed by: EMERGENCY MEDICINE

## 2018-05-15 PROCEDURE — 25010000002 MORPHINE SULFATE (PF) 2 MG/ML SOLUTION: Performed by: EMERGENCY MEDICINE

## 2018-05-15 PROCEDURE — 83880 ASSAY OF NATRIURETIC PEPTIDE: CPT | Performed by: EMERGENCY MEDICINE

## 2018-05-15 RX ORDER — ONDANSETRON 2 MG/ML
4 INJECTION INTRAMUSCULAR; INTRAVENOUS ONCE
Status: COMPLETED | OUTPATIENT
Start: 2018-05-15 | End: 2018-05-15

## 2018-05-15 RX ORDER — SODIUM CHLORIDE 0.9 % (FLUSH) 0.9 %
10 SYRINGE (ML) INJECTION AS NEEDED
Status: DISCONTINUED | OUTPATIENT
Start: 2018-05-15 | End: 2018-05-16

## 2018-05-15 RX ORDER — CEFTRIAXONE SODIUM 1 G/50ML
1 INJECTION, SOLUTION INTRAVENOUS ONCE
Status: COMPLETED | OUTPATIENT
Start: 2018-05-15 | End: 2018-05-16

## 2018-05-15 RX ORDER — MORPHINE SULFATE 2 MG/ML
4 INJECTION, SOLUTION INTRAMUSCULAR; INTRAVENOUS ONCE
Status: COMPLETED | OUTPATIENT
Start: 2018-05-15 | End: 2018-05-15

## 2018-05-15 RX ORDER — SODIUM CHLORIDE 9 MG/ML
125 INJECTION, SOLUTION INTRAVENOUS CONTINUOUS
Status: DISCONTINUED | OUTPATIENT
Start: 2018-05-15 | End: 2018-05-19

## 2018-05-15 RX ADMIN — MORPHINE SULFATE 4 MG: 10 INJECTION INTRAVENOUS at 23:24

## 2018-05-15 RX ADMIN — ONDANSETRON 4 MG: 2 INJECTION INTRAMUSCULAR; INTRAVENOUS at 22:37

## 2018-05-15 RX ADMIN — Medication 10 ML: at 23:25

## 2018-05-15 RX ADMIN — SODIUM CHLORIDE 250 ML/HR: 9 INJECTION, SOLUTION INTRAVENOUS at 22:35

## 2018-05-15 RX ADMIN — CEFTRIAXONE SODIUM 1 G: 1 INJECTION, SOLUTION INTRAVENOUS at 23:27

## 2018-05-16 ENCOUNTER — APPOINTMENT (OUTPATIENT)
Dept: GENERAL RADIOLOGY | Facility: HOSPITAL | Age: 73
End: 2018-05-16

## 2018-05-16 PROBLEM — E86.0 DEHYDRATION: Status: ACTIVE | Noted: 2018-05-16

## 2018-05-16 PROBLEM — A41.9 SEPSIS: Status: ACTIVE | Noted: 2018-05-16

## 2018-05-16 PROBLEM — J96.21 ACUTE ON CHRONIC RESPIRATORY FAILURE WITH HYPOXIA (HCC): Status: ACTIVE | Noted: 2018-05-16

## 2018-05-16 PROBLEM — N18.30 CKD (CHRONIC KIDNEY DISEASE), STAGE III (HCC): Status: ACTIVE | Noted: 2018-05-16

## 2018-05-16 PROBLEM — J18.9 CAP (COMMUNITY ACQUIRED PNEUMONIA): Status: ACTIVE | Noted: 2018-05-16

## 2018-05-16 LAB
ALBUMIN SERPL-MCNC: 4.4 G/DL (ref 3.2–4.8)
ALBUMIN/GLOB SERPL: 1.3 G/DL (ref 1.5–2.5)
ALP SERPL-CCNC: 76 U/L (ref 25–100)
ALT SERPL W P-5'-P-CCNC: 26 U/L (ref 7–40)
AMPHET+METHAMPHET UR QL: NEGATIVE
AMPHETAMINES UR QL: NEGATIVE
ANION GAP SERPL CALCULATED.3IONS-SCNC: 9 MMOL/L (ref 3–11)
ARTERIAL PATENCY WRIST A: ABNORMAL
AST SERPL-CCNC: 21 U/L (ref 0–33)
ATMOSPHERIC PRESS: ABNORMAL MMHG
BACTERIA UR QL AUTO: ABNORMAL /HPF
BARBITURATES UR QL SCN: NEGATIVE
BASE EXCESS BLDA CALC-SCNC: 1.2 MMOL/L (ref 0–2)
BDY SITE: ABNORMAL
BENZODIAZ UR QL SCN: NEGATIVE
BILIRUB SERPL-MCNC: 1.4 MG/DL (ref 0.3–1.2)
BILIRUB UR QL STRIP: NEGATIVE
BUN BLD-MCNC: 26 MG/DL (ref 9–23)
BUN/CREAT SERPL: 14.4 (ref 7–25)
BUPRENORPHINE SERPL-MCNC: NEGATIVE NG/ML
CALCIUM SPEC-SCNC: 9.1 MG/DL (ref 8.7–10.4)
CANNABINOIDS SERPL QL: NEGATIVE
CHLORIDE SERPL-SCNC: 100 MMOL/L (ref 99–109)
CLARITY UR: ABNORMAL
CO2 BLDA-SCNC: 28.1 MMOL/L (ref 22–33)
CO2 SERPL-SCNC: 24 MMOL/L (ref 20–31)
COCAINE UR QL: NEGATIVE
COHGB MFR BLD: 1.9 % (ref 0–2)
COLOR UR: ABNORMAL
CREAT BLD-MCNC: 1.8 MG/DL (ref 0.6–1.3)
D DIMER PPP FEU-MCNC: 0.57 MG/L (FEU) (ref 0–0.5)
D-LACTATE SERPL-SCNC: 0.8 MMOL/L (ref 0.5–2)
D-LACTATE SERPL-SCNC: 2.4 MMOL/L (ref 0.5–2)
D-LACTATE SERPL-SCNC: 3.2 MMOL/L (ref 0.5–2)
DEPRECATED RDW RBC AUTO: 52 FL (ref 37–54)
ERYTHROCYTE [DISTWIDTH] IN BLOOD BY AUTOMATED COUNT: 14.4 % (ref 11.3–14.5)
FINE GRAN CASTS URNS QL MICRO: ABNORMAL /LPF
GFR SERPL CREATININE-BSD FRML MDRD: 28 ML/MIN/1.73
GLOBULIN UR ELPH-MCNC: 3.5 GM/DL
GLUCOSE BLD-MCNC: 198 MG/DL (ref 70–100)
GLUCOSE BLDC GLUCOMTR-MCNC: 144 MG/DL (ref 70–130)
GLUCOSE BLDC GLUCOMTR-MCNC: 168 MG/DL (ref 70–130)
GLUCOSE BLDC GLUCOMTR-MCNC: 200 MG/DL (ref 70–130)
GLUCOSE BLDC GLUCOMTR-MCNC: 208 MG/DL (ref 70–130)
GLUCOSE UR STRIP-MCNC: NEGATIVE MG/DL
HBA1C MFR BLD: 8.4 % (ref 4.8–5.6)
HCO3 BLDA-SCNC: 26.8 MMOL/L (ref 20–26)
HCT VFR BLD AUTO: 49.5 % (ref 34.5–44)
HCT VFR BLD CALC: 43.5 %
HGB BLD-MCNC: 16.3 G/DL (ref 11.5–15.5)
HGB BLDA-MCNC: 14.2 G/DL (ref 14–18)
HGB UR QL STRIP.AUTO: NEGATIVE
HOLD SPECIMEN: NORMAL
HOROWITZ INDEX BLD+IHG-RTO: 28 %
HYALINE CASTS UR QL AUTO: ABNORMAL /LPF
KETONES UR QL STRIP: ABNORMAL
LEUKOCYTE ESTERASE UR QL STRIP.AUTO: NEGATIVE
MCH RBC QN AUTO: 32.5 PG (ref 27–31)
MCHC RBC AUTO-ENTMCNC: 32.9 G/DL (ref 32–36)
MCV RBC AUTO: 98.8 FL (ref 80–99)
METHADONE UR QL SCN: NEGATIVE
METHGB BLD QL: 0.9 % (ref 0–1.5)
MODALITY: ABNORMAL
MRSA DNA SPEC QL NAA+PROBE: NEGATIVE
NITRITE UR QL STRIP: NEGATIVE
OPIATES UR QL: POSITIVE
OXYCODONE UR QL SCN: NEGATIVE
OXYHGB MFR BLDV: 92.5 % (ref 94–99)
PCO2 BLDA: 44.9 MM HG (ref 35–45)
PCP UR QL SCN: NEGATIVE
PH BLDA: 7.38 PH UNITS (ref 7.35–7.45)
PH UR STRIP.AUTO: <=5 [PH] (ref 5–8)
PLATELET # BLD AUTO: 145 10*3/MM3 (ref 150–450)
PMV BLD AUTO: 11.8 FL (ref 6–12)
PO2 BLDA: 74.4 MM HG (ref 83–108)
POTASSIUM BLD-SCNC: 4.8 MMOL/L (ref 3.5–5.5)
PROCALCITONIN SERPL-MCNC: 1.04 NG/ML
PROPOXYPH UR QL: NEGATIVE
PROT SERPL-MCNC: 7.9 G/DL (ref 5.7–8.2)
PROT UR QL STRIP: ABNORMAL
RBC # BLD AUTO: 5.01 10*6/MM3 (ref 3.89–5.14)
RBC # UR: ABNORMAL /HPF
REF LAB TEST METHOD: ABNORMAL
S PYO AG THROAT QL: NEGATIVE
SODIUM BLD-SCNC: 133 MMOL/L (ref 132–146)
SP GR UR STRIP: 1.02 (ref 1–1.03)
SQUAMOUS #/AREA URNS HPF: ABNORMAL /HPF
TRICYCLICS UR QL SCN: NEGATIVE
UROBILINOGEN UR QL STRIP: ABNORMAL
WBC NRBC COR # BLD: 24.02 10*3/MM3 (ref 3.5–10.8)
WBC UR QL AUTO: ABNORMAL /HPF

## 2018-05-16 PROCEDURE — 83605 ASSAY OF LACTIC ACID: CPT | Performed by: EMERGENCY MEDICINE

## 2018-05-16 PROCEDURE — 83036 HEMOGLOBIN GLYCOSYLATED A1C: CPT | Performed by: PHYSICIAN ASSISTANT

## 2018-05-16 PROCEDURE — 36600 WITHDRAWAL OF ARTERIAL BLOOD: CPT | Performed by: FAMILY MEDICINE

## 2018-05-16 PROCEDURE — 71045 X-RAY EXAM CHEST 1 VIEW: CPT

## 2018-05-16 PROCEDURE — 63710000001 INSULIN LISPRO (HUMAN) PER 5 UNITS: Performed by: PHYSICIAN ASSISTANT

## 2018-05-16 PROCEDURE — 25010000002 HEPARIN (PORCINE) PER 1000 UNITS: Performed by: INTERNAL MEDICINE

## 2018-05-16 PROCEDURE — 81001 URINALYSIS AUTO W/SCOPE: CPT | Performed by: INTERNAL MEDICINE

## 2018-05-16 PROCEDURE — 85027 COMPLETE CBC AUTOMATED: CPT | Performed by: PHYSICIAN ASSISTANT

## 2018-05-16 PROCEDURE — 87081 CULTURE SCREEN ONLY: CPT | Performed by: FAMILY MEDICINE

## 2018-05-16 PROCEDURE — 85379 FIBRIN DEGRADATION QUANT: CPT | Performed by: PHYSICIAN ASSISTANT

## 2018-05-16 PROCEDURE — 82962 GLUCOSE BLOOD TEST: CPT

## 2018-05-16 PROCEDURE — 25010000002 VANCOMYCIN

## 2018-05-16 PROCEDURE — 99223 1ST HOSP IP/OBS HIGH 75: CPT | Performed by: INTERNAL MEDICINE

## 2018-05-16 PROCEDURE — 25010000002 PIPERACILLIN SOD-TAZOBACTAM PER 1 G

## 2018-05-16 PROCEDURE — 87641 MR-STAPH DNA AMP PROBE: CPT | Performed by: PHYSICIAN ASSISTANT

## 2018-05-16 PROCEDURE — 82805 BLOOD GASES W/O2 SATURATION: CPT | Performed by: FAMILY MEDICINE

## 2018-05-16 PROCEDURE — 87880 STREP A ASSAY W/OPTIC: CPT | Performed by: FAMILY MEDICINE

## 2018-05-16 PROCEDURE — 87449 NOS EACH ORGANISM AG IA: CPT | Performed by: PHYSICIAN ASSISTANT

## 2018-05-16 PROCEDURE — 25010000002 PIPERACILLIN SOD-TAZOBACTAM PER 1 G: Performed by: PHYSICIAN ASSISTANT

## 2018-05-16 PROCEDURE — 87070 CULTURE OTHR SPECIMN AEROBIC: CPT | Performed by: PHYSICIAN ASSISTANT

## 2018-05-16 PROCEDURE — 80306 DRUG TEST PRSMV INSTRMNT: CPT | Performed by: INTERNAL MEDICINE

## 2018-05-16 PROCEDURE — 83605 ASSAY OF LACTIC ACID: CPT | Performed by: NURSE PRACTITIONER

## 2018-05-16 PROCEDURE — 87205 SMEAR GRAM STAIN: CPT | Performed by: PHYSICIAN ASSISTANT

## 2018-05-16 PROCEDURE — 80053 COMPREHEN METABOLIC PANEL: CPT | Performed by: PHYSICIAN ASSISTANT

## 2018-05-16 RX ORDER — SODIUM CHLORIDE 9 MG/ML
100 INJECTION, SOLUTION INTRAVENOUS CONTINUOUS
Status: DISCONTINUED | OUTPATIENT
Start: 2018-05-16 | End: 2018-05-16

## 2018-05-16 RX ORDER — NICOTINE POLACRILEX 4 MG
15 LOZENGE BUCCAL
Status: DISCONTINUED | OUTPATIENT
Start: 2018-05-16 | End: 2018-05-20 | Stop reason: HOSPADM

## 2018-05-16 RX ORDER — GABAPENTIN 300 MG/1
300 CAPSULE ORAL 3 TIMES DAILY
Status: DISCONTINUED | OUTPATIENT
Start: 2018-05-16 | End: 2018-05-19

## 2018-05-16 RX ORDER — ACETAMINOPHEN 325 MG/1
650 TABLET ORAL EVERY 6 HOURS PRN
Status: DISCONTINUED | OUTPATIENT
Start: 2018-05-16 | End: 2018-05-16 | Stop reason: SDUPTHER

## 2018-05-16 RX ORDER — IPRATROPIUM BROMIDE AND ALBUTEROL SULFATE 2.5; .5 MG/3ML; MG/3ML
3 SOLUTION RESPIRATORY (INHALATION) EVERY 4 HOURS PRN
Status: DISCONTINUED | OUTPATIENT
Start: 2018-05-16 | End: 2018-05-20 | Stop reason: HOSPADM

## 2018-05-16 RX ORDER — ASPIRIN 81 MG/1
81 TABLET ORAL DAILY
Status: DISCONTINUED | OUTPATIENT
Start: 2018-05-16 | End: 2018-05-20 | Stop reason: HOSPADM

## 2018-05-16 RX ORDER — ACETAMINOPHEN 325 MG/1
650 TABLET ORAL EVERY 4 HOURS PRN
Status: DISCONTINUED | OUTPATIENT
Start: 2018-05-16 | End: 2018-05-20 | Stop reason: HOSPADM

## 2018-05-16 RX ORDER — ATORVASTATIN CALCIUM 20 MG/1
20 TABLET, FILM COATED ORAL NIGHTLY
Status: DISCONTINUED | OUTPATIENT
Start: 2018-05-16 | End: 2018-05-20 | Stop reason: HOSPADM

## 2018-05-16 RX ORDER — ROPINIROLE 0.5 MG/1
1 TABLET, FILM COATED ORAL NIGHTLY
Status: DISCONTINUED | OUTPATIENT
Start: 2018-05-16 | End: 2018-05-20 | Stop reason: HOSPADM

## 2018-05-16 RX ORDER — HEPARIN SODIUM 5000 [USP'U]/ML
5000 INJECTION, SOLUTION INTRAVENOUS; SUBCUTANEOUS EVERY 8 HOURS SCHEDULED
Status: DISCONTINUED | OUTPATIENT
Start: 2018-05-16 | End: 2018-05-20 | Stop reason: HOSPADM

## 2018-05-16 RX ORDER — SODIUM CHLORIDE 0.9 % (FLUSH) 0.9 %
1-10 SYRINGE (ML) INJECTION AS NEEDED
Status: DISCONTINUED | OUTPATIENT
Start: 2018-05-16 | End: 2018-05-16

## 2018-05-16 RX ORDER — DEXTROSE MONOHYDRATE 25 G/50ML
25 INJECTION, SOLUTION INTRAVENOUS
Status: DISCONTINUED | OUTPATIENT
Start: 2018-05-16 | End: 2018-05-20 | Stop reason: HOSPADM

## 2018-05-16 RX ORDER — SODIUM CHLORIDE 9 MG/ML
125 INJECTION, SOLUTION INTRAVENOUS CONTINUOUS
Status: ACTIVE | OUTPATIENT
Start: 2018-05-16 | End: 2018-05-16

## 2018-05-16 RX ORDER — ONDANSETRON 2 MG/ML
4 INJECTION INTRAMUSCULAR; INTRAVENOUS EVERY 6 HOURS PRN
Status: DISCONTINUED | OUTPATIENT
Start: 2018-05-16 | End: 2018-05-20 | Stop reason: HOSPADM

## 2018-05-16 RX ORDER — TRAZODONE HYDROCHLORIDE 50 MG/1
50 TABLET ORAL NIGHTLY
Status: DISCONTINUED | OUTPATIENT
Start: 2018-05-16 | End: 2018-05-20 | Stop reason: HOSPADM

## 2018-05-16 RX ADMIN — ACETAMINOPHEN 650 MG: 325 TABLET, FILM COATED ORAL at 23:59

## 2018-05-16 RX ADMIN — TAZOBACTAM SODIUM AND PIPERACILLIN SODIUM 2.25 G: 250; 2 INJECTION, SOLUTION INTRAVENOUS at 23:13

## 2018-05-16 RX ADMIN — TAZOBACTAM SODIUM AND PIPERACILLIN SODIUM 3.38 G: 375; 3 INJECTION, SOLUTION INTRAVENOUS at 06:08

## 2018-05-16 RX ADMIN — GABAPENTIN 300 MG: 300 CAPSULE ORAL at 14:25

## 2018-05-16 RX ADMIN — TAZOBACTAM SODIUM AND PIPERACILLIN SODIUM 3.38 G: 375; 3 INJECTION, SOLUTION INTRAVENOUS at 09:44

## 2018-05-16 RX ADMIN — TAZOBACTAM SODIUM AND PIPERACILLIN SODIUM 2.25 G: 250; 2 INJECTION, SOLUTION INTRAVENOUS at 17:42

## 2018-05-16 RX ADMIN — INSULIN LISPRO 2 UNITS: 100 INJECTION, SOLUTION INTRAVENOUS; SUBCUTANEOUS at 14:26

## 2018-05-16 RX ADMIN — INSULIN LISPRO 3 UNITS: 100 INJECTION, SOLUTION INTRAVENOUS; SUBCUTANEOUS at 17:42

## 2018-05-16 RX ADMIN — VANCOMYCIN HYDROCHLORIDE 2000 MG: 10 INJECTION, POWDER, LYOPHILIZED, FOR SOLUTION INTRAVENOUS at 06:09

## 2018-05-16 RX ADMIN — SODIUM CHLORIDE 500 ML: 9 INJECTION, SOLUTION INTRAVENOUS at 07:09

## 2018-05-16 RX ADMIN — PHENOL 1 SPRAY: 1.5 LIQUID ORAL at 23:13

## 2018-05-16 RX ADMIN — GABAPENTIN 300 MG: 300 CAPSULE ORAL at 20:10

## 2018-05-16 RX ADMIN — TRAZODONE HYDROCHLORIDE 50 MG: 50 TABLET ORAL at 23:13

## 2018-05-16 RX ADMIN — ROPINIROLE 1 MG: 0.5 TABLET, FILM COATED ORAL at 20:10

## 2018-05-16 RX ADMIN — INSULIN LISPRO 3 UNITS: 100 INJECTION, SOLUTION INTRAVENOUS; SUBCUTANEOUS at 20:12

## 2018-05-16 RX ADMIN — ATORVASTATIN CALCIUM 20 MG: 20 TABLET, FILM COATED ORAL at 20:10

## 2018-05-16 RX ADMIN — ASPIRIN 81 MG: 81 TABLET, COATED ORAL at 09:44

## 2018-05-16 RX ADMIN — HEPARIN SODIUM 5000 UNITS: 5000 INJECTION, SOLUTION INTRAVENOUS; SUBCUTANEOUS at 20:10

## 2018-05-16 RX ADMIN — HEPARIN SODIUM 5000 UNITS: 5000 INJECTION, SOLUTION INTRAVENOUS; SUBCUTANEOUS at 14:26

## 2018-05-16 RX ADMIN — ACETAMINOPHEN 650 MG: 325 TABLET, FILM COATED ORAL at 14:25

## 2018-05-16 RX ADMIN — ACETAMINOPHEN 650 MG: 325 TABLET, FILM COATED ORAL at 10:23

## 2018-05-16 RX ADMIN — SODIUM CHLORIDE 125 ML/HR: 9 INJECTION, SOLUTION INTRAVENOUS at 17:42

## 2018-05-16 RX ADMIN — SODIUM CHLORIDE 75 ML/HR: 9 INJECTION, SOLUTION INTRAVENOUS at 01:54

## 2018-05-16 NOTE — PROGRESS NOTES
Pharmacokinetic Consult - Vancomycin Dosing    Angelita Shay is a 72 y.o. female who has been consulted for vancomycin dosing for sepsis .    Relevant clinical data and objective history reviewed:  Lab Results   Component Value Date/Time    CREATININE 1.30 05/15/2018 08:36 PM    CREATININE 0.90 04/05/2018 03:26 PM    CREATININE 1.10 03/30/2018 05:09 AM    BUN 25 (H) 05/15/2018 08:36 PM    BUN 14 04/05/2018 03:26 PM    BUN 36 (H) 03/30/2018 05:09 AM     Estimated Creatinine Clearance: 44.6 mL/min (by C-G formula based on SCr of 1.3 mg/dL).  No intake/output data recorded.  Lab Results   Component Value Date/Time    WBC 28.20 (H) 05/15/2018 08:36 PM    HGB 16.8 (H) 05/15/2018 08:36 PM    HCT 50.4 (H) 05/15/2018 08:36 PM    MCV 97.7 05/15/2018 08:36 PM     05/15/2018 08:36 PM     Temp Readings from Last 3 Encounters:   05/16/18 98.5 °F (36.9 °C) (Oral)   05/03/18 98.8 °F (37.1 °C) (Temporal Artery )   04/23/18 98.7 °F (37.1 °C)     Weight: 94.3 kg (208 lb)  Baseline culture/source/susceptibility: Pending    Assessment/Plan  The patient will be started on vancomycin utilizing scheduled dosing.  Vancomycin 2000 mg IV x 1, then Vancomycin 1500 mg IV Q24H (16 mg/kd/dose). Plan for trough as patient approaches steady state, prior to the 3rd dose (06:00 dose on Friday 5/18/18).  Due to infection severity, will target a trough of 15-20 ug/mL.  Pharmacy will continue to follow the patient’s culture results and clinical progress daily.    Celio Montiel RPH

## 2018-05-16 NOTE — ED PROVIDER NOTES
Subjective   This patient is a very nice 72-year-old  female who comes in with her son for evaluation.  The story is provided by the patient, her son, and via text with the daughter who lives with the patient.  The patient states that she was having some abdominal pain and did not feel well yesterday.  She woke up today with sweats.  She has been coughing all day and also reports pain from head to toe.  The son reports that the patient has a history of fibromyalgia.  She tells me that her abdomen, head, back, lower extremities and upper external ears are hurting.  No objective fevers.  Temperature 99.7 here.  She reports no hemoptysis or hematemesis.  Denies any production of cough.  She reports that she feels like she might have a pneumonia.  She has had pneumonia in the past.  She has a history of diabetes and neuropathy.  Her son reports that she has been acting appropriately.  She is clear from a mental status standpoint.  The patient has suffered no falls.  She has no rashes.  No nausea, vomiting, or diarrhea.  No change in speech pattern.  No focal deficits.  In summary, this is a 72-year-old female with a litany of complaints that include headache, back pain, extremity pain ×4, abdominal pain, and cough and comes in for evaluation.    Past medical history  CHF, CAD, DM, fibromyalgia,      Family history  Cancer, CAD        History provided by:  Patient and relative  Shortness of Breath   Severity:  Moderate  Onset quality:  Gradual  Duration:  15 hours  Timing:  Intermittent  Progression:  Worsening  Chronicity:  New  Relieved by:  Nothing  Worsened by:  Coughing  Ineffective treatments:  None tried  Associated symptoms: abdominal pain, cough, diaphoresis, fever, headaches, sore throat and sputum production    Associated symptoms: no chest pain, no hemoptysis, no neck pain, no syncope and no vomiting    Risk factors: obesity    Risk factors: no hx of cancer and no tobacco use        Review of Systems    Constitutional: Positive for chills, diaphoresis and fever. Negative for unexpected weight change.   HENT: Positive for sore throat. Negative for congestion and dental problem.    Eyes: Negative for pain and discharge.   Respiratory: Positive for cough, sputum production and shortness of breath. Negative for hemoptysis.    Cardiovascular: Negative for chest pain and syncope.   Gastrointestinal: Positive for abdominal pain. Negative for diarrhea, nausea and vomiting.   Genitourinary: Negative for difficulty urinating, dysuria, frequency and hematuria.   Musculoskeletal: Positive for back pain. Negative for myalgias, neck pain and neck stiffness.   Neurological: Positive for headaches. Negative for syncope, speech difficulty and light-headedness.   Psychiatric/Behavioral: Negative for confusion.   All other systems reviewed and are negative.      Past Medical History:   Diagnosis Date   • Anxiety    • Arthritis    • Cellulitis of both lower extremities    • CKD (chronic kidney disease), stage III    • Colon cancer 2000   • Colon polyp 2015    x 3 adenomatous   • Colon polyp 04/18/2018    x 5 Dr Cash   • Coronary artery disease     1 stent   • Corrosive esophagitis    • Depression    • Diabetes mellitus     dx 2 years ago- checks fsbs bid   • GERD (gastroesophageal reflux disease)    • GI bleed    • Gout    • H/O cardiac catheterization    • H/O colonoscopy 2008    incomplete prep   • H/O echocardiogram 03/11/2018    EF 36%,LVH, mild MR, mild TR   • H/O esophagogastroduodenoscopy 2008    hiatal hernia, gastritis   • H/O hiatal hernia    • H/O mammogram 2012   • Hyperlipidemia    • Hypertension    • Knee pain    • PAF (paroxysmal atrial fibrillation) 2007    postoperative total knee replacement   • Pap smear for cervical cancer screening 2013   • RLS (restless legs syndrome)    • Sleep apnea    • Wears eyeglasses        Allergies   Allergen Reactions   • Oxycodone Shortness Of Breath   • Zolpidem Other (See  Comments)     nightmares       Past Surgical History:   Procedure Laterality Date   • APPENDECTOMY  05/1963   • CARDIAC CATHETERIZATION  08/2015    no stents   • CARDIAC CATHETERIZATION  03/13/2018   • CARDIAC CATHETERIZATION N/A 3/13/2018    Procedure: Left Heart Cath;  Surgeon: Pierre Jones III, MD;  Location:  [facility];  Service: Cardiovascular   • COLON RESECTION  04/08/2000    colon cancer   • COLONOSCOPY  2015   • COLONOSCOPY  04/18/2018    with 5 polyps removed 1yr f/u. Dr Cash- REPEAT YEARLY   • CORONARY ANGIOPLASTY WITH STENT PLACEMENT  12/2015   • AL TOTAL KNEE ARTHROPLASTY Left 11/3/2016    Procedure: LEFT TOTAL KNEE REPLACEMENT;  Surgeon: Laurent Zuniga MD;  Location:  [facility];  Service: Orthopedics   • TONSILLECTOMY  12/1961   • TOTAL KNEE ARTHROPLASTY Right 2008    revision 2010   • TOTAL SHOULDER ARTHROPLASTY Bilateral     right 2014, left 2015       Family History   Problem Relation Age of Onset   • Colon cancer Other    • Diabetes Other    • Heart disease Other    • Hypertension Other    • Stroke Other    • Tuberculosis Other    • Hypertension Mother    • Colon cancer Father    • Stroke Father    • Diabetes Father    • Colon cancer Sister    • Diabetes Sister    • Diabetes Maternal Grandmother    • Coronary artery disease Maternal Grandfather    • No Known Problems Paternal Grandmother    • No Known Problems Paternal Grandfather        Social History     Social History   • Marital status:    • Number of children: 5     Occupational History   • Retired       Social History Main Topics   • Smoking status: Former Smoker     Packs/day: 1.00     Years: 22.00     Types: Cigarettes     Quit date: 1979   • Smokeless tobacco: Never Used      Comment: quit 1979   • Alcohol use No   • Drug use: No   • Sexual activity: Defer     Other Topics Concern   • Not on file     Social History Narrative    Caffeine: 0-1 servings per day    Patient lives at her home  with her daughter and her family         Objective   Physical Exam   Constitutional: She is oriented to person, place, and time. She appears well-developed. No distress.   HENT:   Head: Normocephalic and atraumatic.   Right Ear: External ear normal.   Left Ear: External ear normal.   Nose: Nose normal.   Dry mucous membranes.    Eyes: EOM are normal. Pupils are equal, round, and reactive to light.   Neck: Normal range of motion. Neck supple. No JVD present.   Cardiovascular: Normal rate, regular rhythm and normal heart sounds.  Exam reveals no gallop and no friction rub.    Pulmonary/Chest: Effort normal and breath sounds normal. She has no wheezes. She has no rales. She exhibits no tenderness.   Bilateral wheezing.    Abdominal: Soft. Bowel sounds are normal. She exhibits no distension and no mass. There is tenderness. There is no rebound and no guarding.   No signs of acute abdomen.  No pain at McBurney's point.  No pulsatile abdominal mass  Tenderness at LUQ and mid left abdomen.    Musculoskeletal: Normal range of motion. She exhibits no edema.   No C, T, L paraspinal or midline spine tenderness. No step offs, crepitus or deformities.   TTP to all extremities in all dermatomes.    Lymphadenopathy:     She has no cervical adenopathy.   Neurological: She is alert and oriented to person, place, and time.   5/5 strength bilaterally with flexion and extension of fingers, wrist, elbows, knees and hips as well as plantar and dorsiflexion of the foot.   Skin: Skin is warm and dry. No erythema. No pallor.   Psychiatric: She has a normal mood and affect. Her behavior is normal. Judgment and thought content normal.   Nursing note and vitals reviewed.      Critical Care  Performed by: KEKE BRITT  Authorized by: KEKE BRITT     Critical care provider statement:     Critical care time (minutes):  60    Critical care time was exclusive of:  Separately billable procedures and treating other patients and teaching  time    Critical care was necessary to treat or prevent imminent or life-threatening deterioration of the following conditions:  Dehydration and circulatory failure (vital sign irregularities, consultations and multiple visits to the bedside)    Critical care was time spent personally by me on the following activities:  Evaluation of patient's response to treatment, examination of patient, obtaining history from patient or surrogate, ordering and performing treatments and interventions, ordering and review of laboratory studies, ordering and review of radiographic studies, re-evaluation of patient's condition, development of treatment plan with patient or surrogate, pulse oximetry, discussions with consultants and review of old charts    I assumed direction of critical care for this patient from another provider in my specialty: no                 ED Course  ED Course   Comment By Time   The patient's white blood cell count is markedly elevated at 28,000.  Hemoglobin is 16.8.  Hematocrit 50.4.  Platelet count 155.  CMP shows a chloride of 97.  BNP is 92.  Troponin is 0.01.  Chest x-ray shows a right mid lung infiltrate/pneumonia.  Blood cultures have been ordered.  Rocephin has been ordered.  IV fluid rehydration has been ordered for dehydration.  I will plan for admission.  I have also ordered Zofran for nausea control.  CT scan of the abdomen and pelvis, as well as EKG pending. Josue Mina MD 05/15 2206   Patient and family made aware of the diagnoses, the plan for admission, treatment.  All questions were answered and patient will be admitted following results of studies ordered and still pending. Josue Mina MD 05/15 2207   The patient's son, Wes is now back at the bedside.  I have reexamined the patient ×2.  Patient is resting comfortably.  She is easily awakened.  She is aware of the diagnosis of pneumonia, following chest x-ray results.  Blood cultures have been ordered and obtained.  IV  antibiotics have been started, as has IV fluid rehydration.  We will keep a close eye on the patient's heart rate.  CT scan of the abdomen and pelvis currently pending.  All questions were answered.  Patient is resting comfortably.  I will plan to admit the patient following results of CT scan. Josue Mina MD 05/15 7662   CT scan of the abdomen and pelvis shows bilateral inguinal lymphadenopathy, and nonspecific bowel gas pattern, and cholelithiasis.  Otherwise no specific abnormalities.  All findings discussed with patient and family.  We will go ahead and admit the patient.  Patient is receiving fluids, antibiotics.  Patient will be admitted to telemetry. Josue Mina MD 05/15 2320   Patient did have a blood pressure of approximately 96/67.  Most recent reading at 2215 is 135/67.  Patient will be admitted accordingly. Josue Mina MD 05/15 2321   Hospitalist, Dr.Aliu combs for admission. Josue Mina MD 05/15 2344   Critical care time on this patient including management of patient's laboratory results, vital sign irregularities, consultation and multiple visits at the bedside talking to family 60 minutes. Josue Mina MD 05/15 2344     Recent Results (from the past 24 hour(s))   Comprehensive Metabolic Panel    Collection Time: 05/15/18  8:36 PM   Result Value Ref Range    Glucose 247 (H) 70 - 100 mg/dL    BUN 25 (H) 9 - 23 mg/dL    Creatinine 1.30 0.60 - 1.30 mg/dL    Sodium 134 132 - 146 mmol/L    Potassium 4.5 3.5 - 5.5 mmol/L    Chloride 97 (L) 99 - 109 mmol/L    CO2 30.0 20.0 - 31.0 mmol/L    Calcium 9.9 8.7 - 10.4 mg/dL    Total Protein 8.3 (H) 5.7 - 8.2 g/dL    Albumin 4.80 3.20 - 4.80 g/dL    ALT (SGPT) 34 7 - 40 U/L    AST (SGOT) 25 0 - 33 U/L    Alkaline Phosphatase 84 25 - 100 U/L    Total Bilirubin 1.0 0.3 - 1.2 mg/dL    eGFR Non African Amer 40 (L) >60 mL/min/1.73    Globulin 3.5 gm/dL    A/G Ratio 1.4 (L) 1.5 - 2.5 g/dL    BUN/Creatinine Ratio 19.2 7.0 - 25.0    Anion Gap  7.0 3.0 - 11.0 mmol/L   BNP    Collection Time: 05/15/18  8:36 PM   Result Value Ref Range    BNP 92.0 0.0 - 100.0 pg/mL   Light Blue Top    Collection Time: 05/15/18  8:36 PM   Result Value Ref Range    Extra Tube hold for add-on    Green Top (Gel)    Collection Time: 05/15/18  8:36 PM   Result Value Ref Range    Extra Tube Hold for add-ons.    Lavender Top    Collection Time: 05/15/18  8:36 PM   Result Value Ref Range    Extra Tube hold for add-on    Gold Top - SST    Collection Time: 05/15/18  8:36 PM   Result Value Ref Range    Extra Tube Hold for add-ons.    CBC Auto Differential    Collection Time: 05/15/18  8:36 PM   Result Value Ref Range    WBC 28.20 (H) 3.50 - 10.80 10*3/mm3    RBC 5.16 (H) 3.89 - 5.14 10*6/mm3    Hemoglobin 16.8 (H) 11.5 - 15.5 g/dL    Hematocrit 50.4 (H) 34.5 - 44.0 %    MCV 97.7 80.0 - 99.0 fL    MCH 32.6 (H) 27.0 - 31.0 pg    MCHC 33.3 32.0 - 36.0 g/dL    RDW 13.6 11.3 - 14.5 %    RDW-SD 48.1 37.0 - 54.0 fl    MPV 10.9 6.0 - 12.0 fL    Platelets 155 150 - 450 10*3/mm3    Neutrophil % 87.0 (H) 41.0 - 71.0 %    Lymphocyte % 5.7 (L) 24.0 - 44.0 %    Monocyte % 6.5 0.0 - 12.0 %    Eosinophil % 0.1 0.0 - 3.0 %    Basophil % 0.1 0.0 - 1.0 %    Immature Grans % 0.6 0.0 - 0.6 %    Neutrophils, Absolute 24.50 (H) 1.50 - 8.30 10*3/mm3    Lymphocytes, Absolute 1.61 0.60 - 4.80 10*3/mm3    Monocytes, Absolute 1.84 (H) 0.00 - 1.00 10*3/mm3    Eosinophils, Absolute 0.03 0.00 - 0.30 10*3/mm3    Basophils, Absolute 0.04 0.00 - 0.20 10*3/mm3    Immature Grans, Absolute 0.18 (H) 0.00 - 0.03 10*3/mm3   Procalcitonin    Collection Time: 05/15/18  8:36 PM   Result Value Ref Range    Procalcitonin 1.04 (H) <=0.25 ng/mL   POC Troponin, Rapid    Collection Time: 05/15/18  8:41 PM   Result Value Ref Range    Troponin I 0.01 0.00 - 0.07 ng/mL   Lactic Acid, Plasma    Collection Time: 05/16/18 12:03 AM   Result Value Ref Range    Lactate 2.4 (C) 0.5 - 2.0 mmol/L     Note: In addition to lab results from this  visit, the labs listed above may include labs taken at another facility or during a different encounter within the last 24 hours. Please correlate lab times with ED admission and discharge times for further clarification of the services performed during this visit.    CT Abdomen Pelvis Without Contrast   Final Result   1.  Cholelithiasis.     2.  Nonspecific bowel gas pattern without obstruction.   3.  Nonspecific few slightly prominent bilateral inguinal lymph nodes.      THIS DOCUMENT HAS BEEN ELECTRONICALLY SIGNED BY EARL TOBIN MD      XR Chest 1 View   Final Result     New mild right mid-lung field infiltrate/atelectasis.      THIS DOCUMENT HAS BEEN ELECTRONICALLY SIGNED BY EARL TOBIN MD        Vitals:    05/16/18 0045 05/16/18 0100 05/16/18 0105 05/16/18 0108   BP: 99/56 108/81     Pulse: (!) 122 (!) 125 120    Resp:       Temp:       TempSrc:       SpO2: 92% 91% 92% 93%   Weight:       Height:         Medications   sodium chloride 0.9 % flush 10 mL (10 mL Intravenous Given 5/15/18 2325)   sodium chloride 0.9 % infusion (0 mL/hr Intravenous Stopped 5/16/18 0105)   apixaban (ELIQUIS) tablet 5 mg (not administered)   aspirin EC tablet 81 mg (not administered)   atorvastatin (LIPITOR) tablet 20 mg (not administered)   polyethylene glycol 3350 powder (packet) (not administered)   gabapentin (NEURONTIN) capsule 300 mg (not administered)   rOPINIRole (REQUIP) tablet 1 mg (not administered)   traZODone (DESYREL) tablet 50 mg (not administered)   sodium chloride 0.9 % flush 1-10 mL (not administered)   acetaminophen (TYLENOL) tablet 650 mg (not administered)   ipratropium (ATROVENT) nebulizer solution 0.5 mg (not administered)   ondansetron (ZOFRAN) injection 4 mg (not administered)   Pharmacy to dose vancomycin (not administered)   piperacillin-tazobactam (ZOSYN) 3.375 g in iso-osmotic dextrose 50 ml (premix) (not administered)   sodium chloride 0.9 % infusion (not administered)   cefTRIAXone (ROCEPHIN) IVPB  1 g (0 g Intravenous Stopped 5/16/18 0001)   ondansetron (ZOFRAN) injection 4 mg (4 mg Intravenous Given 5/15/18 2237)   Morphine sulfate (PF) injection 4 mg (4 mg Intravenous Given 5/15/18 2324)     ECG/EMG Results (last 24 hours)     Procedure Component Value Units Date/Time    ECG 12 Lead [[id number]] Collected:  05/15/18 2033     Updated:  05/15/18 2034                      [facility]    Final diagnoses:   Nonintractable headache, unspecified chronicity pattern, unspecified headache type   Hyperglycemia   Leukocytosis, unspecified type   Community acquired pneumonia of right middle lobe of lung   Calculus of gallbladder without cholecystitis without obstruction   Inguinal adenopathy   Cough       Documentation assistance provided by tejal Gonzalez.  Information recorded by the tejal was done at my direction and has been verified and validated by me.     Ten Gonzalez  05/15/18 2200       Ten Gonzalez  05/15/18 2251       Ten Gonzalez  05/15/18 9208       Josue Mina MD  05/16/18 4312

## 2018-05-16 NOTE — PROGRESS NOTES
Discharge Planning Assessment  [facility]     Patient Name: Angelita Shay  MRN: [id number]  Today's Date: 5/16/2018    Admit Date: 5/15/2018          Discharge Needs Assessment     Row Name 05/16/18 1349       Living Environment    Lives With child(kaz), adult    Name(s) of Who Lives With Patient Kaushik Peraza(adult daughter and son in law)    Current Living Arrangements home/apartment/condo    Primary Care Provided by self    Provides Primary Care For no one    Family Caregiver if Needed child(kaz), adult    Quality of Family Relationships supportive    Able to Return to Prior Arrangements yes    Living Arrangement Comments Spoke with pt in room with permission in regards to discharge planning. Pt resides in [facility] [facility] with daughter and son in law. Pt is independent of ADLs prior to admission.        Resource/Environmental Concerns    Resource/Environmental Concerns none    Transportation Concerns other (see comments)   Pt denies transportation concerns       Transition Planning    Patient/Family Anticipates Transition to home with family    Patient/Family Anticipated Services at Transition none    Transportation Anticipated family or friend will provide       Discharge Needs Assessment    Readmission Within the Last 30 Days no previous admission in last 30 days    Concerns to be Addressed denies needs/concerns at this time    Equipment Currently Used at Home glucometer;oxygen;respiratory supplies;other (see comments)   Pt states she has a CPAP at home, but does not use it. Pt unsure of DME she uses for home O2 and only wears home O2 as needed.     Anticipated Changes Related to Illness other (see comments)   Denies discharge needs at this time.    Equipment Needed After Discharge glucometer;oxygen;respiratory supplies    Discharge Coordination/Progress Pt has [employer] Medicare Replacement insurance and denies recent changes in insurance. Pt states here prescription copay is affordable and pt uses [facility] on [address]. Plan is home with family when medically ready for discharge. Pt denies discharge needs at this time. CM will cont to follow.            Discharge Plan     Row Name 05/16/18 2176       Plan    Plan discharge plan    Patient/Family in Agreement with Plan yes    Plan Comments Plan is home with family when medically ready for discharge. Pt denies discharge needs at this time. CM will cont to follow        Destination     No service coordination in this encounter.      Durable Medical Equipment     No service coordination in this encounter.      Dialysis/Infusion     No service coordination in this encounter.      Home Medical Care     No service coordination in this encounter.      Social Care     No service coordination in this encounter.        Expected Discharge Date and Time     Expected Discharge Date Expected Discharge Time    May 19, 2018               Demographic Summary     Row Name 05/16/18 0414       General Information    General Information Comments Pt's PCP is Sharon Saldivar       Contact Information    Permission Granted to Share Info With     Contact Information Obtained for     Contact Information Comments Medina Romeo(daughter) [phone] and Pat Peraza(daughter) [phone]            Functional Status     Row Name 05/16/18 0790       Functional Status    Functional Status Comments Pt is independent of ADLS prior to admission.             Psychosocial    No documentation.           Abuse/Neglect    No documentation.           Legal    No documentation.           Substance Abuse    No documentation.           Patient Forms    No documentation.         Bisi Case RN

## 2018-05-16 NOTE — CONSULTS
INFECTIOUS DISEASE CONSULT/INITIAL HOSPITAL VISIT    Angelita Shay  1945  [id number]    Date of Consult: 5/16/2018    Admission Date: 5/15/2018      Requesting Provider: Michelle Landa MD  Evaluating Physician: Herber Christiansen MD    Reason for Consultation: Sepsis, HCAP    History of present illness:    Patient is a 72 y.o. female with h/o CAD, CKD3, remote colon cancer, T2DM,  HTN, PAF/Eliquis, NICM (EF 36%), chronicstasis dermatitis, and osteoarthritis with Bilateral TSA and Bilateral TKA  Who presented to [facility] ED with worsening generalized weakness, body aches, rigors, night sweats, productive nonpurulent cough, and central chest pain especially with deep breaths.  She denies nausea, vomiting, diarrhea, and dysuria. She was recently hospitalized from 3/10-3/14/18 with acute systolic CHF exacerbation and diuresed.  She had LVEF of 36% with severe pulmonary HTN at that time. She was readmitted from 3/28-3/30/18 with RUQ pain and was felt to have chronic cholecystitis and was to follow up with Dr. Marion in a month for elective cholecystectomy.  However, she followed up with Dr. Ulloa in [place] will undergo cholecystectomy when cleared by cardiology in June 2018.  She now returns with respiratory symptoms and dyspnea. Worsened RUQ pain.  She denies sick contacts.  Work up showed Tmax 99.7, BP 89/63, pO2 74, PCT 1.04, WBC 28,000 with 87% neutrophils, BNP 92, sCr 1.3, lactic acid 3.2, and Strep A screen negative.  A CXR showed RML pulmonary infiltrate/atelectasis.  A CT scan of abd/pelvis showed cholelithiasis.  She was started on Zosyn and Vancomycin.  ID was asked to evaluate and manage her antibiotic therapy.     Past Medical History:   Diagnosis Date   • Anxiety    • Arthritis    • Cellulitis of both lower extremities    • CKD (chronic kidney disease), stage III    • Colon cancer 2000   • Colon polyp 2015    x 3 adenomatous   • Colon polyp 04/18/2018    x 5 Dr Cash   • Coronary artery disease     1 stent    • Corrosive esophagitis    • Depression    • Diabetes mellitus     dx 2 years ago- checks fsbs bid   • GERD (gastroesophageal reflux disease)    • GI bleed    • Gout    • H/O cardiac catheterization    • H/O colonoscopy 2008    incomplete prep   • H/O echocardiogram 03/11/2018    EF 36%,LVH, mild MR, mild TR   • H/O esophagogastroduodenoscopy 2008    hiatal hernia, gastritis   • H/O hiatal hernia    • H/O mammogram 2012   • Hyperlipidemia    • Hypertension    • Knee pain    • PAF (paroxysmal atrial fibrillation) 2007    postoperative total knee replacement   • Pap smear for cervical cancer screening 2013   • RLS (restless legs syndrome)    • Sleep apnea    • Wears eyeglasses        Past Surgical History:   Procedure Laterality Date   • APPENDECTOMY  05/1963   • CARDIAC CATHETERIZATION  08/2015    no stents   • CARDIAC CATHETERIZATION  03/13/2018   • CARDIAC CATHETERIZATION N/A 3/13/2018    Procedure: Left Heart Cath;  Surgeon: Pierre Jones III, MD;  Location:  [facility];  Service: Cardiovascular   • COLON RESECTION  04/08/2000    colon cancer   • COLONOSCOPY  2015   • COLONOSCOPY  04/18/2018    with 5 polyps removed 1yr f/u. Dr Cash- REPEAT YEARLY   • CORONARY ANGIOPLASTY WITH STENT PLACEMENT  12/2015   • CO TOTAL KNEE ARTHROPLASTY Left 11/3/2016    Procedure: LEFT TOTAL KNEE REPLACEMENT;  Surgeon: Laurent Zuniga MD;  Location:  [facility] OR;  Service: Orthopedics   • TONSILLECTOMY  12/1961   • TOTAL KNEE ARTHROPLASTY Right 2008    revision 2010   • TOTAL SHOULDER ARTHROPLASTY Bilateral     right 2014, left 2015       Family History   Problem Relation Age of Onset   • Colon cancer Other    • Diabetes Other    • Heart disease Other    • Hypertension Other    • Stroke Other    • Tuberculosis Other    • Hypertension Mother    • Colon cancer Father    • Stroke Father    • Diabetes Father    • Colon cancer Sister    • Diabetes Sister    • Diabetes Maternal Grandmother    • Coronary artery  disease Maternal Grandfather    • No Known Problems Paternal Grandmother    • No Known Problems Paternal Grandfather        Social History     Social History   • Marital status:      Spouse name: N/A   • Number of children: 5   • Years of education: N/A     Occupational History   • Retired       Social History Main Topics   • Smoking status: Former Smoker     Packs/day: 1.00     Years: 22.00     Types: Cigarettes     Quit date: 1979   • Smokeless tobacco: Never Used      Comment: quit 1979   • Alcohol use No   • Drug use: No   • Sexual activity: Defer     Other Topics Concern   • Not on file     Social History Narrative    Caffeine: 0-1 servings per day    Patient lives at her home with her daughter and her family       Allergies   Allergen Reactions   • Oxycodone Shortness Of Breath   • Zolpidem Other (See Comments)     nightmares         Medication:    Current Facility-Administered Medications:   •  ! Home Medications Stored in Pharmacy, , Does not apply, Daily, Celio Montiel [facility]  •  acetaminophen (TYLENOL) tablet 650 mg, 650 mg, Oral, Q4H PRN, Lamont Henderson PA-C, 650 mg at 05/16/18 1023  •  aspirin EC tablet 81 mg, 81 mg, Oral, Daily, Lamont Henderson PA-C, 81 mg at 05/16/18 0944  •  atorvastatin (LIPITOR) tablet 20 mg, 20 mg, Oral, Nightly, Lamont Henderson PA-C  •  dextrose (D50W) solution 25 g, 25 g, Intravenous, Q15 Min PRN, Lamont Henderson PA-C  •  dextrose (GLUTOSE) oral gel 15 g, 15 g, Oral, Q15 Min PRN, Lamont Henderson PA-C  •  gabapentin (NEURONTIN) capsule 300 mg, 300 mg, Oral, TID, Shayan Boswell MD  •  glucagon (human recombinant) (GLUCAGEN DIAGNOSTIC) injection 1 mg, 1 mg, Subcutaneous, PRN, Lamont Henderson PA-C  •  heparin (porcine) 5000 UNIT/ML injection 5,000 Units, 5,000 Units, Subcutaneous, Q8H, Tiffanie Landa MD  •  insulin lispro (humaLOG) injection 0-7 Units, 0-7 Units, Subcutaneous, 4x Daily With Meals & Nightly, Lamont Henderson PA-C  •   ipratropium-albuterol (DUO-NEB) nebulizer solution 3 mL, 3 mL, Nebulization, Q4H PRN, Lamont Henderson PA-C  •  ondansetron (ZOFRAN) injection 4 mg, 4 mg, Intravenous, Q6H PRN, Lamont Henderson PA-C  •  phenol (CHLORASEPTIC) 1.4 % liquid 1 spray, 1 spray, Mouth/Throat, Q2H PRN, Tiffanie Landa MD  •  piperacillin-tazobactam (ZOSYN) in iso-osmotic dextrose IVPB 2.25 g (premix), 2.25 g, Intravenous, Q6H, KHLOE Sharma  •  pneumococcal polysaccharide 23-valent (PNEUMOVAX-23) vaccine 0.5 mL, 0.5 mL, Intramuscular, During Hospitalization, Shayan Boswell MD  •  polyethylene glycol 3350 powder (packet), 17 g, Oral, Daily PRN, Lamont Henderson PA-C  •  rOPINIRole (REQUIP) tablet 1 mg, 1 mg, Oral, Nightly, Shayan Boswell MD  •  sodium chloride 0.9 % infusion, 125 mL/hr, Intravenous, Continuous, Tiffanie Landa MD, Last Rate: 125 mL/hr at 05/16/18 0948, 125 mL/hr at 05/16/18 0948  •  traZODone (DESYREL) tablet 50 mg, 50 mg, Oral, Nightly, Shayan Boswell MD    Antibiotics:  Anti-Infectives     Ordered     Dose/Rate Route Frequency Start Stop    05/16/18 1118  piperacillin-tazobactam (ZOSYN) in iso-osmotic dextrose IVPB 2.25 g (premix)     Ordering Provider:  KHLOE Sharma    2.25 g  over 30 Minutes Intravenous Every 6 Hours 05/16/18 1800 05/26/18 1759    05/16/18 0148  vancomycin 2000 mg/500 mL 0.9% NS IVPB ([facility])     Ordering Provider:  Celio Montiel [facility]    20 mg/kg × 94.3 kg  over 120 Minutes Intravenous Once 05/16/18 0300 05/16/18 0809    05/16/18 0145  piperacillin-tazobactam (ZOSYN) 3.375 g in iso-osmotic dextrose 50 ml (premix)     Ordering Provider:  Celio Montiel [facility]    3.375 g  over 30 Minutes Intravenous Once 05/16/18 0230 05/16/18 0638    05/15/18 2203  cefTRIAXone (ROCEPHIN) IVPB 1 g     Ordering Provider:  Josue Mina MD    1 g  100 mL/hr over 30 Minutes Intravenous Once 05/15/18 2205 05/16/18 0001            Review of Systems:  Constitutional-- + Fever, chills or sweats.   Appetite good, and no malaise. No fatigue.  HEENT-- She complains of a sore throat.  No epistaxis or oral sores.  Denies odynophagia or dysphagia. No headache, photophobia or neck stiffness.  CV-- No chest pain, palpitation or syncope  Resp-- + SOB/+cough/no Hemoptysis  GI- + nausea, no vomiting, or diarrhea.  No hematochezia, melena, or hematemesis. Denies jaundice or chronic liver disease.  Complain of severe RUQ pain worse when eating, but just worse today  -- No dysuria, hematuria, or flank pain.  Denies hesitancy, urgency or flank pain.  Lymph- no swollen lymph nodes in neck/axilla or groin.   Heme- No active bruising or bleeding; no Hx of DVT or PE.  MS-- no swelling or pain in the bones or joints of arms/legs.  No new back pain.  Neuro-- No acute focal weakness or numbness in the arms or legs.  No seizures.  Skin--No rashes or lesions      Physical Exam:   Vital Signs  Temp (24hrs), Av.1 °F (37.3 °C), Min:98.2 °F (36.8 °C), Max:99.8 °F (37.7 °C)    Temp  Min: 98.2 °F (36.8 °C)  Max: 99.8 °F (37.7 °C)  BP  Min: 82/48  Max: 152/84  Pulse  Min: 90  Max: 138  Resp  Min: 16  Max: 24  SpO2  Min: 86 %  Max: 97 %    GENERAL: Awake and alert, in no acute distress.   HEENT: Normocephalic, atraumatic.  PERRL. EOMI. No conjunctival injection. No icterus. Oropharynx clear without evidence of thrush or exudate.     NECK: Supple without nuchal rigidity. No mass.  LYMPH: No cervical, axillary or inguinal lymphadenopathy.  HEART: RRR; No murmur, rubs, gallops.   LUNGS: Diminished at lung bases bilaterally without wheezing, or rhonchi. Scattered rales R>L.  Normal respiratory effort. Nonlabored. Wet cough with observed hemoptysis.   ABDOMEN: Soft, RUQ tenderness, nondistended. Positive bowel sounds. No rebound or guarding. NO mass or HSM. Obese  EXT:  No cyanosis, clubbing or edema. No cord.  : Genitalia generally unremarkable.  Without Carrion catheter.  MSK: FROM without joint effusions noted arms/legs.    SKIN: Warm  and dry without cutaneous eruptions on Inspection/palpation.    NEURO: Oriented to PPT. No focal deficits on motor/sensory exam at arms/legs.  PSYCHIATRIC: Normal insight and judgement. Cooperative with PE    Laboratory Data      Results from last 7 days  Lab Units 05/16/18  0611 05/15/18  2036   WBC 10*3/mm3 24.02* 28.20*   HEMOGLOBIN g/dL 16.3* 16.8*   HEMATOCRIT % 49.5* 50.4*   PLATELETS 10*3/mm3 145* 155       Results from last 7 days  Lab Units 05/16/18  0611   SODIUM mmol/L 133   POTASSIUM mmol/L 4.8   CHLORIDE mmol/L 100   CO2 mmol/L 24.0   BUN mg/dL 26*   CREATININE mg/dL 1.80*   GLUCOSE mg/dL 198*   CALCIUM mg/dL 9.1       Results from last 7 days  Lab Units 05/16/18  0611   ALK PHOS U/L 76   BILIRUBIN mg/dL 1.4*   ALT (SGPT) U/L 26   AST (SGOT) U/L 21               Results from last 7 days  Lab Units 05/16/18  0611   LACTATE mmol/L 3.2*             Estimated Creatinine Clearance: 32.6 mL/min (A) (by C-G formula based on SCr of 1.8 mg/dL (H)).      Microbiology:      Blood cultures ordered  Strep A Ag negative  Sputum culture ordered  UA/UCx ordered                       Radiology:  Imaging Results (last 72 hours)     Procedure Component Value Units Date/Time    CT Abdomen Pelvis Without Contrast [[id number]] Collected:  05/15/18 2202     Updated:  05/15/18 2318    Narrative:       EXAM:    CT Abdomen and Pelvis Without Intravenous Contrast    CLINICAL HISTORY:    72 years old, female; Signs and symptoms; Other: See notes; Prior surgery;   Additional info: Pain    TECHNIQUE:    Axial computed tomography images of the abdomen and pelvis without   intravenous contrast.  All CT scans at this facility use one or more dose   reduction techniques, viz.: automated exposure control; ma/kV adjustment per   patient size (including targeted exams where dose is matched to indication;   i.e. head); or iterative reconstruction technique.    Coronal reformatted images were created and reviewed.    COMPARISON:    CT ABDOMEN  PELVIS W CONTRAST 2018-03-10 23:16, US Gallbladder 03/11/2018.    FINDINGS:    Limitations:  Patient's obese body habitus limits resolution of the   examination.    Lung bases:  Minimal basilar subsegmental atelectasis or scars.     ABDOMEN:    Liver:  Unremarkable.    Gallbladder and bile ducts:  Cholelithiasis at gallbladder neck.  No ductal   dilation.    Pancreas:  Unremarkable.  No ductal dilation.    Spleen:  Unremarkable. No splenomegaly or mass.    Adrenals:  Unremarkable adrenals.    Kidneys and ureters:  Unremarkable. No hydronephrosis, mass, urinary calculi,   or obstructive uropathy.    Stomach and bowel:  Nonspecific bowel gas pattern without dilatation or   obstruction.  Some fluid scattered in the nondilated small bowel.  Right-sided   bowel anastomosis.  The contracted segments of bowel restrict wall evaluation   to rule out any abnormal thickening.     PELVIS:    Appendix:  Appendix not localized, obscured.  However, no inflammation in the   RLQ mesenteric fat.    Bladder:  Unremarkable.  No stones.    Reproductive:  Unremarkable as visualized.  Retroverted uterus.     ABDOMEN and PELVIS:    Intraperitoneal space:  No pneumoperitoneum.  No significant free fluid.    Bones/joints:  Degenerative changes of the spine.  Mild scoliosis.  Mild DJD   of bilateral hips.  No acute fracture.  No dislocation.    Soft tissues:  Obesity.    Vasculature:  Pelvic phleboliths. Scattered atherosclerotic placques are seen   along some vessels. No abdominal aortic aneurysm.    Lymph nodes:  Nonspecific few slightly prominent bilateral inguinal lymph   nodes.  No enlarged intra-abdominal lymph nodes.    Other findings:  Surgical clips scattered in abdomen and pelvis.      Impression:       1.  Cholelithiasis.    2.  Nonspecific bowel gas pattern without obstruction.  3.  Nonspecific few slightly prominent bilateral inguinal lymph nodes.    THIS DOCUMENT HAS BEEN ELECTRONICALLY SIGNED BY EARL TOBIN MD    XR Chest 1  View [[id number]] Collected:  05/15/18 2031     Updated:  05/15/18 2141    Narrative:       EXAM:    XR Chest, 1 View    CLINICAL HISTORY:    72 years old, female; Signs and symptoms; Shortness of breath; Additional   info: SOA triage protocol    TECHNIQUE:    Frontal view of the chest.    COMPARISON:    CR - XR CHEST 1 VW 2018 18:41    FINDINGS:    Lungs:  Small new infiltrate/atelectasis in right mid-lung field compared to   prior study.  No acute left infiltrates.    Pleural space:  No pneumothorax or pleural effusion.    Heart:  Normal heart size.    Mediastinum: No acute abnormality compared to prior study.    Bones/joints:  Bilateral shoulder prostheses.  No acute abnormality seen   along the well-visualized osseous structures.  Minimal scoliotic curvature,   positional versus fixed.      Impression:         New mild right mid-lung field infiltrate/atelectasis.    THIS DOCUMENT HAS BEEN ELECTRONICALLY SIGNED BY EARL TOBIN MD        I read her chest x-ray and CT scans.    Impression:   1.  Severe sepsis POA with lactic acidosis, PCT, acute hypoxemic respiratory failure, hypotension, tachycardia, fever, and leukocytosis  2.  RML pneumonia.  Probably  HCAP.  Now with hemoptysis  3.  Chronic cholelithiasis/cholecystitis, awaiting CCY at [place]  4.  Fever, low grade  5.  Marked leukocytosis/neutrophilia  6.  Lactic acidosis/elevated procalcitonin  7.  Acute hypoxemic respiratory failure  8.  Hypotension, improved  9.  CKDIII  10.  T2DM, insulin dependent  11.  PAF/Eliquis  12.  NICM with h/o systolic CHF (last EF 36% with pulmonary HTN)  13.  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 Christiansen  13.  Sore throat-although she complains of a sore throat, her exam is unremarkable.      PLAN/RECOMMENDATIONS:   Thank you for asking us to see Angelita Shay, I recommend the followin.  Monitor cultures, labs, YUKO  2.  Continue Zosyn but change to  2.25GM IV Q6H  3.  D/c Vancomycin  4.  Sputum culture stat d/w nursing given her recent hemoptysis  5.  Nasal MRSA stat  6.  UAg for Strep pneumoniae      Herber Christiansen MD saw and examined patient, verified hx and PE, read all radiographic studies, reviewed labs and micro data, and formulated dx, plan for treatment and all medical decision making.      Maurizio Herman PA-C for MD Herber Shaffer MD  5/16/2018  11:42 AM

## 2018-05-16 NOTE — H&P
"    [facility]  HISTORY AND PHYSICAL    Patient Name: Angelita Shay  : 1945  MRN: [id number]  Primary Care Physician: Sharon Saldivar MD    Subjective   Subjective     Chief Complaint:  Generalized weakness    HPI:  Angelita Shay is a 72 y.o. female with extensive past medical history including PAF, IDDM, systolic dysfunction, CKD III, COPD, and CAD who presents with multiple complaints she has noticed for the past 3-5 days. She complaints of generalized weakness and diffuse body aches. States she feels \"feverish\", noting night sweats and rigors. She a has a productive cough of clear sputum and complains of central chest pain which is exacerbated by deep inspiration. Patient also endorsing SOB. Worse with exertion. States she was concerned and presented to ED for further evaluation. Currently with no complaints of abdominal pain, N/V/D, dysuria, or syncope, or focal weakness/parathesias. No lower extremity pain or swelling. Records reviewed indicate that patient was last admitted to this service 3/2018 secondary acute on chronic kidney failure and possible cholecystitis. Cardiac meds were held or adjusted and patient was discharged home with improved renal function. Will admit for further evaluation and treatment.     Emergency Department Evaluation; hypoxic to 87%. Tachycardic and hypotensive. Borderline febrile at 99.7. . Lactic acid 2.4. Procalcitonin 1.0. WBC 28.8. H/H 16.8 and 50.4. CXR demonstrates right middle lobe infiltrate. CT abdomen impressive for cholelithiasis. EKG stable.    Review of Systems   Constitutional: Positive for chills, fatigue and fever.   HENT: Positive for congestion. Negative for trouble swallowing.    Eyes: Negative for photophobia.   Respiratory: Positive for cough and shortness of breath.    Cardiovascular: Positive for chest pain. Negative for leg swelling.   Gastrointestinal: Negative for abdominal pain, diarrhea, nausea " and vomiting.   Endocrine: Negative for cold intolerance and heat intolerance.   Genitourinary: Negative for dysuria and flank pain.   Musculoskeletal: Negative for back pain and gait problem.   Skin: Negative for pallor and rash.   Allergic/Immunologic: Negative for immunocompromised state.   Neurological: Negative for dizziness and headaches.   Hematological: Negative for adenopathy.   Psychiatric/Behavioral: Negative for agitation and confusion.        Otherwise 10-system ROS reviewed and is negative except as mentioned in the HPI.    Personal History     Past Medical History:   Diagnosis Date   • Anxiety    • Arthritis    • Cellulitis of both lower extremities    • CKD (chronic kidney disease), stage III    • Colon cancer 2000   • Colon polyp 2015    x 3 adenomatous   • Colon polyp 04/18/2018    x 5 Dr Cash   • Coronary artery disease     1 stent   • Corrosive esophagitis    • Depression    • Diabetes mellitus     dx 2 years ago- checks fsbs bid   • GERD (gastroesophageal reflux disease)    • GI bleed    • Gout    • H/O cardiac catheterization    • H/O colonoscopy 2008    incomplete prep   • H/O echocardiogram 03/11/2018    EF 36%,LVH, mild MR, mild TR   • H/O esophagogastroduodenoscopy 2008    hiatal hernia, gastritis   • H/O hiatal hernia    • H/O mammogram 2012   • Hyperlipidemia    • Hypertension    • Knee pain    • PAF (paroxysmal atrial fibrillation) 2007    postoperative total knee replacement   • Pap smear for cervical cancer screening 2013   • RLS (restless legs syndrome)    • Sleep apnea    • Wears eyeglasses        Past Surgical History:   Procedure Laterality Date   • APPENDECTOMY  05/1963   • CARDIAC CATHETERIZATION  08/2015    no stents   • CARDIAC CATHETERIZATION  03/13/2018   • CARDIAC CATHETERIZATION N/A 3/13/2018    Procedure: Left Heart Cath;  Surgeon: Pierre Jones III, MD;  Location: [facility] CATH INVASIVE LOCATION;  Service: Cardiovascular   • COLON RESECTION  04/08/2000    colon cancer   •  COLONOSCOPY  2015   • COLONOSCOPY  04/18/2018    with 5 polyps removed 1yr f/u. Dr Cash- REPEAT YEARLY   • CORONARY ANGIOPLASTY WITH STENT PLACEMENT  12/2015   • LA TOTAL KNEE ARTHROPLASTY Left 11/3/2016    Procedure: LEFT TOTAL KNEE REPLACEMENT;  Surgeon: Laurent Zuniga MD;  Location: [facility];  Service: Orthopedics   • TONSILLECTOMY  12/1961   • TOTAL KNEE ARTHROPLASTY Right 2008    revision 2010   • TOTAL SHOULDER ARTHROPLASTY Bilateral     right 2014, left 2015       Family History: family history includes Colon cancer in her father, other, and sister; Coronary artery disease in her maternal grandfather; Diabetes in her father, maternal grandmother, other, and sister; Heart disease in her other; Hypertension in her mother and other; No Known Problems in her paternal grandfather and paternal grandmother; Stroke in her father and other; Tuberculosis in her other.     Social History:  reports that she quit smoking about 39 years ago. Her smoking use included Cigarettes. She has a 22.00 pack-year smoking history. She has never used smokeless tobacco. She reports that she does not drink alcohol or use drugs.  Social History     Social History Narrative    Caffeine: 0-1 servings per day    Patient lives at her home with her daughter and her family       Medications:    (Not in a hospital admission)    Allergies   Allergen Reactions   • Oxycodone Shortness Of Breath   • Zolpidem Other (See Comments)     nightmares       Objective   Objective     Vital Signs:   Temp:  [99.7 °F (37.6 °C)] 99.7 °F (37.6 °C)  Heart Rate:  [113-138] 120  Resp:  [16] 16  BP: ()/(52-92) 108/81        Physical Exam   Constitutional: No acute distress, awake, alert, diaphoretic  Eyes: PERRLA, sclerae anicteric, no conjunctival injection  HENT: NCAT, mucous membranes moist  Neck: Supple, no thyromegaly, no lymphadenopathy, trachea midline  Respiratory: Clear to auscultation bilaterally, nonlabored respirations    Cardiovascular: RRR, no murmurs, rubs, or gallops, palpable pedal pulses bilaterally  Gastrointestinal: Positive bowel sounds, soft, nontender, nondistended  Musculoskeletal: No bilateral ankle edema, no clubbing or cyanosis to extremities  Psychiatric: Appropriate affect, cooperative  Neurologic: Oriented x 3, strength symmetric in all extremities, Cranial Nerves grossly intact to confrontation, speech clear  Skin: No rashes      Results Reviewed:  I have personally reviewed current lab, radiology, and data and agree.      Results from last 7 days  Lab Units 05/15/18  2036   WBC 10*3/mm3 28.20*   HEMOGLOBIN g/dL 16.8*   HEMATOCRIT % 50.4*   PLATELETS 10*3/mm3 155       Results from last 7 days  Lab Units 05/15/18  2036   SODIUM mmol/L 134   POTASSIUM mmol/L 4.5   CHLORIDE mmol/L 97*   CO2 mmol/L 30.0   BUN mg/dL 25*   CREATININE mg/dL 1.30   GLUCOSE mg/dL 247*   CALCIUM mg/dL 9.9   ALT (SGPT) U/L 34   AST (SGOT) U/L 25     Estimated Creatinine Clearance: 44.6 mL/min (by C-G formula based on SCr of 1.3 mg/dL).  Brief Urine Lab Results  (Last result in the past 365 days)      Color   Clarity   Blood   Leuk Est   Nitrite   Protein   CREAT   Urine HCG        04/23/18 1246             17.3           BNP   Date Value Ref Range Status   05/15/2018 92.0 0.0 - 100.0 pg/mL Final     Comment:     Results may be falsely decreased if patient taking Biotin.     No results found for: PHART  Imaging Results (last 24 hours)     Procedure Component Value Units Date/Time    CT Abdomen Pelvis Without Contrast [[id number]] Collected:  05/15/18 2202     Updated:  05/15/18 2318    Narrative:       EXAM:    CT Abdomen and Pelvis Without Intravenous Contrast    CLINICAL HISTORY:    72 years old, female; Signs and symptoms; Other: See notes; Prior surgery;   Additional info: Pain    TECHNIQUE:    Axial computed tomography images of the abdomen and pelvis without   intravenous contrast.  All CT scans at this facility use one or more dose    reduction techniques, viz.: automated exposure control; ma/kV adjustment per   patient size (including targeted exams where dose is matched to indication;   i.e. head); or iterative reconstruction technique.    Coronal reformatted images were created and reviewed.    COMPARISON:    CT ABDOMEN PELVIS W CONTRAST 2018-03-10 23:16, US Gallbladder 03/11/2018.    FINDINGS:    Limitations:  Patient's obese body habitus limits resolution of the   examination.    Lung bases:  Minimal basilar subsegmental atelectasis or scars.     ABDOMEN:    Liver:  Unremarkable.    Gallbladder and bile ducts:  Cholelithiasis at gallbladder neck.  No ductal   dilation.    Pancreas:  Unremarkable.  No ductal dilation.    Spleen:  Unremarkable. No splenomegaly or mass.    Adrenals:  Unremarkable adrenals.    Kidneys and ureters:  Unremarkable. No hydronephrosis, mass, urinary calculi,   or obstructive uropathy.    Stomach and bowel:  Nonspecific bowel gas pattern without dilatation or   obstruction.  Some fluid scattered in the nondilated small bowel.  Right-sided   bowel anastomosis.  The contracted segments of bowel restrict wall evaluation   to rule out any abnormal thickening.     PELVIS:    Appendix:  Appendix not localized, obscured.  However, no inflammation in the   RLQ mesenteric fat.    Bladder:  Unremarkable.  No stones.    Reproductive:  Unremarkable as visualized.  Retroverted uterus.     ABDOMEN and PELVIS:    Intraperitoneal space:  No pneumoperitoneum.  No significant free fluid.    Bones/joints:  Degenerative changes of the spine.  Mild scoliosis.  Mild DJD   of bilateral hips.  No acute fracture.  No dislocation.    Soft tissues:  Obesity.    Vasculature:  Pelvic phleboliths. Scattered atherosclerotic placques are seen   along some vessels. No abdominal aortic aneurysm.    Lymph nodes:  Nonspecific few slightly prominent bilateral inguinal lymph   nodes.  No enlarged intra-abdominal lymph nodes.    Other findings:  Surgical  clips scattered in abdomen and pelvis.      Impression:       1.  Cholelithiasis.    2.  Nonspecific bowel gas pattern without obstruction.  3.  Nonspecific few slightly prominent bilateral inguinal lymph nodes.    THIS DOCUMENT HAS BEEN ELECTRONICALLY SIGNED BY EARL TOBIN MD    XR Chest 1 View [[id number]] Collected:  05/15/18 2031     Updated:  05/15/18 2141    Narrative:       EXAM:    XR Chest, 1 View    CLINICAL HISTORY:    72 years old, female; Signs and symptoms; Shortness of breath; Additional   info: SOA triage protocol    TECHNIQUE:    Frontal view of the chest.    COMPARISON:    CR - XR CHEST 1 VW 2018-03-28 18:41    FINDINGS:    Lungs:  Small new infiltrate/atelectasis in right mid-lung field compared to   prior study.  No acute left infiltrates.    Pleural space:  No pneumothorax or pleural effusion.    Heart:  Normal heart size.    Mediastinum: No acute abnormality compared to prior study.    Bones/joints:  Bilateral shoulder prostheses.  No acute abnormality seen   along the well-visualized osseous structures.  Minimal scoliotic curvature,   positional versus fixed.      Impression:         New mild right mid-lung field infiltrate/atelectasis.    THIS DOCUMENT HAS BEEN ELECTRONICALLY SIGNED BY EARL TOBIN MD        Results for orders placed during the hospital encounter of 03/10/18   Adult Transthoracic Echo Complete W/ Cont if Necessary Per Protocol    Narrative · Left atrial cavity size is mildly dilated.  · Left ventricular wall thickness is consistent with mild concentric   hypertrophy.  · Left ventricular systolic function is moderately decreased. Estimated EF   = 36%.  · Mild mitral valve regurgitation is present  · Mild tricuspid valve regurgitation is present.  · Estimated right ventricular systolic pressure is moderately elevated   (45-55 mmHg).          Assessment/Plan   Assessment / Plan     Hospital Problem List     * (Principal)Sepsis    Chronic systolic congestive heart failure     Overview Addendum 3/13/2018  5:17 PM by Festus Bowie IV, MD     · Echocardiogram (3/11/18):  LVEF 36%, mild MR  · Cardiac catheterization (3/13/18): Mild to moderate nonobstructive CAD. Previously placed LAD stent widely patent.         Paroxysmal atrial fibrillation (Chronic)    Overview Addendum 3/13/2018  6:14 PM by Festus Bowie IV, MD     · Chads VASC = 6 (age, female, CHF, CAD, HTN, DM)         Nonintractable headache    Dehydration    CAP (community acquired pneumonia)    Acute on chronic respiratory failure with hypoxia    Essential hypertension    DM type 2 (diabetes mellitus, type 2), on insulin therapy (Chronic)    Sleep apnea (Chronic)    Cholelithiasis (Chronic)    Chronic passive hepatic congestion    Overview Deleted 3/29/2018 12:17 PM by Ten Marion MD            CKD (chronic kidney disease), stage III    GERD (gastroesophageal reflux disease)    Coronary artery disease involving native coronary artery of native heart with angina pectoris (Chronic)    Overview Addendum 3/13/2018  5:16 PM by Festus Bowie IV, MD     · Previous PCI to the LAD  · Cardiac catheterization (8/2/2015): Mild nonobstructive CAD. Widely patent LAD stent. Normal LVEF.  · Cardiac catheterization (3/13/18): Mild to moderate nonobstructive CAD. Previously placed LAD stent widely patent.         RLS (restless legs syndrome)    Gout    Diabetic polyneuropathy associated with type 2 diabetes mellitus            Assessment & Plan:  1. Acute on chronic respiratory failure with hypoxia secondary to HCAP:  - on 2L supplemental oxygen at home as needed. Non compliant.  - improved and stable on nasal cannula. In the setting of substernal, inspiratory chest pain and hypoxia will start with D-dimer and obtain CTA chest pending results  - administered rocephin in ed. Patient is septic. Will start Vanc and Zosyn. Await blood culture reports. Consider consult to ID in am  - duo nebs with additional  pulmonary toilet as needed  - administered 250 cc bolus in Ed. BP still labile. Holding BP meds  - start saline 75 cc/hr x 8 hours,  Hydrate gently in the setting of chronic systolic dysfunction  - am labs    2. Chronic systolic dysfunction:  - appears compensated. Monitor closely in the setting of IVF.  - last echocardiogram 3/2018 with Ef: 36%  - has been followed by heart failure clinic in the past  - on coreg and lasix. Holding for now due to hypotension. Resume as tolerated    3. PAF:  - stable and rate controlled. [id number] = 5-6  - chronically anticoagulated on Eliquis. Reports compliance. Continue.   - followed by Dr. Bowie per cardiology    4. Abdominal pain:  - history of cholelithiasis/biliary colic. Demonstrated on imaging today with no evidence of acute cholecystitis.   - has been evaluated by Dr Ulloa in [place] and plans to undergo cholecystectomy  after she receives cardiac clearance from Dr Bowie.  - recommend outpatient follow up.    5. CKD III:  - stable. Baseline creatinine around 1.3. BUN slightly bumped with hemoconcentration. Likely some component of dehydration  - continue with gentle IVF as above  - avoid additional nephrotoxins    6. CAD:   - EKG as needed. Troponin negative  - S/P cardiac cath 3/2018. Noted mild to moderate nonobstructive CAD. LAD stent patent    7. IDDM:  - on Humalog sliding scale at home. With a history of diabetic neuropathy  - start SSI with scheduled accu checks      DVT prophylaxis: eliquis    CODE STATUS:  Full code    Admission Status:  I believe this patient meets INPATIENT status due to the need for care which can only be reasonably provided in an hospital setting such as aggressive/expedited ancillary services and/or consultation services, the necessity for IV medications, close physician monitoring and/or the possible need for procedures.  In such, I feel patient’s risk for adverse outcomes and need for care warrant INPATIENT evaluation and predict  the patient’s care encounter to likely last beyond 2 midnights.      Electronically signed by Lamont Henderson PA-C, 05/16/18, 1:16 AM.    Patient was independently evaluated by me. I have reviewed and edited above documentation. I agree with documentation as noted above.

## 2018-05-16 NOTE — PLAN OF CARE
Problem: Patient Care Overview  Goal: Individualization and Mutuality  Outcome: Ongoing (interventions implemented as appropriate)    Goal: Discharge Needs Assessment  Outcome: Ongoing (interventions implemented as appropriate)      Problem: Sepsis/Septic Shock (Adult)  Goal: Signs and Symptoms of Listed Potential Problems Will be Absent, Minimized or Managed (Sepsis/Septic Shock)  Outcome: Ongoing (interventions implemented as appropriate)      Problem: Pneumonia (Adult)  Goal: Signs and Symptoms of Listed Potential Problems Will be Absent, Minimized or Managed (Pneumonia)  Outcome: Ongoing (interventions implemented as appropriate)      Problem: Fall Risk (Adult)  Goal: Identify Related Risk Factors and Signs and Symptoms  Outcome: Ongoing (interventions implemented as appropriate)    Goal: Absence of Fall  Outcome: Ongoing (interventions implemented as appropriate)

## 2018-05-16 NOTE — PROGRESS NOTES
[facility] Medicine Services  PROGRESS NOTE    Patient Name: Angelita Shay  : 1945  MRN: [id number]    Date of Admission: 5/15/2018  Length of Stay: 1  Primary Care Physician: Sharon Saldivar MD    Subjective   Subjective     CC:  Fever, chills, sweats, and cough    HPI:  CTSP at 0800 for hypotension.   Admitted last night with dx of pna and multiple comorbidities including CHF, DM, PAFib.  Overnight, no urine output, BPs 80-90's, lactic acid up, creatinine up.    I ordered bolus of 500 saline at 0700 which is going in currently.      Patient says she developed a cough a couple days ago, had sweats and chills thereafter, now has a bad sore throat which she relates to severe coughing.  Also has intermittent abd pain from a gallbladder awaiting extraction (needs cardiac clearance).      Nurse notes she ihas been somnolent despite no sedatives/opioids for nearly twelve hours. ABG is pending.     Review of Systems   As above.   Otherwise ROS is negative except as mentioned in the HPI.    Objective   Objective     Vital Signs:   Temp:  [98.2 °F (36.8 °C)-99.7 °F (37.6 °C)] 98.2 °F (36.8 °C)  Heart Rate:  [100-138] 100  Resp:  [16-20] 20  BP: ()/(49-92) 93/49        Physical Exam:  Constitutional: Ill-appearing, somnolent but rousable, nods off.    Eyes: PERRLA, sclerae anicteric, no conjunctival injection  HENT: NCAT, mucous membranes moist, post OP red  Neck: Supple,  trachea midline  Respiratory: Clear to auscultation bilaterally, nonlabored respirations   Cardiovascular: tachy RR, no murmurs, rubs, or gallops, Gastrointestinal: Positive bowel sounds, soft, difusely tender faviola epigastrium and RUQ, nondistended  Musculoskeletal: No bilateral ankle edema, no clubbing or cyanosis to extremities  Psychiatric: Appropriate affect, cooperative  Neurologic: very somnolent, strength symmetric in all extremities, Cranial Nerves grossly intact to confrontation, speech clear  Skin: No  ciara    Results Reviewed:  I have personally reviewed current lab, radiology, and data and agree.      Results from last 7 days  Lab Units 05/16/18  0611 05/15/18  2036   WBC 10*3/mm3 24.02* 28.20*   HEMOGLOBIN g/dL 16.3* 16.8*   HEMATOCRIT % 49.5* 50.4*   PLATELETS 10*3/mm3 145* 155       Results from last 7 days  Lab Units 05/16/18  0611 05/15/18  2036   SODIUM mmol/L 133 134   POTASSIUM mmol/L 4.8 4.5   CHLORIDE mmol/L 100 97*   CO2 mmol/L 24.0 30.0   BUN mg/dL 26* 25*   CREATININE mg/dL 1.80* 1.30   GLUCOSE mg/dL 198* 247*   CALCIUM mg/dL 9.1 9.9   ALT (SGPT) U/L 26 34   AST (SGOT) U/L 21 25     Estimated Creatinine Clearance: 32.6 mL/min (A) (by C-G formula based on SCr of 1.8 mg/dL (H)).  BNP   Date Value Ref Range Status   05/15/2018 92.0 0.0 - 100.0 pg/mL Final     Comment:     Results may be falsely decreased if patient taking Biotin.     No results found for: PHART    Microbiology Results Abnormal     None          Imaging Results (last 24 hours)     Procedure Component Value Units Date/Time    CT Abdomen Pelvis Without Contrast [[id number]] Collected:  05/15/18 2202     Updated:  05/15/18 2318    Narrative:       EXAM:    CT Abdomen and Pelvis Without Intravenous Contrast    CLINICAL HISTORY:    72 years old, female; Signs and symptoms; Other: See notes; Prior surgery;   Additional info: Pain    TECHNIQUE:    Axial computed tomography images of the abdomen and pelvis without   intravenous contrast.  All CT scans at this facility use one or more dose   reduction techniques, viz.: automated exposure control; ma/kV adjustment per   patient size (including targeted exams where dose is matched to indication;   i.e. head); or iterative reconstruction technique.    Coronal reformatted images were created and reviewed.    COMPARISON:    CT ABDOMEN PELVIS W CONTRAST 2018-03-10 23:16, US Gallbladder 03/11/2018.    FINDINGS:    Limitations:  Patient's obese body habitus limits resolution of the   examination.     Lung bases:  Minimal basilar subsegmental atelectasis or scars.     ABDOMEN:    Liver:  Unremarkable.    Gallbladder and bile ducts:  Cholelithiasis at gallbladder neck.  No ductal   dilation.    Pancreas:  Unremarkable.  No ductal dilation.    Spleen:  Unremarkable. No splenomegaly or mass.    Adrenals:  Unremarkable adrenals.    Kidneys and ureters:  Unremarkable. No hydronephrosis, mass, urinary calculi,   or obstructive uropathy.    Stomach and bowel:  Nonspecific bowel gas pattern without dilatation or   obstruction.  Some fluid scattered in the nondilated small bowel.  Right-sided   bowel anastomosis.  The contracted segments of bowel restrict wall evaluation   to rule out any abnormal thickening.     PELVIS:    Appendix:  Appendix not localized, obscured.  However, no inflammation in the   RLQ mesenteric fat.    Bladder:  Unremarkable.  No stones.    Reproductive:  Unremarkable as visualized.  Retroverted uterus.     ABDOMEN and PELVIS:    Intraperitoneal space:  No pneumoperitoneum.  No significant free fluid.    Bones/joints:  Degenerative changes of the spine.  Mild scoliosis.  Mild DJD   of bilateral hips.  No acute fracture.  No dislocation.    Soft tissues:  Obesity.    Vasculature:  Pelvic phleboliths. Scattered atherosclerotic placques are seen   along some vessels. No abdominal aortic aneurysm.    Lymph nodes:  Nonspecific few slightly prominent bilateral inguinal lymph   nodes.  No enlarged intra-abdominal lymph nodes.    Other findings:  Surgical clips scattered in abdomen and pelvis.      Impression:       1.  Cholelithiasis.    2.  Nonspecific bowel gas pattern without obstruction.  3.  Nonspecific few slightly prominent bilateral inguinal lymph nodes.    THIS DOCUMENT HAS BEEN ELECTRONICALLY SIGNED BY EARL TOBIN MD    XR Chest 1 View [[id number]] Collected:  05/15/18 2031     Updated:  05/15/18 2141    Narrative:       EXAM:    XR Chest, 1 View    CLINICAL HISTORY:    72 years old, female;  Signs and symptoms; Shortness of breath; Additional   info: SOA triage protocol    TECHNIQUE:    Frontal view of the chest.    COMPARISON:    CR - XR CHEST 1 VW 2018-03-28 18:41    FINDINGS:    Lungs:  Small new infiltrate/atelectasis in right mid-lung field compared to   prior study.  No acute left infiltrates.    Pleural space:  No pneumothorax or pleural effusion.    Heart:  Normal heart size.    Mediastinum: No acute abnormality compared to prior study.    Bones/joints:  Bilateral shoulder prostheses.  No acute abnormality seen   along the well-visualized osseous structures.  Minimal scoliotic curvature,   positional versus fixed.      Impression:         New mild right mid-lung field infiltrate/atelectasis.    THIS DOCUMENT HAS BEEN ELECTRONICALLY SIGNED BY EARL TOBIN MD        Results for orders placed during the hospital encounter of 03/10/18   Adult Transthoracic Echo Complete W/ Cont if Necessary Per Protocol    Narrative · Left atrial cavity size is mildly dilated.  · Left ventricular wall thickness is consistent with mild concentric   hypertrophy.  · Left ventricular systolic function is moderately decreased. Estimated EF   = 36%.  · Mild mitral valve regurgitation is present  · Mild tricuspid valve regurgitation is present.  · Estimated right ventricular systolic pressure is moderately elevated   (45-55 mmHg).          I have reviewed the medications.    Assessment/Plan   Assessment / Plan     Hospital Problem List     * (Principal)Sepsis    Essential hypertension    DM type 2 (diabetes mellitus, type 2), on insulin therapy (Chronic)    GERD (gastroesophageal reflux disease)    Coronary artery disease involving native coronary artery of native heart with angina pectoris (Chronic)    Overview Addendum 3/13/2018  5:16 PM by Festus Bowie IV, MD     · Previous PCI to the LAD  · Cardiac catheterization (8/2/2015): Mild nonobstructive CAD. Widely patent LAD stent. Normal LVEF.  · Cardiac  "catheterization (3/13/18): Mild to moderate nonobstructive CAD. Previously placed LAD stent widely patent.         Sleep apnea (Chronic)    RLS (restless legs syndrome)    Gout    Chronic systolic congestive heart failure    Overview Addendum 3/13/2018  5:17 PM by Festus Bowie IV, MD     · Echocardiogram (3/11/18):  LVEF 36%, mild MR  · Cardiac catheterization (3/13/18): Mild to moderate nonobstructive CAD. Previously placed LAD stent widely patent.         Paroxysmal atrial fibrillation (Chronic)    Overview Addendum 3/13/2018  6:14 PM by Festus Bowie IV, MD     · Chads VASC = 6 (age, female, CHF, CAD, HTN, DM)         Cholelithiasis (Chronic)    Chronic passive hepatic congestion    Overview Deleted 3/29/2018 12:17 PM by Ten Marion MD            Diabetic polyneuropathy associated with type 2 diabetes mellitus    Nonintractable headache    Dehydration    CAP (community acquired pneumonia)    CKD (chronic kidney disease), stage III    Acute on chronic respiratory failure with hypoxia             Brief Hospital Course to date:  Angelita Shay is a 72 y.o. female here with severe sepsis.  Cough is prominent.  R lung infiltrate by CXR.  Abd CT did not show GB inflammation.  A main complaint is \"swollen throat\".  Since this started preadmission and is described as progressively painful, doubt it is anaphylaxis but must consider this.       Assessment & Plan:  Severe sepsis bordering on septic shock.   -- bolus  -- place jo  -- transfer to ICU if BPs dont improve.  Discussed with nurse.     R pna  -- vanc, Zosyn.  Day 1. BCx pending  -- strep A screen, sputum culture  -- ID consult    GORDON  -- hydrate  -- consider changing off Vanc - defer to ID    Somnolence  -- likely from sepsis.  ABG pending.   PAF, CHADs VAsc 6.   -- stopping eliquis in case procedures needed    Dm2:  SSI  CHF:  EF 36 percent.  Watch sats.   CAD with LAD stent:  cath'ed in March, looked good.             DVT " Prophylaxis:  hep    CODE STATUS: Full Code    Disposition: I expect the patient to be discharged ?? in ?? days.      Electronically signed by Tiffanie Landa MD, 05/16/18, 8:19 AM.

## 2018-05-16 NOTE — PLAN OF CARE
Problem: Patient Care Overview  Goal: Plan of Care Review  Outcome: Ongoing (interventions implemented as appropriate)   05/16/18 0501   Coping/Psychosocial   Plan of Care Reviewed With patient   Plan of Care Review   Progress improving   OTHER   Outcome Summary Pt. slept most of the night, VSS, slight temp. IS education provided, & 02 92% on 2L,.       Problem: Sepsis/Septic Shock (Adult)  Goal: Signs and Symptoms of Listed Potential Problems Will be Absent, Minimized or Managed (Sepsis/Septic Shock)  Outcome: Ongoing (interventions implemented as appropriate)   05/16/18 0501   Goal/Outcome Evaluation   Problems Assessed (Sepsis) all   Problems Present (Sepsis) situational response;infection progression       Problem: Pneumonia (Adult)  Goal: Signs and Symptoms of Listed Potential Problems Will be Absent, Minimized or Managed (Pneumonia)  Outcome: Ongoing (interventions implemented as appropriate)   05/16/18 0501   Goal/Outcome Evaluation   Problems Assessed (Pneumonia) all   Problems Present (Pneumonia) fluid/electrolyte imbalance;respiratory compromise

## 2018-05-17 LAB
ALBUMIN SERPL-MCNC: 3.3 G/DL (ref 3.2–4.8)
ALBUMIN/GLOB SERPL: 1.4 G/DL (ref 1.5–2.5)
ALP SERPL-CCNC: 61 U/L (ref 25–100)
ALT SERPL W P-5'-P-CCNC: 19 U/L (ref 7–40)
ANION GAP SERPL CALCULATED.3IONS-SCNC: 6 MMOL/L (ref 3–11)
AST SERPL-CCNC: 13 U/L (ref 0–33)
BASOPHILS # BLD AUTO: 0.02 10*3/MM3 (ref 0–0.2)
BASOPHILS NFR BLD AUTO: 0.2 % (ref 0–1)
BILIRUB SERPL-MCNC: 0.4 MG/DL (ref 0.3–1.2)
BUN BLD-MCNC: 29 MG/DL (ref 9–23)
BUN/CREAT SERPL: 24.2 (ref 7–25)
CALCIUM SPEC-SCNC: 8 MG/DL (ref 8.7–10.4)
CHLORIDE SERPL-SCNC: 105 MMOL/L (ref 99–109)
CO2 SERPL-SCNC: 27 MMOL/L (ref 20–31)
CREAT BLD-MCNC: 1.2 MG/DL (ref 0.6–1.3)
D-LACTATE SERPL-SCNC: 0.7 MMOL/L (ref 0.5–2)
DEPRECATED RDW RBC AUTO: 52.7 FL (ref 37–54)
EOSINOPHIL # BLD AUTO: 0.29 10*3/MM3 (ref 0–0.3)
EOSINOPHIL NFR BLD AUTO: 2.4 % (ref 0–3)
ERYTHROCYTE [DISTWIDTH] IN BLOOD BY AUTOMATED COUNT: 14.5 % (ref 11.3–14.5)
GFR SERPL CREATININE-BSD FRML MDRD: 44 ML/MIN/1.73
GLOBULIN UR ELPH-MCNC: 2.3 GM/DL
GLUCOSE BLD-MCNC: 151 MG/DL (ref 70–100)
GLUCOSE BLDC GLUCOMTR-MCNC: 164 MG/DL (ref 70–130)
GLUCOSE BLDC GLUCOMTR-MCNC: 168 MG/DL (ref 70–130)
GLUCOSE BLDC GLUCOMTR-MCNC: 177 MG/DL (ref 70–130)
GLUCOSE BLDC GLUCOMTR-MCNC: 205 MG/DL (ref 70–130)
HCT VFR BLD AUTO: 39.1 % (ref 34.5–44)
HGB BLD-MCNC: 12.7 G/DL (ref 11.5–15.5)
IMM GRANULOCYTES # BLD: 0.03 10*3/MM3 (ref 0–0.03)
IMM GRANULOCYTES NFR BLD: 0.2 % (ref 0–0.6)
LYMPHOCYTES # BLD AUTO: 1.1 10*3/MM3 (ref 0.6–4.8)
LYMPHOCYTES NFR BLD AUTO: 9 % (ref 24–44)
MCH RBC QN AUTO: 32.5 PG (ref 27–31)
MCHC RBC AUTO-ENTMCNC: 32.5 G/DL (ref 32–36)
MCV RBC AUTO: 100 FL (ref 80–99)
MONOCYTES # BLD AUTO: 0.53 10*3/MM3 (ref 0–1)
MONOCYTES NFR BLD AUTO: 4.4 % (ref 0–12)
NEUTROPHILS # BLD AUTO: 10.2 10*3/MM3 (ref 1.5–8.3)
NEUTROPHILS NFR BLD AUTO: 83.8 % (ref 41–71)
PLATELET # BLD AUTO: 126 10*3/MM3 (ref 150–450)
PMV BLD AUTO: 11.2 FL (ref 6–12)
POTASSIUM BLD-SCNC: 3.9 MMOL/L (ref 3.5–5.5)
PROT SERPL-MCNC: 5.6 G/DL (ref 5.7–8.2)
RBC # BLD AUTO: 3.91 10*6/MM3 (ref 3.89–5.14)
SODIUM BLD-SCNC: 138 MMOL/L (ref 132–146)
WBC NRBC COR # BLD: 12.17 10*3/MM3 (ref 3.5–10.8)

## 2018-05-17 PROCEDURE — 25010000002 HEPARIN (PORCINE) PER 1000 UNITS: Performed by: INTERNAL MEDICINE

## 2018-05-17 PROCEDURE — 83605 ASSAY OF LACTIC ACID: CPT | Performed by: INTERNAL MEDICINE

## 2018-05-17 PROCEDURE — 85025 COMPLETE CBC W/AUTO DIFF WBC: CPT | Performed by: INTERNAL MEDICINE

## 2018-05-17 PROCEDURE — 80053 COMPREHEN METABOLIC PANEL: CPT | Performed by: INTERNAL MEDICINE

## 2018-05-17 PROCEDURE — 25010000002 ONDANSETRON PER 1 MG: Performed by: PHYSICIAN ASSISTANT

## 2018-05-17 PROCEDURE — 94799 UNLISTED PULMONARY SVC/PX: CPT

## 2018-05-17 PROCEDURE — 25010000002 ERTAPENEM 1 GM/100ML SOLUTION: Performed by: INTERNAL MEDICINE

## 2018-05-17 PROCEDURE — 99232 SBSQ HOSP IP/OBS MODERATE 35: CPT | Performed by: INTERNAL MEDICINE

## 2018-05-17 PROCEDURE — 82962 GLUCOSE BLOOD TEST: CPT

## 2018-05-17 PROCEDURE — 25010000002 PIPERACILLIN SOD-TAZOBACTAM PER 1 G: Performed by: PHYSICIAN ASSISTANT

## 2018-05-17 RX ORDER — HYDROCODONE BITARTRATE AND ACETAMINOPHEN 5; 325 MG/1; MG/1
1 TABLET ORAL ONCE
Status: COMPLETED | OUTPATIENT
Start: 2018-05-17 | End: 2018-05-17

## 2018-05-17 RX ADMIN — GABAPENTIN 300 MG: 300 CAPSULE ORAL at 21:13

## 2018-05-17 RX ADMIN — HYDROCODONE BITARTRATE AND ACETAMINOPHEN 1 TABLET: 5; 325 TABLET ORAL at 00:37

## 2018-05-17 RX ADMIN — SODIUM CHLORIDE 125 ML/HR: 9 INJECTION, SOLUTION INTRAVENOUS at 09:32

## 2018-05-17 RX ADMIN — HYDROCODONE BITARTRATE AND ACETAMINOPHEN 1 TABLET: 5; 325 TABLET ORAL at 22:59

## 2018-05-17 RX ADMIN — ERTAPENEM SODIUM 1 G: 1 INJECTION, POWDER, LYOPHILIZED, FOR SOLUTION INTRAMUSCULAR; INTRAVENOUS at 21:36

## 2018-05-17 RX ADMIN — ONDANSETRON 4 MG: 2 INJECTION INTRAMUSCULAR; INTRAVENOUS at 15:32

## 2018-05-17 RX ADMIN — INSULIN LISPRO 2 UNITS: 100 INJECTION, SOLUTION INTRAVENOUS; SUBCUTANEOUS at 09:25

## 2018-05-17 RX ADMIN — ONDANSETRON 4 MG: 2 INJECTION INTRAMUSCULAR; INTRAVENOUS at 09:32

## 2018-05-17 RX ADMIN — GABAPENTIN 300 MG: 300 CAPSULE ORAL at 13:10

## 2018-05-17 RX ADMIN — TRAZODONE HYDROCHLORIDE 50 MG: 50 TABLET ORAL at 21:13

## 2018-05-17 RX ADMIN — ASPIRIN 81 MG: 81 TABLET, COATED ORAL at 09:25

## 2018-05-17 RX ADMIN — TAZOBACTAM SODIUM AND PIPERACILLIN SODIUM 2.25 G: 250; 2 INJECTION, SOLUTION INTRAVENOUS at 17:23

## 2018-05-17 RX ADMIN — NYSTATIN 500000 UNITS: 100000 SUSPENSION ORAL at 17:23

## 2018-05-17 RX ADMIN — TAZOBACTAM SODIUM AND PIPERACILLIN SODIUM 2.25 G: 250; 2 INJECTION, SOLUTION INTRAVENOUS at 13:10

## 2018-05-17 RX ADMIN — ROPINIROLE 1 MG: 0.5 TABLET, FILM COATED ORAL at 21:13

## 2018-05-17 RX ADMIN — INSULIN LISPRO 2 UNITS: 100 INJECTION, SOLUTION INTRAVENOUS; SUBCUTANEOUS at 17:23

## 2018-05-17 RX ADMIN — GABAPENTIN 300 MG: 300 CAPSULE ORAL at 05:15

## 2018-05-17 RX ADMIN — HEPARIN SODIUM 5000 UNITS: 5000 INJECTION, SOLUTION INTRAVENOUS; SUBCUTANEOUS at 05:15

## 2018-05-17 RX ADMIN — ACETAMINOPHEN 650 MG: 325 TABLET, FILM COATED ORAL at 09:24

## 2018-05-17 RX ADMIN — POLYETHYLENE GLYCOL (3350) 17 G: 17 POWDER, FOR SOLUTION ORAL at 13:10

## 2018-05-17 RX ADMIN — HEPARIN SODIUM 5000 UNITS: 5000 INJECTION, SOLUTION INTRAVENOUS; SUBCUTANEOUS at 21:13

## 2018-05-17 RX ADMIN — INSULIN LISPRO 2 UNITS: 100 INJECTION, SOLUTION INTRAVENOUS; SUBCUTANEOUS at 13:09

## 2018-05-17 RX ADMIN — ATORVASTATIN CALCIUM 20 MG: 20 TABLET, FILM COATED ORAL at 21:13

## 2018-05-17 RX ADMIN — TAZOBACTAM SODIUM AND PIPERACILLIN SODIUM 2.25 G: 250; 2 INJECTION, SOLUTION INTRAVENOUS at 05:15

## 2018-05-17 RX ADMIN — NYSTATIN 500000 UNITS: 100000 SUSPENSION ORAL at 13:11

## 2018-05-17 RX ADMIN — INSULIN LISPRO 3 UNITS: 100 INJECTION, SOLUTION INTRAVENOUS; SUBCUTANEOUS at 21:36

## 2018-05-17 RX ADMIN — HEPARIN SODIUM 5000 UNITS: 5000 INJECTION, SOLUTION INTRAVENOUS; SUBCUTANEOUS at 13:10

## 2018-05-17 RX ADMIN — NYSTATIN 500000 UNITS: 100000 SUSPENSION ORAL at 21:12

## 2018-05-17 RX ADMIN — ACETAMINOPHEN 650 MG: 325 TABLET, FILM COATED ORAL at 13:10

## 2018-05-17 NOTE — PROGRESS NOTES
[facility] Medicine Services  PROGRESS NOTE    Patient Name: Angelita Shay  : 1945  MRN: [id number]    Date of Admission: 5/15/2018  Length of Stay: 2  Primary Care Physician: Sharon Saldivar MD    Subjective   Subjective     CC:  Fever, chills, sweats, and cough    HPI:  BP has improved.  She has occas nausea but is eating intermittently.   Mild abd pain at times.   Cough ongoing. No longer sleepy      Review of Systems   As above.   Otherwise ROS is negative except as mentioned in the HPI.    Objective   Objective     Vital Signs:   Temp:  [97.9 °F (36.6 °C)-99.2 °F (37.3 °C)] 99 °F (37.2 °C)  Heart Rate:  [] 93  Resp:  [16-18] 18  BP: ()/(51-85) 97/57        Physical Exam:  Constitutional: sitting up in chair, perky and alert, very chatty  Eyes: PERRLA, sclerae anicteric, no conjunctival injection  HENT: NCAT, mucous membranes moist, post OP red  Neck: Supple,  trachea midline  Respiratory: Clear to auscultation bilaterally, nonlabored respirations on oxygen   Cardiovascular: tachy RR, no murmurs, rubs, or gallops, Gastrointestinal: Positive bowel sounds, soft, difusely tender faviola epigastrium and RUQ, nondistended  Musculoskeletal: No bilateral ankle edema, no clubbing or cyanosis to extremities  Psychiatric: Appropriate affect, cooperative  Neurologic: awake and alert, strength symmetric in all extremities, Cranial Nerves grossly intact to confrontation, speech clear  Skin: No rashes    Results Reviewed:  I have personally reviewed current lab, radiology, and data and agree.      Results from last 7 days  Lab Units 18  0353 18  0611 05/15/18  2036   WBC 10*3/mm3 12.17* 24.02* 28.20*   HEMOGLOBIN g/dL 12.7 16.3* 16.8*   HEMATOCRIT % 39.1 49.5* 50.4*   PLATELETS 10*3/mm3 126* 145* 155       Results from last 7 days  Lab Units 18  0353 18  0611 05/15/18  2036   SODIUM mmol/L 138 133 134   POTASSIUM mmol/L 3.9 4.8 4.5   CHLORIDE mmol/L 105  100 97*   CO2 mmol/L 27.0 24.0 30.0   BUN mg/dL 29* 26* 25*   CREATININE mg/dL 1.20 1.80* 1.30   GLUCOSE mg/dL 151* 198* 247*   CALCIUM mg/dL 8.0* 9.1 9.9   ALT (SGPT) U/L 19 26 34   AST (SGOT) U/L 13 21 25     Estimated Creatinine Clearance: 49.6 mL/min (by C-G formula based on SCr of 1.2 mg/dL).  BNP   Date Value Ref Range Status   05/15/2018 92.0 0.0 - 100.0 pg/mL Final     Comment:     Results may be falsely decreased if patient taking Biotin.     pH, Arterial   Date Value Ref Range Status   05/16/2018 7.383 7.350 - 7.450 pH units Final       Microbiology Results Abnormal     Procedure Component Value - Date/Time    Beta Strep Culture, Throat - Swab, Throat [[id number]]  (Normal) Collected:  05/16/18 0837    Lab Status:  Preliminary result Specimen:  Swab from Throat Updated:  05/17/18 0807     Throat Culture, Beta Strep No Beta Hemolytic Streptococcus Isolated    Respiratory Culture - Sputum, Cough [[id number]] Collected:  05/16/18 1827    Lab Status:  Preliminary result Specimen:  Sputum from Cough Updated:  05/17/18 0803     Respiratory Culture No growth at less than 24 hours    Blood Culture - Blood, Blood, Venous Line [[id number]]  (Normal) Collected:  05/15/18 2245    Lab Status:  Preliminary result Specimen:  Blood from Arm, Right Updated:  05/16/18 2300     Blood Culture No growth at 24 hours    MRSA Screen, PCR - Swab, Nares [[id number]]  (Normal) Collected:  05/16/18 1427    Lab Status:  Final result Specimen:  Swab from Nares Updated:  05/16/18 1710     MRSA, PCR Negative    Narrative:         MRSA Negative    Rapid Strep A Screen - Swab, Throat [[id number]]  (Normal) Collected:  05/16/18 0837    Lab Status:  Final result Specimen:  Swab from Throat Updated:  05/16/18 1015     Strep A Ag Negative    Narrative:         Test performed by Direct Antigen Testing.          Imaging Results (last 24 hours)     Procedure Component Value Units Date/Time    XR Chest 1 View [[id number]] Collected:  05/16/18 0190      Updated:  05/17/18 0021    Narrative:       EXAM:    XR Chest, 1 View    CLINICAL HISTORY:    72 years old, female; Elevated white blood cell count, unspecified; Lobar   pneumonia, unspecified organism; Calculus of gallbladder without cholecystitis   without obstruction; Cough; Headache; Localized enlarged lymph nodes;   Hyperglycemia, unspecified; Signs and symptoms; Dyspnea and fever; Additional   info: Hypoxic    TECHNIQUE:    Frontal view of the chest.    COMPARISON:    CR - XR CHEST 1 VW 2018-05-15 21:06    FINDINGS:    No significant change in focal airspace opacity in the RIGHT mid zone.    Prominent lung markings bilaterally with mild bibasal atelectasis, RIGHT worse   than LEFT. Cardiomegaly with normal lung vascularity.  Calcification and   unfolding of the aortic arch.       Impression:         Unchanged RIGHT lung consolidation/pneumonia. Possibility of underlying   malignancy cannot be excluded.  Recommend followup to complete resolution.       THIS DOCUMENT HAS BEEN ELECTRONICALLY SIGNED BY ILENE CRUZ MD        Results for orders placed during the hospital encounter of 03/10/18   Adult Transthoracic Echo Complete W/ Cont if Necessary Per Protocol    Narrative · Left atrial cavity size is mildly dilated.  · Left ventricular wall thickness is consistent with mild concentric   hypertrophy.  · Left ventricular systolic function is moderately decreased. Estimated EF   = 36%.  · Mild mitral valve regurgitation is present  · Mild tricuspid valve regurgitation is present.  · Estimated right ventricular systolic pressure is moderately elevated   (45-55 mmHg).          I have reviewed the medications.    Assessment/Plan   Assessment / Plan     Hospital Problem List     * (Principal)Sepsis    Essential hypertension    DM type 2 (diabetes mellitus, type 2), on insulin therapy (Chronic)    GERD (gastroesophageal reflux disease)    Coronary artery disease involving native coronary artery of native heart with  "angina pectoris (Chronic)    Overview Addendum 3/13/2018  5:16 PM by Festus Bowie IV, MD     · Previous PCI to the LAD  · Cardiac catheterization (8/2/2015): Mild nonobstructive CAD. Widely patent LAD stent. Normal LVEF.  · Cardiac catheterization (3/13/18): Mild to moderate nonobstructive CAD. Previously placed LAD stent widely patent.         Sleep apnea (Chronic)    RLS (restless legs syndrome)    Gout    Chronic systolic congestive heart failure    Overview Addendum 3/13/2018  5:17 PM by Festus Bowie IV, MD     · Echocardiogram (3/11/18):  LVEF 36%, mild MR  · Cardiac catheterization (3/13/18): Mild to moderate nonobstructive CAD. Previously placed LAD stent widely patent.         Paroxysmal atrial fibrillation (Chronic)    Overview Addendum 3/13/2018  6:14 PM by Festus Bowie IV, MD     · Chads VASC = 6 (age, female, CHF, CAD, HTN, DM)         Cholelithiasis (Chronic)    Chronic passive hepatic congestion    Overview Deleted 3/29/2018 12:17 PM by Ten Marion MD            Diabetic polyneuropathy associated with type 2 diabetes mellitus    Nonintractable headache    Dehydration    CAP (community acquired pneumonia)    CKD (chronic kidney disease), stage III    Acute on chronic respiratory failure with hypoxia             Brief Hospital Course to date:  Angelita Shay is a 72 y.o. female here with severe sepsis.  Cough is prominent.  R lung infiltrate by CXR.  Abd CT did not show GB inflammation.  A main complaint is \"swollen throat\".  Since this started preadmission and is described as progressively painful, doubt it is anaphylaxis but must consider this.       Assessment & Plan:  Severe sepsis  resolving  -- BP stabilized  -- UOP  Good     R pna  -- vanc, Zosyn.  Day 2. BCx pending  -- strep A screen, sputum culture  -- ID consulted  -- has o2 at home but hasn't needed it.     GORDON  -- improved w  Hydration to 1.2    Somnolence  -- likely from sepsis.    -- abg " okay    PAF, CHADs VAsc 6.   -- stopped eliquis in case procedures needed; also hemoptysis   - restart eventually if improvement    Dm2:  SSI  CHF:  EF 36 percent.  Watch sats. Stopped fluids  CAD with LAD stent:  cath'ed in March, looked good.             DVT Prophylaxis:  hep    CODE STATUS: Full Code    Disposition: I expect the patient to be discharged ?? in ?? days.      Electronically signed by Tiffanie Landa MD, 05/17/18, 1:31 PM.

## 2018-05-17 NOTE — PLAN OF CARE
Problem: Fall Risk (Adult)  Intervention: Monitor/Assist with Self Care   05/17/18 0400   Activity   Activity Assistance Provided assistance, 1 person       Goal: Identify Related Risk Factors and Signs and Symptoms  Outcome: Ongoing (interventions implemented as appropriate)   05/16/18 1822   Fall Risk (Adult)   Related Risk Factors (Fall Risk) polypharmacy;fatigue/slow reaction   Signs and Symptoms (Fall Risk) presence of risk factors     Goal: Absence of Fall   05/16/18 1822   Fall Risk (Adult)   Absence of Fall making progress toward outcome

## 2018-05-17 NOTE — PROGRESS NOTES
[facility] Progress Note    Admission Date: 5/15/2018    Angelita Shay  1945  [id number]    Date: 5/17/2018    Meds:    Anti-Infectives     Ordered     Dose/Rate Route Frequency Start Stop    05/17/18 2030  ertapenem (INVanz) 1 g/100 mL 0.9% NS VTB (mbp)     Ordering Provider:  Herber Christiansen MD    1 g  over 30 Minutes Intravenous Every 24 Hours 05/18/18 1000 05/25/18 0959    05/17/18 2031  ertapenem (INVanz) 1 g/100 mL 0.9% NS VTB (mbp)     Ordering Provider:  Herber Christiansen MD    1 g  over 30 Minutes Intravenous Once 05/17/18 2145      05/16/18 0148  vancomycin 2000 mg/500 mL 0.9% NS IVPB ([facility])     Ordering Provider:  Celio Montiel RPH    20 mg/kg × 94.3 kg  over 120 Minutes Intravenous Once 05/16/18 0300 05/16/18 0809    05/16/18 0145  piperacillin-tazobactam (ZOSYN) 3.375 g in iso-osmotic dextrose 50 ml (premix)     Ordering Provider:  Celio Montiel RPH    3.375 g  over 30 Minutes Intravenous Once 05/16/18 0230 05/16/18 0638    05/15/18 2203  cefTRIAXone (ROCEPHIN) IVPB 1 g     Ordering Provider:  Josue Mina MD    1 g  100 mL/hr over 30 Minutes Intravenous Once 05/15/18 2205 05/16/18 0001          CC:  Sepsis, HCAP    SUBJECTIVE:  5/16/18: Patient is a 72 y.o. female with h/o CAD, CKD3, remote colon cancer, T2DM,  HTN, PAF/Eliquis, NICM (EF 36%), chronicstasis dermatitis, and osteoarthritis with Bilateral TSA and Bilateral TKA  Who presented to [facility] ED with worsening generalized weakness, body aches, rigors, night sweats, productive nonpurulent cough, and central chest pain especially with deep breaths.  She denies nausea, vomiting, diarrhea, and dysuria. She was recently hospitalized from 3/10-3/14/18 with acute systolic CHF exacerbation and diuresed.  She had LVEF of 36% with severe pulmonary HTN at that time. She was readmitted from 3/28-3/30/18 with RUQ pain and was felt to have chronic cholecystitis and was to follow up with Dr. Marion in a month for elective cholecystectomy.  However, she  followed up with Dr. Ulloa in [place] will undergo cholecystectomy when cleared by cardiology in 2018.  She now returns with respiratory symptoms and dyspnea. Worsened RUQ pain.  She denies sick contacts.  Work up showed Tmax 99.7, BP 89/63, pO2 74, PCT 1.04, WBC 28,000 with 87% neutrophils, BNP 92, sCr 1.3, lactic acid 3.2, and Strep A screen negative.  A CXR showed RML pulmonary infiltrate/atelectasis.  A CT scan of abd/pelvis showed cholelithiasis.  She was started on Zosyn and Vancomycin.  ID was asked to evaluate and manage her antibiotic therapy.   18: C/o bilateral breast pain.  Still with cough and sore throat.  TMax 99.8 and very cold.  She would like to go home soon.     ROS: No n/v/d. No rash. No new ADR to Abx.  See hpi    PE:   Vital Signs  Temp (24hrs), Av °F (37.2 °C), Min:98.5 °F (36.9 °C), Max:99.2 °F (37.3 °C)    Temp  Min: 98.5 °F (36.9 °C)  Max: 99.2 °F (37.3 °C)  BP  Min: 97/57  Max: 144/68  Pulse  Min: 81  Max: 113  Resp  Min: 16  Max: 18  SpO2  Min: 91 %  Max: 99 %    GENERAL: Awake and alert, in no acute distress.   HEENT: Normocephalic, atraumatic.  PERRL. EOMI. No conjunctival injection. No icterus. Oropharynx clear with evidence of thrush, no exudate.     HEART: RRR; No murmur, rubs, gallops.   LUNGS: Diminished at lung bases bilaterally without wheezing, or rhonchi. Scattered rales R>L.  Normal respiratory effort. Nonlabored. Wet cough.    BREASTS: pain on palp.  No erythema or yeast dermatitis  ABDOMEN: Soft, RUQ tenderness, nondistended. Positive bowel sounds. No rebound or guarding. NO mass or HSM. Obese  EXT:  No cyanosis, clubbing or edema. No cord.  : Genitalia generally unremarkable.   A Carrion catheter is in place  MSK: FROM without joint effusions noted arms/legs.    SKIN: Warm and dry without cutaneous eruptions on Inspection/palpation.    NEURO: Oriented to PPT. No focal deficits on motor/sensory exam at arms/legs.  PSYCHIATRIC: Normal insight and judgement.  Cooperative with PE    Laboratory Data      Results from last 7 days  Lab Units 05/17/18  0353 05/16/18  0611 05/15/18  2036   WBC 10*3/mm3 12.17* 24.02* 28.20*   HEMOGLOBIN g/dL 12.7 16.3* 16.8*   HEMATOCRIT % 39.1 49.5* 50.4*   PLATELETS 10*3/mm3 126* 145* 155       Results from last 7 days  Lab Units 05/17/18  0353   SODIUM mmol/L 138   POTASSIUM mmol/L 3.9   CHLORIDE mmol/L 105   CO2 mmol/L 27.0   BUN mg/dL 29*   CREATININE mg/dL 1.20   GLUCOSE mg/dL 151*   CALCIUM mg/dL 8.0*       Results from last 7 days  Lab Units 05/17/18  0353   ALK PHOS U/L 61   BILIRUBIN mg/dL 0.4   ALT (SGPT) U/L 19   AST (SGOT) U/L 13               Results from last 7 days  Lab Units 05/17/18  0353   LACTATE mmol/L 0.7             Estimated Creatinine Clearance: 49.6 mL/min (by C-G formula based on SCr of 1.2 mg/dL).    Microbiology:     Microbiology Results Abnormal     Procedure Component Value - Date/Time    Respiratory Culture - Sputum, Cough [[id number]] Collected:  05/16/18 1827    Lab Status:  Preliminary result Specimen:  Sputum from Cough Updated:  05/17/18 1432     Respiratory Culture No growth at less than 24 hours     Gram Stain Result Many (4+) WBCs per low power field      Moderate (3+) Epithelial cells per low power field      Occasional Yeast      Few (2+) Gram variable bacilli    Beta Strep Culture, Throat - Swab, Throat [[id number]]  (Normal) Collected:  05/16/18 0837    Lab Status:  Preliminary result Specimen:  Swab from Throat Updated:  05/17/18 0807     Throat Culture, Beta Strep No Beta Hemolytic Streptococcus Isolated    Blood Culture - Blood, Blood, Venous Line [[id number]]  (Normal) Collected:  05/15/18 2245    Lab Status:  Preliminary result Specimen:  Blood from Arm, Right Updated:  05/16/18 2300     Blood Culture No growth at 24 hours    MRSA Screen, PCR - Swab, Nares [[id number]]  (Normal) Collected:  05/16/18 1427    Lab Status:  Final result Specimen:  Swab from Nares Updated:  05/16/18 1710     MRSA, PCR  Negative    Narrative:         MRSA Negative    Rapid Strep A Screen - Swab, Throat [[id number]]  (Normal) Collected:  05/16/18 0837    Lab Status:  Final result Specimen:  Swab from Throat Updated:  05/16/18 1015     Strep A Ag Negative    Narrative:         Test performed by Direct Antigen Testing.        UA WBC 3-5 RBC 3-6 LE neg nit neg                      Radiology:  Imaging Results (last 72 hours)     Procedure Component Value Units Date/Time    XR Chest 1 View [[id number]] Collected:  05/16/18 2345     Updated:  05/17/18 0021    Narrative:       EXAM:    XR Chest, 1 View    CLINICAL HISTORY:    72 years old, female; Elevated white blood cell count, unspecified; Lobar   pneumonia, unspecified organism; Calculus of gallbladder without cholecystitis   without obstruction; Cough; Headache; Localized enlarged lymph nodes;   Hyperglycemia, unspecified; Signs and symptoms; Dyspnea and fever; Additional   info: Hypoxic    TECHNIQUE:    Frontal view of the chest.    COMPARISON:    CR - XR CHEST 1 VW 2018-05-15 21:06    FINDINGS:    No significant change in focal airspace opacity in the RIGHT mid zone.    Prominent lung markings bilaterally with mild bibasal atelectasis, RIGHT worse   than LEFT. Cardiomegaly with normal lung vascularity.  Calcification and   unfolding of the aortic arch.       Impression:         Unchanged RIGHT lung consolidation/pneumonia. Possibility of underlying   malignancy cannot be excluded.  Recommend followup to complete resolution.       THIS DOCUMENT HAS BEEN ELECTRONICALLY SIGNED BY ILENE CRUZ MD    CT Abdomen Pelvis Without Contrast [[id number]] Collected:  05/15/18 2202     Updated:  05/15/18 2318    Narrative:       EXAM:    CT Abdomen and Pelvis Without Intravenous Contrast    CLINICAL HISTORY:    72 years old, female; Signs and symptoms; Other: See notes; Prior surgery;   Additional info: Pain    TECHNIQUE:    Axial computed tomography images of the abdomen and pelvis without    intravenous contrast.  All CT scans at this facility use one or more dose   reduction techniques, viz.: automated exposure control; ma/kV adjustment per   patient size (including targeted exams where dose is matched to indication;   i.e. head); or iterative reconstruction technique.    Coronal reformatted images were created and reviewed.    COMPARISON:    CT ABDOMEN PELVIS W CONTRAST 2018-03-10 23:16, US Gallbladder 03/11/2018.    FINDINGS:    Limitations:  Patient's obese body habitus limits resolution of the   examination.    Lung bases:  Minimal basilar subsegmental atelectasis or scars.     ABDOMEN:    Liver:  Unremarkable.    Gallbladder and bile ducts:  Cholelithiasis at gallbladder neck.  No ductal   dilation.    Pancreas:  Unremarkable.  No ductal dilation.    Spleen:  Unremarkable. No splenomegaly or mass.    Adrenals:  Unremarkable adrenals.    Kidneys and ureters:  Unremarkable. No hydronephrosis, mass, urinary calculi,   or obstructive uropathy.    Stomach and bowel:  Nonspecific bowel gas pattern without dilatation or   obstruction.  Some fluid scattered in the nondilated small bowel.  Right-sided   bowel anastomosis.  The contracted segments of bowel restrict wall evaluation   to rule out any abnormal thickening.     PELVIS:    Appendix:  Appendix not localized, obscured.  However, no inflammation in the   RLQ mesenteric fat.    Bladder:  Unremarkable.  No stones.    Reproductive:  Unremarkable as visualized.  Retroverted uterus.     ABDOMEN and PELVIS:    Intraperitoneal space:  No pneumoperitoneum.  No significant free fluid.    Bones/joints:  Degenerative changes of the spine.  Mild scoliosis.  Mild DJD   of bilateral hips.  No acute fracture.  No dislocation.    Soft tissues:  Obesity.    Vasculature:  Pelvic phleboliths. Scattered atherosclerotic placques are seen   along some vessels. No abdominal aortic aneurysm.    Lymph nodes:  Nonspecific few slightly prominent bilateral inguinal lymph    nodes.  No enlarged intra-abdominal lymph nodes.    Other findings:  Surgical clips scattered in abdomen and pelvis.      Impression:       1.  Cholelithiasis.    2.  Nonspecific bowel gas pattern without obstruction.  3.  Nonspecific few slightly prominent bilateral inguinal lymph nodes.    THIS DOCUMENT HAS BEEN ELECTRONICALLY SIGNED BY EARL TOBIN MD    XR Chest 1 View [[id number]] Collected:  05/15/18 2031     Updated:  05/15/18 2141    Narrative:       EXAM:    XR Chest, 1 View    CLINICAL HISTORY:    72 years old, female; Signs and symptoms; Shortness of breath; Additional   info: SOA triage protocol    TECHNIQUE:    Frontal view of the chest.    COMPARISON:    CR - XR CHEST 1 VW 2018-03-28 18:41    FINDINGS:    Lungs:  Small new infiltrate/atelectasis in right mid-lung field compared to   prior study.  No acute left infiltrates.    Pleural space:  No pneumothorax or pleural effusion.    Heart:  Normal heart size.    Mediastinum: No acute abnormality compared to prior study.    Bones/joints:  Bilateral shoulder prostheses.  No acute abnormality seen   along the well-visualized osseous structures.  Minimal scoliotic curvature,   positional versus fixed.      Impression:         New mild right mid-lung field infiltrate/atelectasis.    THIS DOCUMENT HAS BEEN ELECTRONICALLY SIGNED BY EARL TOBIN MD          I personally read the radiographic studies     Impression:   1.  Severe sepsis POA- with lactic acidosis, PCT, acute hypoxemic respiratory failure, hypotension, tachycardia, fever, and leukocytosis.  She is clinically improving with decreased fever, and improved leukocytosis.  2.  RML pneumonia.  Her MRSA nasal PCR was negative, suggesting that MRSA infection is highly unlikely.  I will plan to switch her Zosyn to Invanz pending further culture data.  I would like to avoid Rocephin for the time being since this can exacerbate cholecystitis.  3.  Chronic cholelithiasis/cholecystitis, awaiting CCY at  [facility]  4.  Fever, low grade  5.  Marked leukocytosis/neutrophilia-improving  6.  Lactic acidosis/elevated procalcitonin  7.  Acute hypoxemic respiratory failure  8.  Hypotension, improved  9.  CKDIII  10.  T2DM, insulin dependent  11.  PAF/Eliquis  12.  NICM with h/o systolic CHF (last EF 36% with pulmonary HTN)  13.  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 Christiansen  13.  Sore throat-although she complains of a sore throat, her exam is unremarkable.  14.  Thrush        PLAN/RECOMMENDATIONS:   1.  UAg for Strep pneumoniae  2.  Nystatin S and S qid  3.  Discontinue Zosyn  4.  Invanz 1 g IV daily  5.  Possible discharge tomorrow with acute outpatient care in our office.  6.  Remove the Carrion catheter    Herber Christiansen MD saw and examined patient, verified hx and PE, read all radiographic studies, reviewed labs and micro data, and formulated dx, plan for treatment and all medical decision making.      Maurizio Herman PA-C for Herber Christiansen MD    UM/LAI: I will tentatively plan for her to discharge later tomorrow with acute outpatient care in our office starting on Saturday.  I will plan to have her see Dr. Ash in our office on Saturday and I will plan to infuse her daily with intravenous Invanz.    Outpatient orders:  1.  Fax this page to [phone]  2.  Appointment to see Dr. Ash in our office at 9:00 on Saturday 5/19-this appointment has been made  3.  Ensure that she has home O2-she indicates that she does have this at home  5.  Outpatient intravenous antibiotic therapy: Invanz 1 g IV daily via peripheral IV in our office starting on Saturday 5/19    Herber Christiansen MD  5/17/2018  8:32 PM

## 2018-05-18 LAB
BACTERIA FLD CULT: ABNORMAL
BACTERIA SPEC AEROBE CULT: NORMAL
BACTERIA SPEC RESP CULT: NORMAL
GLUCOSE BLDC GLUCOMTR-MCNC: 116 MG/DL (ref 70–130)
GLUCOSE BLDC GLUCOMTR-MCNC: 126 MG/DL (ref 70–130)
GLUCOSE BLDC GLUCOMTR-MCNC: 135 MG/DL (ref 70–130)
GLUCOSE BLDC GLUCOMTR-MCNC: 145 MG/DL (ref 70–130)
GRAM STN SPEC: NORMAL
Lab: ABNORMAL
ORGANISM ID: ABNORMAL
S PNEUM AG SPEC QL LA: POSITIVE
SPECIMEN SOURCE: ABNORMAL

## 2018-05-18 PROCEDURE — 99233 SBSQ HOSP IP/OBS HIGH 50: CPT | Performed by: INTERNAL MEDICINE

## 2018-05-18 PROCEDURE — 25010000002 HEPARIN (PORCINE) PER 1000 UNITS: Performed by: INTERNAL MEDICINE

## 2018-05-18 PROCEDURE — 25010000002 ERTAPENEM 1 GM/100ML SOLUTION: Performed by: INTERNAL MEDICINE

## 2018-05-18 PROCEDURE — 82962 GLUCOSE BLOOD TEST: CPT

## 2018-05-18 RX ORDER — HYDROCODONE BITARTRATE AND ACETAMINOPHEN 5; 325 MG/1; MG/1
1 TABLET ORAL ONCE
Status: COMPLETED | OUTPATIENT
Start: 2018-05-18 | End: 2018-05-18

## 2018-05-18 RX ORDER — HYDROCODONE BITARTRATE AND ACETAMINOPHEN 5; 325 MG/1; MG/1
1 TABLET ORAL EVERY 6 HOURS PRN
Status: DISCONTINUED | OUTPATIENT
Start: 2018-05-18 | End: 2018-05-20 | Stop reason: HOSPADM

## 2018-05-18 RX ORDER — LIDOCAINE 50 MG/G
3 PATCH TOPICAL DAILY PRN
Status: DISCONTINUED | OUTPATIENT
Start: 2018-05-18 | End: 2018-05-20 | Stop reason: HOSPADM

## 2018-05-18 RX ORDER — CARVEDILOL 6.25 MG/1
6.25 TABLET ORAL 2 TIMES DAILY WITH MEALS
Status: DISCONTINUED | OUTPATIENT
Start: 2018-05-18 | End: 2018-05-19

## 2018-05-18 RX ADMIN — HEPARIN SODIUM 5000 UNITS: 5000 INJECTION, SOLUTION INTRAVENOUS; SUBCUTANEOUS at 05:22

## 2018-05-18 RX ADMIN — SODIUM CHLORIDE 125 ML/HR: 9 INJECTION, SOLUTION INTRAVENOUS at 00:53

## 2018-05-18 RX ADMIN — POLYETHYLENE GLYCOL (3350) 17 G: 17 POWDER, FOR SOLUTION ORAL at 05:23

## 2018-05-18 RX ADMIN — HYDROCODONE BITARTRATE AND ACETAMINOPHEN 1 TABLET: 5; 325 TABLET ORAL at 23:36

## 2018-05-18 RX ADMIN — ERTAPENEM SODIUM 1 G: 1 INJECTION, POWDER, LYOPHILIZED, FOR SOLUTION INTRAMUSCULAR; INTRAVENOUS at 08:56

## 2018-05-18 RX ADMIN — MAGNESIUM HYDROXIDE 10 ML: 2400 SUSPENSION ORAL at 05:22

## 2018-05-18 RX ADMIN — ASPIRIN 81 MG: 81 TABLET, COATED ORAL at 08:55

## 2018-05-18 RX ADMIN — LIDOCAINE 2 PATCH: 50 PATCH CUTANEOUS at 05:23

## 2018-05-18 RX ADMIN — GABAPENTIN 300 MG: 300 CAPSULE ORAL at 05:22

## 2018-05-18 RX ADMIN — ACETAMINOPHEN 650 MG: 325 TABLET, FILM COATED ORAL at 12:36

## 2018-05-18 RX ADMIN — NYSTATIN 500000 UNITS: 100000 SUSPENSION ORAL at 17:47

## 2018-05-18 RX ADMIN — NYSTATIN 500000 UNITS: 100000 SUSPENSION ORAL at 12:39

## 2018-05-18 RX ADMIN — ROPINIROLE 1 MG: 0.5 TABLET, FILM COATED ORAL at 21:07

## 2018-05-18 RX ADMIN — ACETAMINOPHEN 650 MG: 325 TABLET, FILM COATED ORAL at 21:06

## 2018-05-18 RX ADMIN — ACETAMINOPHEN 650 MG: 325 TABLET, FILM COATED ORAL at 04:56

## 2018-05-18 RX ADMIN — HEPARIN SODIUM 5000 UNITS: 5000 INJECTION, SOLUTION INTRAVENOUS; SUBCUTANEOUS at 12:37

## 2018-05-18 RX ADMIN — HYDROCODONE POLISTIREX AND CHLORPHENIRAMINE POLISTIREX 5 ML: 10; 8 SUSPENSION, EXTENDED RELEASE ORAL at 05:22

## 2018-05-18 RX ADMIN — CARVEDILOL 6.25 MG: 6.25 TABLET, FILM COATED ORAL at 17:47

## 2018-05-18 RX ADMIN — NYSTATIN 500000 UNITS: 100000 SUSPENSION ORAL at 08:55

## 2018-05-18 RX ADMIN — TRAZODONE HYDROCHLORIDE 50 MG: 50 TABLET ORAL at 21:07

## 2018-05-18 RX ADMIN — HEPARIN SODIUM 5000 UNITS: 5000 INJECTION, SOLUTION INTRAVENOUS; SUBCUTANEOUS at 21:07

## 2018-05-18 RX ADMIN — NYSTATIN 500000 UNITS: 100000 SUSPENSION ORAL at 21:39

## 2018-05-18 RX ADMIN — HYDROCODONE BITARTRATE AND ACETAMINOPHEN 1 TABLET: 5; 325 TABLET ORAL at 02:52

## 2018-05-18 RX ADMIN — HYDROCODONE BITARTRATE AND ACETAMINOPHEN 1 TABLET: 5; 325 TABLET ORAL at 15:52

## 2018-05-18 RX ADMIN — GABAPENTIN 300 MG: 300 CAPSULE ORAL at 12:36

## 2018-05-18 RX ADMIN — ATORVASTATIN CALCIUM 20 MG: 20 TABLET, FILM COATED ORAL at 21:06

## 2018-05-18 RX ADMIN — GABAPENTIN 300 MG: 300 CAPSULE ORAL at 21:07

## 2018-05-18 NOTE — PLAN OF CARE
Problem: Patient Care Overview  Goal: Plan of Care Review  Outcome: Ongoing (interventions implemented as appropriate)   05/18/18 0258 05/18/18 0800   Coping/Psychosocial   Plan of Care Reviewed With --  patient   Plan of Care Review   Progress improving --      Goal: Individualization and Mutuality  Outcome: Outcome(s) achieved Date Met: 05/18/18    Goal: Discharge Needs Assessment  Outcome: Ongoing (interventions implemented as appropriate)   05/16/18 1349   Discharge Needs Assessment   Readmission Within the Last 30 Days no previous admission in last 30 days   Concerns to be Addressed denies needs/concerns at this time   Patient/Family Anticipates Transition to home with family   Patient/Family Anticipated Services at Transition none   Transportation Concerns other (see comments)  (Pt denies transportation concerns)   Transportation Anticipated family or friend will provide   Anticipated Changes Related to Illness other (see comments)  (Denies discharge needs at this time.)   Equipment Needed After Discharge glucometer;oxygen;respiratory supplies   Disability   Equipment Currently Used at Home glucometer;oxygen;respiratory supplies;other (see comments)  (Pt states she has a CPAP at home, but does not use it. Pt unsure of DME she uses for home O2 and only wears home O2 as needed. )     Goal: Interprofessional Rounds/Family Conf  Outcome: Ongoing (interventions implemented as appropriate)   05/18/18 1520   Interdisciplinary Rounds/Family Conf   Participants nursing       Problem: Sepsis/Septic Shock (Adult)  Goal: Signs and Symptoms of Listed Potential Problems Will be Absent, Minimized or Managed (Sepsis/Septic Shock)  Outcome: Ongoing (interventions implemented as appropriate)   05/18/18 0258   Goal/Outcome Evaluation   Problems Assessed (Sepsis) all   Problems Present (Sepsis) glycemic control impaired;hypoxia/hypoxemia;situational response       Problem: Pneumonia (Adult)  Goal: Signs and Symptoms of Listed  Potential Problems Will be Absent, Minimized or Managed (Pneumonia)  Outcome: Ongoing (interventions implemented as appropriate)   05/18/18 0258   Goal/Outcome Evaluation   Problems Assessed (Pneumonia) all   Problems Present (Pneumonia) respiratory compromise       Problem: Fall Risk (Adult)  Goal: Identify Related Risk Factors and Signs and Symptoms  Outcome: Outcome(s) achieved Date Met: 05/18/18 05/18/18 0258   Fall Risk (Adult)   Related Risk Factors (Fall Risk) age-related changes;fatigue/slow reaction;gait/mobility problems;sleep pattern alteration;environment unfamiliar   Signs and Symptoms (Fall Risk) presence of risk factors     Goal: Absence of Fall  Outcome: Ongoing (interventions implemented as appropriate)   05/18/18 1520   Fall Risk (Adult)   Absence of Fall making progress toward outcome

## 2018-05-18 NOTE — PROGRESS NOTES
Continued Stay Note  [facility]     Patient Name: Angelita Shay  MRN: [id number]  Today's Date: 5/18/2018    Admit Date: 5/15/2018    Consent obtained for the participation in the [facility] Transitions Program.  Vilma Villeda RN        Discharge Plan    No documentation.             Discharge Codes    No documentation.       Expected Discharge Date and Time     Expected Discharge Date Expected Discharge Time    May 19, 2018             Vilma Villeda RN

## 2018-05-18 NOTE — SIGNIFICANT NOTE
Pt reporting severe uncontrolled pain to RN. Pleuretic/MSK/abd. States rounding provider discussed with pt and daytime RN a standing norco rx but not in. Oxy allergy listed but PRINCE shows has had that prior a few times. Will give 1x norco, RN to call back if needs additional 1x dose until rounding provider can re-eval. Looks like the hypotension has been a lesser issue today with sepsis/shock but still need to watch it.    Addendum 0500 - Pt continues to c/o of pain. The norco does help and take the edge off. Will give it x1 again by tussionex since some pleuretic (PNA, zosyn changed to invanz and ID seeing). A&O. Reporting uncontrolled. Her BP a little up  but uncomfortable --- will not tx as lower before, tx pain x1, re-eval/monitor.    Pt wants home meds updated - wants lasix - teaching on sepsis/hypotension/GORDON/other, recommend to restart as soon as able but defer that to rounding provider if safe enough for today vs waiting longer (hospitalized March, CHF/pulm HTN with EF 30s). Defer to rounding provider if needs CXR repeat for volume monitoring if correlation pleuritic pain/elevated BP today, still on 3L NC since 5/17). Chronic constipation and she feels symptomatic - has the linzess with her from home available as we do not stock it, uses MOM PRN. Restarted. Please f/u PRN.

## 2018-05-18 NOTE — PROGRESS NOTES
Continued Stay Note  [facility]     Patient Name: Angelita Shay  MRN: [id number]  Today's Date: 5/18/2018    Admit Date: 5/15/2018          Discharge Plan     Row Name 05/18/18 1001       Plan    Plan Home with daughter    Plan Comments Spoke with patient at bedside.  Plan is still to return home with daughter, Pat.  Daughter will transport.  We also discussed home oxygen.  She was not able to tell me name of oxygen DME provider.  However, she said she has a concentrator at home as well as three portable oxygen tanks.  She will have her daughter get a portable tank from home to bring to hospital for transport.  [facility] antibiotic plan faxed to [phone] as ordered.  CM will follow.      Final Discharge Disposition Code 01 - home or self-care              Discharge Codes    No documentation.       Expected Discharge Date and Time     Expected Discharge Date Expected Discharge Time    May 19, 2018             Leixs Monique

## 2018-05-18 NOTE — DISCHARGE SUMMARY
[facility] Medicine Services  DISCHARGE SUMMARY    Patient Name: Angelita Shay  : 1945  MRN: [id number]    Date of Admission: 5/15/2018  Date of Discharge:  2018  Primary Care Physician: Sharon Saldivar MD    Consults     Date and Time Order Name Status Description    2018 0829 Inpatient Infectious Diseases Consult Completed     2018 0552 Inpatient Infectious Diseases Consult Completed         Hospital Course     Presenting Problem:   Nonintractable headache, unspecified chronicity pattern, unspecified headache type [R51]    Active Hospital Problems (** Indicates Principal Problem)    Diagnosis Date Noted   • **Sepsis [A41.9] 2018   • Dehydration [E86.0] 2018   • CAP (community acquired pneumonia) [J18.9] 2018   • CKD (chronic kidney disease), stage III [N18.3] 2018   • Acute on chronic respiratory failure with hypoxia [J96.21] 2018   • Nonintractable headache [R51] 05/15/2018   • Diabetic polyneuropathy associated with type 2 diabetes mellitus [E11.42] 2018   • Chronic passive hepatic congestion [K76.1] 2018   • Cholelithiasis [K80.20] 2018   • Paroxysmal atrial fibrillation [I48.0] 2018   • Chronic systolic congestive heart failure [I50.22] 2016   • Coronary artery disease involving native coronary artery of native heart with angina pectoris [I25.119] 2016   • GERD (gastroesophageal reflux disease) [K21.9] 2016   • Gout [M10.9] 2016   • Sleep apnea [G47.30] 2016   • RLS (restless legs syndrome) [G25.81] 2016   • DM type 2 (diabetes mellitus, type 2), on insulin therapy [E11.9] 2016   • Essential hypertension [I10] 2016      Resolved Hospital Problems    Diagnosis Date Noted Date Resolved   No resolved problems to display.          Hospital Course:  Angelita Shay is a 72 y.o. female with diabetes and paroxysmal atrial fibrillation who developed chills, fever,  and cough at home.  She presented to the ED with a temp of 99.7 and WBC of 28.  CXR showed RML pneumonia.      She was admitted on antibiotic coverage for community acquired pneumonia but quickly worsened, with systolic BP in the 80's, obtundation, no urine output, and renal insufficiency.  Antibiotics were broadened and fluid boluses given. ICU transfer was considered.  However, she improved quickly and by the following day was bright and conversant.  Her WBC improved and fevers resolved.     She continued to have pain in her right chest and right upper abdomen.  It seemed to be not pleuritic pain from pneumonia but her longstanding complaint of RUQ pain, thought to be either from hepatic capsule stretch or GB disease.  She has a plan for elective CCY by a surgeon in [place], but is considering changing to a [facility] surgeon.      She was off eliquis during her stay, due to concern that she might need bronch.  She will restart it at discharge.  Her ChadsVasc is 6.    The ID service was consulted and are planning to see her daily in their office for Invanz infusion to complete a ten day course.          Day of Discharge     HPI:   Malaise and cough better.  Persistent pain in RUQ.  Looks uncomfortable.  Says pete 'takes the edge off.'  Loose stool, no constip.   Breathing okay.     Review of Systems   Gen- No fevers, chills  CV- no  palpitations  GI- No N/V/D,     Otherwise ROS is negative except as mentioned in the HPI.    Vital Signs:   Temp:  [98.7 °F (37.1 °C)-98.9 °F (37.2 °C)] 98.7 °F (37.1 °C)  Heart Rate:  [] 71  Resp:  [18] 18  BP: (162-182)/() 178/99     Physical Exam:  Constitutional: looks uncomfortable, hand on her RUQ.  awake, alert, very pleasant.   Eyes: PERRLA, sclerae anicteric, no conjunctival injection  HENT: NCAT, mucous membranes moist  Neck: Supple, no thyromegaly, no lymphadenopathy, trachea midline  Respiratory: Clear to auscultation bilaterally, nonlabored respirations    Cardiovascular: RRR, no murmurs, rubs, or gallops,   Gastrointestinal: Positive bowel sounds, soft, mildly tender RUQ, nondistended  Musculoskeletal: No bilateral ankle edema, no clubbing or cyanosis to extremities  Psychiatric: Appropriate affect, cooperative  Neurologic: Oriented x 3, strength symmetric in all extremities, Cranial Nerves grossly intact to confrontation, speech clear  Skin: No rashes      Pertinent  and/or Most Recent Results       Results from last 7 days  Lab Units 05/20/18  0537 05/19/18  0457 05/17/18  0353 05/16/18  0611 05/15/18  2036   WBC 10*3/mm3  --  6.19 12.17* 24.02* 28.20*   HEMOGLOBIN g/dL  --  12.4 12.7 16.3* 16.8*   HEMATOCRIT %  --  39.3 39.1 49.5* 50.4*   PLATELETS 10*3/mm3  --  140* 126* 145* 155   SODIUM mmol/L 140 139 138 133 134   POTASSIUM mmol/L 4.2 4.5 3.9 4.8 4.5   CHLORIDE mmol/L 99 103 105 100 97*   CO2 mmol/L 33.0* 31.0 27.0 24.0 30.0   BUN mg/dL 15 14 29* 26* 25*   CREATININE mg/dL 0.90 0.80 1.20 1.80* 1.30   GLUCOSE mg/dL 157* 138* 151* 198* 247*   CALCIUM mg/dL 9.2 9.3 8.0* 9.1 9.9       Results from last 7 days  Lab Units 05/20/18  0537 05/19/18  0457 05/17/18  0353 05/16/18  0611 05/15/18  2036   BILIRUBIN mg/dL 0.4 0.3 0.4 1.4* 1.0   ALK PHOS U/L 55 56 61 76 84   ALT (SGPT) U/L 15 16 19 26 34   AST (SGOT) U/L 14 18 13 21 25           Invalid input(s): TG, LDLCALC, LDLREALC    Results from last 7 days  Lab Units 05/20/18  0537 05/16/18  0611 05/15/18  2036   HEMOGLOBIN A1C %  --  8.40*  --    BNP pg/mL 277.0*  --  92.0     Brief Urine Lab Results  (Last result in the past 365 days)      Color   Clarity   Blood   Leuk Est   Nitrite   Protein   CREAT   Urine HCG        05/16/18 1038 Dark Yellow(A) Cloudy(A) Negative Negative Negative 100 mg/dL (2+)(A)               Microbiology Results Abnormal     Procedure Component Value - Date/Time    Blood Culture - Blood, Blood, Venous Line [[id number]]  (Normal) Collected:  05/15/18 3325    Lab Status:  Preliminary result  Specimen:  Blood from Arm, Right Updated:  05/19/18 2300     Blood Culture No growth at 4 days    S. Pneumo Ag Urine or CSF - Urine, Urine, Clean Catch [[id number]]  (Abnormal) Collected:  05/16/18 1038    Lab Status:  Edited Result - FINAL Specimen:  Urine from Urine, Clean Catch Updated:  05/18/18 1711     Specimen Source Urine     STREP PNEUMONIAE ANTIGEN Positive (A)     Comment: Reported pos SP to Hanna LEMUSDaya @3:42 on 5/18/18.  Faxed to [phone]. AM        Body Fluid Culture, Sterile Not Indicated     Organism ID Not indicated.     Please note Comment     Comment: College [employer]ologists standards require a culture to be  performed on CSF specimens submitted for bacterial antigen testing.  (CAP [id number].82483) Urine specimens will not be cultured.       Narrative:       Performed at:  [address] [place] [address]  [id number]  : Yoni Humphrey MD, Phone:  [phone]    Respiratory Culture - Sputum, Cough [[id number]] Collected:  05/16/18 1827    Lab Status:  Final result Specimen:  Sputum from Cough Updated:  05/18/18 0822     Respiratory Culture Scant growth (1+) Normal Respiratory Selena     Gram Stain Result Many (4+) WBCs per low power field      Moderate (3+) Epithelial cells per low power field      Occasional Yeast      Few (2+) Gram variable bacilli    Beta Strep Culture, Throat - Swab, Throat [[id number]]  (Normal) Collected:  05/16/18 0837    Lab Status:  Final result Specimen:  Swab from Throat Updated:  05/18/18 0821     Throat Culture, Beta Strep No Beta Hemolytic Streptococcus Isolated    MRSA Screen, PCR - Swab, Nares [[id number]]  (Normal) Collected:  05/16/18 1427    Lab Status:  Final result Specimen:  Swab from Nares Updated:  05/16/18 1710     MRSA, PCR Negative    Narrative:         MRSA Negative    Rapid Strep A Screen - Swab, Throat [[id number]]  (Normal) Collected:  05/16/18 0837    Lab Status:  Final result Specimen:  Swab from Throat Updated:   05/16/18 1015     Strep A Ag Negative    Narrative:         Test performed by Direct Antigen Testing.          Imaging Results (all)     Procedure Component Value Units Date/Time    XR Chest 1 View [[id number]] Collected:  05/16/18 2345     Updated:  05/17/18 0021    Narrative:       EXAM:    XR Chest, 1 View    CLINICAL HISTORY:    72 years old, female; Elevated white blood cell count, unspecified; Lobar   pneumonia, unspecified organism; Calculus of gallbladder without cholecystitis   without obstruction; Cough; Headache; Localized enlarged lymph nodes;   Hyperglycemia, unspecified; Signs and symptoms; Dyspnea and fever; Additional   info: Hypoxic    TECHNIQUE:    Frontal view of the chest.    COMPARISON:    CR - XR CHEST 1 VW 2018-05-15 21:06    FINDINGS:    No significant change in focal airspace opacity in the RIGHT mid zone.    Prominent lung markings bilaterally with mild bibasal atelectasis, RIGHT worse   than LEFT. Cardiomegaly with normal lung vascularity.  Calcification and   unfolding of the aortic arch.       Impression:         Unchanged RIGHT lung consolidation/pneumonia. Possibility of underlying   malignancy cannot be excluded.  Recommend followup to complete resolution.       THIS DOCUMENT HAS BEEN ELECTRONICALLY SIGNED BY ILENE CRUZ MD    CT Abdomen Pelvis Without Contrast [[id number]] Collected:  05/15/18 2202     Updated:  05/15/18 2318    Narrative:       EXAM:    CT Abdomen and Pelvis Without Intravenous Contrast    CLINICAL HISTORY:    72 years old, female; Signs and symptoms; Other: See notes; Prior surgery;   Additional info: Pain    TECHNIQUE:    Axial computed tomography images of the abdomen and pelvis without   intravenous contrast.  All CT scans at this facility use one or more dose   reduction techniques, viz.: automated exposure control; ma/kV adjustment per   patient size (including targeted exams where dose is matched to indication;   i.e. head); or iterative reconstruction  technique.    Coronal reformatted images were created and reviewed.    COMPARISON:    CT ABDOMEN PELVIS W CONTRAST 2018-03-10 23:16, US Gallbladder 03/11/2018.    FINDINGS:    Limitations:  Patient's obese body habitus limits resolution of the   examination.    Lung bases:  Minimal basilar subsegmental atelectasis or scars.     ABDOMEN:    Liver:  Unremarkable.    Gallbladder and bile ducts:  Cholelithiasis at gallbladder neck.  No ductal   dilation.    Pancreas:  Unremarkable.  No ductal dilation.    Spleen:  Unremarkable. No splenomegaly or mass.    Adrenals:  Unremarkable adrenals.    Kidneys and ureters:  Unremarkable. No hydronephrosis, mass, urinary calculi,   or obstructive uropathy.    Stomach and bowel:  Nonspecific bowel gas pattern without dilatation or   obstruction.  Some fluid scattered in the nondilated small bowel.  Right-sided   bowel anastomosis.  The contracted segments of bowel restrict wall evaluation   to rule out any abnormal thickening.     PELVIS:    Appendix:  Appendix not localized, obscured.  However, no inflammation in the   RLQ mesenteric fat.    Bladder:  Unremarkable.  No stones.    Reproductive:  Unremarkable as visualized.  Retroverted uterus.     ABDOMEN and PELVIS:    Intraperitoneal space:  No pneumoperitoneum.  No significant free fluid.    Bones/joints:  Degenerative changes of the spine.  Mild scoliosis.  Mild DJD   of bilateral hips.  No acute fracture.  No dislocation.    Soft tissues:  Obesity.    Vasculature:  Pelvic phleboliths. Scattered atherosclerotic placques are seen   along some vessels. No abdominal aortic aneurysm.    Lymph nodes:  Nonspecific few slightly prominent bilateral inguinal lymph   nodes.  No enlarged intra-abdominal lymph nodes.    Other findings:  Surgical clips scattered in abdomen and pelvis.      Impression:       1.  Cholelithiasis.    2.  Nonspecific bowel gas pattern without obstruction.  3.  Nonspecific few slightly prominent bilateral inguinal  lymph nodes.    THIS DOCUMENT HAS BEEN ELECTRONICALLY SIGNED BY EARL TOBIN MD    XR Chest 1 View [[id number]] Collected:  05/15/18 2031     Updated:  05/15/18 2141    Narrative:       EXAM:    XR Chest, 1 View    CLINICAL HISTORY:    72 years old, female; Signs and symptoms; Shortness of breath; Additional   info: SOA triage protocol    TECHNIQUE:    Frontal view of the chest.    COMPARISON:    CR - XR CHEST 1 VW 2018-03-28 18:41    FINDINGS:    Lungs:  Small new infiltrate/atelectasis in right mid-lung field compared to   prior study.  No acute left infiltrates.    Pleural space:  No pneumothorax or pleural effusion.    Heart:  Normal heart size.    Mediastinum: No acute abnormality compared to prior study.    Bones/joints:  Bilateral shoulder prostheses.  No acute abnormality seen   along the well-visualized osseous structures.  Minimal scoliotic curvature,   positional versus fixed.      Impression:         New mild right mid-lung field infiltrate/atelectasis.    THIS DOCUMENT HAS BEEN ELECTRONICALLY SIGNED BY EARL TOBIN MD          Results for orders placed during the hospital encounter of 12/09/16   Duplex Venous Lower Extremity - Bilateral CAR    Narrative · Normal bilateral lower extremity venous duplex scan.  · SUBMITTED FOR INTERPRETATION 12 DECEMBER 2016.          Results for orders placed during the hospital encounter of 12/09/16   Duplex Venous Lower Extremity - Bilateral CAR    Narrative · Normal bilateral lower extremity venous duplex scan.  · SUBMITTED FOR INTERPRETATION 12 DECEMBER 2016.          Results for orders placed during the hospital encounter of 03/10/18   Adult Transthoracic Echo Complete W/ Cont if Necessary Per Protocol    Narrative · Left atrial cavity size is mildly dilated.  · Left ventricular wall thickness is consistent with mild concentric   hypertrophy.  · Left ventricular systolic function is moderately decreased. Estimated EF   = 36%.  · Mild mitral valve regurgitation  is present  · Mild tricuspid valve regurgitation is present.  · Estimated right ventricular systolic pressure is moderately elevated   (45-55 mmHg).           Order Current Status    Blood Culture - Blood, Blood, Venous Line Preliminary result        Discharge Details      Angelita Shay   Home Medication Instructions SUSAN:[id number]    Printed on:05/20/18 2781   Medication Information                      apixaban (ELIQUIS) 5 MG tablet tablet  Take 1 tablet by mouth Every 12 (Twelve) Hours for 90 days.             aspirin 81 MG EC tablet  Take 1 tablet by mouth Daily. Resume in 2 weeks             atorvastatin (LIPITOR) 20 MG tablet  Take 1 tablet by mouth Every Night for 180 days.             benzonatate (TESSALON) 100 MG capsule  Take 1 capsule by mouth 3 (Three) Times a Day As Needed for Cough.             BIOTIN PO  Take 2 tablets by mouth Daily.             carvedilol (COREG) 3.125 MG tablet  Take 6.25 mg by mouth 2 (Two) Times a Day With Meals.             ertapenem (INVanz) 1 g/100 mL 0.9% NS VTB (mbp)  Infuse 100 mL into a venous catheter Daily for 6 doses.             furosemide (LASIX) 40 MG tablet  Take 1 tablet by mouth Daily.             gabapentin (NEURONTIN) 300 MG capsule  TAKE ONE CAPSULE BY MOUTH THREE TIMES A DAY             HYDROcodone-acetaminophen (NORCO) 5-325 MG per tablet  Take 1 tablet by mouth Every 6 (Six) Hours As Needed for Moderate Pain  for up to 8 days.             insulin aspart (NOVOLOG) 100 UNIT/ML injection  **Per patient's Sliding scale**             linaclotide (LINZESS) 290 MCG capsule capsule  Take 1 capsule by mouth Every Morning Before Breakfast.             magnesium gluconate (MAGONATE) 500 MG tablet  Take 27 mg by mouth Daily. OTC              magnesium hydroxide (MILK OF MAGNESIA) 400 MG/5ML suspension  Take 15 mL by mouth Daily As Needed for Constipation.             nystatin (MYCOSTATIN) 313212 UNIT/GM powder  Apply  topically Every 12 (Twelve) Hours.              nystatin (MYCOSTATIN) 206284 UNIT/ML suspension  Take 5 mL by mouth 4 (Four) Times a Day.             ondansetron (ZOFRAN) 4 MG tablet  Take 1 tablet by mouth Every 6 (Six) Hours As Needed for Nausea or Vomiting.             polyethylene glycol (MIRALAX) powder  Take 17 g by mouth Daily As Needed.             rOPINIRole (REQUIP) 1 MG tablet  Take 1-3 tablets by mouth Every Night. Take 1 hour before bedtime.             sacubitril-valsartan (ENTRESTO) 49-51 MG tablet  Take 1 tablet by mouth Every 12 (Twelve) Hours.             traZODone (DESYREL) 50 MG tablet  Take 1-2 tablets at bedtime                   Discharge Disposition:  homeHome or Self Care    Discharge Diet: diabetic, cardiac    Discharge Activity: as tolerated.  Walk frequently to reduce risk of venous clots    Special Instructions:  Daily appointment at [facility] for Invanz infusion.    Future Appointments  Date Time Provider Department Center   5/22/2018 11:00 AM Wilbur Galarza MD [facility] None   5/22/2018 3:30 PM CHASE [facility]AU DEXA 1 BH CHASE DX BE [facility]   5/22/2018 4:00 PM CHASE [facility]AU MAMM 1 BH CHASE BR BE Skinny   6/12/2018 11:15 AM STONEY Brewer [facility] None   6/14/2018 1:00 PM STONEY Douglass [facility]   6/28/2018 1:00 PM Brennen Back MD [facility] None   7/23/2018 12:30 PM Sharon TURNER MD [facility] None           Time Spent on Discharge:  45 minutes    Electronically signed by Tiffanie Landa MD, 05/18/18, 9:18 AM.

## 2018-05-18 NOTE — PLAN OF CARE
Problem: Patient Care Overview  Goal: Plan of Care Review  Outcome: Ongoing (interventions implemented as appropriate)   05/18/18 0258   Coping/Psychosocial   Plan of Care Reviewed With patient   Plan of Care Review   Progress improving   OTHER   Outcome Summary Pt improved since yesterday. Slightly lethargic, resting well between care. Woke c/o severe pain rated 10/10 under her right breast radiating to her back. Hospitalist called for 2 (1x) doses of norco. Carrion pulled, adequate urinary output through the night. 3L O2 NC. SR/S--tach with PACs, WAPs noted at 0300. Will continue to monitor.       Problem: Sepsis/Septic Shock (Adult)  Goal: Signs and Symptoms of Listed Potential Problems Will be Absent, Minimized or Managed (Sepsis/Septic Shock)  Outcome: Ongoing (interventions implemented as appropriate)   05/18/18 0258   Goal/Outcome Evaluation   Problems Assessed (Sepsis) all   Problems Present (Sepsis) glycemic control impaired;hypoxia/hypoxemia;situational response       Problem: Pneumonia (Adult)  Goal: Signs and Symptoms of Listed Potential Problems Will be Absent, Minimized or Managed (Pneumonia)  Outcome: Ongoing (interventions implemented as appropriate)   05/18/18 0258   Goal/Outcome Evaluation   Problems Assessed (Pneumonia) all   Problems Present (Pneumonia) respiratory compromise       Problem: Fall Risk (Adult)  Goal: Identify Related Risk Factors and Signs and Symptoms  Outcome: Ongoing (interventions implemented as appropriate)   05/18/18 0258   Fall Risk (Adult)   Related Risk Factors (Fall Risk) age-related changes;fatigue/slow reaction;gait/mobility problems;sleep pattern alteration;environment unfamiliar   Signs and Symptoms (Fall Risk) presence of risk factors     Goal: Absence of Fall  Outcome: Ongoing (interventions implemented as appropriate)   05/18/18 0258   Fall Risk (Adult)   Absence of Fall making progress toward outcome

## 2018-05-18 NOTE — PROGRESS NOTES
"    [facility]  PROGRESS NOTE    Patient Name: Angelita Shay  : 1945  MRN: [id number]    Date of Admission: 5/15/2018  Length of Stay: 3  Primary Care Physician: Sharon Saldivar MD    Subjective   Subjective     CC:  Fever, chills, sweats, and cough    HPI:  BP has improved.   Bad day - pain in R chest \"moved around to my back.\"  Different from her usual RUQ discomfort from GB.   She has had nausea , mouth dry.  Four BMs since starting back on linzess.       Review of Systems   As above.   Otherwise ROS is negative except as mentioned in the HPI.    Objective   Objective     Vital Signs:   Temp:  [97.6 °F (36.4 °C)-99.2 °F (37.3 °C)] 97.7 °F (36.5 °C)  Heart Rate:  [81-91] 84  Resp:  [18] 18  BP: (139-143)/(71-93) 143/93        Physical Exam:  Constitutional: up on commode, looks tired and uncomfortable.   Eyes: PERRLA, sclerae anicteric, no conjunctival injection  HENT: NCAT, mucous membranes dry,   Neck: Supple,  trachea midline  Respiratory: Clear to auscultation bilaterally, nonlabored respirations on room air, but breathes carefully and breath souhds distant  Chest wall - tender left upper back.   Cardiovascular: tachy RR, no murmurs, rubs, or gallops, Gastrointestinal: Positive bowel sounds, soft, difusely tender faviola epigastrium and RUQ, nondistended  Musculoskeletal: No bilateral ankle edema, no clubbing or cyanosis to extremities  Psychiatric: Appropriate affect, cooperative  Neurologic: awake and alert, strength symmetric in all extremities, Cranial Nerves grossly intact to confrontation, speech clear  Skin: No rashes    Results Reviewed:  I have personally reviewed current lab, radiology, and data and agree.      Results from last 7 days  Lab Units 18  0353 18  0611 05/15/18  2036   WBC 10*3/mm3 12.17* 24.02* 28.20*   HEMOGLOBIN g/dL 12.7 16.3* 16.8*   HEMATOCRIT % 39.1 49.5* 50.4*   PLATELETS 10*3/mm3 126* 145* 155       Results from last 7 " days  Lab Units 05/17/18  0353 05/16/18  0611 05/15/18  2036   SODIUM mmol/L 138 133 134   POTASSIUM mmol/L 3.9 4.8 4.5   CHLORIDE mmol/L 105 100 97*   CO2 mmol/L 27.0 24.0 30.0   BUN mg/dL 29* 26* 25*   CREATININE mg/dL 1.20 1.80* 1.30   GLUCOSE mg/dL 151* 198* 247*   CALCIUM mg/dL 8.0* 9.1 9.9   ALT (SGPT) U/L 19 26 34   AST (SGOT) U/L 13 21 25     Estimated Creatinine Clearance: 50.9 mL/min (by C-G formula based on SCr of 1.2 mg/dL).  BNP   Date Value Ref Range Status   05/15/2018 92.0 0.0 - 100.0 pg/mL Final     Comment:     Results may be falsely decreased if patient taking Biotin.     pH, Arterial   Date Value Ref Range Status   05/16/2018 7.383 7.350 - 7.450 pH units Final       Microbiology Results Abnormal     Procedure Component Value - Date/Time    S. Pneumo Ag Urine or CSF - Urine, Urine, Clean Catch [[id number]]  (Abnormal) Collected:  05/16/18 1038    Lab Status:  Final result Specimen:  Urine from Urine, Clean Catch Updated:  05/18/18 1518     Specimen Source Urine     STREP PNEUMONIAE ANTIGEN Positive (A)     Body Fluid Culture, Sterile Not Indicated     Organism ID Not indicated.     Please note Comment     Comment: College [employer] standards require a culture to be  performed on CSF specimens submitted for bacterial antigen testing.  (CAP [id number]) Urine specimens will not be cultured.       Narrative:       Performed at:  [address] [place] [address]  [id number]  : Yoni Humphrey MD, Phone:  [phone]    Respiratory Culture - Sputum, Cough [[id number]] Collected:  05/16/18 4380    Lab Status:  Final result Specimen:  Sputum from Cough Updated:  05/18/18 0822     Respiratory Culture Scant growth (1+) Normal Respiratory Selena     Gram Stain Result Many (4+) WBCs per low power field      Moderate (3+) Epithelial cells per low power field      Occasional Yeast      Few (2+) Gram variable bacilli    Beta Strep Culture, Throat - Swab, Throat  [[id number]]  (Normal) Collected:  05/16/18 0837    Lab Status:  Final result Specimen:  Swab from Throat Updated:  05/18/18 0821     Throat Culture, Beta Strep No Beta Hemolytic Streptococcus Isolated    Blood Culture - Blood, Blood, Venous Line [[id number]]  (Normal) Collected:  05/15/18 2245    Lab Status:  Preliminary result Specimen:  Blood from Arm, Right Updated:  05/17/18 2300     Blood Culture No growth at 2 days    MRSA Screen, PCR - Swab, Nares [[id number]]  (Normal) Collected:  05/16/18 1427    Lab Status:  Final result Specimen:  Swab from Nares Updated:  05/16/18 1710     MRSA, PCR Negative    Narrative:         MRSA Negative    Rapid Strep A Screen - Swab, Throat [[id number]]  (Normal) Collected:  05/16/18 0837    Lab Status:  Final result Specimen:  Swab from Throat Updated:  05/16/18 1015     Strep A Ag Negative    Narrative:         Test performed by Direct Antigen Testing.          Imaging Results (last 24 hours)     ** No results found for the last 24 hours. **        Results for orders placed during the hospital encounter of 03/10/18   Adult Transthoracic Echo Complete W/ Cont if Necessary Per Protocol    Narrative · Left atrial cavity size is mildly dilated.  · Left ventricular wall thickness is consistent with mild concentric   hypertrophy.  · Left ventricular systolic function is moderately decreased. Estimated EF   = 36%.  · Mild mitral valve regurgitation is present  · Mild tricuspid valve regurgitation is present.  · Estimated right ventricular systolic pressure is moderately elevated   (45-55 mmHg).          I have reviewed the medications.    Assessment/Plan   Assessment / Plan     Hospital Problem List     * (Principal)Sepsis    Essential hypertension    DM type 2 (diabetes mellitus, type 2), on insulin therapy (Chronic)    GERD (gastroesophageal reflux disease)    Coronary artery disease involving native coronary artery of native heart with angina pectoris (Chronic)    Overview Addendum  "3/13/2018  5:16 PM by Festus Bowie IV, MD     · Previous PCI to the LAD  · Cardiac catheterization (8/2/2015): Mild nonobstructive CAD. Widely patent LAD stent. Normal LVEF.  · Cardiac catheterization (3/13/18): Mild to moderate nonobstructive CAD. Previously placed LAD stent widely patent.         Sleep apnea (Chronic)    RLS (restless legs syndrome)    Gout    Chronic systolic congestive heart failure    Overview Addendum 3/13/2018  5:17 PM by Festus Bowie IV, MD     · Echocardiogram (3/11/18):  LVEF 36%, mild MR  · Cardiac catheterization (3/13/18): Mild to moderate nonobstructive CAD. Previously placed LAD stent widely patent.         Paroxysmal atrial fibrillation (Chronic)    Overview Addendum 3/13/2018  6:14 PM by Festus Bowie IV, MD     · Chads VASC = 6 (age, female, CHF, CAD, HTN, DM)         Cholelithiasis (Chronic)    Chronic passive hepatic congestion    Overview Deleted 3/29/2018 12:17 PM by Ten Marion MD            Diabetic polyneuropathy associated with type 2 diabetes mellitus    Nonintractable headache    Dehydration    CAP (community acquired pneumonia)    CKD (chronic kidney disease), stage III    Acute on chronic respiratory failure with hypoxia             Brief Hospital Course to date:  Angelita Shay is a 72 y.o. female here with severe sepsis.  Cough is prominent.  R lung infiltrate by CXR.  Abd CT did not show GB inflammation.  A main complaint is \"swollen throat\".  Since this started preadmission and is described as progressively painful, doubt it is anaphylaxis but must consider this.       Assessment & Plan:  Severe sepsis  resolving  -- BP stabilized  -- UOP  Good     R pna  -- vanc, Zosyn.  Day 3. BCx pending  -- strep A screen, sputum culture  -- ID consulted  -- has o2 at home but hasn't needed it.     GORDON  -- improved w  Hydration to 1.2    worsenign pain R chest  -- some is musculoskeletal.  -pleuritic and referred GB pain prob " "contributing.   -- heparin sq for prophylaxis.     PAF, CHADs VAsc 6.   -- stopped eliquis in case procedures needed; also hemoptysis   - restart at discharge    Dm2:  SSI  CHF:  EF 36 percent.  Watch sats. Stopped fluids  -- add coreg.  Hold entresto which \"makes me sleepy\" and lasix since she is not eating well.     CAD with LAD stent:  cath'ed in March, looked good.             DVT Prophylaxis:  hep    CODE STATUS: Full Code    Disposition: I expect the patient to be discharged ?? in ?? days.      Electronically signed by Tiffanie Landa MD, 05/18/18, 3:49 PM.      "

## 2018-05-18 NOTE — PROGRESS NOTES
[facility] Progress Note    Admission Date: 5/15/2018    Angelita Shay  1945  [id number]    Date: 5/18/2018    Meds:    Anti-Infectives     Ordered     Dose/Rate Route Frequency Start Stop    05/17/18 2030  ertapenem (INVanz) 1 g/100 mL 0.9% NS VTB (mbp)     Ordering Provider:  Herber Christiansen MD    1 g  over 30 Minutes Intravenous Every 24 Hours 05/18/18 1000 05/25/18 0959    05/17/18 2031  ertapenem (INVanz) 1 g/100 mL 0.9% NS VTB (mbp)     Ordering Provider:  Herber Christiansen MD    1 g  over 30 Minutes Intravenous Once 05/17/18 2145 05/17/18 2206    05/16/18 0148  vancomycin 2000 mg/500 mL 0.9% NS IVPB ([facility])     Ordering Provider:  Celio Montiel RPH    20 mg/kg × 94.3 kg  over 120 Minutes Intravenous Once 05/16/18 0300 05/16/18 0809    05/16/18 0145  piperacillin-tazobactam (ZOSYN) 3.375 g in iso-osmotic dextrose 50 ml (premix)     Ordering Provider:  Celio Montiel RPH    3.375 g  over 30 Minutes Intravenous Once 05/16/18 0230 05/16/18 0638    05/15/18 2203  cefTRIAXone (ROCEPHIN) IVPB 1 g     Ordering Provider:  Josue Mina MD    1 g  100 mL/hr over 30 Minutes Intravenous Once 05/15/18 2205 05/16/18 0001          CC:  Sepsis, HCAP    SUBJECTIVE:  5/16/18: Patient is a 72 y.o. female with h/o CAD, CKD3, remote colon cancer, T2DM,  HTN, PAF/Eliquis, NICM (EF 36%), chronicstasis dermatitis, and osteoarthritis with Bilateral TSA and Bilateral TKA  Who presented to [facility] ED with worsening generalized weakness, body aches, rigors, night sweats, productive nonpurulent cough, and central chest pain especially with deep breaths.  She denies nausea, vomiting, diarrhea, and dysuria. She was recently hospitalized from 3/10-3/14/18 with acute systolic CHF exacerbation and diuresed.  She had LVEF of 36% with severe pulmonary HTN at that time. She was readmitted from 3/28-3/30/18 with RUQ pain and was felt to have chronic cholecystitis and was to follow up with Dr. Marion in a month for elective cholecystectomy.   However, she followed up with Dr. Ulloa in [place] will undergo cholecystectomy when cleared by cardiology in 2018.  She now returns with respiratory symptoms and dyspnea. Worsened RUQ pain.  She denies sick contacts.  Work up showed Tmax 99.7, BP 89/63, pO2 74, PCT 1.04, WBC 28,000 with 87% neutrophils, BNP 92, sCr 1.3, lactic acid 3.2, and Strep A screen negative.  A CXR showed RML pulmonary infiltrate/atelectasis.  A CT scan of abd/pelvis showed cholelithiasis.  She was started on Zosyn and Vancomycin.  ID was asked to evaluate and manage her antibiotic therapy.   18: C/o bilateral breast pain.  Still with cough and sore throat.  TMax 99.8 and very cold.  She would like to go home soon.    18: Generally fatigued and feels crummy.  Some LGF noted in last 24 hours.  Reports she remains largely weak with increased headache today. C/o some constipation but no abdominal pain. Cough present not worse. No n/v/d.      Objective:   PE:   Vital Signs  Temp (24hrs), Av.4 °F (36.9 °C), Min:97.6 °F (36.4 °C), Max:99.2 °F (37.3 °C)    Temp  Min: 97.6 °F (36.4 °C)  Max: 99.2 °F (37.3 °C)  BP  Min: 97/57  Max: 143/93  Pulse  Min: 81  Max: 102  Resp  Min: 18  Max: 18  SpO2  Min: 95 %  Max: 99 %    GENERAL: Awake and alert, in no acute distress. Elderly and chronically ill appearing in poor general health  HEENT: Normocephalic, atraumatic.  PERRL. EOMI. No conjunctival injection. No icterus. Oropharynx clear with evidence of thrush, no exudate.   Dry MM   HEART: RRR; No murmur, rubs, gallops.   LUNGS: Diminished at lung bases bilaterally without wheezing, or rhonchi. Scattered rales R>L.  Normal respiratory effort. Nonlabored. Wet cough.    ABDOMEN: Soft, epigastric tenderness to touch, nondistended. Positive bowel sounds. No rebound or guarding. NO mass or HSM. Obese  EXT:  No cyanosis, clubbing or edema. No cord.  : Genitalia generally unremarkable. Carrion removed.   MSK: FROM without joint effusions noted  arms/legs.    SKIN: Warm and dry without cutaneous eruptions on Inspection/palpation.    NEURO: Oriented to PPT. No focal deficits on motor/sensory exam at arms/legs.  PSYCHIATRIC: Normal insight and judgement. Cooperative with PE    Laboratory Data      Results from last 7 days  Lab Units 05/17/18  0353 05/16/18  0611 05/15/18  2036   WBC 10*3/mm3 12.17* 24.02* 28.20*   HEMOGLOBIN g/dL 12.7 16.3* 16.8*   HEMATOCRIT % 39.1 49.5* 50.4*   PLATELETS 10*3/mm3 126* 145* 155       Results from last 7 days  Lab Units 05/17/18  0353   SODIUM mmol/L 138   POTASSIUM mmol/L 3.9   CHLORIDE mmol/L 105   CO2 mmol/L 27.0   BUN mg/dL 29*   CREATININE mg/dL 1.20   GLUCOSE mg/dL 151*   CALCIUM mg/dL 8.0*       Results from last 7 days  Lab Units 05/17/18  0353   ALK PHOS U/L 61   BILIRUBIN mg/dL 0.4   ALT (SGPT) U/L 19   AST (SGOT) U/L 13               Results from last 7 days  Lab Units 05/17/18  0353   LACTATE mmol/L 0.7             Estimated Creatinine Clearance: 50.9 mL/min (by C-G formula based on SCr of 1.2 mg/dL).    Microbiology:     Microbiology Results Abnormal     Procedure Component Value - Date/Time    Respiratory Culture - Sputum, Cough [[id number]] Collected:  05/16/18 1827    Lab Status:  Final result Specimen:  Sputum from Cough Updated:  05/18/18 0822     Respiratory Culture Scant growth (1+) Normal Respiratory Selena     Gram Stain Result Many (4+) WBCs per low power field      Moderate (3+) Epithelial cells per low power field      Occasional Yeast      Few (2+) Gram variable bacilli    Beta Strep Culture, Throat - Swab, Throat [[id number]]  (Normal) Collected:  05/16/18 0837    Lab Status:  Final result Specimen:  Swab from Throat Updated:  05/18/18 0821     Throat Culture, Beta Strep No Beta Hemolytic Streptococcus Isolated    Blood Culture - Blood, Blood, Venous Line [[id number]]  (Normal) Collected:  05/15/18 2245    Lab Status:  Preliminary result Specimen:  Blood from Arm, Right Updated:  05/17/18 2300     Blood  Culture No growth at 2 days    MRSA Screen, PCR - Swab, Nares [[id number]]  (Normal) Collected:  05/16/18 1427    Lab Status:  Final result Specimen:  Swab from Nares Updated:  05/16/18 1710     MRSA, PCR Negative    Narrative:         MRSA Negative    Rapid Strep A Screen - Swab, Throat [[id number]]  (Normal) Collected:  05/16/18 0837    Lab Status:  Final result Specimen:  Swab from Throat Updated:  05/16/18 1015     Strep A Ag Negative    Narrative:         Test performed by Direct Antigen Testing.        UA WBC 3-5 RBC 3-6 LE neg nit neg       UAg for Strep pneumoniae is negative      Radiology:  Imaging Results (last 72 hours)     Procedure Component Value Units Date/Time    XR Chest 1 View [[id number]] Collected:  05/16/18 2345     Updated:  05/17/18 0021    Narrative:       EXAM:    XR Chest, 1 View    CLINICAL HISTORY:    72 years old, female; Elevated white blood cell count, unspecified; Lobar   pneumonia, unspecified organism; Calculus of gallbladder without cholecystitis   without obstruction; Cough; Headache; Localized enlarged lymph nodes;   Hyperglycemia, unspecified; Signs and symptoms; Dyspnea and fever; Additional   info: Hypoxic    TECHNIQUE:    Frontal view of the chest.    COMPARISON:    CR - XR CHEST 1 VW 2018-05-15 21:06    FINDINGS:    No significant change in focal airspace opacity in the RIGHT mid zone.    Prominent lung markings bilaterally with mild bibasal atelectasis, RIGHT worse   than LEFT. Cardiomegaly with normal lung vascularity.  Calcification and   unfolding of the aortic arch.       Impression:         Unchanged RIGHT lung consolidation/pneumonia. Possibility of underlying   malignancy cannot be excluded.  Recommend followup to complete resolution.       THIS DOCUMENT HAS BEEN ELECTRONICALLY SIGNED BY ILENE CRUZ MD    CT Abdomen Pelvis Without Contrast [[id number]] Collected:  05/15/18 2202     Updated:  05/15/18 2318    Narrative:       EXAM:    CT Abdomen and Pelvis Without  Intravenous Contrast    CLINICAL HISTORY:    72 years old, female; Signs and symptoms; Other: See notes; Prior surgery;   Additional info: Pain    TECHNIQUE:    Axial computed tomography images of the abdomen and pelvis without   intravenous contrast.  All CT scans at this facility use one or more dose   reduction techniques, viz.: automated exposure control; ma/kV adjustment per   patient size (including targeted exams where dose is matched to indication;   i.e. head); or iterative reconstruction technique.    Coronal reformatted images were created and reviewed.    COMPARISON:    CT ABDOMEN PELVIS W CONTRAST 2018-03-10 23:16, US Gallbladder 03/11/2018.    FINDINGS:    Limitations:  Patient's obese body habitus limits resolution of the   examination.    Lung bases:  Minimal basilar subsegmental atelectasis or scars.     ABDOMEN:    Liver:  Unremarkable.    Gallbladder and bile ducts:  Cholelithiasis at gallbladder neck.  No ductal   dilation.    Pancreas:  Unremarkable.  No ductal dilation.    Spleen:  Unremarkable. No splenomegaly or mass.    Adrenals:  Unremarkable adrenals.    Kidneys and ureters:  Unremarkable. No hydronephrosis, mass, urinary calculi,   or obstructive uropathy.    Stomach and bowel:  Nonspecific bowel gas pattern without dilatation or   obstruction.  Some fluid scattered in the nondilated small bowel.  Right-sided   bowel anastomosis.  The contracted segments of bowel restrict wall evaluation   to rule out any abnormal thickening.     PELVIS:    Appendix:  Appendix not localized, obscured.  However, no inflammation in the   RLQ mesenteric fat.    Bladder:  Unremarkable.  No stones.    Reproductive:  Unremarkable as visualized.  Retroverted uterus.     ABDOMEN and PELVIS:    Intraperitoneal space:  No pneumoperitoneum.  No significant free fluid.    Bones/joints:  Degenerative changes of the spine.  Mild scoliosis.  Mild DJD   of bilateral hips.  No acute fracture.  No dislocation.    Soft  tissues:  Obesity.    Vasculature:  Pelvic phleboliths. Scattered atherosclerotic placques are seen   along some vessels. No abdominal aortic aneurysm.    Lymph nodes:  Nonspecific few slightly prominent bilateral inguinal lymph   nodes.  No enlarged intra-abdominal lymph nodes.    Other findings:  Surgical clips scattered in abdomen and pelvis.      Impression:       1.  Cholelithiasis.    2.  Nonspecific bowel gas pattern without obstruction.  3.  Nonspecific few slightly prominent bilateral inguinal lymph nodes.    THIS DOCUMENT HAS BEEN ELECTRONICALLY SIGNED BY EARL TOBIN MD    XR Chest 1 View [[id number]] Collected:  05/15/18 2031     Updated:  05/15/18 2141    Narrative:       EXAM:    XR Chest, 1 View    CLINICAL HISTORY:    72 years old, female; Signs and symptoms; Shortness of breath; Additional   info: SOA triage protocol    TECHNIQUE:    Frontal view of the chest.    COMPARISON:    CR - XR CHEST 1 VW 2018-03-28 18:41    FINDINGS:    Lungs:  Small new infiltrate/atelectasis in right mid-lung field compared to   prior study.  No acute left infiltrates.    Pleural space:  No pneumothorax or pleural effusion.    Heart:  Normal heart size.    Mediastinum: No acute abnormality compared to prior study.    Bones/joints:  Bilateral shoulder prostheses.  No acute abnormality seen   along the well-visualized osseous structures.  Minimal scoliotic curvature,   positional versus fixed.      Impression:         New mild right mid-lung field infiltrate/atelectasis.    THIS DOCUMENT HAS BEEN ELECTRONICALLY SIGNED BY EARL TOBIN MD          I personally read the radiographic studies     Impression:   1.  Severe sepsis POA- with lactic acidosis, PCT, acute hypoxemic respiratory failure, hypotension, tachycardia, fever, and leukocytosis.  She is clinically improving with decreased fever, and improved leukocytosis.  2.  RML pneumonia.  Her MRSA nasal PCR was negative, suggesting that MRSA infection is highly  unlikely.Generally stable with Invanz.  Continue to monitor culture data  3.  Chronic cholelithiasis/cholecystitis, awaiting CCY at [address]  4.  Fever, low grade  5.  Marked leukocytosis/neutrophilia-improving  6.  Lactic acidosis/elevated procalcitonin  7.  Acute hypoxemic respiratory failure  8.  Hypotension, improved  9.  CKDIII  10.  T2DM, insulin dependent  11.  PAF/Eliquis  12.  NICM with h/o systolic CHF (last EF 36% with pulmonary HTN)  13.  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 Christiansen  13.  Sore throat-although she complains of a sore throat, her exam is unremarkable.  14.  Thrush        PLAN/RECOMMENDATIONS:   Invanz 1 g IV daily   Patient agreeable to infusion in our office daily D/w our office and they are aware of her infusion needs starting day after discharge    Disussed with Dr. Christiansen  Check/review labs cultures and scans  Discussed with microbiology  Partial history per nursing staff  Highly complex set of issues with high risk for further serious morbidity and other serious sequela      Dr. Celio Ash saw the patient, performed the physical exam, reviewed the laboratory data and guided with the formulation of the above problem list, assessment and treatment plan.     KHLOE Farnsworth  5/18/2018  9:06 AM

## 2018-05-19 LAB
ALBUMIN SERPL-MCNC: 3.6 G/DL (ref 3.2–4.8)
ALBUMIN/GLOB SERPL: 1.3 G/DL (ref 1.5–2.5)
ALP SERPL-CCNC: 56 U/L (ref 25–100)
ALT SERPL W P-5'-P-CCNC: 16 U/L (ref 7–40)
ANION GAP SERPL CALCULATED.3IONS-SCNC: 5 MMOL/L (ref 3–11)
AST SERPL-CCNC: 18 U/L (ref 0–33)
BILIRUB SERPL-MCNC: 0.3 MG/DL (ref 0.3–1.2)
BUN BLD-MCNC: 14 MG/DL (ref 9–23)
BUN/CREAT SERPL: 17.5 (ref 7–25)
CALCIUM SPEC-SCNC: 9.3 MG/DL (ref 8.7–10.4)
CHLORIDE SERPL-SCNC: 103 MMOL/L (ref 99–109)
CO2 SERPL-SCNC: 31 MMOL/L (ref 20–31)
CREAT BLD-MCNC: 0.8 MG/DL (ref 0.6–1.3)
DEPRECATED RDW RBC AUTO: 51.8 FL (ref 37–54)
ERYTHROCYTE [DISTWIDTH] IN BLOOD BY AUTOMATED COUNT: 14 % (ref 11.3–14.5)
GFR SERPL CREATININE-BSD FRML MDRD: 70 ML/MIN/1.73
GLOBULIN UR ELPH-MCNC: 2.7 GM/DL
GLUCOSE BLD-MCNC: 138 MG/DL (ref 70–100)
GLUCOSE BLDC GLUCOMTR-MCNC: 120 MG/DL (ref 70–130)
GLUCOSE BLDC GLUCOMTR-MCNC: 129 MG/DL (ref 70–130)
GLUCOSE BLDC GLUCOMTR-MCNC: 152 MG/DL (ref 70–130)
GLUCOSE BLDC GLUCOMTR-MCNC: 241 MG/DL (ref 70–130)
HCT VFR BLD AUTO: 39.3 % (ref 34.5–44)
HGB BLD-MCNC: 12.4 G/DL (ref 11.5–15.5)
MCH RBC QN AUTO: 31.9 PG (ref 27–31)
MCHC RBC AUTO-ENTMCNC: 31.6 G/DL (ref 32–36)
MCV RBC AUTO: 101 FL (ref 80–99)
PLATELET # BLD AUTO: 140 10*3/MM3 (ref 150–450)
PMV BLD AUTO: 11.5 FL (ref 6–12)
POTASSIUM BLD-SCNC: 4.5 MMOL/L (ref 3.5–5.5)
PROT SERPL-MCNC: 6.3 G/DL (ref 5.7–8.2)
RBC # BLD AUTO: 3.89 10*6/MM3 (ref 3.89–5.14)
SODIUM BLD-SCNC: 139 MMOL/L (ref 132–146)
WBC NRBC COR # BLD: 6.19 10*3/MM3 (ref 3.5–10.8)

## 2018-05-19 PROCEDURE — 82962 GLUCOSE BLOOD TEST: CPT

## 2018-05-19 PROCEDURE — 85027 COMPLETE CBC AUTOMATED: CPT | Performed by: INTERNAL MEDICINE

## 2018-05-19 PROCEDURE — 99233 SBSQ HOSP IP/OBS HIGH 50: CPT | Performed by: INTERNAL MEDICINE

## 2018-05-19 PROCEDURE — 25010000002 FUROSEMIDE PER 20 MG: Performed by: INTERNAL MEDICINE

## 2018-05-19 PROCEDURE — 80053 COMPREHEN METABOLIC PANEL: CPT | Performed by: INTERNAL MEDICINE

## 2018-05-19 PROCEDURE — 25010000002 ERTAPENEM 1 GM/100ML SOLUTION: Performed by: INTERNAL MEDICINE

## 2018-05-19 PROCEDURE — 25010000002 HEPARIN (PORCINE) PER 1000 UNITS: Performed by: INTERNAL MEDICINE

## 2018-05-19 RX ORDER — BENZONATATE 100 MG/1
100 CAPSULE ORAL 3 TIMES DAILY PRN
Status: DISCONTINUED | OUTPATIENT
Start: 2018-05-19 | End: 2018-05-20 | Stop reason: HOSPADM

## 2018-05-19 RX ORDER — GABAPENTIN 100 MG/1
200 CAPSULE ORAL 3 TIMES DAILY
Status: DISCONTINUED | OUTPATIENT
Start: 2018-05-19 | End: 2018-05-20 | Stop reason: HOSPADM

## 2018-05-19 RX ORDER — NYSTATIN 100000 [USP'U]/G
POWDER TOPICAL EVERY 12 HOURS SCHEDULED
Status: DISCONTINUED | OUTPATIENT
Start: 2018-05-19 | End: 2018-05-20 | Stop reason: HOSPADM

## 2018-05-19 RX ORDER — FUROSEMIDE 10 MG/ML
20 INJECTION INTRAMUSCULAR; INTRAVENOUS ONCE
Status: COMPLETED | OUTPATIENT
Start: 2018-05-19 | End: 2018-05-19

## 2018-05-19 RX ADMIN — ATORVASTATIN CALCIUM 20 MG: 20 TABLET, FILM COATED ORAL at 20:14

## 2018-05-19 RX ADMIN — GABAPENTIN 300 MG: 300 CAPSULE ORAL at 13:06

## 2018-05-19 RX ADMIN — GABAPENTIN 200 MG: 100 CAPSULE ORAL at 21:36

## 2018-05-19 RX ADMIN — HYDROCODONE BITARTRATE AND ACETAMINOPHEN 1 TABLET: 5; 325 TABLET ORAL at 11:29

## 2018-05-19 RX ADMIN — NYSTATIN 500000 UNITS: 100000 SUSPENSION ORAL at 17:40

## 2018-05-19 RX ADMIN — TRAZODONE HYDROCHLORIDE 50 MG: 50 TABLET ORAL at 20:13

## 2018-05-19 RX ADMIN — FUROSEMIDE 20 MG: 10 INJECTION, SOLUTION INTRAMUSCULAR; INTRAVENOUS at 17:40

## 2018-05-19 RX ADMIN — HEPARIN SODIUM 5000 UNITS: 5000 INJECTION, SOLUTION INTRAVENOUS; SUBCUTANEOUS at 21:36

## 2018-05-19 RX ADMIN — HEPARIN SODIUM 5000 UNITS: 5000 INJECTION, SOLUTION INTRAVENOUS; SUBCUTANEOUS at 13:06

## 2018-05-19 RX ADMIN — HEPARIN SODIUM 5000 UNITS: 5000 INJECTION, SOLUTION INTRAVENOUS; SUBCUTANEOUS at 06:39

## 2018-05-19 RX ADMIN — GABAPENTIN 300 MG: 300 CAPSULE ORAL at 06:39

## 2018-05-19 RX ADMIN — BENZONATATE 100 MG: 100 CAPSULE ORAL at 09:16

## 2018-05-19 RX ADMIN — NYSTATIN 500000 UNITS: 100000 SUSPENSION ORAL at 11:29

## 2018-05-19 RX ADMIN — NYSTATIN 500000 UNITS: 100000 SUSPENSION ORAL at 07:50

## 2018-05-19 RX ADMIN — ASPIRIN 81 MG: 81 TABLET, COATED ORAL at 07:49

## 2018-05-19 RX ADMIN — ERTAPENEM SODIUM 1 G: 1 INJECTION, POWDER, LYOPHILIZED, FOR SOLUTION INTRAMUSCULAR; INTRAVENOUS at 07:49

## 2018-05-19 RX ADMIN — BENZONATATE 100 MG: 100 CAPSULE ORAL at 17:40

## 2018-05-19 RX ADMIN — HYDROCODONE BITARTRATE AND ACETAMINOPHEN 1 TABLET: 5; 325 TABLET ORAL at 05:21

## 2018-05-19 RX ADMIN — NYSTATIN: 100000 POWDER TOPICAL at 08:44

## 2018-05-19 RX ADMIN — CARVEDILOL 6.25 MG: 6.25 TABLET, FILM COATED ORAL at 07:49

## 2018-05-19 RX ADMIN — NYSTATIN 500000 UNITS: 100000 SUSPENSION ORAL at 20:13

## 2018-05-19 RX ADMIN — SACUBITRIL AND VALSARTAN 1 TABLET: 49; 51 TABLET, FILM COATED ORAL at 20:14

## 2018-05-19 RX ADMIN — NYSTATIN: 100000 POWDER TOPICAL at 20:16

## 2018-05-19 RX ADMIN — INSULIN LISPRO 3 UNITS: 100 INJECTION, SOLUTION INTRAVENOUS; SUBCUTANEOUS at 21:36

## 2018-05-19 RX ADMIN — ROPINIROLE 1 MG: 0.5 TABLET, FILM COATED ORAL at 20:14

## 2018-05-19 RX ADMIN — HYDROCODONE BITARTRATE AND ACETAMINOPHEN 1 TABLET: 5; 325 TABLET ORAL at 17:40

## 2018-05-19 RX ADMIN — ACETAMINOPHEN 650 MG: 325 TABLET, FILM COATED ORAL at 20:31

## 2018-05-19 NOTE — PROGRESS NOTES
[facility] Medicine Services  PROGRESS NOTE    Patient Name: Angelita Shay  : 1945  MRN: [id number]    Date of Admission: 5/15/2018  Length of Stay: 4  Primary Care Physician: Sharon Saldivar MD    Subjective   Subjective     CC:  Fever, chills, sweats, and cough    HPI:  Pain continues.  It is worst in RUQ.  Also R chest and R back.     Review of Systems   As above.   Otherwise ROS is negative except as mentioned in the HPI.    Objective   Objective     Vital Signs:   Temp:  [97.9 °F (36.6 °C)-98.2 °F (36.8 °C)] 98.2 °F (36.8 °C)  Heart Rate:  [69-91] 83  Resp:  [18] 18  BP: (160-176)/() 167/92        Physical Exam:  Constitutional: in bed, asleep, wakes easily.   Eyes: PERRLA, sclerae anicteric, no conjunctival injection  HENT: NCAT, mucous membranes dry,   Neck: Supple,  trachea midline  Respiratory: Clear to auscultation bilaterally, nonlabored respirations on room air, but breathes carefully and breath souhds distant  Chest wall - general mild tenderness r side.   Cardiovascular: tachy RR, no murmurs, rubs, or gallops, Gastrointestinal: Positive bowel sounds, soft, difusely tender  RUQ, nondistended  Musculoskeletal: No bilateral ankle edema, no clubbing or cyanosis to extremities  Psychiatric: Appropriate affect, cooperative  Neurologic: awake and alert, strength symmetric in all extremities, Cranial Nerves grossly intact to confrontation, speech clear  Skin: No rashes    Results Reviewed:  I have personally reviewed current lab, radiology, and data and agree.      Results from last 7 days  Lab Units 18  0457 18  0353 18  0611   WBC 10*3/mm3 6.19 12.17* 24.02*   HEMOGLOBIN g/dL 12.4 12.7 16.3*   HEMATOCRIT % 39.3 39.1 49.5*   PLATELETS 10*3/mm3 140* 126* 145*       Results from last 7 days  Lab Units 18  0457 18  0353 18  0611   SODIUM mmol/L 139 138 133   POTASSIUM mmol/L 4.5 3.9 4.8   CHLORIDE mmol/L 103 105 100   CO2 mmol/L  31.0 27.0 24.0   BUN mg/dL 14 29* 26*   CREATININE mg/dL 0.80 1.20 1.80*   GLUCOSE mg/dL 138* 151* 198*   CALCIUM mg/dL 9.3 8.0* 9.1   ALT (SGPT) U/L 16 19 26   AST (SGOT) U/L 18 13 21     Estimated Creatinine Clearance: 75.5 mL/min (by C-G formula based on SCr of 0.8 mg/dL).  No results found for: BNP  No results found for: PHART    Microbiology Results Abnormal     Procedure Component Value - Date/Time    Blood Culture - Blood, Blood, Venous Line [[id number]]  (Normal) Collected:  05/15/18 2245    Lab Status:  Preliminary result Specimen:  Blood from Arm, Right Updated:  05/18/18 2300     Blood Culture No growth at 3 days    S. Pneumo Ag Urine or CSF - Urine, Urine, Clean Catch [[id number]]  (Abnormal) Collected:  05/16/18 1038    Lab Status:  Edited Result - FINAL Specimen:  Urine from Urine, Clean Catch Updated:  05/18/18 1711     Specimen Source Urine     STREP PNEUMONIAE ANTIGEN Positive (A)     Comment: Reported pos SP to Hanna GILES @3:42 on 5/18/18.  Faxed to [phone]. AM        Body Fluid Culture, Sterile Not Indicated     Organism ID Not indicated.     Please note Comment     Comment: College of American Pathologists standards require a culture to be  performed on CSF specimens submitted for bacterial antigen testing.  (CAP STEVEN.[id number]) Urine specimens will not be cultured.       Narrative:       Performed at:  [address] [place] [address]  [id number]  : Yoni Humphrey MD, Phone:  [phone]    Respiratory Culture - Sputum, Cough [[id number]] Collected:  05/16/18 3174    Lab Status:  Final result Specimen:  Sputum from Cough Updated:  05/18/18 0822     Respiratory Culture Scant growth (1+) Normal Respiratory Selena     Gram Stain Result Many (4+) WBCs per low power field      Moderate (3+) Epithelial cells per low power field      Occasional Yeast      Few (2+) Gram variable bacilli    Beta Strep Culture, Throat - Swab, Throat [[id number]]  (Normal) Collected:  05/16/18  0837    Lab Status:  Final result Specimen:  Swab from Throat Updated:  05/18/18 0821     Throat Culture, Beta Strep No Beta Hemolytic Streptococcus Isolated    MRSA Screen, PCR - Swab, Nares [[id number]]  (Normal) Collected:  05/16/18 1427    Lab Status:  Final result Specimen:  Swab from Nares Updated:  05/16/18 1710     MRSA, PCR Negative    Narrative:         MRSA Negative    Rapid Strep A Screen - Swab, Throat [[id number]]  (Normal) Collected:  05/16/18 0837    Lab Status:  Final result Specimen:  Swab from Throat Updated:  05/16/18 1015     Strep A Ag Negative    Narrative:         Test performed by Direct Antigen Testing.          Imaging Results (last 24 hours)     ** No results found for the last 24 hours. **        Results for orders placed during the hospital encounter of 03/10/18   Adult Transthoracic Echo Complete W/ Cont if Necessary Per Protocol    Narrative · Left atrial cavity size is mildly dilated.  · Left ventricular wall thickness is consistent with mild concentric   hypertrophy.  · Left ventricular systolic function is moderately decreased. Estimated EF   = 36%.  · Mild mitral valve regurgitation is present  · Mild tricuspid valve regurgitation is present.  · Estimated right ventricular systolic pressure is moderately elevated   (45-55 mmHg).          I have reviewed the medications.    Assessment/Plan   Assessment / Plan     Hospital Problem List     * (Principal)Sepsis    Essential hypertension    DM type 2 (diabetes mellitus, type 2), on insulin therapy (Chronic)    GERD (gastroesophageal reflux disease)    Coronary artery disease involving native coronary artery of native heart with angina pectoris (Chronic)    Overview Addendum 3/13/2018  5:16 PM by Festus Bowie IV, MD     · Previous PCI to the LAD  · Cardiac catheterization (8/2/2015): Mild nonobstructive CAD. Widely patent LAD stent. Normal LVEF.  · Cardiac catheterization (3/13/18): Mild to moderate nonobstructive CAD.  "Previously placed LAD stent widely patent.         Sleep apnea (Chronic)    RLS (restless legs syndrome)    Gout    Chronic systolic congestive heart failure    Overview Addendum 3/13/2018  5:17 PM by Festus Bowie IV, MD     · Echocardiogram (3/11/18):  LVEF 36%, mild MR  · Cardiac catheterization (3/13/18): Mild to moderate nonobstructive CAD. Previously placed LAD stent widely patent.         Paroxysmal atrial fibrillation (Chronic)    Overview Addendum 3/13/2018  6:14 PM by Festus Bowie IV, MD     · Chads VASC = 6 (age, female, CHF, CAD, HTN, DM)         Cholelithiasis (Chronic)    Chronic passive hepatic congestion    Overview Deleted 3/29/2018 12:17 PM by Ten Marion MD            Diabetic polyneuropathy associated with type 2 diabetes mellitus    Nonintractable headache    Dehydration    CAP (community acquired pneumonia)    CKD (chronic kidney disease), stage III    Acute on chronic respiratory failure with hypoxia             Brief Hospital Course to date:  Angelita Shay is a 72 y.o. female here with severe sepsis.  Cough is prominent.  R lung infiltrate by CXR.  Abd CT did not show GB inflammation.  A main complaint is \"swollen throat\".  Since this started preadmission and is described as progressively painful, doubt it is anaphylaxis but must consider this.       Assessment & Plan:  Severe sepsis  resolving  -- BP stabilized  -- UOP  Good     R pna  -- vanc, Zosyn.  Day 3. BCx pending  -- strep A screen, sputum culture  -- ID consulted  -- has o2 at home but hasn't needed it.     GORDON  -- improved w  Hydration to 1.2    worsenign pain R chest  -- some is musculoskeletal.  -pleuritic and referred GB pain prob contributing.   -- heparin sq for prophylaxis.     PAF, CHADs VAsc 6.   -- stopped eliquis in case procedures needed; also hemoptysis   - restart at discharge  -- needs to stop for CCY.     Dm2:  SSI  CHF:  EF 36 percent.  Watch sats. Stopped fluids  -- add coreg.  Was " "holding entresto which \"heart meds make me sleepy\" and lasix since she is not eating well.   -- since she is still sleepy, start entresto and stop coreg.     CAD with LAD stent:  cath'ed in March, looked good.       Could consider getting CCY here since she has been off eliquis and having constant pain.       DVT Prophylaxis:  hep    CODE STATUS: Full Code    Disposition: I expect the patient to be discharged ?? in ?? days.      Electronically signed by Tiffanie Landa MD, 05/19/18, 3:10 PM.      "

## 2018-05-19 NOTE — PROGRESS NOTES
[facility] Progress Note    Admission Date: 5/15/2018    Angelita Shay  1945  [id number]    Date: 5/19/2018    Meds:    Anti-Infectives     Ordered     Dose/Rate Route Frequency Start Stop    05/17/18 2030  ertapenem (INVanz) 1 g/100 mL 0.9% NS VTB (mbp)     Ordering Provider:  Herber Christiansen MD    1 g  over 30 Minutes Intravenous Every 24 Hours 05/18/18 1000 05/25/18 0959    05/17/18 2031  ertapenem (INVanz) 1 g/100 mL 0.9% NS VTB (mbp)     Ordering Provider:  Herber Christiansen MD    1 g  over 30 Minutes Intravenous Once 05/17/18 2145 05/17/18 2206    05/16/18 0148  vancomycin 2000 mg/500 mL 0.9% NS IVPB ([facility])     Ordering Provider:  Celio Montiel RPH    20 mg/kg × 94.3 kg  over 120 Minutes Intravenous Once 05/16/18 0300 05/16/18 0809    05/16/18 0145  piperacillin-tazobactam (ZOSYN) 3.375 g in iso-osmotic dextrose 50 ml (premix)     Ordering Provider:  Celio Montiel RPH    3.375 g  over 30 Minutes Intravenous Once 05/16/18 0230 05/16/18 0638    05/15/18 2203  cefTRIAXone (ROCEPHIN) IVPB 1 g     Ordering Provider:  Josue Mina MD    1 g  100 mL/hr over 30 Minutes Intravenous Once 05/15/18 2205 05/16/18 0001          CC:  Sepsis, HCAP    SUBJECTIVE:  5/16/18: Patient is a 72 y.o. female with h/o CAD, CKD3, remote colon cancer, T2DM,  HTN, PAF/Eliquis, NICM (EF 36%), chronicstasis dermatitis, and osteoarthritis with Bilateral TSA and Bilateral TKA  Who presented to [facility] ED with worsening generalized weakness, body aches, rigors, night sweats, productive nonpurulent cough, and central chest pain especially with deep breaths.  She denies nausea, vomiting, diarrhea, and dysuria. She was recently hospitalized from 3/10-3/14/18 with acute systolic CHF exacerbation and diuresed.  She had LVEF of 36% with severe pulmonary HTN at that time. She was readmitted from 3/28-3/30/18 with RUQ pain and was felt to have chronic cholecystitis and was to follow up with Dr. Marion in a month for elective cholecystectomy.   However, she followed up with Dr. Ulloa in [place] will undergo cholecystectomy when cleared by cardiology in 2018.  She now returns with respiratory symptoms and dyspnea. Worsened RUQ pain.  She denies sick contacts.  Work up showed Tmax 99.7, BP 89/63, pO2 74, PCT 1.04, WBC 28,000 with 87% neutrophils, BNP 92, sCr 1.3, lactic acid 3.2, and Strep A screen negative.  A CXR showed RML pulmonary infiltrate/atelectasis.  A CT scan of abd/pelvis showed cholelithiasis.  She was started on Zosyn and Vancomycin.  ID was asked to evaluate and manage her antibiotic therapy.   18: C/o bilateral breast pain.  Still with cough and sore throat.  TMax 99.8 and very cold.  She would like to go home soon.    18: Generally fatigued but feeling better. Cough present not worse. No n/v/d.  No F/C/S;  No rash    Objective:   PE:   Vital Signs  Temp (24hrs), Av.1 °F (36.7 °C), Min:97.9 °F (36.6 °C), Max:98.2 °F (36.8 °C)    Temp  Min: 97.9 °F (36.6 °C)  Max: 98.2 °F (36.8 °C)  BP  Min: 160/93  Max: 176/103  Pulse  Min: 69  Max: 91  Resp  Min: 18  Max: 18  SpO2  Min: 86 %  Max: 97 %    GENERAL: Awake and alert, in no acute distress. Elderly and chronically ill appearing in poor general health  HEENT: Normocephalic, atraumatic.  PERRL. EOMI. No conjunctival injection. No icterus. Oropharynx clear with evidence of thrush, no exudate.   Dry MM   HEART: RRR; No murmur, rubs, gallops.   LUNGS: Diminished at lung bases bilaterally without wheezing, or rhonchi. Scattered rales R>L.  Normal respiratory effort. Nonlabored. Wet cough.    ABDOMEN: Soft, epigastric tenderness to touch, nondistended. Positive bowel sounds. No rebound or guarding. NO mass or HSM. Obese  EXT:  No cyanosis, clubbing or edema. No cord.  : Genitalia generally unremarkable. Carrion removed.   MSK: FROM without joint effusions noted arms/legs.    SKIN: Warm and dry without cutaneous eruptions on Inspection/palpation.    NEURO: Oriented to PPT. No focal  deficits on motor/sensory exam at arms/legs.  PSYCHIATRIC: Normal insight and judgement. Cooperative with PE    Laboratory Data      Results from last 7 days  Lab Units 05/19/18  0457 05/17/18  0353 05/16/18  0611   WBC 10*3/mm3 6.19 12.17* 24.02*   HEMOGLOBIN g/dL 12.4 12.7 16.3*   HEMATOCRIT % 39.3 39.1 49.5*   PLATELETS 10*3/mm3 140* 126* 145*       Results from last 7 days  Lab Units 05/19/18  0457   SODIUM mmol/L 139   POTASSIUM mmol/L 4.5   CHLORIDE mmol/L 103   CO2 mmol/L 31.0   BUN mg/dL 14   CREATININE mg/dL 0.80   GLUCOSE mg/dL 138*   CALCIUM mg/dL 9.3       Results from last 7 days  Lab Units 05/19/18  0457   ALK PHOS U/L 56   BILIRUBIN mg/dL 0.3   ALT (SGPT) U/L 16   AST (SGOT) U/L 18               Results from last 7 days  Lab Units 05/17/18  0353   LACTATE mmol/L 0.7             Estimated Creatinine Clearance: 75.5 mL/min (by C-G formula based on SCr of 0.8 mg/dL).    Microbiology:     Microbiology Results Abnormal     Procedure Component Value - Date/Time    Blood Culture - Blood, Blood, Venous Line [[id number]]  (Normal) Collected:  05/15/18 2245    Lab Status:  Preliminary result Specimen:  Blood from Arm, Right Updated:  05/18/18 2300     Blood Culture No growth at 3 days    S. Pneumo Ag Urine or CSF - Urine, Urine, Clean Catch [[id number]]  (Abnormal) Collected:  05/16/18 1038    Lab Status:  Edited Result - FINAL Specimen:  Urine from Urine, Clean Catch Updated:  05/18/18 1711     Specimen Source Urine     STREP PNEUMONIAE ANTIGEN Positive (A)     Comment: Reported pos SP to Hanna GILES @3:42 on 5/18/18.  Faxed to [phone]. AM        Body Fluid Culture, Sterile Not Indicated     Organism ID Not indicated.     Please note Comment     Comment: College of American Pathologists standards require a culture to be  performed on CSF specimens submitted for bacterial antigen testing.  (CAP STEVEN.[id number]) Urine specimens will not be cultured.       Narrative:       Performed at:  [address] [place] [address]  [place], [place]  [id number]  : Yoni Humphrey MD, Phone:  [phone]    Respiratory Culture - Sputum, Cough [[id number]] Collected:  05/16/18 1827    Lab Status:  Final result Specimen:  Sputum from Cough Updated:  05/18/18 0822     Respiratory Culture Scant growth (1+) Normal Respiratory Selena     Gram Stain Result Many (4+) WBCs per low power field      Moderate (3+) Epithelial cells per low power field      Occasional Yeast      Few (2+) Gram variable bacilli    Beta Strep Culture, Throat - Swab, Throat [[id number]]  (Normal) Collected:  05/16/18 0837    Lab Status:  Final result Specimen:  Swab from Throat Updated:  05/18/18 0821     Throat Culture, Beta Strep No Beta Hemolytic Streptococcus Isolated    MRSA Screen, PCR - Swab, Nares [[id number]]  (Normal) Collected:  05/16/18 1427    Lab Status:  Final result Specimen:  Swab from Nares Updated:  05/16/18 1710     MRSA, PCR Negative    Narrative:         MRSA Negative    Rapid Strep A Screen - Swab, Throat [[id number]]  (Normal) Collected:  05/16/18 0837    Lab Status:  Final result Specimen:  Swab from Throat Updated:  05/16/18 1015     Strep A Ag Negative    Narrative:         Test performed by Direct Antigen Testing.        UA WBC 3-5 RBC 3-6 LE neg nit neg       UAg for Strep pneumoniae is negative      Radiology:  Imaging Results (last 72 hours)     Procedure Component Value Units Date/Time    XR Chest 1 View [[id number]] Collected:  05/16/18 2345     Updated:  05/17/18 0021    Narrative:       EXAM:    XR Chest, 1 View    CLINICAL HISTORY:    72 years old, female; Elevated white blood cell count, unspecified; Lobar   pneumonia, unspecified organism; Calculus of gallbladder without cholecystitis   without obstruction; Cough; Headache; Localized enlarged lymph nodes;   Hyperglycemia, unspecified; Signs and symptoms; Dyspnea and fever; Additional   info: Hypoxic    TECHNIQUE:    Frontal view of the chest.    COMPARISON:    CR - XR CHEST 1   2018-05-15 21:06    FINDINGS:    No significant change in focal airspace opacity in the RIGHT mid zone.    Prominent lung markings bilaterally with mild bibasal atelectasis, RIGHT worse   than LEFT. Cardiomegaly with normal lung vascularity.  Calcification and   unfolding of the aortic arch.       Impression:         Unchanged RIGHT lung consolidation/pneumonia. Possibility of underlying   malignancy cannot be excluded.  Recommend followup to complete resolution.       THIS DOCUMENT HAS BEEN ELECTRONICALLY SIGNED BY ILENE CRUZ MD    CT Abdomen Pelvis Without Contrast [[id number]] Collected:  05/15/18 2202     Updated:  05/15/18 2318    Narrative:       EXAM:    CT Abdomen and Pelvis Without Intravenous Contrast    CLINICAL HISTORY:    72 years old, female; Signs and symptoms; Other: See notes; Prior surgery;   Additional info: Pain    TECHNIQUE:    Axial computed tomography images of the abdomen and pelvis without   intravenous contrast.  All CT scans at this facility use one or more dose   reduction techniques, viz.: automated exposure control; ma/kV adjustment per   patient size (including targeted exams where dose is matched to indication;   i.e. head); or iterative reconstruction technique.    Coronal reformatted images were created and reviewed.    COMPARISON:    CT ABDOMEN PELVIS W CONTRAST 2018-03-10 23:16, US Gallbladder 03/11/2018.    FINDINGS:    Limitations:  Patient's obese body habitus limits resolution of the   examination.    Lung bases:  Minimal basilar subsegmental atelectasis or scars.     ABDOMEN:    Liver:  Unremarkable.    Gallbladder and bile ducts:  Cholelithiasis at gallbladder neck.  No ductal   dilation.    Pancreas:  Unremarkable.  No ductal dilation.    Spleen:  Unremarkable. No splenomegaly or mass.    Adrenals:  Unremarkable adrenals.    Kidneys and ureters:  Unremarkable. No hydronephrosis, mass, urinary calculi,   or obstructive uropathy.    Stomach and bowel:  Nonspecific bowel  gas pattern without dilatation or   obstruction.  Some fluid scattered in the nondilated small bowel.  Right-sided   bowel anastomosis.  The contracted segments of bowel restrict wall evaluation   to rule out any abnormal thickening.     PELVIS:    Appendix:  Appendix not localized, obscured.  However, no inflammation in the   RLQ mesenteric fat.    Bladder:  Unremarkable.  No stones.    Reproductive:  Unremarkable as visualized.  Retroverted uterus.     ABDOMEN and PELVIS:    Intraperitoneal space:  No pneumoperitoneum.  No significant free fluid.    Bones/joints:  Degenerative changes of the spine.  Mild scoliosis.  Mild DJD   of bilateral hips.  No acute fracture.  No dislocation.    Soft tissues:  Obesity.    Vasculature:  Pelvic phleboliths. Scattered atherosclerotic placques are seen   along some vessels. No abdominal aortic aneurysm.    Lymph nodes:  Nonspecific few slightly prominent bilateral inguinal lymph   nodes.  No enlarged intra-abdominal lymph nodes.    Other findings:  Surgical clips scattered in abdomen and pelvis.      Impression:       1.  Cholelithiasis.    2.  Nonspecific bowel gas pattern without obstruction.  3.  Nonspecific few slightly prominent bilateral inguinal lymph nodes.    THIS DOCUMENT HAS BEEN ELECTRONICALLY SIGNED BY EARL TOBIN MD    XR Chest 1 View [[id number]] Collected:  05/15/18 2031     Updated:  05/15/18 2141    Narrative:       EXAM:    XR Chest, 1 View    CLINICAL HISTORY:    72 years old, female; Signs and symptoms; Shortness of breath; Additional   info: SOA triage protocol    TECHNIQUE:    Frontal view of the chest.    COMPARISON:    CR - XR CHEST 1 VW 2018-03-28 18:41    FINDINGS:    Lungs:  Small new infiltrate/atelectasis in right mid-lung field compared to   prior study.  No acute left infiltrates.    Pleural space:  No pneumothorax or pleural effusion.    Heart:  Normal heart size.    Mediastinum: No acute abnormality compared to prior study.    Bones/joints:   Bilateral shoulder prostheses.  No acute abnormality seen   along the well-visualized osseous structures.  Minimal scoliotic curvature,   positional versus fixed.      Impression:         New mild right mid-lung field infiltrate/atelectasis.    THIS DOCUMENT HAS BEEN ELECTRONICALLY SIGNED BY EARL TOBIN MD          I personally read the radiographic studies     Impression:   1.  Severe sepsis POA- with lactic acidosis, PCT, acute hypoxemic respiratory failure, hypotension, tachycardia, fever, and leukocytosis.  She is clinically improving with decreased fever, and improved leukocytosis.  2.  RML pneumonia.  Her MRSA nasal PCR was negative, suggesting that MRSA infection is highly unlikely.Generally stable with Invanz.  Continue to monitor culture data  3.  Chronic cholelithiasis/cholecystitis, awaiting CCY at [facility]  4.  Fever,better  5.  Marked leukocytosis/neutrophilia-improving  6.  Lactic acidosis/elevated procalcitonin  7.  Acute hypoxemic respiratory failure  8.  Hypotension, improved  9.  CKDIII  10.  T2DM, insulin dependent  11.  PAF/Eliquis  12.  NICM with h/o systolic CHF (last EF 36% with pulmonary HTN)  13.  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 Christiansen  13.  Sore throat-although she complains of a sore throat, her exam is unremarkable.  14.  Thrush;  better        PLAN/RECOMMENDATIONS:   Invanz 1 g IV daily   Patient agreeable to infusion in our office daily D/w our office and they are aware of her infusion needs starting day after discharge    Disussed with Dr. Landa  Check/review labs cultures and scans  Discussed with microbiology  Partial history per nursing staff  Highly complex set of issues with high risk for further serious morbidity and other serious sequela        Celio Ash MD  5/19/2018  10:47 AM

## 2018-05-19 NOTE — PLAN OF CARE
Problem: Patient Care Overview  Goal: Plan of Care Review  Outcome: Ongoing (interventions implemented as appropriate)   05/19/18 0537   Plan of Care Review   Progress improving   OTHER   Outcome Summary Pt. slept most of the night, c/o pain treated see MAR, & will continue to monitor.       Problem: Sepsis/Septic Shock (Adult)  Goal: Signs and Symptoms of Listed Potential Problems Will be Absent, Minimized or Managed (Sepsis/Septic Shock)  Outcome: Ongoing (interventions implemented as appropriate)   05/19/18 0537   Goal/Outcome Evaluation   Problems Assessed (Sepsis) all   Problems Present (Sepsis) situational response       Problem: Pneumonia (Adult)  Goal: Signs and Symptoms of Listed Potential Problems Will be Absent, Minimized or Managed (Pneumonia)  Outcome: Ongoing (interventions implemented as appropriate)   05/19/18 0537   Goal/Outcome Evaluation   Problems Assessed (Pneumonia) all   Problems Present (Pneumonia) none       Problem: Fall Risk (Adult)  Goal: Absence of Fall  Outcome: Ongoing (interventions implemented as appropriate)   05/19/18 0537   Fall Risk (Adult)   Absence of Fall making progress toward outcome

## 2018-05-19 NOTE — PLAN OF CARE
Problem: Patient Care Overview  Goal: Plan of Care Review  Outcome: Ongoing (interventions implemented as appropriate)   05/19/18 0537 05/19/18 0800   Coping/Psychosocial   Plan of Care Reviewed With --  patient   Plan of Care Review   Progress improving --      Goal: Discharge Needs Assessment  Outcome: Ongoing (interventions implemented as appropriate)   05/16/18 1349   Discharge Needs Assessment   Readmission Within the Last 30 Days no previous admission in last 30 days   Concerns to be Addressed denies needs/concerns at this time   Patient/Family Anticipates Transition to home with family   Patient/Family Anticipated Services at Transition none   Transportation Concerns other (see comments)  (Pt denies transportation concerns)   Transportation Anticipated family or friend will provide   Anticipated Changes Related to Illness other (see comments)  (Denies discharge needs at this time.)   Equipment Needed After Discharge glucometer;oxygen;respiratory supplies   Disability   Equipment Currently Used at Home glucometer;oxygen;respiratory supplies;other (see comments)  (Pt states she has a CPAP at home, but does not use it. Pt unsure of DME she uses for home O2 and only wears home O2 as needed. )     Goal: Interprofessional Rounds/Family Conf  Outcome: Ongoing (interventions implemented as appropriate)   05/19/18 1612   Interdisciplinary Rounds/Family Conf   Participants nursing       Problem: Sepsis/Septic Shock (Adult)  Goal: Signs and Symptoms of Listed Potential Problems Will be Absent, Minimized or Managed (Sepsis/Septic Shock)  Outcome: Ongoing (interventions implemented as appropriate)   05/19/18 0537   Goal/Outcome Evaluation   Problems Assessed (Sepsis) all   Problems Present (Sepsis) situational response       Problem: Pneumonia (Adult)  Goal: Signs and Symptoms of Listed Potential Problems Will be Absent, Minimized or Managed (Pneumonia)  Outcome: Ongoing (interventions implemented as appropriate)    05/19/18 0537   Goal/Outcome Evaluation   Problems Assessed (Pneumonia) all   Problems Present (Pneumonia) none       Problem: Fall Risk (Adult)  Goal: Absence of Fall  Outcome: Ongoing (interventions implemented as appropriate)   05/19/18 1612   Fall Risk (Adult)   Absence of Fall making progress toward outcome

## 2018-05-20 VITALS
WEIGHT: 227.29 LBS | TEMPERATURE: 98.7 F | HEIGHT: 66 IN | SYSTOLIC BLOOD PRESSURE: 178 MMHG | HEART RATE: 71 BPM | BODY MASS INDEX: 36.53 KG/M2 | RESPIRATION RATE: 18 BRPM | DIASTOLIC BLOOD PRESSURE: 99 MMHG | OXYGEN SATURATION: 92 %

## 2018-05-20 LAB
ALBUMIN SERPL-MCNC: 3.5 G/DL (ref 3.2–4.8)
ALBUMIN/GLOB SERPL: 1.5 G/DL (ref 1.5–2.5)
ALP SERPL-CCNC: 55 U/L (ref 25–100)
ALT SERPL W P-5'-P-CCNC: 15 U/L (ref 7–40)
ANION GAP SERPL CALCULATED.3IONS-SCNC: 8 MMOL/L (ref 3–11)
AST SERPL-CCNC: 14 U/L (ref 0–33)
BACTERIA SPEC AEROBE CULT: NORMAL
BILIRUB SERPL-MCNC: 0.4 MG/DL (ref 0.3–1.2)
BNP SERPL-MCNC: 277 PG/ML (ref 0–100)
BUN BLD-MCNC: 15 MG/DL (ref 9–23)
BUN/CREAT SERPL: 16.7 (ref 7–25)
CALCIUM SPEC-SCNC: 9.2 MG/DL (ref 8.7–10.4)
CHLORIDE SERPL-SCNC: 99 MMOL/L (ref 99–109)
CO2 SERPL-SCNC: 33 MMOL/L (ref 20–31)
CREAT BLD-MCNC: 0.9 MG/DL (ref 0.6–1.3)
GFR SERPL CREATININE-BSD FRML MDRD: 61 ML/MIN/1.73
GLOBULIN UR ELPH-MCNC: 2.4 GM/DL
GLUCOSE BLD-MCNC: 157 MG/DL (ref 70–100)
GLUCOSE BLDC GLUCOMTR-MCNC: 124 MG/DL (ref 70–130)
GLUCOSE BLDC GLUCOMTR-MCNC: 151 MG/DL (ref 70–130)
POTASSIUM BLD-SCNC: 4.2 MMOL/L (ref 3.5–5.5)
PROT SERPL-MCNC: 5.9 G/DL (ref 5.7–8.2)
SODIUM BLD-SCNC: 140 MMOL/L (ref 132–146)

## 2018-05-20 PROCEDURE — 25010000002 HEPARIN (PORCINE) PER 1000 UNITS: Performed by: INTERNAL MEDICINE

## 2018-05-20 PROCEDURE — 80053 COMPREHEN METABOLIC PANEL: CPT | Performed by: INTERNAL MEDICINE

## 2018-05-20 PROCEDURE — 83880 ASSAY OF NATRIURETIC PEPTIDE: CPT | Performed by: INTERNAL MEDICINE

## 2018-05-20 PROCEDURE — 82962 GLUCOSE BLOOD TEST: CPT

## 2018-05-20 PROCEDURE — 25010000002 ERTAPENEM 1 GM/100ML SOLUTION: Performed by: INTERNAL MEDICINE

## 2018-05-20 RX ORDER — BENZONATATE 100 MG/1
100 CAPSULE ORAL 3 TIMES DAILY PRN
Qty: 40 CAPSULE | Refills: 0 | Status: SHIPPED | OUTPATIENT
Start: 2018-05-20 | End: 2018-06-21

## 2018-05-20 RX ORDER — HYDROCODONE BITARTRATE AND ACETAMINOPHEN 7.5; 325 MG/1; MG/1
1 TABLET ORAL EVERY 6 HOURS PRN
Status: DISCONTINUED | OUTPATIENT
Start: 2018-05-20 | End: 2018-05-20

## 2018-05-20 RX ORDER — HYDROCODONE BITARTRATE AND ACETAMINOPHEN 7.5; 325 MG/1; MG/1
1 TABLET ORAL ONCE
Status: COMPLETED | OUTPATIENT
Start: 2018-05-20 | End: 2018-05-20

## 2018-05-20 RX ORDER — HYDROCODONE BITARTRATE AND ACETAMINOPHEN 5; 325 MG/1; MG/1
1 TABLET ORAL EVERY 6 HOURS PRN
Qty: 16 TABLET | Refills: 0 | Status: SHIPPED | OUTPATIENT
Start: 2018-05-20 | End: 2018-05-28

## 2018-05-20 RX ORDER — NYSTATIN 100000 [USP'U]/G
POWDER TOPICAL EVERY 12 HOURS SCHEDULED
Qty: 30 G | Refills: 0 | Status: SHIPPED | OUTPATIENT
Start: 2018-05-20 | End: 2020-07-13

## 2018-05-20 RX ADMIN — ERTAPENEM SODIUM 1 G: 1 INJECTION, POWDER, LYOPHILIZED, FOR SOLUTION INTRAMUSCULAR; INTRAVENOUS at 10:16

## 2018-05-20 RX ADMIN — NYSTATIN: 100000 POWDER TOPICAL at 08:45

## 2018-05-20 RX ADMIN — SACUBITRIL AND VALSARTAN 1 TABLET: 49; 51 TABLET, FILM COATED ORAL at 08:45

## 2018-05-20 RX ADMIN — NYSTATIN 500000 UNITS: 100000 SUSPENSION ORAL at 11:58

## 2018-05-20 RX ADMIN — ASPIRIN 81 MG: 81 TABLET, COATED ORAL at 08:45

## 2018-05-20 RX ADMIN — HEPARIN SODIUM 5000 UNITS: 5000 INJECTION, SOLUTION INTRAVENOUS; SUBCUTANEOUS at 05:38

## 2018-05-20 RX ADMIN — HYDROCODONE BITARTRATE AND ACETAMINOPHEN 1 TABLET: 7.5; 325 TABLET ORAL at 11:58

## 2018-05-20 RX ADMIN — GABAPENTIN 200 MG: 100 CAPSULE ORAL at 15:01

## 2018-05-20 RX ADMIN — INSULIN LISPRO 2 UNITS: 100 INJECTION, SOLUTION INTRAVENOUS; SUBCUTANEOUS at 11:58

## 2018-05-20 RX ADMIN — HYDROCODONE BITARTRATE AND ACETAMINOPHEN 1 TABLET: 5; 325 TABLET ORAL at 04:30

## 2018-05-20 RX ADMIN — NYSTATIN 500000 UNITS: 100000 SUSPENSION ORAL at 08:45

## 2018-05-20 RX ADMIN — GABAPENTIN 200 MG: 100 CAPSULE ORAL at 05:38

## 2018-05-20 NOTE — PROGRESS NOTES
[facility] Progress Note    Admission Date: 5/15/2018    Angelita Shay  1945  [id number]    Date: 5/20/2018    Meds:    Anti-Infectives     Ordered     Dose/Rate Route Frequency Start Stop    05/17/18 2030  ertapenem (INVanz) 1 g/100 mL 0.9% NS VTB (mbp)     Ordering Provider:  Herber Christiansen MD    1 g  over 30 Minutes Intravenous Every 24 Hours 05/18/18 1000 05/25/18 0959    05/17/18 2031  ertapenem (INVanz) 1 g/100 mL 0.9% NS VTB (mbp)     Ordering Provider:  Herber Christiansen MD    1 g  over 30 Minutes Intravenous Once 05/17/18 2145 05/17/18 2206    05/16/18 0148  vancomycin 2000 mg/500 mL 0.9% NS IVPB ([facility])     Ordering Provider:  Celio Montiel RPH    20 mg/kg × 94.3 kg  over 120 Minutes Intravenous Once 05/16/18 0300 05/16/18 0809    05/16/18 0145  piperacillin-tazobactam (ZOSYN) 3.375 g in iso-osmotic dextrose 50 ml (premix)     Ordering Provider:  Celio Montiel RPH    3.375 g  over 30 Minutes Intravenous Once 05/16/18 0230 05/16/18 0638    05/15/18 2203  cefTRIAXone (ROCEPHIN) IVPB 1 g     Ordering Provider:  Josue Mina MD    1 g  100 mL/hr over 30 Minutes Intravenous Once 05/15/18 2205 05/16/18 0001          CC:  Sepsis, HCAP    SUBJECTIVE:  5/16/18: Patient is a 72 y.o. female with h/o CAD, CKD3, remote colon cancer, T2DM,  HTN, PAF/Eliquis, NICM (EF 36%), chronicstasis dermatitis, and osteoarthritis with Bilateral TSA and Bilateral TKA  Who presented to [facility] ED with worsening generalized weakness, body aches, rigors, night sweats, productive nonpurulent cough, and central chest pain especially with deep breaths.  She denies nausea, vomiting, diarrhea, and dysuria. She was recently hospitalized from 3/10-3/14/18 with acute systolic CHF exacerbation and diuresed.  She had LVEF of 36% with severe pulmonary HTN at that time. She was readmitted from 3/28-3/30/18 with RUQ pain and was felt to have chronic cholecystitis and was to follow up with Dr. Marion in a month for elective cholecystectomy.   However, she followed up with Dr. Ulloa in [place] will undergo cholecystectomy when cleared by cardiology in 2018.  She now returns with respiratory symptoms and dyspnea. Worsened RUQ pain.  She denies sick contacts.  Work up showed Tmax 99.7, BP 89/63, pO2 74, PCT 1.04, WBC 28,000 with 87% neutrophils, BNP 92, sCr 1.3, lactic acid 3.2, and Strep A screen negative.  A CXR showed RML pulmonary infiltrate/atelectasis.  A CT scan of abd/pelvis showed cholelithiasis.  She was started on Zosyn and Vancomycin.  ID was asked to evaluate and manage her antibiotic therapy.   18: C/o bilateral breast pain.  Still with cough and sore throat.  TMax 99.8 and very cold.  She would like to go home soon.    18: Generally better and wans to get home. Cough improving and no hemoptysis. No n/v/d.  No F/C/S;  No rash;  No ADR to abx    Objective:   PE:   Vital Signs  Temp (24hrs), Av.8 °F (37.1 °C), Min:98.7 °F (37.1 °C), Max:98.9 °F (37.2 °C)    Temp  Min: 98.7 °F (37.1 °C)  Max: 98.9 °F (37.2 °C)  BP  Min: 162/89  Max: 182/101  Pulse  Min: 71  Max: 107  Resp  Min: 18  Max: 18  No Data Recorded    GENERAL: Awake and alert, in no acute distress. Elderly and chronically ill appearing in poor general health  HEENT: Normocephalic, atraumatic.  PERRL. EOMI. No conjunctival injection. No icterus. Oropharynx clear with evidence of thrush, no exudate.   Dry MM   HEART: RRR; No murmur, rubs, gallops.   LUNGS: Diminished at lung bases bilaterally without wheezing, or rhonchi. Scattered rales R>L.  Normal respiratory effort. Nonlabored. Wet cough.    ABDOMEN: Soft, epigastric tenderness to touch, nondistended. Positive bowel sounds. No rebound or guarding. NO mass or HSM. Obese  EXT:  No cyanosis, clubbing or edema. No cord.  : Genitalia generally unremarkable. Carrion removed.   MSK: FROM without joint effusions noted arms/legs.    SKIN: Warm and dry without cutaneous eruptions on Inspection/palpation.    NEURO: Oriented  to PPT. No focal deficits on motor/sensory exam at arms/legs.  PSYCHIATRIC: Normal insight and judgement. Cooperative with PE    Laboratory Data      Results from last 7 days  Lab Units 05/19/18  0457 05/17/18  0353 05/16/18  0611   WBC 10*3/mm3 6.19 12.17* 24.02*   HEMOGLOBIN g/dL 12.4 12.7 16.3*   HEMATOCRIT % 39.3 39.1 49.5*   PLATELETS 10*3/mm3 140* 126* 145*       Results from last 7 days  Lab Units 05/20/18  0537   SODIUM mmol/L 140   POTASSIUM mmol/L 4.2   CHLORIDE mmol/L 99   CO2 mmol/L 33.0*   BUN mg/dL 15   CREATININE mg/dL 0.90   GLUCOSE mg/dL 157*   CALCIUM mg/dL 9.2       Results from last 7 days  Lab Units 05/20/18  0537   ALK PHOS U/L 55   BILIRUBIN mg/dL 0.4   ALT (SGPT) U/L 15   AST (SGOT) U/L 14               Results from last 7 days  Lab Units 05/17/18  0353   LACTATE mmol/L 0.7             Estimated Creatinine Clearance: 67.5 mL/min (by C-G formula based on SCr of 0.9 mg/dL).    Microbiology:     Microbiology Results Abnormal     Procedure Component Value - Date/Time    Blood Culture - Blood, Blood, Venous Line [[id number]]  (Normal) Collected:  05/15/18 2245    Lab Status:  Preliminary result Specimen:  Blood from Arm, Right Updated:  05/19/18 2300     Blood Culture No growth at 4 days    S. Pneumo Ag Urine or CSF - Urine, Urine, Clean Catch [[id number]]  (Abnormal) Collected:  05/16/18 1038    Lab Status:  Edited Result - FINAL Specimen:  Urine from Urine, Clean Catch Updated:  05/18/18 1711     Specimen Source Urine     STREP PNEUMONIAE ANTIGEN Positive (A)     Comment: Reported pos SP to Hanna GILES @3:42 on 5/18/18.  Faxed to [phone]. AM        Body Fluid Culture, Sterile Not Indicated     Organism ID Not indicated.     Please note Comment     Comment: College [employer]ologists standards require a culture to be  performed on CSF specimens submitted for bacterial antigen testing.  (CAP [id number]) Urine specimens will not be cultured.       Narrative:       Performed at:  [address] [place] [address]  [address]  [id number]  : Yoni uHmphrey MD, Phone:  [phone]    Respiratory Culture - Sputum, Cough [[id number]] Collected:  05/16/18 1827    Lab Status:  Final result Specimen:  Sputum from Cough Updated:  05/18/18 0822     Respiratory Culture Scant growth (1+) Normal Respiratory Selena     Gram Stain Result Many (4+) WBCs per low power field      Moderate (3+) Epithelial cells per low power field      Occasional Yeast      Few (2+) Gram variable bacilli    Beta Strep Culture, Throat - Swab, Throat [[id number]]  (Normal) Collected:  05/16/18 0837    Lab Status:  Final result Specimen:  Swab from Throat Updated:  05/18/18 0821     Throat Culture, Beta Strep No Beta Hemolytic Streptococcus Isolated    MRSA Screen, PCR - Swab, Nares [[id number]]  (Normal) Collected:  05/16/18 1427    Lab Status:  Final result Specimen:  Swab from Nares Updated:  05/16/18 1710     MRSA, PCR Negative    Narrative:         MRSA Negative    Rapid Strep A Screen - Swab, Throat [[id number]]  (Normal) Collected:  05/16/18 0837    Lab Status:  Final result Specimen:  Swab from Throat Updated:  05/16/18 1015     Strep A Ag Negative    Narrative:         Test performed by Direct Antigen Testing.        UA WBC 3-5 RBC 3-6 LE neg nit neg       UAg for Strep pneumoniae is negative      Radiology:  Imaging Results (last 72 hours)     Procedure Component Value Units Date/Time    XR Chest 1 View [[id number]] Collected:  05/16/18 2345     Updated:  05/17/18 0021    Narrative:       EXAM:    XR Chest, 1 View    CLINICAL HISTORY:    72 years old, female; Elevated white blood cell count, unspecified; Lobar   pneumonia, unspecified organism; Calculus of gallbladder without cholecystitis   without obstruction; Cough; Headache; Localized enlarged lymph nodes;   Hyperglycemia, unspecified; Signs and symptoms; Dyspnea and fever; Additional   info: Hypoxic    TECHNIQUE:    Frontal view of the chest.    COMPARISON:     CR - XR CHEST 1 VW 2018-05-15 21:06    FINDINGS:    No significant change in focal airspace opacity in the RIGHT mid zone.    Prominent lung markings bilaterally with mild bibasal atelectasis, RIGHT worse   than LEFT. Cardiomegaly with normal lung vascularity.  Calcification and   unfolding of the aortic arch.       Impression:         Unchanged RIGHT lung consolidation/pneumonia. Possibility of underlying   malignancy cannot be excluded.  Recommend followup to complete resolution.       THIS DOCUMENT HAS BEEN ELECTRONICALLY SIGNED BY ILENE CRUZ MD    CT Abdomen Pelvis Without Contrast [[id number]] Collected:  05/15/18 2202     Updated:  05/15/18 2318    Narrative:       EXAM:    CT Abdomen and Pelvis Without Intravenous Contrast    CLINICAL HISTORY:    72 years old, female; Signs and symptoms; Other: See notes; Prior surgery;   Additional info: Pain    TECHNIQUE:    Axial computed tomography images of the abdomen and pelvis without   intravenous contrast.  All CT scans at this facility use one or more dose   reduction techniques, viz.: automated exposure control; ma/kV adjustment per   patient size (including targeted exams where dose is matched to indication;   i.e. head); or iterative reconstruction technique.    Coronal reformatted images were created and reviewed.    COMPARISON:    CT ABDOMEN PELVIS W CONTRAST 2018-03-10 23:16, US Gallbladder 03/11/2018.    FINDINGS:    Limitations:  Patient's obese body habitus limits resolution of the   examination.    Lung bases:  Minimal basilar subsegmental atelectasis or scars.     ABDOMEN:    Liver:  Unremarkable.    Gallbladder and bile ducts:  Cholelithiasis at gallbladder neck.  No ductal   dilation.    Pancreas:  Unremarkable.  No ductal dilation.    Spleen:  Unremarkable. No splenomegaly or mass.    Adrenals:  Unremarkable adrenals.    Kidneys and ureters:  Unremarkable. No hydronephrosis, mass, urinary calculi,   or obstructive uropathy.    Stomach and bowel:   Nonspecific bowel gas pattern without dilatation or   obstruction.  Some fluid scattered in the nondilated small bowel.  Right-sided   bowel anastomosis.  The contracted segments of bowel restrict wall evaluation   to rule out any abnormal thickening.     PELVIS:    Appendix:  Appendix not localized, obscured.  However, no inflammation in the   RLQ mesenteric fat.    Bladder:  Unremarkable.  No stones.    Reproductive:  Unremarkable as visualized.  Retroverted uterus.     ABDOMEN and PELVIS:    Intraperitoneal space:  No pneumoperitoneum.  No significant free fluid.    Bones/joints:  Degenerative changes of the spine.  Mild scoliosis.  Mild DJD   of bilateral hips.  No acute fracture.  No dislocation.    Soft tissues:  Obesity.    Vasculature:  Pelvic phleboliths. Scattered atherosclerotic placques are seen   along some vessels. No abdominal aortic aneurysm.    Lymph nodes:  Nonspecific few slightly prominent bilateral inguinal lymph   nodes.  No enlarged intra-abdominal lymph nodes.    Other findings:  Surgical clips scattered in abdomen and pelvis.      Impression:       1.  Cholelithiasis.    2.  Nonspecific bowel gas pattern without obstruction.  3.  Nonspecific few slightly prominent bilateral inguinal lymph nodes.    THIS DOCUMENT HAS BEEN ELECTRONICALLY SIGNED BY EARL TOBIN MD    XR Chest 1 View [[id number]] Collected:  05/15/18 2031     Updated:  05/15/18 2141    Narrative:       EXAM:    XR Chest, 1 View    CLINICAL HISTORY:    72 years old, female; Signs and symptoms; Shortness of breath; Additional   info: SOA triage protocol    TECHNIQUE:    Frontal view of the chest.    COMPARISON:    CR - XR CHEST 1 VW 2018-03-28 18:41    FINDINGS:    Lungs:  Small new infiltrate/atelectasis in right mid-lung field compared to   prior study.  No acute left infiltrates.    Pleural space:  No pneumothorax or pleural effusion.    Heart:  Normal heart size.    Mediastinum: No acute abnormality compared to prior  study.    Bones/joints:  Bilateral shoulder prostheses.  No acute abnormality seen   along the well-visualized osseous structures.  Minimal scoliotic curvature,   positional versus fixed.      Impression:         New mild right mid-lung field infiltrate/atelectasis.    THIS DOCUMENT HAS BEEN ELECTRONICALLY SIGNED BY EARL TOBIN MD          I personally read the radiographic studies     Impression:   1.  Severe sepsis POA- with lactic acidosis, PCT, acute hypoxemic respiratory failure, hypotension, tachycardia, fever, and leukocytosis.  She is clinically improving with decreased fever, and improved leukocytosis.  2.  RML pneumonia.  Her MRSA nasal PCR was negative, strep pneumo Ag +.Generally improving with Invanz.  Continue to monitor culture data  3.  Chronic cholelithiasis/cholecystitis, awaiting CCY at [facility]  4.  Fever,better  5.  Marked leukocytosis/neutrophilia-improving  6.  Lactic acidosis/elevated procalcitonin  7.  Acute hypoxemic respiratory failure  8.  Hypotension, improved  9.  CKDIII  10.  T2DM, insulin dependent  11.  PAF/Eliquis  12.  NICM with h/o systolic CHF (last EF 36% with pulmonary HTN)  13.  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 Christiansen  13.  Sore throat-although she complains of a sore throat, her exam is unremarkable.  14.  Thrush;  better        PLAN/RECOMMENDATIONS:   Invanz 1 g IV daily   Patient agreeable to infusion in our office daily D/w our office and they are aware of her infusion needs starting day after discharge    Disussed with Dr. Landa  Check/review labs cultures and scans  Discussed with microbiology  Partial history per nursing staff  Highly complex set of issues with high risk for further serious morbidity and other serious sequela    **home today, f/u with Dr Christiansen in office tomorrow, continue Invanz 1 gm IV daily in office starting 5/21; I  D/w Dr Landa, , office, [facility] nursing,  etc..        Celio Ash MD  5/20/2018  10:32 AM

## 2018-05-20 NOTE — PLAN OF CARE
Problem: Patient Care Overview  Goal: Plan of Care Review  Outcome: Ongoing (interventions implemented as appropriate)   05/20/18 8602   Coping/Psychosocial   Plan of Care Reviewed With patient   Plan of Care Review   Progress improving   OTHER   Outcome Summary Pt. slept most of the night, VSS, & pain controlled.

## 2018-05-21 ENCOUNTER — TRANSITIONAL CARE MANAGEMENT TELEPHONE ENCOUNTER (OUTPATIENT)
Dept: INTERNAL MEDICINE | Facility: CLINIC | Age: 73
End: 2018-05-21

## 2018-05-21 NOTE — OUTREACH NOTE
LAI call completed.  Please refer to TCM call flowsheet for call documentation.  Patient states she was sent home from the hospital too soon.  She has a hard time walking to the bathroom related to neuropathy.  She rates her pain an 8/10.  She has RUQ pain.  She would like HH and PT ordered by pcp.  I advised her to return to ED if symptoms worsen.  She verbalized understanding.  She denies CP and SOA.  Appointment confirmed.

## 2018-05-22 ENCOUNTER — APPOINTMENT (OUTPATIENT)
Dept: BONE DENSITY | Facility: HOSPITAL | Age: 73
End: 2018-05-22

## 2018-05-22 ENCOUNTER — APPOINTMENT (OUTPATIENT)
Dept: MAMMOGRAPHY | Facility: HOSPITAL | Age: 73
End: 2018-05-22

## 2018-05-31 DIAGNOSIS — I48.0 PAROXYSMAL ATRIAL FIBRILLATION (HCC): ICD-10-CM

## 2018-05-31 RX ORDER — APIXABAN 5 MG/1
TABLET, FILM COATED ORAL
Qty: 60 TABLET | Refills: 5 | Status: SHIPPED | OUTPATIENT
Start: 2018-05-31 | End: 2018-07-10

## 2018-06-06 ENCOUNTER — APPOINTMENT (OUTPATIENT)
Dept: GENERAL RADIOLOGY | Facility: HOSPITAL | Age: 73
End: 2018-06-06

## 2018-06-06 ENCOUNTER — HOSPITAL ENCOUNTER (EMERGENCY)
Facility: HOSPITAL | Age: 73
Discharge: HOME | End: 2018-06-07
Attending: EMERGENCY MEDICINE | Admitting: EMERGENCY MEDICINE

## 2018-06-06 ENCOUNTER — APPOINTMENT (OUTPATIENT)
Dept: CT IMAGING | Facility: HOSPITAL | Age: 73
End: 2018-06-06

## 2018-06-06 VITALS
TEMPERATURE: 98.3 F | RESPIRATION RATE: 18 BRPM | OXYGEN SATURATION: 96 % | WEIGHT: 210 LBS | HEART RATE: 72 BPM | DIASTOLIC BLOOD PRESSURE: 89 MMHG | BODY MASS INDEX: 33.75 KG/M2 | SYSTOLIC BLOOD PRESSURE: 140 MMHG | HEIGHT: 66 IN

## 2018-06-06 DIAGNOSIS — S00.03XA SCALP HEMATOMA, INITIAL ENCOUNTER: ICD-10-CM

## 2018-06-06 DIAGNOSIS — S40.012A CONTUSION OF LEFT SHOULDER, INITIAL ENCOUNTER: ICD-10-CM

## 2018-06-06 DIAGNOSIS — S20.212A RIB CONTUSION, LEFT, INITIAL ENCOUNTER: ICD-10-CM

## 2018-06-06 DIAGNOSIS — Z79.01 CHRONIC ANTICOAGULATION: ICD-10-CM

## 2018-06-06 DIAGNOSIS — I48.0 PAF (PAROXYSMAL ATRIAL FIBRILLATION) (HCC): ICD-10-CM

## 2018-06-06 DIAGNOSIS — Z91.81 HISTORY OF FALL: Primary | ICD-10-CM

## 2018-06-06 DIAGNOSIS — S70.02XA CONTUSION OF LEFT HIP, INITIAL ENCOUNTER: ICD-10-CM

## 2018-06-06 PROCEDURE — 70450 CT HEAD/BRAIN W/O DYE: CPT

## 2018-06-06 PROCEDURE — 93005 ELECTROCARDIOGRAM TRACING: CPT

## 2018-06-06 PROCEDURE — 93005 ELECTROCARDIOGRAM TRACING: CPT | Performed by: EMERGENCY MEDICINE

## 2018-06-06 PROCEDURE — 71046 X-RAY EXAM CHEST 2 VIEWS: CPT

## 2018-06-06 PROCEDURE — 99283 EMERGENCY DEPT VISIT LOW MDM: CPT

## 2018-06-06 PROCEDURE — 73502 X-RAY EXAM HIP UNI 2-3 VIEWS: CPT

## 2018-06-06 PROCEDURE — 72131 CT LUMBAR SPINE W/O DYE: CPT

## 2018-06-06 PROCEDURE — 72125 CT NECK SPINE W/O DYE: CPT

## 2018-06-06 RX ORDER — HYDROCODONE BITARTRATE AND ACETAMINOPHEN 5; 325 MG/1; MG/1
1 TABLET ORAL ONCE
Status: COMPLETED | OUTPATIENT
Start: 2018-06-06 | End: 2018-06-07

## 2018-06-07 ENCOUNTER — APPOINTMENT (OUTPATIENT)
Dept: GENERAL RADIOLOGY | Facility: HOSPITAL | Age: 73
End: 2018-06-07

## 2018-06-07 PROCEDURE — 73030 X-RAY EXAM OF SHOULDER: CPT

## 2018-06-07 PROCEDURE — 73560 X-RAY EXAM OF KNEE 1 OR 2: CPT

## 2018-06-07 PROCEDURE — 71101 X-RAY EXAM UNILAT RIBS/CHEST: CPT

## 2018-06-07 RX ORDER — HYDROCODONE BITARTRATE AND ACETAMINOPHEN 5; 325 MG/1; MG/1
1 TABLET ORAL EVERY 4 HOURS PRN
Qty: 12 TABLET | Refills: 0 | Status: SHIPPED | OUTPATIENT
Start: 2018-06-07 | End: 2018-06-07

## 2018-06-07 RX ORDER — HYDROCODONE BITARTRATE AND ACETAMINOPHEN 5; 325 MG/1; MG/1
1 TABLET ORAL EVERY 4 HOURS PRN
Qty: 12 TABLET | Refills: 0 | Status: SHIPPED | OUTPATIENT
Start: 2018-06-07 | End: 2018-06-21

## 2018-06-07 RX ORDER — METAXALONE 800 MG/1
800 TABLET ORAL 3 TIMES DAILY PRN
Qty: 21 TABLET | Refills: 0 | Status: SHIPPED | OUTPATIENT
Start: 2018-06-07 | End: 2018-06-21

## 2018-06-07 RX ADMIN — HYDROCODONE BITARTRATE AND ACETAMINOPHEN 1 TABLET: 5; 325 TABLET ORAL at 01:03

## 2018-06-07 NOTE — ED PROVIDER NOTES
Subjective   Angelita Shay is a 72 y.o.female who presents to the ED status post fall. The patient reports she was mopping her kitchen last night when she slipped and fell. She tells us she fell and hit the left side of her body and head on the tile floor. She states that her left rib pain pain worsens when she sneezes or takes deep breaths. She did not lose consciousness. She also complains of pain in her left hip and neck stiffness. She has taken tylenol today for the pain. She also takes Eliquis.  Her PCP is Dr. Saldivar. There are no other complaints at this time.  Patient was just recently admitted to the hospital here [facility] on 5/15/2018 through 5/20/2018 for community-acquired pneumonia and dehydration.        History provided by:  Patient  Fall   Mechanism of injury: fall    Injury location:  Head/neck, shoulder/arm, torso and leg  Head/neck injury location:  Head  Shoulder/arm injury location:  L shoulder  Torso injury location:  L chest (rib pain)  Leg injury location:  L hip, L upper leg and L knee  Incident location:  Home  Time since incident:  1 day  Arrived directly from scene: no    Fall:     Fall occurred:  Walking    Impact surface:  Hard floor    Point of impact:  Head (left side)    Entrapped after fall: no    Suspicion of alcohol use: no    Suspicion of drug use: no    Prior to arrival data:     Blood loss:  None    Responsiveness at scene:  Alert    Loss of consciousness: no      Amnesic to event: no    Associated symptoms: back pain, chest pain and neck pain    Associated symptoms: no loss of consciousness        Review of Systems   Cardiovascular: Positive for chest pain.   Musculoskeletal: Positive for arthralgias (left shoulder and left hip), back pain and neck pain.   Neurological: Negative for loss of consciousness.   All other systems reviewed and are negative.      Past Medical History:   Diagnosis Date   • Anxiety    • Arthritis    • Cellulitis of both lower  extremities    • CKD (chronic kidney disease), stage III    • Colon cancer 2000   • Colon polyp 2015    x 3 adenomatous   • Colon polyp 04/18/2018    x 5 Dr Cash   • Coronary artery disease     1 stent   • Corrosive esophagitis    • Depression    • Diabetes mellitus     dx 2 years ago- checks fsbs bid   • GERD (gastroesophageal reflux disease)    • GI bleed    • Gout    • H/O cardiac catheterization    • H/O colonoscopy 2008    incomplete prep   • H/O echocardiogram 03/11/2018    EF 36%,LVH, mild MR, mild TR   • H/O esophagogastroduodenoscopy 2008    hiatal hernia, gastritis   • H/O hiatal hernia    • H/O mammogram 2012   • Hyperlipidemia    • Hypertension    • Knee pain    • PAF (paroxysmal atrial fibrillation) 2007    postoperative total knee replacement   • Pap smear for cervical cancer screening 2013   • RLS (restless legs syndrome)    • Sleep apnea    • Wears eyeglasses        Allergies   Allergen Reactions   • Oxycodone Shortness Of Breath   • Zolpidem Other (See Comments)     nightmares       Past Surgical History:   Procedure Laterality Date   • APPENDECTOMY  05/1963   • CARDIAC CATHETERIZATION  08/2015    no stents   • CARDIAC CATHETERIZATION  03/13/2018   • CARDIAC CATHETERIZATION N/A 3/13/2018    Procedure: Left Heart Cath;  Surgeon: Pierre Jones III, MD;  Location:  [facility];  Service: Cardiovascular   • COLON RESECTION  04/08/2000    colon cancer   • COLONOSCOPY  2015   • COLONOSCOPY  04/18/2018    with 5 polyps removed 1yr f/u. Dr Cash- REPEAT YEARLY   • CORONARY ANGIOPLASTY WITH STENT PLACEMENT  12/2015   • CA TOTAL KNEE ARTHROPLASTY Left 11/3/2016    Procedure: LEFT TOTAL KNEE REPLACEMENT;  Surgeon: Laurent Zuniga MD;  Location:  [facility] OR;  Service: Orthopedics   • TONSILLECTOMY  12/1961   • TOTAL KNEE ARTHROPLASTY Right 2008    revision 2010   • TOTAL SHOULDER ARTHROPLASTY Bilateral     right 2014, left 2015       Family History   Problem Relation Age of Onset   •  Colon cancer Other    • Diabetes Other    • Heart disease Other    • Hypertension Other    • Stroke Other    • Tuberculosis Other    • Hypertension Mother    • Colon cancer Father    • Stroke Father    • Diabetes Father    • Colon cancer Sister    • Diabetes Sister    • Diabetes Maternal Grandmother    • Coronary artery disease Maternal Grandfather    • No Known Problems Paternal Grandmother    • No Known Problems Paternal Grandfather        Social History     Social History   • Marital status:    • Number of children: 5     Occupational History   • Retired       Social History Main Topics   • Smoking status: Former Smoker     Packs/day: 1.00     Years: 22.00     Types: Cigarettes     Quit date: 1979   • Smokeless tobacco: Never Used      Comment: quit 1979   • Alcohol use No   • Drug use: No   • Sexual activity: Defer     Other Topics Concern   • Not on file     Social History Narrative    Caffeine: 0-1 servings per day    Patient lives at her home with her daughter and her family         Objective   Physical Exam   Constitutional: She is oriented to person, place, and time. She appears well-developed and well-nourished. No distress.   HENT:   Head: Normocephalic. Head is with contusion.       Right Ear: External ear normal.   Left Ear: External ear normal.   Nose: Nose normal.   Mouth/Throat: Oropharynx is clear and moist.   Eyes: Conjunctivae and EOM are normal. Pupils are equal, round, and reactive to light. No scleral icterus. Right eye exhibits no nystagmus. Left eye exhibits no nystagmus.   Neck: Neck supple. Spinous process tenderness and muscular tenderness present. Normal range of motion present.       Cardiovascular: Normal rate, regular rhythm and normal heart sounds.    Pulmonary/Chest: Effort normal and breath sounds normal. No respiratory distress. She has no decreased breath sounds. She exhibits tenderness. She exhibits no crepitus and no swelling.       Abdominal: Soft. She  exhibits no distension. There is no tenderness.   Musculoskeletal: She exhibits no edema.        Left hip: She exhibits decreased range of motion, tenderness and bony tenderness. She exhibits no swelling, no crepitus and no deformity.        Left knee: She exhibits normal range of motion, no swelling, no effusion, no ecchymosis and no deformity. Tenderness (anterior aspect of left knee.) found.        Lumbar back: She exhibits decreased range of motion, tenderness, bony tenderness and pain. She exhibits no swelling, no edema, no deformity, no spasm and normal pulse.        Back:         Legs:  Lymphadenopathy:     She has no cervical adenopathy.   Neurological: She is alert and oriented to person, place, and time.   Skin: Skin is warm and dry. She is not diaphoretic.   Psychiatric: She has a normal mood and affect. Her behavior is normal.   Nursing note and vitals reviewed.      Procedures         ED Course  ED Course as of Jun 07 0114   Thu Jun 07, 2018   0102 All radiologic studies showed no acute bony abnormality.  CT of the brain without contrast showed no acute intracranial abnormality or evidence of bleed.  Patient is on chronic Eliquis as a blood thinner for history of paroxysmal A Fib.  [FC]      ED Course User Index  [FC] Lakeshia Young PA-C     No results found for this or any previous visit (from the past 24 hour(s)).  Note: In addition to lab results from this visit, the labs listed above may include labs taken at another facility or during a different encounter within the last 24 hours. Please correlate lab times with ED admission and discharge times for further clarification of the services performed during this visit.    XR Knee 1 or 2 View Left   Final Result     No acute osseous abnormality.      THIS DOCUMENT HAS BEEN ELECTRONICALLY SIGNED BY LAISHA NEAL MD      XR Hip With or Without Pelvis 2 - 3 View Left   Final Result     No acute osseous abnormality.      THIS DOCUMENT HAS BEEN ELECTRONICALLY  "SIGNED BY LAISHA NEAL MD      XR Ribs Left With PA Chest   Final Result     No acute abnormality.      THIS DOCUMENT HAS BEEN ELECTRONICALLY SIGNED BY LAISHA NEAL MD      XR Shoulder 2+ View Left   Final Result     No acute osseous abnormality.      THIS DOCUMENT HAS BEEN ELECTRONICALLY SIGNED BY LAISHA NEAL MD      CT Lumbar Spine Without Contrast   Final Result     No acute osseous abnormality.      THIS DOCUMENT HAS BEEN ELECTRONICALLY SIGNED BY LAISHA NEAL MD      CT Cervical Spine Without Contrast   Final Result     No acute fracture.      THIS DOCUMENT HAS BEEN ELECTRONICALLY SIGNED BY LAISHA NEAL MD      CT Head Without Contrast   Final Result     No acute intracranial abnormality.      THIS DOCUMENT HAS BEEN ELECTRONICALLY SIGNED BY LAISHA NEAL MD      XR Chest PA & Lateral   Final Result     No acute cardiopulmonary process.      THIS DOCUMENT HAS BEEN ELECTRONICALLY SIGNED BY LAISHA NEAL MD        Vitals:    06/06/18 2055   BP: 140/89   BP Location: Left arm   Patient Position: Sitting   Pulse: 72   Resp: 18   Temp: 98.3 °F (36.8 °C)   TempSrc: Oral   SpO2: 96%   Weight: 95.3 kg (210 lb)   Height: 167.6 cm (66\")     Medications   HYDROcodone-acetaminophen (NORCO) 5-325 MG per tablet 1 tablet (1 tablet Oral Given 6/7/18 0103)     ECG/EMG Results (last 24 hours)     Procedure Component Value Units Date/Time    ECG 12 Lead [[id number]] Collected:  06/06/18 2101     Updated:  06/06/18 2101      PRINCE query complete. Treatment plan to include limited course of prescribed  controlled substance. Risks including addiction, benefits, and alternatives presented to patient.                   MDM    Final diagnoses:   History of fall   Scalp hematoma, initial encounter   Contusion of left shoulder, initial encounter   Rib contusion, left, initial encounter   Contusion of left hip, initial encounter   Chronic anticoagulation   PAF (paroxysmal atrial fibrillation)       Documentation assistance provided by tejal WANG " Yuly.  Information recorded by the scribe was done at my direction and has been verified and validated by me.     Christina Emery  06/06/18 2340       Lakeshia Young PA-C  06/07/18 0111       Lakeshia Young PA-C  06/07/18 0114

## 2018-06-07 NOTE — DISCHARGE INSTRUCTIONS
All radiologic studies showed no acute abnormalities.  CT scan of the brain without contrast showed no evidence of bleed, since patient is on chronic blood thinning medication of Eliquis.  We will give patient head injury instructions.  Rx for Lortab 5 mg by mouth every 4-6 hours as needed for moderate pain dispensed 12 with no refills.  Rx for Skelaxin 800 mg by mouth 3 times a day as needed for muscle spasms.  Recommend close follow-up with Dr. Saldivar for recheck.  Return if worsening symptoms.

## 2018-06-11 RX ORDER — GABAPENTIN 300 MG/1
CAPSULE ORAL
Qty: 90 CAPSULE | Refills: 0 | Status: SHIPPED | OUTPATIENT
Start: 2018-06-11 | End: 2018-07-23 | Stop reason: DRUGHIGH

## 2018-06-12 PROBLEM — E78.5 HYPERLIPIDEMIA LDL GOAL <70: Status: ACTIVE | Noted: 2018-06-12

## 2018-06-20 ENCOUNTER — HOSPITAL ENCOUNTER (OUTPATIENT)
Dept: BONE DENSITY | Facility: HOSPITAL | Age: 73
Discharge: HOME | End: 2018-06-20
Admitting: FAMILY MEDICINE

## 2018-06-20 ENCOUNTER — HOSPITAL ENCOUNTER (OUTPATIENT)
Dept: MAMMOGRAPHY | Facility: HOSPITAL | Age: 73
Discharge: HOME | End: 2018-06-20

## 2018-06-20 DIAGNOSIS — Z78.0 MENOPAUSE: ICD-10-CM

## 2018-06-20 DIAGNOSIS — Z12.31 ENCOUNTER FOR SCREENING MAMMOGRAM FOR MALIGNANT NEOPLASM OF BREAST: ICD-10-CM

## 2018-06-20 PROCEDURE — 77063 BREAST TOMOSYNTHESIS BI: CPT | Performed by: RADIOLOGY

## 2018-06-20 PROCEDURE — 77067 SCR MAMMO BI INCL CAD: CPT

## 2018-06-20 PROCEDURE — 77080 DXA BONE DENSITY AXIAL: CPT

## 2018-06-20 PROCEDURE — 77067 SCR MAMMO BI INCL CAD: CPT | Performed by: RADIOLOGY

## 2018-06-20 PROCEDURE — 77063 BREAST TOMOSYNTHESIS BI: CPT

## 2018-06-21 ENCOUNTER — OFFICE VISIT (OUTPATIENT)
Dept: CARDIOLOGY | Facility: HOSPITAL | Age: 73
End: 2018-06-21

## 2018-06-21 VITALS
TEMPERATURE: 97.9 F | BODY MASS INDEX: 34.01 KG/M2 | OXYGEN SATURATION: 95 % | DIASTOLIC BLOOD PRESSURE: 65 MMHG | HEIGHT: 66 IN | HEART RATE: 107 BPM | WEIGHT: 211.6 LBS | SYSTOLIC BLOOD PRESSURE: 111 MMHG

## 2018-06-21 DIAGNOSIS — N18.30 CKD (CHRONIC KIDNEY DISEASE), STAGE III (HCC): ICD-10-CM

## 2018-06-21 DIAGNOSIS — I50.22 CHRONIC SYSTOLIC CONGESTIVE HEART FAILURE (HCC): Primary | ICD-10-CM

## 2018-06-21 DIAGNOSIS — I10 ESSENTIAL HYPERTENSION: ICD-10-CM

## 2018-06-21 DIAGNOSIS — R11.2 NON-INTRACTABLE VOMITING WITH NAUSEA, UNSPECIFIED VOMITING TYPE: ICD-10-CM

## 2018-06-21 PROCEDURE — 99214 OFFICE O/P EST MOD 30 MIN: CPT | Performed by: NURSE PRACTITIONER

## 2018-06-21 RX ORDER — ATORVASTATIN CALCIUM 20 MG/1
20 TABLET, FILM COATED ORAL NIGHTLY
Qty: 90 TABLET | Refills: 3 | Status: SHIPPED | OUTPATIENT
Start: 2018-06-21 | End: 2018-09-19

## 2018-06-21 NOTE — PROGRESS NOTES
[facility]  [facility]      Encounter Date:06/21/2018     Angelita Shay  [address] [facility] [address]      1945    Sharon Saldivar MD    Angelita Shay is a 72 y.o. female.      Subjective:     Chief Complaint:  Follow-up ([facility]F)       HPI patient presents to the heart and valve Center today for ongoing evaluation of her chronic systolic heart failure.  She notes she is doing well from a cardiology standpoint.  She notes her home weight is 208 pounds and she is down 15-1/2 pounds since last office visit.  She notes she is exercising regularly and can do so without significant dyspnea. Pedal edema is mild.  Denies chest pain.  She does note fatigue.  Patient's biggest complaint today is her intermittent vomiting that occurs after eating each meal.  She was evaluated by Dr. Marion during recent hospitalization for possible cholecystitis.  She saw Dr. Ulloa in [place] for second opinion.  She notes she has not decided who she is going to follow with.    Patient Active Problem List   Diagnosis   • Essential hypertension   • Anxiety   • DM type 2 (diabetes mellitus, type 2), on insulin therapy   • GERD (gastroesophageal reflux disease)   • Coronary artery disease involving native coronary artery of native heart without angina pectoris   • Sleep apnea   • RLS (restless legs syndrome)   • Gout   • Chronic systolic congestive heart failure   • Paroxysmal atrial fibrillation   • Nonischemic cardiomyopathy   • Biliary colic   • Cholelithiasis   • Acute kidney injury superimposed on chronic kidney disease stage 3   • Chronic passive hepatic congestion   • Diabetic polyneuropathy associated with type 2 diabetes mellitus   • Nonintractable headache   • Dehydration   • Sepsis   • CAP (community acquired pneumonia)   • CKD (chronic kidney disease), stage III   • Acute on chronic respiratory failure with hypoxia   • Hyperlipidemia LDL goal <70       Past Surgical History:   Procedure  Laterality Date   • APPENDECTOMY  05/1963   • CARDIAC CATHETERIZATION  08/2015    no stents   • CARDIAC CATHETERIZATION  03/13/2018   • CARDIAC CATHETERIZATION N/A 3/13/2018    Procedure: Left Heart Cath;  Surgeon: Pierre Jones III, MD;  Location:  [facility];  Service: Cardiovascular   • COLON RESECTION  04/08/2000    colon cancer   • COLONOSCOPY  2015   • COLONOSCOPY  04/18/2018    with 5 polyps removed 1yr f/u. Dr Cash- REPEAT YEARLY   • CORONARY ANGIOPLASTY WITH STENT PLACEMENT  12/2015   • OR TOTAL KNEE ARTHROPLASTY Left 11/3/2016    Procedure: LEFT TOTAL KNEE REPLACEMENT;  Surgeon: Laurent Zuniga MD;  Location:  [facility];  Service: Orthopedics   • TONSILLECTOMY  12/1961   • TOTAL KNEE ARTHROPLASTY Right 2008    revision 2010   • TOTAL SHOULDER ARTHROPLASTY Bilateral     right 2014, left 2015       Allergies   Allergen Reactions   • Oxycodone Shortness Of Breath   • Zolpidem Other (See Comments)     nightmares         Current Outpatient Prescriptions:   •  aspirin 81 MG EC tablet, Take 1 tablet by mouth Daily. Resume in 2 weeks, Disp: , Rfl:   •  carvedilol (COREG) 3.125 MG tablet, Take 6.25 mg by mouth 2 (Two) Times a Day With Meals., Disp: , Rfl:   •  ELIQUIS 5 MG tablet tablet, TAKE ONE TABLET BY MOUTH EVERY 12 HOURS, Disp: 60 tablet, Rfl: 5  •  furosemide (LASIX) 40 MG tablet, Take 1 tablet by mouth Daily., Disp: 30 tablet, Rfl: 11  •  gabapentin (NEURONTIN) 300 MG capsule, TAKE ONE CAPSULE BY MOUTH THREE TIMES A DAY, Disp: 90 capsule, Rfl: 0  •  insulin aspart (NOVOLOG) 100 UNIT/ML injection, **Per patient's Sliding scale**, Disp: 10 mL, Rfl: 2  •  linaclotide (LINZESS) 290 MCG capsule capsule, Take 1 capsule by mouth Every Morning Before Breakfast., Disp: 30 capsule, Rfl: 1  •  magnesium hydroxide (MILK OF MAGNESIA) 400 MG/5ML suspension, Take 15 mL by mouth Daily As Needed for Constipation., Disp: 473 mL, Rfl: 0  •  nystatin (MYCOSTATIN) 318849 UNIT/GM powder, Apply   topically Every 12 (Twelve) Hours., Disp: 30 g, Rfl: 0  •  nystatin (MYCOSTATIN) 598219 UNIT/ML suspension, Take 5 mL by mouth 4 (Four) Times a Day., Disp: 473 mL, Rfl: 0  •  ondansetron (ZOFRAN) 4 MG tablet, Take 1 tablet by mouth Every 6 (Six) Hours As Needed for Nausea or Vomiting., Disp: 21 tablet, Rfl: 0  •  polyethylene glycol (MIRALAX) powder, Take 17 g by mouth Daily As Needed., Disp: , Rfl:   •  rOPINIRole (REQUIP) 1 MG tablet, Take 1-3 tablets by mouth Every Night. Take 1 hour before bedtime., Disp: 90 tablet, Rfl: 1  •  sacubitril-valsartan (ENTRESTO) 49-51 MG tablet, Take 1 tablet by mouth Every 12 (Twelve) Hours., Disp: 60 tablet, Rfl: 5  •  traZODone (DESYREL) 50 MG tablet, Take 1-2 tablets at bedtime, Disp: 60 tablet, Rfl: 2  •  atorvastatin (LIPITOR) 20 MG tablet, Take 1 tablet by mouth Every Night for 90 days., Disp: 90 tablet, Rfl: 3    The following portions of the patient's history were reviewed and updated as appropriate: allergies, current medications, past family history, past medical history, past social history, past surgical history and problem list.    Review of Systems   Constitution: Positive for weakness, malaise/fatigue and weight loss. Negative for chills, decreased appetite, diaphoresis, fever, night sweats and weight gain.   HENT: Negative for congestion and nosebleeds.    Eyes: Negative for blurred vision, visual disturbance and visual halos.   Cardiovascular: Positive for leg swelling. Negative for chest pain, claudication, cyanosis, dyspnea on exertion, irregular heartbeat, near-syncope, orthopnea, palpitations, paroxysmal nocturnal dyspnea and syncope.   Respiratory: Positive for snoring. Negative for cough, hemoptysis, shortness of breath, sleep disturbances due to breathing, sputum production and wheezing.    Endocrine: Negative for cold intolerance, heat intolerance, polydipsia, polyphagia and polyuria.   Hematologic/Lymphatic: Does not bruise/bleed easily.   Skin: Negative  "for dry skin, itching and rash.   Musculoskeletal: Positive for falls (occurred after mopping her floor, was evaluated in ED). Negative for joint pain, joint swelling, muscle weakness and myalgias.   Gastrointestinal: Positive for bloating, abdominal pain, heartburn, nausea and vomiting. Negative for constipation, diarrhea, dysphagia and melena.   Genitourinary: Negative for dysuria, flank pain, hematuria and nocturia.   Neurological: Positive for excessive daytime sleepiness and headaches. Negative for difficulty with concentration, dizziness and loss of balance.   Psychiatric/Behavioral: Positive for depression. Negative for altered mental status and thoughts of violence. The patient is nervous/anxious.    Allergic/Immunologic: Negative for environmental allergies.       Objective:     Vitals:    06/21/18 1113 06/21/18 1115   BP: 114/67 111/65   BP Location: Right arm Right arm   Patient Position: Sitting Standing   Pulse: 98 107   Temp: 97.9 °F (36.6 °C)    TempSrc: Temporal Artery     SpO2: 95%    Weight: 96 kg (211 lb 9.6 oz)    Height: 167.6 cm (66\")          Physical Exam   Constitutional: She is oriented to person, place, and time. She appears well-developed and well-nourished. She is active and cooperative. No distress.   HENT:   Head: Normocephalic and atraumatic.   Mouth/Throat: Oropharynx is clear and moist.   Eyes: Conjunctivae and EOM are normal. Pupils are equal, round, and reactive to light.   Neck: Normal range of motion. Neck supple. No JVD present. No tracheal deviation present. No thyromegaly present.   Cardiovascular: Normal rate, regular rhythm, normal heart sounds and intact distal pulses.    Pulmonary/Chest: Effort normal and breath sounds normal.   Abdominal: Soft. Bowel sounds are normal. She exhibits no distension. There is no tenderness.   Musculoskeletal: Normal range of motion.   Neurological: She is alert and oriented to person, place, and time.   Skin: Skin is warm, dry and intact. "   Psychiatric: She has a normal mood and affect. Her behavior is normal.   Nursing note and vitals reviewed.      Lab and Diagnostic Review:  Lab Results   Component Value Date    WBC 6.19 05/19/2018    HGB 12.4 05/19/2018    HCT 39.3 05/19/2018    .0 (H) 05/19/2018     (L) 05/19/2018     Lab Results   Component Value Date    GLUCOSE 157 (H) 05/20/2018    CALCIUM 9.2 05/20/2018     05/20/2018    K 4.2 05/20/2018    CO2 33.0 (H) 05/20/2018    CL 99 05/20/2018    BUN 15 05/20/2018    CREATININE 0.90 05/20/2018    EGFRIFNONA 61 05/20/2018    BCR 16.7 05/20/2018    ANIONGAP 8.0 05/20/2018       Assessment and Plan:         1. Chronic systolic congestive heart failure  euvolemic  nyha II  Heart failure education today including signs and symptoms, the role of the heart failure center, daily weights, low sodium diet (less than 1500 mg per day), and daily physical activity. Reviewed HF Zones with patient and family.  Patient to continue current medications as previously ordered.     2. Essential hypertension  Well controlled  HTN Education provided today including signs and symptoms, medication management, daily blood pressure monitoring. Patient encouraged to call the Heart and Valve center with any abnormal readings.     3. CKD (chronic kidney disease), stage III  Creatinine 0.9  Will continue to monitor  4. Non-intractable vomiting with nausea, unspecified vomiting type  Encouraged to follow up with Dr Marion and her PCP, Dr Saldivar    It has been a pleasure to participate in the care of this patient.  Patient was instructed to call the [facility] Center with any questions, concerns, or worsening symptoms.        *Please note that portions of this note were completed with a voice recognition program. Efforts were made to edit the dictations, but occasionally words are mistranscribed.

## 2018-06-28 ENCOUNTER — CONSULT (OUTPATIENT)
Dept: SLEEP MEDICINE | Facility: HOSPITAL | Age: 73
End: 2018-06-28

## 2018-06-28 VITALS
SYSTOLIC BLOOD PRESSURE: 143 MMHG | HEIGHT: 66 IN | OXYGEN SATURATION: 93 % | HEART RATE: 55 BPM | BODY MASS INDEX: 33.88 KG/M2 | DIASTOLIC BLOOD PRESSURE: 81 MMHG | WEIGHT: 210.8 LBS

## 2018-06-28 DIAGNOSIS — G25.81 RLS (RESTLESS LEGS SYNDROME): ICD-10-CM

## 2018-06-28 DIAGNOSIS — E66.9 OBESITY (BMI 30-39.9): Primary | ICD-10-CM

## 2018-06-28 DIAGNOSIS — R29.818 SUSPECTED SLEEP APNEA: ICD-10-CM

## 2018-06-28 PROBLEM — J18.9 CAP (COMMUNITY ACQUIRED PNEUMONIA): Status: RESOLVED | Noted: 2018-05-16 | Resolved: 2018-06-28

## 2018-06-28 PROCEDURE — 99204 OFFICE O/P NEW MOD 45 MIN: CPT | Performed by: INTERNAL MEDICINE

## 2018-06-28 NOTE — PROGRESS NOTES
Angelita Shay is a 72 y.o. female.   Chief Complaint   Patient presents with   • Sleeping Problem       HPI     72 y.o. female seen in consultation at the request of Marielos Amaro MD for evaluation of the above.     She has a history of obstructive sleep apnea.  She was here in the past though she does not remember when.  She had an abnormal sleep study and was placed on CPAP therapy but did not tolerate it.  She turned in her equipment.    Recently, she was hospitalized here and was placed on nasal CPAP with a chinstrap.  She said it was the best night of sleep in her life.  She is interested in trying CPAP again.    Further details are as follows:    Raleigh Scale is: 10/24    Estimated average amount of sleep per night: 5  Number of times she wakes up at night: 3  Difficulty falling back asleep: no  It usually takes 15 minutes to go to sleep.  She feels sleepy upon waking up: yes  Rotating or night shift work: no    Drowsiness/Sleepiness:  She exhibits the following:  excessive daytime sleepiness  excessive daytime fatigue  falls asleep watching TV  falls asleep during times of the day when she is quiet  sleepy even while on vacation  sleeps better while away from own bed (vacation/visiting)    Snoring/Breathing:  She exhibits the following:  loud snoring  snores in all sleep positions  awoken with dry mouth    Reflux:  She describes the following:  none    Narcolepsy:  She exhibits the following:  sudden episodes of sleep during the day    RLS/PLMs:  She describes the following:  discomfort in legs with an urge to move them    Insomnia:  She describes the following:  problems initiating sleep at night  frequent awakenings  bothered by pain at night  restless sleep    Parasomnia:  She exhibits the following:  sleep talks    Neurological:  She has a history of the following:  none    Weight:  Weight change in the last year:  loss: 70 lbs      The patient's relevant past medical, surgical, family, and social  history reviewed and updated in Epic as appropriate.    Current medications are:   Current Outpatient Prescriptions:   •  aspirin 81 MG EC tablet, Take 1 tablet by mouth Daily. Resume in 2 weeks, Disp: , Rfl:   •  atorvastatin (LIPITOR) 20 MG tablet, Take 1 tablet by mouth Every Night for 90 days., Disp: 90 tablet, Rfl: 3  •  carvedilol (COREG) 3.125 MG tablet, Take 6.25 mg by mouth 2 (Two) Times a Day With Meals., Disp: , Rfl:   •  ELIQUIS 5 MG tablet tablet, TAKE ONE TABLET BY MOUTH EVERY 12 HOURS, Disp: 60 tablet, Rfl: 5  •  furosemide (LASIX) 40 MG tablet, Take 1 tablet by mouth Daily., Disp: 30 tablet, Rfl: 11  •  gabapentin (NEURONTIN) 300 MG capsule, TAKE ONE CAPSULE BY MOUTH THREE TIMES A DAY, Disp: 90 capsule, Rfl: 0  •  insulin aspart (NOVOLOG) 100 UNIT/ML injection, **Per patient's Sliding scale**, Disp: 10 mL, Rfl: 2  •  linaclotide (LINZESS) 290 MCG capsule capsule, Take 1 capsule by mouth Every Morning Before Breakfast., Disp: 30 capsule, Rfl: 1  •  nystatin (MYCOSTATIN) 303313 UNIT/GM powder, Apply  topically Every 12 (Twelve) Hours., Disp: 30 g, Rfl: 0  •  nystatin (MYCOSTATIN) 239632 UNIT/ML suspension, Take 5 mL by mouth 4 (Four) Times a Day., Disp: 473 mL, Rfl: 0  •  ondansetron (ZOFRAN) 4 MG tablet, Take 1 tablet by mouth Every 6 (Six) Hours As Needed for Nausea or Vomiting., Disp: 21 tablet, Rfl: 0  •  polyethylene glycol (MIRALAX) powder, Take 17 g by mouth Daily As Needed., Disp: , Rfl:   •  rOPINIRole (REQUIP) 1 MG tablet, Take 1-3 tablets by mouth Every Night. Take 1 hour before bedtime., Disp: 90 tablet, Rfl: 1  •  sacubitril-valsartan (ENTRESTO) 49-51 MG tablet, Take 1 tablet by mouth Every 12 (Twelve) Hours., Disp: 60 tablet, Rfl: 5  •  traZODone (DESYREL) 50 MG tablet, Take 1-2 tablets at bedtime, Disp: 60 tablet, Rfl: 2  •  magnesium hydroxide (MILK OF MAGNESIA) 400 MG/5ML suspension, Take 15 mL by mouth Daily As Needed for Constipation., Disp: 473 mL, Rfl: 0.    Review of  "Systems    Review of Systems  ROS documented in patient questionnaire ×12 systems.  Reviewed with patient.  Otherwise negative except as noted in HPI.    Physical Exam    Blood pressure 143/81, pulse 55, height 167.6 cm (66\"), weight 95.6 kg (210 lb 12.8 oz), SpO2 93 %. Body mass index is 34.02 kg/m².    Physical Exam   Constitutional: She is oriented to person, place, and time. She appears well-developed and well-nourished.   HENT:   Head: Normocephalic and atraumatic.   Nose: Nose normal.   Mouth/Throat: Oropharynx is clear and moist. No oropharyngeal exudate.   Class III airway   Eyes: Conjunctivae are normal. No scleral icterus.   Neck: No tracheal deviation present. No thyromegaly present.   Cardiovascular: Normal rate and regular rhythm.  Exam reveals no gallop and no friction rub.    No murmur heard.  Pulmonary/Chest: Effort normal. No respiratory distress. She has no wheezes. She has no rales.   Musculoskeletal: She exhibits no edema or deformity.   Neurological: She is alert and oriented to person, place, and time.   Skin: Skin is warm and dry. No rash noted.   Psychiatric: She has a normal mood and affect. Her behavior is normal.   Nursing note and vitals reviewed.      ASSESSMENT:    Problem List Items Addressed This Visit     Suspected sleep apnea    Relevant Orders    Polysomnography 4 or More Parameters    RLS (restless legs syndrome)    Relevant Orders    Polysomnography 4 or More Parameters    Obesity (BMI 30-39.9) - Primary    Relevant Orders    Polysomnography 4 or More Parameters          72-year-old female with signs and symptoms of obstructive sleep apnea.  She has a remote history of an abnormal sleep study but did not tolerate CPAP therapy at that time.  She was most recently hospitalized and did well with CPAP using a nasal mask and chin strap.  She is interested in trying CPAP therapy again.  She does have RLS and is on medical therapy.  She continues to have difficulties with nocturnal leg " symptoms and PLMD may be playing a role.    PLAN:    1. In lab polysomnogram due to her RLS diagnosis, PLMD symptoms, and possibility of obstructive sleep apnea  2. Hopefully reinitiate CPAP therapy as appropriate  3. She has lost 70 pounds and encouraged her towards continued weight loss.  4. Close follow-up    I have reviewed the results of my evaluation and impression and discussed my recommendations in detail with the patient.      Signed by  Brennen Back MD    June 28, 2018      CC: MD Prem Forte Holly, MD

## 2018-07-10 ENCOUNTER — OFFICE VISIT (OUTPATIENT)
Dept: CARDIOLOGY | Facility: CLINIC | Age: 73
End: 2018-07-10

## 2018-07-10 ENCOUNTER — APPOINTMENT (OUTPATIENT)
Dept: CT IMAGING | Facility: HOSPITAL | Age: 73
End: 2018-07-10

## 2018-07-10 ENCOUNTER — HOSPITAL ENCOUNTER (EMERGENCY)
Facility: HOSPITAL | Age: 73
Discharge: HOME | End: 2018-07-10
Attending: EMERGENCY MEDICINE | Admitting: EMERGENCY MEDICINE

## 2018-07-10 VITALS
SYSTOLIC BLOOD PRESSURE: 132 MMHG | WEIGHT: 213 LBS | HEIGHT: 66 IN | BODY MASS INDEX: 34.23 KG/M2 | HEART RATE: 99 BPM | DIASTOLIC BLOOD PRESSURE: 76 MMHG

## 2018-07-10 VITALS
HEIGHT: 66 IN | RESPIRATION RATE: 20 BRPM | TEMPERATURE: 97.5 F | BODY MASS INDEX: 34.23 KG/M2 | SYSTOLIC BLOOD PRESSURE: 122 MMHG | DIASTOLIC BLOOD PRESSURE: 65 MMHG | WEIGHT: 213 LBS | OXYGEN SATURATION: 95 % | HEART RATE: 75 BPM

## 2018-07-10 DIAGNOSIS — K92.1 HEMATOCHEZIA: ICD-10-CM

## 2018-07-10 DIAGNOSIS — I42.8 NONISCHEMIC CARDIOMYOPATHY (HCC): Chronic | ICD-10-CM

## 2018-07-10 DIAGNOSIS — R11.2 NON-INTRACTABLE VOMITING WITH NAUSEA, UNSPECIFIED VOMITING TYPE: ICD-10-CM

## 2018-07-10 DIAGNOSIS — K62.5 BLOOD PER RECTUM: ICD-10-CM

## 2018-07-10 DIAGNOSIS — R10.10 PAIN OF UPPER ABDOMEN: Primary | ICD-10-CM

## 2018-07-10 DIAGNOSIS — E66.9 DIABETES MELLITUS TYPE 2 IN OBESE (HCC): ICD-10-CM

## 2018-07-10 DIAGNOSIS — I50.22 CHRONIC SYSTOLIC CONGESTIVE HEART FAILURE (HCC): Primary | ICD-10-CM

## 2018-07-10 DIAGNOSIS — I48.0 PAROXYSMAL ATRIAL FIBRILLATION (HCC): Chronic | ICD-10-CM

## 2018-07-10 DIAGNOSIS — I10 ELEVATED BLOOD PRESSURE READING WITH DIAGNOSIS OF HYPERTENSION: ICD-10-CM

## 2018-07-10 DIAGNOSIS — I25.119 CORONARY ARTERY DISEASE INVOLVING NATIVE CORONARY ARTERY OF NATIVE HEART WITH ANGINA PECTORIS (HCC): ICD-10-CM

## 2018-07-10 DIAGNOSIS — I10 ESSENTIAL HYPERTENSION: ICD-10-CM

## 2018-07-10 DIAGNOSIS — K80.20 CALCULUS OF GALLBLADDER WITHOUT CHOLECYSTITIS WITHOUT OBSTRUCTION: ICD-10-CM

## 2018-07-10 DIAGNOSIS — E78.5 HYPERLIPIDEMIA LDL GOAL <70: ICD-10-CM

## 2018-07-10 DIAGNOSIS — E11.69 DIABETES MELLITUS TYPE 2 IN OBESE (HCC): ICD-10-CM

## 2018-07-10 LAB
ABO GROUP BLD: NORMAL
ALBUMIN SERPL-MCNC: 4.73 G/DL (ref 3.2–4.8)
ALBUMIN/GLOB SERPL: 1.6 G/DL (ref 1.5–2.5)
ALP SERPL-CCNC: 90 U/L (ref 25–100)
ALT SERPL W P-5'-P-CCNC: 40 U/L (ref 7–40)
ANION GAP SERPL CALCULATED.3IONS-SCNC: 11 MMOL/L (ref 3–11)
AST SERPL-CCNC: 37 U/L (ref 0–33)
BASOPHILS # BLD AUTO: 0.04 10*3/MM3 (ref 0–0.2)
BASOPHILS NFR BLD AUTO: 0.6 % (ref 0–1)
BILIRUB SERPL-MCNC: 0.7 MG/DL (ref 0.3–1.2)
BLD GP AB SCN SERPL QL: NEGATIVE
BUN BLD-MCNC: 21 MG/DL (ref 9–23)
BUN/CREAT SERPL: 18.6 (ref 7–25)
CALCIUM SPEC-SCNC: 9.2 MG/DL (ref 8.7–10.4)
CHLORIDE SERPL-SCNC: 104 MMOL/L (ref 99–109)
CO2 SERPL-SCNC: 28 MMOL/L (ref 20–31)
CREAT BLD-MCNC: 1.13 MG/DL (ref 0.6–1.3)
DEPRECATED RDW RBC AUTO: 48.2 FL (ref 37–54)
DEVELOPER EXPIRATION DATE: ABNORMAL
DEVELOPER LOT NUMBER: ABNORMAL
EOSINOPHIL # BLD AUTO: 0.26 10*3/MM3 (ref 0–0.3)
EOSINOPHIL NFR BLD AUTO: 3.8 % (ref 0–3)
ERYTHROCYTE [DISTWIDTH] IN BLOOD BY AUTOMATED COUNT: 13.6 % (ref 11.3–14.5)
EXPIRATION DATE: ABNORMAL
FECAL OCCULT BLOOD SCREEN, POC: POSITIVE
GFR SERPL CREATININE-BSD FRML MDRD: 47 ML/MIN/1.73
GLOBULIN UR ELPH-MCNC: 2.9 GM/DL
GLUCOSE BLD-MCNC: 164 MG/DL (ref 70–100)
HCT VFR BLD AUTO: 46 % (ref 34.5–44)
HGB BLD-MCNC: 15.3 G/DL (ref 11.5–15.5)
HOLD SPECIMEN: NORMAL
HOLD SPECIMEN: NORMAL
IMM GRANULOCYTES # BLD: 0.01 10*3/MM3 (ref 0–0.03)
IMM GRANULOCYTES NFR BLD: 0.1 % (ref 0–0.6)
LYMPHOCYTES # BLD AUTO: 1.13 10*3/MM3 (ref 0.6–4.8)
LYMPHOCYTES NFR BLD AUTO: 16.4 % (ref 24–44)
Lab: ABNORMAL
MCH RBC QN AUTO: 32.1 PG (ref 27–31)
MCHC RBC AUTO-ENTMCNC: 33.3 G/DL (ref 32–36)
MCV RBC AUTO: 96.4 FL (ref 80–99)
MONOCYTES # BLD AUTO: 0.54 10*3/MM3 (ref 0–1)
MONOCYTES NFR BLD AUTO: 7.9 % (ref 0–12)
NEGATIVE CONTROL: NEGATIVE
NEUTROPHILS # BLD AUTO: 4.9 10*3/MM3 (ref 1.5–8.3)
NEUTROPHILS NFR BLD AUTO: 71.3 % (ref 41–71)
PLATELET # BLD AUTO: 145 10*3/MM3 (ref 150–450)
PMV BLD AUTO: 11 FL (ref 6–12)
POSITIVE CONTROL: POSITIVE
POTASSIUM BLD-SCNC: 4.3 MMOL/L (ref 3.5–5.5)
PROT SERPL-MCNC: 7.6 G/DL (ref 5.7–8.2)
RBC # BLD AUTO: 4.77 10*6/MM3 (ref 3.89–5.14)
RH BLD: POSITIVE
SODIUM BLD-SCNC: 143 MMOL/L (ref 132–146)
T&S EXPIRATION DATE: NORMAL
TROPONIN I SERPL-MCNC: 0 NG/ML (ref 0–0.07)
WBC NRBC COR # BLD: 6.87 10*3/MM3 (ref 3.5–10.8)
WHOLE BLOOD HOLD SPECIMEN: NORMAL
WHOLE BLOOD HOLD SPECIMEN: NORMAL

## 2018-07-10 PROCEDURE — 93000 ELECTROCARDIOGRAM COMPLETE: CPT | Performed by: NURSE PRACTITIONER

## 2018-07-10 PROCEDURE — 25010000002 MORPHINE PER 10 MG: Performed by: EMERGENCY MEDICINE

## 2018-07-10 PROCEDURE — 93005 ELECTROCARDIOGRAM TRACING: CPT | Performed by: EMERGENCY MEDICINE

## 2018-07-10 PROCEDURE — 82270 OCCULT BLOOD FECES: CPT | Performed by: EMERGENCY MEDICINE

## 2018-07-10 PROCEDURE — 25010000002 DICYCLOMINE PER 20 MG: Performed by: EMERGENCY MEDICINE

## 2018-07-10 PROCEDURE — 74176 CT ABD & PELVIS W/O CONTRAST: CPT

## 2018-07-10 PROCEDURE — 86850 RBC ANTIBODY SCREEN: CPT | Performed by: EMERGENCY MEDICINE

## 2018-07-10 PROCEDURE — 86900 BLOOD TYPING SEROLOGIC ABO: CPT | Performed by: EMERGENCY MEDICINE

## 2018-07-10 PROCEDURE — 80053 COMPREHEN METABOLIC PANEL: CPT | Performed by: EMERGENCY MEDICINE

## 2018-07-10 PROCEDURE — 99284 EMERGENCY DEPT VISIT MOD MDM: CPT

## 2018-07-10 PROCEDURE — 84484 ASSAY OF TROPONIN QUANT: CPT

## 2018-07-10 PROCEDURE — 96372 THER/PROPH/DIAG INJ SC/IM: CPT

## 2018-07-10 PROCEDURE — 96375 TX/PRO/DX INJ NEW DRUG ADDON: CPT

## 2018-07-10 PROCEDURE — 25010000002 ONDANSETRON PER 1 MG: Performed by: EMERGENCY MEDICINE

## 2018-07-10 PROCEDURE — 36415 COLL VENOUS BLD VENIPUNCTURE: CPT

## 2018-07-10 PROCEDURE — 86901 BLOOD TYPING SEROLOGIC RH(D): CPT | Performed by: EMERGENCY MEDICINE

## 2018-07-10 PROCEDURE — 99214 OFFICE O/P EST MOD 30 MIN: CPT | Performed by: NURSE PRACTITIONER

## 2018-07-10 PROCEDURE — 85025 COMPLETE CBC W/AUTO DIFF WBC: CPT | Performed by: EMERGENCY MEDICINE

## 2018-07-10 PROCEDURE — 96374 THER/PROPH/DIAG INJ IV PUSH: CPT

## 2018-07-10 RX ORDER — TRAMADOL HYDROCHLORIDE 50 MG/1
50 TABLET ORAL EVERY 6 HOURS PRN
Qty: 12 TABLET | Refills: 0 | Status: SHIPPED | OUTPATIENT
Start: 2018-07-10 | End: 2018-07-30

## 2018-07-10 RX ORDER — MORPHINE SULFATE 4 MG/ML
4 INJECTION, SOLUTION INTRAMUSCULAR; INTRAVENOUS ONCE
Status: COMPLETED | OUTPATIENT
Start: 2018-07-10 | End: 2018-07-10

## 2018-07-10 RX ORDER — ONDANSETRON 2 MG/ML
4 INJECTION INTRAMUSCULAR; INTRAVENOUS ONCE
Status: COMPLETED | OUTPATIENT
Start: 2018-07-10 | End: 2018-07-10

## 2018-07-10 RX ORDER — DICYCLOMINE HYDROCHLORIDE 10 MG/ML
20 INJECTION INTRAMUSCULAR ONCE
Status: COMPLETED | OUTPATIENT
Start: 2018-07-10 | End: 2018-07-10

## 2018-07-10 RX ORDER — ONDANSETRON 8 MG/1
8 TABLET, ORALLY DISINTEGRATING ORAL EVERY 8 HOURS PRN
Qty: 15 TABLET | Refills: 0 | Status: SHIPPED | OUTPATIENT
Start: 2018-07-10 | End: 2018-07-30

## 2018-07-10 RX ORDER — SODIUM CHLORIDE 0.9 % (FLUSH) 0.9 %
10 SYRINGE (ML) INJECTION AS NEEDED
Status: DISCONTINUED | OUTPATIENT
Start: 2018-07-10 | End: 2018-07-10 | Stop reason: HOSPADM

## 2018-07-10 RX ADMIN — ONDANSETRON 4 MG: 2 INJECTION, SOLUTION INTRAMUSCULAR; INTRAVENOUS at 12:37

## 2018-07-10 RX ADMIN — MORPHINE SULFATE 4 MG: 4 INJECTION, SOLUTION INTRAMUSCULAR; INTRAVENOUS at 12:39

## 2018-07-10 RX ADMIN — DICYCLOMINE HYDROCHLORIDE 20 MG: 20 INJECTION, SOLUTION INTRAMUSCULAR at 14:19

## 2018-07-10 NOTE — ED PROVIDER NOTES
Subjective   Ms. Angelita Shay is a 72 y.o. female who presents to the ED with c/o rectal bleeding with onset 5 days ago. She states this has been intermittent since onset occurring twice daily. She reports her stool is bright red and has less blood in her second bowel movement. She also complains of diarrhea. She has a history of colon cancer 18 years ago and has not had any since. She had a colonoscopy 2 months ago and had 5 precancerous polyps removed. She also notes a medical history of DM, HTN and previous appendectomy. She denies history of diverticulosis. She is also on Eliquis and 81 mg aspirin but recently stopped Eliquis. Dr. Cash is her GI doctor. There are no other acute symptoms at this time.        History provided by:  Patient  Rectal Bleeding   Quality:  Bright red  Amount:  Copious  Duration:  5 days  Timing:  Intermittent  Chronicity:  Recurrent  Relieved by: Lying down.  Worsened by:  Nothing  Ineffective treatments:  None tried  Associated symptoms: abdominal pain and light-headedness    Abdominal pain:     Location:  Epigastric and LUQ    Quality: aching      Severity:  Severe  Risk factors: anticoagulant use, hx of colorectal cancer and hx of colorectal surgery    Risk factors: no hx of IBD        Review of Systems   Constitutional: Positive for fatigue.   Respiratory: Positive for cough (mild).    Cardiovascular: Negative for chest pain.   Gastrointestinal: Positive for abdominal pain, blood in stool, diarrhea, hematochezia and nausea.   Neurological: Positive for weakness (generalized) and light-headedness.   All other systems reviewed and are negative.      Past Medical History:   Diagnosis Date   • Anxiety    • Arthritis    • Breast injury     recent fall in early June, bruising left breast   • Cellulitis of both lower extremities    • CKD (chronic kidney disease), stage III    • Colon cancer (CMS/HCC) 2000   • Colon polyp 2015    x 3 adenomatous   • Colon polyp 04/18/2018    x 5   Shailesh   • Coronary artery disease     1 stent   • Corrosive esophagitis    • Depression    • Diabetes mellitus (CMS/HCC)     dx 2 years ago- checks fsbs bid   • Drug therapy    • GERD (gastroesophageal reflux disease)    • GI bleed    • Gout    • H/O cardiac catheterization    • H/O colonoscopy 2008    incomplete prep   • H/O echocardiogram 03/11/2018    EF 36%,LVH, mild MR, mild TR   • H/O esophagogastroduodenoscopy 2008    hiatal hernia, gastritis   • H/O hiatal hernia    • H/O mammogram 2012   • Hyperlipidemia    • Hypertension    • Knee pain    • PAF (paroxysmal atrial fibrillation) (CMS/HCC) 2007    postoperative total knee replacement   • Pap smear for cervical cancer screening 2013   • Rectal bleeding    • RLS (restless legs syndrome)    • Sleep apnea    • Wears eyeglasses        Allergies   Allergen Reactions   • Oxycodone Shortness Of Breath   • Zolpidem Other (See Comments)     nightmares       Past Surgical History:   Procedure Laterality Date   • APPENDECTOMY  05/1963   • CARDIAC CATHETERIZATION  08/2015    no stents   • CARDIAC CATHETERIZATION  03/13/2018   • CARDIAC CATHETERIZATION N/A 3/13/2018    Procedure: Left Heart Cath;  Surgeon: Pierre Jones III, MD;  Location:  [facility];  Service: Cardiovascular   • COLON RESECTION  04/08/2000    colon cancer   • COLONOSCOPY  2015   • COLONOSCOPY  04/18/2018    with 5 polyps removed 1yr f/u. Dr Cash- REPEAT YEARLY   • CORONARY ANGIOPLASTY WITH STENT PLACEMENT  12/2015   • CA TOTAL KNEE ARTHROPLASTY Left 11/3/2016    Procedure: LEFT TOTAL KNEE REPLACEMENT;  Surgeon: Laurent Zuniga MD;  Location:  [facility];  Service: Orthopedics   • TONSILLECTOMY  12/1961   • TOTAL KNEE ARTHROPLASTY Right 2008    revision 2010   • TOTAL SHOULDER ARTHROPLASTY Bilateral     right 2014, left 2015       Family History   Problem Relation Age of Onset   • Colon cancer Other    • Diabetes Other    • Heart disease Other    • Hypertension Other    •  Stroke Other    • Tuberculosis Other    • Hypertension Mother    • Colon cancer Father    • Stroke Father    • Diabetes Father    • Colon cancer Sister    • Diabetes Sister    • Diabetes Maternal Grandmother    • Coronary artery disease Maternal Grandfather    • No Known Problems Paternal Grandmother    • No Known Problems Paternal Grandfather    • Breast cancer Neg Hx    • Ovarian cancer Neg Hx        Social History     Social History   • Marital status:    • Number of children: 5     Occupational History   • Retired       Social History Main Topics   • Smoking status: Former Smoker     Packs/day: 1.00     Years: 22.00     Types: Cigarettes     Quit date: 1979   • Smokeless tobacco: Never Used      Comment: quit 1979   • Alcohol use 0.6 oz/week     1 Glasses of wine per week      Comment: occas   • Drug use: No   • Sexual activity: Defer     Other Topics Concern   • Not on file     Social History Narrative    Caffeine: 0-1 servings per day    Patient lives at her home with her daughter and her family         Objective   Physical Exam   Constitutional: She is oriented to person, place, and time. She appears well-developed and well-nourished. No distress.   HENT:   Head: Normocephalic and atraumatic.   Nose: Nose normal.   Eyes: Conjunctivae are normal. No scleral icterus.   Neck: Normal range of motion.   Cardiovascular: Normal rate, regular rhythm, normal heart sounds and intact distal pulses.    Pulmonary/Chest: Effort normal and breath sounds normal. No respiratory distress.   Abdominal: Soft. There is tenderness (upper abdomen).   Genitourinary: Rectal exam shows guaiac positive stool.   Musculoskeletal: Normal range of motion.   Neurological: She is alert and oriented to person, place, and time.   Skin: Skin is warm and dry.   Psychiatric: She has a normal mood and affect. Her behavior is normal.   Nursing note and vitals reviewed.      Procedures         ED Course  ED Course as of Jul 10  1521   Tue Jul 10, 2018   1323 Occult Blood, Fecal: (!) Positive [RS]   1334 Dr. Marti is at the bedside re-evaluating the patient.  [JW]   1336 His blood pressure does not appear to be accurate.  I went and rechecked patient's blood pressure and it is currently 140/87.. BP: (!) 91/35 [RS]      ED Course User Index  [JW] Kasi Harley  [RS] Rush Marti MD                     MDM  Number of Diagnoses or Management Options  Blood per rectum:   Calculus of gallbladder without cholecystitis without obstruction:   Diabetes mellitus type 2 in obese (CMS/HCC):   Elevated blood pressure reading with diagnosis of hypertension:   Non-intractable vomiting with nausea, unspecified vomiting type:   Pain of upper abdomen:      Amount and/or Complexity of Data Reviewed  Clinical lab tests: reviewed  Tests in the radiology section of CPT®: reviewed  Independent visualization of images, tracings, or specimens: yes        Final diagnoses:   Pain of upper abdomen   Non-intractable vomiting with nausea, unspecified vomiting type   Blood per rectum   Calculus of gallbladder without cholecystitis without obstruction   Elevated blood pressure reading with diagnosis of hypertension   Diabetes mellitus type 2 in obese (CMS/HCC)       Documentation assistance provided by tejal Harley.  Information recorded by the scribe was done at my direction and has been verified and validated by me.     Kasi Harley  07/10/18 1229       Rush Marti MD  07/10/18 1521

## 2018-07-10 NOTE — PROGRESS NOTES
Encounter Date:07/10/2018    Patient ID: Angelita Shay is a 72 y.o. female who resides in [place], [place].    CC/Reason for visit:  Atrial Fibrillation; Cardiomyopathy; Coronary Artery Disease; Hypertension; and sx clearance            Angelita Shay returns for follow-up of her chronic systolic heart failure, coronary artery disease, paroxysmal atrial fibrillation and nonischemic cardiomyopathy.  The patient has been hospitalized at [facility] 2 times in November for worsening heart failure with a further reduced LVEF of 36% and again for bradycardia, acute kidney injury and hyperkalemia.  She was also hospitalized in May for pneumonia.  She has followed up several occasions at the heart and valve clinic with Tosha LUA.  The patient tells me she had been doing better until this last Thursday when she is started to have large bloody bowel movements.  She reports having 2-3 bloody bowel movements per day where there is a large amount of bright red blood in the toilet.  She has been feeling dizzy and off balance and didn't even think she would make it to her appointment today.  Sought medical evaluation for this.  She has a history of colon cancer and underwent a colonoscopy 2 months ago that was reportedly normal.  She is afraid that her cancer has returned.  She is chronically anticoagulated with Eliquis for her paroxysmal atrial fibrillation and was started on it during one of her hospitalization in March. Her paroxysmal atrial fibrillation was first noted in 2006 postop knee replacement.  During her hospitalization in March she was found to be in rate controlled atrial fibrillation but converted spontaneously to normal sinus rhythm.  She is due to have her gallbladder removed by Dr. Pantoja in August and is wanting cardiac clearance.  She has an appointment and August for a sleep study to rule out obstructive sleep apnea.  She is currently taking Entresto, Coreg and spironolactone for her  chronic heart failure.  She feels her breathing is stable and much improved than it was during her hospitalizations.  Her main complaint today is of increased fatigue, weakness, dizziness and bloody bowel movements.    Review of Systems   Constitution: Positive for decreased appetite, weakness and malaise/fatigue.   HENT: Positive for tinnitus.    Eyes: Positive for blurred vision.   Cardiovascular: Positive for chest pain and leg swelling.   Hematologic/Lymphatic: Bruises/bleeds easily.   Musculoskeletal: Positive for back pain and falls.   Gastrointestinal: Positive for bloating, abdominal pain, change in bowel habit, diarrhea and hematochezia.   Neurological: Positive for excessive daytime sleepiness, dizziness and loss of balance.   Psychiatric/Behavioral: The patient is nervous/anxious.    Allergic/Immunologic: Positive for environmental allergies.       The patient's past medical, social, family history and ROS reviewed in the patient's electronic medical record.    Allergies  Oxycodone and Zolpidem    Outpatient Prescriptions Marked as Taking for the 7/10/18 encounter (Office Visit) with STONEY Brewer   Medication Sig Dispense Refill   • aspirin 81 MG EC tablet Take 1 tablet by mouth Daily. Resume in 2 weeks     • atorvastatin (LIPITOR) 20 MG tablet Take 1 tablet by mouth Every Night for 90 days. 90 tablet 3   • carvedilol (COREG) 3.125 MG tablet Take 6.25 mg by mouth 2 (Two) Times a Day With Meals.     • furosemide (LASIX) 40 MG tablet Take 1 tablet by mouth Daily. 30 tablet 11   • gabapentin (NEURONTIN) 300 MG capsule TAKE ONE CAPSULE BY MOUTH THREE TIMES A DAY 90 capsule 0   • insulin aspart (NOVOLOG) 100 UNIT/ML injection **Per patient's Sliding scale** 10 mL 2   • linaclotide (LINZESS) 290 MCG capsule capsule Take 1 capsule by mouth Every Morning Before Breakfast. 30 capsule 1   • magnesium hydroxide (MILK OF MAGNESIA) 400 MG/5ML suspension Take 15 mL by mouth Daily As Needed for Constipation.  "473 mL 0   • nystatin (MYCOSTATIN) 630244 UNIT/GM powder Apply  topically Every 12 (Twelve) Hours. 30 g 0   • ondansetron (ZOFRAN) 4 MG tablet Take 1 tablet by mouth Every 6 (Six) Hours As Needed for Nausea or Vomiting. 21 tablet 0   • polyethylene glycol (MIRALAX) powder Take 17 g by mouth Daily As Needed.     • rOPINIRole (REQUIP) 1 MG tablet Take 1-3 tablets by mouth Every Night. Take 1 hour before bedtime. 90 tablet 1   • sacubitril-valsartan (ENTRESTO) 49-51 MG tablet Take 1 tablet by mouth Every 12 (Twelve) Hours. 60 tablet 5   • traZODone (DESYREL) 50 MG tablet Take 1-2 tablets at bedtime 60 tablet 2   • [DISCONTINUED] ELIQUIS 5 MG tablet tablet TAKE ONE TABLET BY MOUTH EVERY 12 HOURS 60 tablet 5         Blood pressure 132/76, pulse 99, height 167.6 cm (66\"), weight 96.6 kg (213 lb).  Body mass index is 34.38 kg/m².  There were no vitals filed for this visit.    Physical Exam   Constitutional: She is oriented to person, place, and time. She appears well-developed and well-nourished.   HENT:   Head: Normocephalic and atraumatic.   Eyes: Pupils are equal, round, and reactive to light. No scleral icterus.   Neck: No JVD present. Carotid bruit is not present. No thyromegaly present.   Cardiovascular: Normal rate, regular rhythm, S1 normal and S2 normal.  Exam reveals no gallop.    No murmur heard.  Pulmonary/Chest: Effort normal and breath sounds normal.   Abdominal: Soft. There is no hepatosplenomegaly. There is no tenderness.   Neurological: She is alert and oriented to person, place, and time.   Skin: Skin is warm and dry. No cyanosis. Nails show no clubbing.   Psychiatric: She has a normal mood and affect. Her behavior is normal.       Data Review:       ECG 12 Lead  Date/Time: 7/10/2018 12:36 PM  Performed by: OLIVER GRAVES  Authorized by: OLIVER GRAVES   Rhythm: sinus rhythm  BPM: 99  Clinical impression: abnormal ECG  Comments: Sinus rhythm with sinus arrhythmia and first-degree A-V block  NM " interval 220 MS  QRS duration 86 MS  QT//464 MS            Lab Results   Component Value Date    CHOL 175 03/11/2018    TRIG 122 03/11/2018    HDL 55 03/11/2018     03/11/2018    AST 37 (H) 07/10/2018    ALT 40 07/10/2018       Lab Results   Component Value Date    HGBA1C 8.40 (H) 05/16/2018            Problem List Items Addressed This Visit        Cardiovascular and Mediastinum    Nonischemic cardiomyopathy (CMS/HCC) (Chronic)    Overview     · Cardiac catheterization (3/13/18): Mild to moderate nonobstructive CAD. Previously placed LAD stent widely patent.  · Echocardiogram (3/11/18):  LVEF 36%, mild MR         Current Assessment & Plan     · Stable NYHA class II symptoms  · Continue Lasix and Entresto         Paroxysmal atrial fibrillation (CMS/HCC) (Chronic)    Overview     · Chads VASC = 6 (age, female, CHF, CAD, HTN, DM)         Current Assessment & Plan     · Stop Eliquis due to hematochezia  · Go to emergency room for evaluation.         Relevant Orders    ECG 12 Lead    Coronary artery disease involving native coronary artery of native heart with angina pectoris (CMS/HCC)    Overview     · Previous PCI to the LAD  · Cardiac catheterization (8/2/2015): Mild nonobstructive CAD. Widely patent LAD stent. Normal LVEF.  · Cardiac catheterization (3/13/18): Mild to moderate nonobstructive CAD. Previously placed LAD stent widely patent.         Current Assessment & Plan     · Continue aspirin 81 mg daily  · Continue Coreg 6.25 mg twice a day         Chronic systolic congestive heart failure (CMS/HCC) - Primary    Overview     · Echocardiogram (3/11/18):  LVEF 36%, mild MR  · Cardiac catheterization (3/13/18): Mild to moderate nonobstructive CAD. Previously placed LAD stent widely patent.         Current Assessment & Plan     · NYHA class II symptoms  · Continue Entresto 49/51 mg 1 tablet twice a day  · Continue Coreg 6.25 mg twice a day  · Continue Lasix 40 mg daily         Relevant Orders     Comprehensive Metabolic Panel    Essential hypertension    Current Assessment & Plan     · Continue Coreg 6.25 mg twice a day         Hyperlipidemia LDL goal <70    Overview     · High-intensity statin therapy indicated given presence coronary artery disease         Current Assessment & Plan     · Continue Lipitor 20 mg daily            Other    Hematochezia    Relevant Orders    CBC & Differential        The patient verbalized preferring to have blood work performed and making a referral to GI however she was trying to check out she was so dizzy and weak that I instructed her nurse to take her to the emergency room to be evaluated.  We will go ahead and schedule her follow-up for 3 months but May BE sooner       · Take patient to emergency room now for evaluation of possible GI bleed  · Stop taking Eliquis  · Follow-up 3 months or possibly sooner  · Follow-up Tosha LUA at heart and valve clinic next month    Arlet LUA

## 2018-07-10 NOTE — DISCHARGE INSTRUCTIONS
Examination and testing today did not reveal a specific cause of your abdominal pain.  It is important to understand that this does not mean ?nothing is wrong? but rather that there is currently no evidence to support a specific diagnosis.  Some diseases, like appendicitis, can be very serious but can develop slowly and it is common for testing to be ?normal? or ?negative? early in the course of illness.  This is why if your symptoms do not quickly resolve it is very important that you see your primary doctor or return to the ED for repeat examination and possibly further testing.  We recommend this repeat evaluation happen in 24-48 hours, however if you feel worse before that or have any change in your condition that concerns you, please return to the ED immediately.

## 2018-07-10 NOTE — ASSESSMENT & PLAN NOTE
· NYHA class II symptoms  · Continue Entresto 49/51 mg 1 tablet twice a day  · Continue Coreg 6.25 mg twice a day  · Continue Lasix 40 mg daily

## 2018-07-15 ENCOUNTER — HOSPITAL ENCOUNTER (OUTPATIENT)
Dept: SLEEP MEDICINE | Facility: HOSPITAL | Age: 73
Discharge: HOME | End: 2018-07-15
Attending: INTERNAL MEDICINE | Admitting: INTERNAL MEDICINE

## 2018-07-15 VITALS
OXYGEN SATURATION: 92 % | WEIGHT: 212.25 LBS | HEIGHT: 66 IN | HEART RATE: 102 BPM | BODY MASS INDEX: 34.11 KG/M2 | SYSTOLIC BLOOD PRESSURE: 133 MMHG | DIASTOLIC BLOOD PRESSURE: 77 MMHG

## 2018-07-15 DIAGNOSIS — R29.818 SUSPECTED SLEEP APNEA: ICD-10-CM

## 2018-07-15 DIAGNOSIS — G25.81 RLS (RESTLESS LEGS SYNDROME): ICD-10-CM

## 2018-07-15 DIAGNOSIS — E66.9 OBESITY (BMI 30-39.9): ICD-10-CM

## 2018-07-15 PROCEDURE — 95810 POLYSOM 6/> YRS 4/> PARAM: CPT | Performed by: INTERNAL MEDICINE

## 2018-07-15 PROCEDURE — 95810 POLYSOM 6/> YRS 4/> PARAM: CPT

## 2018-07-16 DIAGNOSIS — G47.33 MODERATE OBSTRUCTIVE SLEEP APNEA: Primary | ICD-10-CM

## 2018-07-20 NOTE — PROGRESS NOTES
CALLED PATIENT AND ADVISED OF STUDY RESULTS. PATIENT VERBALIZED UNDERSTANDING AND WAS AGREEABLE TO PAP THERAPY. FAXED ORDER TO PATIENT [facility] 07/20/18

## 2018-07-23 ENCOUNTER — OFFICE VISIT (OUTPATIENT)
Dept: INTERNAL MEDICINE | Facility: CLINIC | Age: 73
End: 2018-07-23

## 2018-07-23 ENCOUNTER — LAB REQUISITION (OUTPATIENT)
Dept: INTERNAL MEDICINE | Facility: CLINIC | Age: 73
End: 2018-07-23

## 2018-07-23 VITALS
HEIGHT: 66 IN | SYSTOLIC BLOOD PRESSURE: 142 MMHG | HEART RATE: 60 BPM | BODY MASS INDEX: 34.01 KG/M2 | TEMPERATURE: 98.8 F | DIASTOLIC BLOOD PRESSURE: 82 MMHG | OXYGEN SATURATION: 96 % | WEIGHT: 211.6 LBS

## 2018-07-23 DIAGNOSIS — E11.42 TYPE 2 DIABETES MELLITUS WITH DIABETIC POLYNEUROPATHY (HCC): ICD-10-CM

## 2018-07-23 DIAGNOSIS — N18.30 CKD (CHRONIC KIDNEY DISEASE), STAGE III (HCC): ICD-10-CM

## 2018-07-23 DIAGNOSIS — E55.9 VITAMIN D DEFICIENCY: ICD-10-CM

## 2018-07-23 DIAGNOSIS — N18.30 CHRONIC KIDNEY DISEASE, STAGE III (MODERATE) (HCC): ICD-10-CM

## 2018-07-23 DIAGNOSIS — D50.0 IRON DEFICIENCY ANEMIA DUE TO CHRONIC BLOOD LOSS: ICD-10-CM

## 2018-07-23 DIAGNOSIS — L98.9 SKIN LESION: ICD-10-CM

## 2018-07-23 DIAGNOSIS — Z00.00 ROUTINE GENERAL MEDICAL EXAMINATION AT A HEALTH CARE FACILITY: ICD-10-CM

## 2018-07-23 DIAGNOSIS — E78.5 HYPERLIPIDEMIA: ICD-10-CM

## 2018-07-23 DIAGNOSIS — E78.5 HYPERLIPIDEMIA LDL GOAL <70: ICD-10-CM

## 2018-07-23 DIAGNOSIS — R53.83 OTHER FATIGUE: ICD-10-CM

## 2018-07-23 DIAGNOSIS — E11.42 DIABETIC POLYNEUROPATHY ASSOCIATED WITH TYPE 2 DIABETES MELLITUS (HCC): Primary | ICD-10-CM

## 2018-07-23 DIAGNOSIS — F33.1 MODERATE EPISODE OF RECURRENT MAJOR DEPRESSIVE DISORDER (HCC): ICD-10-CM

## 2018-07-23 LAB
25(OH)D3 SERPL-MCNC: 18.8 NG/ML
ALBUMIN SERPL-MCNC: 4.93 G/DL (ref 3.2–4.8)
ALBUMIN/GLOB SERPL: 1.7 G/DL (ref 1.5–2.5)
ALP SERPL-CCNC: 88 U/L (ref 25–100)
ALT SERPL W P-5'-P-CCNC: 30 U/L (ref 7–40)
ANION GAP SERPL CALCULATED.3IONS-SCNC: 10 MMOL/L (ref 3–11)
ARTICHOKE IGE QN: 121 MG/DL (ref 0–130)
AST SERPL-CCNC: 25 U/L (ref 0–33)
BILIRUB SERPL-MCNC: 0.8 MG/DL (ref 0.3–1.2)
BUN BLD-MCNC: 23 MG/DL (ref 9–23)
BUN/CREAT SERPL: 20.9 (ref 7–25)
CALCIUM SPEC-SCNC: 10.3 MG/DL (ref 8.7–10.4)
CHLORIDE SERPL-SCNC: 103 MMOL/L (ref 99–109)
CHOLEST SERPL-MCNC: 200 MG/DL (ref 0–200)
CO2 SERPL-SCNC: 27 MMOL/L (ref 20–31)
CREAT BLD-MCNC: 1.1 MG/DL (ref 0.6–1.3)
DEPRECATED RDW RBC AUTO: 48 FL (ref 37–54)
ERYTHROCYTE [DISTWIDTH] IN BLOOD BY AUTOMATED COUNT: 13.6 % (ref 11.3–14.5)
FOLATE SERPL-MCNC: 19.81 NG/ML (ref 3.2–20)
GFR SERPL CREATININE-BSD FRML MDRD: 49 ML/MIN/1.73
GLOBULIN UR ELPH-MCNC: 2.9 GM/DL
GLUCOSE BLD-MCNC: 153 MG/DL (ref 70–100)
HCT VFR BLD AUTO: 46.2 % (ref 34.5–44)
HDLC SERPL-MCNC: 52 MG/DL (ref 40–60)
HGB BLD-MCNC: 15.6 G/DL (ref 11.5–15.5)
IRON 24H UR-MRATE: 117 MCG/DL (ref 50–175)
IRON SATN MFR SERPL: 36 % (ref 15–50)
MCH RBC QN AUTO: 32.7 PG (ref 27–31)
MCHC RBC AUTO-ENTMCNC: 33.8 G/DL (ref 32–36)
MCV RBC AUTO: 96.9 FL (ref 80–99)
PLATELET # BLD AUTO: 167 10*3/MM3 (ref 150–450)
PMV BLD AUTO: 11.8 FL (ref 6–12)
POTASSIUM BLD-SCNC: 3.8 MMOL/L (ref 3.5–5.5)
PROT SERPL-MCNC: 7.8 G/DL (ref 5.7–8.2)
RBC # BLD AUTO: 4.77 10*6/MM3 (ref 3.89–5.14)
SODIUM BLD-SCNC: 140 MMOL/L (ref 132–146)
T4 FREE SERPL-MCNC: 1.2 NG/DL (ref 0.89–1.76)
TIBC SERPL-MCNC: 323 MCG/DL (ref 250–450)
TRIGL SERPL-MCNC: 235 MG/DL (ref 0–150)
TSH SERPL DL<=0.05 MIU/L-ACNC: 1.25 MIU/ML (ref 0.35–5.35)
VIT B12 BLD-MCNC: 609 PG/ML (ref 211–911)
WBC NRBC COR # BLD: 8.21 10*3/MM3 (ref 3.5–10.8)

## 2018-07-23 PROCEDURE — 83550 IRON BINDING TEST: CPT | Performed by: FAMILY MEDICINE

## 2018-07-23 PROCEDURE — 82746 ASSAY OF FOLIC ACID SERUM: CPT | Performed by: FAMILY MEDICINE

## 2018-07-23 PROCEDURE — 84439 ASSAY OF FREE THYROXINE: CPT | Performed by: FAMILY MEDICINE

## 2018-07-23 PROCEDURE — 80053 COMPREHEN METABOLIC PANEL: CPT | Performed by: FAMILY MEDICINE

## 2018-07-23 PROCEDURE — 99214 OFFICE O/P EST MOD 30 MIN: CPT | Performed by: FAMILY MEDICINE

## 2018-07-23 PROCEDURE — 84443 ASSAY THYROID STIM HORMONE: CPT | Performed by: FAMILY MEDICINE

## 2018-07-23 PROCEDURE — 82306 VITAMIN D 25 HYDROXY: CPT | Performed by: FAMILY MEDICINE

## 2018-07-23 PROCEDURE — 85027 COMPLETE CBC AUTOMATED: CPT | Performed by: FAMILY MEDICINE

## 2018-07-23 PROCEDURE — 83540 ASSAY OF IRON: CPT | Performed by: FAMILY MEDICINE

## 2018-07-23 PROCEDURE — 82607 VITAMIN B-12: CPT | Performed by: FAMILY MEDICINE

## 2018-07-23 PROCEDURE — 80061 LIPID PANEL: CPT | Performed by: FAMILY MEDICINE

## 2018-07-23 RX ORDER — GABAPENTIN 400 MG/1
400 CAPSULE ORAL 3 TIMES DAILY
Qty: 90 CAPSULE | Refills: 2 | Status: SHIPPED | OUTPATIENT
Start: 2018-07-23 | End: 2018-07-30

## 2018-07-23 RX ORDER — SERTRALINE HYDROCHLORIDE 25 MG/1
25 TABLET, FILM COATED ORAL DAILY
Qty: 30 TABLET | Refills: 1 | Status: SHIPPED | OUTPATIENT
Start: 2018-07-23 | End: 2018-07-30

## 2018-07-23 NOTE — PROGRESS NOTES
"Subjective   Angelita Shay is a 72 y.o. female.     Chief Complaint   Patient presents with   • 3 month follow up   • Med Refill     gabapentin       Visit Vitals  /82   Pulse 60   Temp 98.8 °F (37.1 °C) (Temporal Artery )   Ht 167.6 cm (66\")   Wt 96 kg (211 lb 9.6 oz)   SpO2 96%   BMI 34.15 kg/m²         Depression   Visit Type: initial  Onset of symptoms: 1 to 6 months ago (2.5 months since finances worse)  Progression since onset: gradually worsening  Patient presents with the following symptoms: depressed mood, dizziness, fatigue and feelings of hopelessness.  Patient is not experiencing: anhedonia, chest pain, choking sensation, compulsions, confusion, decreased concentration, dry mouth, excessive worry, feelings of worthlessness, hypersomnia, hyperventilation, insomnia, irritability, malaise, memory impairment, muscle tension, nausea, nervousness/anxiety, obsessions, palpitations, panic, psychomotor agitation, psychomotor retardation, restlessness, shortness of breath, suicidal ideas, suicidal planning, thoughts of death, weight gain and weight loss.  Frequency of symptoms: most days   Severity: moderate (moderate to severe)   Aggravated by: family issues  Sleep quality: fair  Nighttime awakenings: occasional  Risk factors: change in medication, major life event and previous episode of depression  Patient has a history of: anemia, CAD and depression  No history of: anxiety/panic attacks, arrhythmia, asthma, bipolar disorder, CHF, chronic lung disease, fibromyalgia, hyperthyroidism, suicide attempt, mental illness and substance abuse  Treatment tried: non-SSRI antidepressants  Compliance with treatment: good  Improvement on treatment: moderate         Pt's son-in-law recently in hospital and pt is behind in her electric bill.   Pt's [employer] nurse has given her advise in who to call for assistance.   Pt had sleep study and had enough apneic events for CPAP per pt.   Pt had bleeding from urine and rectum " with eliquis.  Sugars are better, morning sugars 101-109. High pm sugar 132.     Pt still having pain in her feet and legs at night and request increase in gabapentin.   Pt has GB surgery next week with Dr Pantoja on 8/1/18.     The following portions of the patient's history were reviewed and updated as appropriate: allergies, current medications, past family history, past medical history, past social history, past surgical history and problem list.    Past Medical History:   Diagnosis Date   • Anxiety    • Arthritis    • Breast injury     recent fall in early June, bruising left breast   • Cellulitis of both lower extremities    • CKD (chronic kidney disease), stage III    • Colon cancer (CMS/[facility]) 2000   • Colon polyp 2015    x 3 adenomatous   • Colon polyp 04/18/2018    x 5 Dr Cash   • Coronary artery disease     1 stent   • Corrosive esophagitis    • Depression    • Diabetes mellitus (CMS/[facility])     dx 2 years ago- checks fsbs bid   • Drug therapy    • GERD (gastroesophageal reflux disease)    • GI bleed    • Gout    • H/O cardiac catheterization    • H/O colonoscopy 2008    incomplete prep   • H/O echocardiogram 03/11/2018    EF 36%,LVH, mild MR, mild TR   • H/O esophagogastroduodenoscopy 2008    hiatal hernia, gastritis   • H/O hiatal hernia    • H/O mammogram 2012   • Hyperlipidemia    • Hypertension    • Knee pain    • PAF (paroxysmal atrial fibrillation) (CMS/[facility]) 2007    postoperative total knee replacement   • Pap smear for cervical cancer screening 2013   • Rectal bleeding    • RLS (restless legs syndrome)    • Sleep apnea    • Wears eyeglasses       Past Surgical History:   Procedure Laterality Date   • APPENDECTOMY  05/1963   • CARDIAC CATHETERIZATION  08/2015    no stents   • CARDIAC CATHETERIZATION  03/13/2018   • CARDIAC CATHETERIZATION N/A 3/13/2018    Procedure: Left Heart Cath;  Surgeon: Pierre Jones III, MD;  Location: [facility] CATH INVASIVE LOCATION;  Service: Cardiovascular   • COLON RESECTION   04/08/2000    colon cancer   • COLONOSCOPY  2015   • COLONOSCOPY  04/18/2018    with 5 polyps removed 1yr f/u. Dr Cash- REPEAT YEARLY   • CORONARY ANGIOPLASTY WITH STENT PLACEMENT  12/2015   • [facility] TOTAL KNEE ARTHROPLASTY Left 11/3/2016    Procedure: LEFT TOTAL KNEE REPLACEMENT;  Surgeon: Laurent Zuniga MD;  Location: [facility];  Service: Orthopedics   • TONSILLECTOMY  12/1961   • TOTAL KNEE ARTHROPLASTY Right 2008    revision 2010   • TOTAL SHOULDER ARTHROPLASTY Bilateral     right 2014, left 2015      Family History   Problem Relation Age of Onset   • Colon cancer Other    • Diabetes Other    • Heart disease Other    • Hypertension Other    • Stroke Other    • Tuberculosis Other    • Hypertension Mother    • Colon cancer Father    • Stroke Father    • Diabetes Father    • Colon cancer Sister    • Diabetes Sister    • Diabetes Maternal Grandmother    • Coronary artery disease Maternal Grandfather    • No Known Problems Paternal Grandmother    • No Known Problems Paternal Grandfather    • Breast cancer Neg Hx    • Ovarian cancer Neg Hx       Social History     Social History   • Marital status:      Spouse name: N/A   • Number of children: 5   • Years of education: N/A     Occupational History   • Retired       Social History Main Topics   • Smoking status: Former Smoker     Packs/day: 1.00     Years: 22.00     Types: Cigarettes     Quit date: 1979   • Smokeless tobacco: Never Used      Comment: quit 1979   • Alcohol use 0.6 oz/week     1 Glasses of wine per week      Comment: occas   • Drug use: No   • Sexual activity: Defer     Other Topics Concern   • Not on file     Social History Narrative    Caffeine: 0-1 servings per day    Patient lives at her home with her daughter and her family        Review of Systems   Constitutional: Negative for chills, diaphoresis, fatigue, fever, irritability, weight gain and weight loss.   HENT: Positive for postnasal drip. Negative for ear pain,  nosebleeds, rhinorrhea, sinus pressure, sneezing and sore throat.    Eyes: Negative.  Negative for redness and itching.   Respiratory: Negative.  Negative for cough, choking, shortness of breath and wheezing.    Cardiovascular: Negative.  Negative for chest pain and palpitations.   Gastrointestinal: Positive for vomiting. Negative for abdominal pain, constipation, diarrhea and nausea.   Endocrine: Negative.  Negative for cold intolerance and heat intolerance.   Genitourinary: Negative.  Negative for dysuria, frequency, hematuria and urgency.   Musculoskeletal: Positive for myalgias and neck pain (slept wrong). Negative for arthralgias and back pain.   Skin: Negative.  Negative for color change and rash.   Allergic/Immunologic: Positive for environmental allergies.   Neurological: Positive for dizziness, weakness and light-headedness. Negative for syncope and headaches.   Hematological: Negative.  Negative for adenopathy. Does not bruise/bleed easily.   Psychiatric/Behavioral: Positive for dysphoric mood. Negative for confusion, decreased concentration, substance abuse and suicidal ideas. The patient is not nervous/anxious and does not have insomnia.        Objective   Physical Exam   Constitutional: She is oriented to person, place, and time. She appears well-developed.   HENT:   Head: Normocephalic.       Right Ear: External ear normal.   Left Ear: External ear normal.   Nose: Nose normal.   Eyes: Pupils are equal, round, and reactive to light. Conjunctivae, EOM and lids are normal.   Neck: Trachea normal and normal range of motion. Neck supple. Carotid bruit is not present. No thyroid mass and no thyromegaly present.   Cardiovascular: Normal rate and regular rhythm.    No murmur heard.  Pulmonary/Chest: Effort normal and breath sounds normal. No respiratory distress. She has no decreased breath sounds. She has no wheezes. She has no rhonchi. She has no rales. She exhibits no tenderness.   Abdominal: Soft. Bowel  sounds are normal. There is tenderness in the right upper quadrant and epigastric area. There is guarding. There is no rebound.   Musculoskeletal: Normal range of motion.    Angelita had a diabetic foot exam performed (decreased left middle toes) today.   During the foot exam she had a monofilament test performed (decreased left middle toes).  Vascular Status -  Her right foot exhibits normal foot vasculature  and no edema. Her left foot exhibits normal foot vasculature  and no edema.  Skin Integrity  -  Her right foot skin is intact.Her left foot skin is intact..  Neurological: She is alert and oriented to person, place, and time.   Skin: Skin is warm and dry. Lesion noted. No rash noted.        Psychiatric: She has a normal mood and affect. Her behavior is normal.   Nursing note and vitals reviewed.      Assessment/Plan   Angelita was seen today for 3 month follow up and med refill.    Diagnoses and all orders for this visit:    Diabetic polyneuropathy associated with type 2 diabetes mellitus (CMS/HCC)  -     gabapentin (NEURONTIN) 400 MG capsule; Take 1 capsule by mouth 3 (Three) Times a Day.  -     TSH  -     T4, Free  -     Vitamin B12  -     POC Glycosylated Hemoglobin (Hb A1C); Future    Moderate episode of recurrent major depressive disorder (CMS/HCC)  -     sertraline (ZOLOFT) 25 MG tablet; Take 1 tablet by mouth Daily.    Iron deficiency anemia due to chronic blood loss  -     CBC & Differential  -     Iron Profile    Other fatigue  -     CBC & Differential  -     Comprehensive Metabolic Panel  -     TSH  -     T4, Free  -     Vitamin B12  -     Folate    CKD (chronic kidney disease), stage III  -     Vitamin D 25 Hydroxy    Hyperlipidemia LDL goal <70  -     Comprehensive Metabolic Panel  -     Lipid Panel    Skin lesion  -     Ambulatory Referral to Dermatology    Vitamin D deficiency      Discussed Shingrix vaccine with pt,  that is currently available at the pharmacy.              Current Outpatient  Prescriptions:   •  aspirin 81 MG EC tablet, Take 1 tablet by mouth Daily. Resume in 2 weeks, Disp: , Rfl:   •  atorvastatin (LIPITOR) 20 MG tablet, Take 1 tablet by mouth Every Night for 90 days., Disp: 90 tablet, Rfl: 3  •  carvedilol (COREG) 3.125 MG tablet, Take 6.25 mg by mouth 2 (Two) Times a Day With Meals., Disp: , Rfl:   •  furosemide (LASIX) 40 MG tablet, Take 1 tablet by mouth Daily., Disp: 30 tablet, Rfl: 11  •  insulin aspart (NOVOLOG) 100 UNIT/ML injection, **Per patient's Sliding scale**, Disp: 10 mL, Rfl: 2  •  linaclotide (LINZESS) 290 MCG capsule capsule, Take 1 capsule by mouth Every Morning Before Breakfast., Disp: 30 capsule, Rfl: 1  •  magnesium hydroxide (MILK OF MAGNESIA) 400 MG/5ML suspension, Take 15 mL by mouth Daily As Needed for Constipation., Disp: 473 mL, Rfl: 0  •  nystatin (MYCOSTATIN) 709737 UNIT/GM powder, Apply  topically Every 12 (Twelve) Hours., Disp: 30 g, Rfl: 0  •  ondansetron (ZOFRAN) 4 MG tablet, Take 1 tablet by mouth Every 6 (Six) Hours As Needed for Nausea or Vomiting., Disp: 21 tablet, Rfl: 0  •  ondansetron ODT (ZOFRAN-ODT) 8 MG disintegrating tablet, Take 1 tablet by mouth Every 8 (Eight) Hours As Needed for Nausea or Vomiting., Disp: 15 tablet, Rfl: 0  •  polyethylene glycol (MIRALAX) powder, Take 17 g by mouth Daily As Needed., Disp: , Rfl:   •  rOPINIRole (REQUIP) 1 MG tablet, Take 1-3 tablets by mouth Every Night. Take 1 hour before bedtime., Disp: 90 tablet, Rfl: 1  •  sacubitril-valsartan (ENTRESTO) 49-51 MG tablet, Take 1 tablet by mouth Every 12 (Twelve) Hours., Disp: 60 tablet, Rfl: 5  •  traMADol (ULTRAM) 50 MG tablet, Take 1 tablet by mouth Every 6 (Six) Hours As Needed for Moderate Pain ., Disp: 12 tablet, Rfl: 0  •  traZODone (DESYREL) 50 MG tablet, Take 1-2 tablets at bedtime, Disp: 60 tablet, Rfl: 2  •  gabapentin (NEURONTIN) 400 MG capsule, Take 1 capsule by mouth 3 (Three) Times a Day., Disp: 90 capsule, Rfl: 2  •  sertraline (ZOLOFT) 25 MG tablet,  Take 1 tablet by mouth Daily., Disp: 30 tablet, Rfl: 1    Return in about 4 weeks (around 8/20/2018), or if symptoms worsen or fail to improve, for Recheck.    Recent Results (from the past 168 hour(s))   Vitamin D 25 hydroxy    Collection Time: 07/23/18  1:59 PM   Result Value Ref Range    25 Hydroxy, Vitamin D 18.8 ng/ml   Folate    Collection Time: 07/23/18  1:59 PM   Result Value Ref Range    Folate 19.81 3.20 - 20.00 ng/mL   T4, free    Collection Time: 07/23/18  1:59 PM   Result Value Ref Range    Free T4 1.20 0.89 - 1.76 ng/dL   Vitamin B12    Collection Time: 07/23/18  1:59 PM   Result Value Ref Range    Vitamin B-12 609 211 - 911 pg/mL   TSH    Collection Time: 07/23/18  1:59 PM   Result Value Ref Range    TSH 1.247 0.350 - 5.350 mIU/mL   Lipid panel    Collection Time: 07/23/18  1:59 PM   Result Value Ref Range    Total Cholesterol 200 0 - 200 mg/dL    Triglycerides 235 (H) 0 - 150 mg/dL    HDL Cholesterol 52 40 - 60 mg/dL    LDL Cholesterol  121 0 - 130 mg/dL   Comprehensive metabolic panel    Collection Time: 07/23/18  1:59 PM   Result Value Ref Range    Glucose 153 (H) 70 - 100 mg/dL    BUN 23 9 - 23 mg/dL    Creatinine 1.10 0.60 - 1.30 mg/dL    Sodium 140 132 - 146 mmol/L    Potassium 3.8 3.5 - 5.5 mmol/L    Chloride 103 99 - 109 mmol/L    CO2 27.0 20.0 - 31.0 mmol/L    Calcium 10.3 8.7 - 10.4 mg/dL    Total Protein 7.8 5.7 - 8.2 g/dL    Albumin 4.93 (H) 3.20 - 4.80 g/dL    ALT (SGPT) 30 7 - 40 U/L    AST (SGOT) 25 0 - 33 U/L    Alkaline Phosphatase 88 25 - 100 U/L    Total Bilirubin 0.8 0.3 - 1.2 mg/dL    eGFR Non African Amer 49 (L) >60 mL/min/1.73    Globulin 2.9 gm/dL    A/G Ratio 1.7 1.5 - 2.5 g/dL    BUN/Creatinine Ratio 20.9 7.0 - 25.0    Anion Gap 10.0 3.0 - 11.0 mmol/L   Iron and TIBC    Collection Time: 07/23/18  1:59 PM   Result Value Ref Range    Iron 117 50 - 175 mcg/dL    TIBC 323 250 - 450 mcg/dL    Iron Saturation 36 15 - 50 %   CBC No Differential    Collection Time: 07/23/18  1:59 PM    Result Value Ref Range    WBC 8.21 3.50 - 10.80 10*3/mm3    RBC 4.77 3.89 - 5.14 10*6/mm3    Hemoglobin 15.6 (H) 11.5 - 15.5 g/dL    Hematocrit 46.2 (H) 34.5 - 44.0 %    MCV 96.9 80.0 - 99.0 fL    MCH 32.7 (H) 27.0 - 31.0 pg    MCHC 33.8 32.0 - 36.0 g/dL    RDW 13.6 11.3 - 14.5 %    RDW-SD 48.0 37.0 - 54.0 fl    MPV 11.8 6.0 - 12.0 fL    Platelets 167 150 - 450 10*3/mm3

## 2018-07-25 ENCOUNTER — TELEPHONE (OUTPATIENT)
Dept: INTERNAL MEDICINE | Facility: CLINIC | Age: 73
End: 2018-07-25

## 2018-07-25 NOTE — TELEPHONE ENCOUNTER
----- Message from Mame Mabry sent at 7/25/2018 11:03 AM EDT -----      ----- Message -----  From: Sharon TURNER MD  Sent: 7/23/2018   6:06 PM  To: Mame Mabry    Please call the patient regarding her abnormal result. Lipids very high Please follow a low animal fat diet that is also low in sugar, low in junk food, low in sweet drinks and low in alcohol.  Please increase the amount of fiber in  diet as well as increasing  daily exercise, such as walking. Vitamin D is low, add otc vitamin D, albumin slightly high, should be repeated next lab. GFR remains low avoid NSAID-such as advil or aleve.  Need her to stop in for fingerstick HGA1c orders are in computer, thyroid and B12 and blood count are ok

## 2018-07-30 ENCOUNTER — TELEPHONE (OUTPATIENT)
Dept: INTERNAL MEDICINE | Facility: CLINIC | Age: 73
End: 2018-07-30

## 2018-07-30 ENCOUNTER — CLINICAL SUPPORT (OUTPATIENT)
Dept: INTERNAL MEDICINE | Facility: CLINIC | Age: 73
End: 2018-07-30

## 2018-07-30 ENCOUNTER — APPOINTMENT (OUTPATIENT)
Dept: PREADMISSION TESTING | Facility: HOSPITAL | Age: 73
End: 2018-07-30

## 2018-07-30 VITALS — BODY MASS INDEX: 37.74 KG/M2 | WEIGHT: 213 LBS | HEIGHT: 63 IN

## 2018-07-30 DIAGNOSIS — E11.42 DIABETIC POLYNEUROPATHY ASSOCIATED WITH TYPE 2 DIABETES MELLITUS (HCC): ICD-10-CM

## 2018-07-30 LAB
ANION GAP SERPL CALCULATED.3IONS-SCNC: 10 MMOL/L (ref 3–11)
BUN BLD-MCNC: 25 MG/DL (ref 9–23)
BUN/CREAT SERPL: 17.7 (ref 7–25)
CALCIUM SPEC-SCNC: 9.9 MG/DL (ref 8.7–10.4)
CHLORIDE SERPL-SCNC: 105 MMOL/L (ref 99–109)
CO2 SERPL-SCNC: 27 MMOL/L (ref 20–31)
CREAT BLD-MCNC: 1.41 MG/DL (ref 0.6–1.3)
DEPRECATED RDW RBC AUTO: 46.4 FL (ref 37–54)
ERYTHROCYTE [DISTWIDTH] IN BLOOD BY AUTOMATED COUNT: 13.1 % (ref 11.3–14.5)
GFR SERPL CREATININE-BSD FRML MDRD: 37 ML/MIN/1.73
GLUCOSE BLD-MCNC: 234 MG/DL (ref 70–100)
HBA1C MFR BLD: 7.9 %
HCT VFR BLD AUTO: 46 % (ref 34.5–44)
HGB BLD-MCNC: 15.3 G/DL (ref 11.5–15.5)
INR PPP: 0.97 (ref 0.91–1.09)
MCH RBC QN AUTO: 32.5 PG (ref 27–31)
MCHC RBC AUTO-ENTMCNC: 33.3 G/DL (ref 32–36)
MCV RBC AUTO: 97.7 FL (ref 80–99)
PLATELET # BLD AUTO: 167 10*3/MM3 (ref 150–450)
PMV BLD AUTO: 11.1 FL (ref 6–12)
POTASSIUM BLD-SCNC: 3.8 MMOL/L (ref 3.5–5.5)
PROTHROMBIN TIME: 10.2 SECONDS (ref 9.6–11.5)
RBC # BLD AUTO: 4.71 10*6/MM3 (ref 3.89–5.14)
SODIUM BLD-SCNC: 142 MMOL/L (ref 132–146)
WBC NRBC COR # BLD: 7.02 10*3/MM3 (ref 3.5–10.8)

## 2018-07-30 PROCEDURE — 85027 COMPLETE CBC AUTOMATED: CPT | Performed by: SURGERY

## 2018-07-30 PROCEDURE — 80048 BASIC METABOLIC PNL TOTAL CA: CPT | Performed by: SURGERY

## 2018-07-30 PROCEDURE — 83036 HEMOGLOBIN GLYCOSYLATED A1C: CPT | Performed by: FAMILY MEDICINE

## 2018-07-30 PROCEDURE — 82306 VITAMIN D 25 HYDROXY: CPT | Performed by: FAMILY MEDICINE

## 2018-07-30 PROCEDURE — 85610 PROTHROMBIN TIME: CPT | Performed by: ANESTHESIOLOGY

## 2018-07-30 PROCEDURE — 36415 COLL VENOUS BLD VENIPUNCTURE: CPT | Performed by: FAMILY MEDICINE

## 2018-07-30 RX ORDER — GABAPENTIN 300 MG/1
300 CAPSULE ORAL 4 TIMES DAILY
COMMUNITY
End: 2018-08-07 | Stop reason: SDUPTHER

## 2018-07-30 NOTE — TELEPHONE ENCOUNTER
PN that gabapentin cannot be refilled yet.  I did advise her to get a police report.  She said the script was not stolen, it was lost. She has searched her car and cannot find it

## 2018-07-31 ENCOUNTER — ANESTHESIA EVENT (OUTPATIENT)
Dept: PERIOP | Facility: HOSPITAL | Age: 73
End: 2018-07-31

## 2018-07-31 LAB — 25(OH)D3 SERPL-MCNC: 14.1 NG/ML (ref 30–100)

## 2018-08-01 ENCOUNTER — ANESTHESIA (OUTPATIENT)
Dept: PERIOP | Facility: HOSPITAL | Age: 73
End: 2018-08-01

## 2018-08-01 ENCOUNTER — HOSPITAL ENCOUNTER (OUTPATIENT)
Facility: HOSPITAL | Age: 73
Discharge: HOME | End: 2018-08-02
Attending: SURGERY | Admitting: SURGERY

## 2018-08-01 DIAGNOSIS — K80.20 CHOLELITHIASIS: ICD-10-CM

## 2018-08-01 LAB
25(OH)D3+25(OH)D2 SERPL-MCNC: 18.8 NG/ML
ALBUMIN SERPL-MCNC: 4.93 G/DL (ref 3.2–4.8)
ALBUMIN/GLOB SERPL: 1.7 G/DL (ref 1.5–2.5)
ALP SERPL-CCNC: 88 U/L (ref 25–100)
ALT SERPL-CCNC: 30 U/L (ref 7–40)
AST SERPL-CCNC: 25 U/L (ref 0–33)
BASOPHILS # BLD AUTO: (no result) 10*3/UL
BILIRUB SERPL-MCNC: 0.8 MG/DL (ref 0.3–1.2)
BUN SERPL-MCNC: 23 MG/DL (ref 9–23)
BUN/CREAT SERPL: 20.9 (ref 7–25)
CALCIUM SERPL-MCNC: 10.3 MG/DL (ref 8.7–10.4)
CHLORIDE SERPL-SCNC: 103 MMOL/L (ref 99–109)
CHOLEST SERPL-MCNC: 200 MG/DL (ref 0–200)
CO2 SERPL-SCNC: 27 MMOL/L (ref 20–31)
CREAT SERPL-MCNC: 1.1 MG/DL (ref 0.6–1.3)
EOSINOPHIL # BLD AUTO: (no result) 10*3/UL
EOSINOPHIL NFR BLD AUTO: (no result) %
ERYTHROCYTE [DISTWIDTH] IN BLOOD BY AUTOMATED COUNT: 13.6 % (ref 11.3–14.5)
GLOBULIN SER CALC-MCNC: 2.9 G/DL
GLUCOSE BLDC GLUCOMTR-MCNC: 166 MG/DL (ref 70–130)
GLUCOSE BLDC GLUCOMTR-MCNC: 217 MG/DL (ref 70–130)
GLUCOSE BLDC GLUCOMTR-MCNC: 219 MG/DL (ref 70–130)
GLUCOSE SERPL-MCNC: 153 MG/DL (ref 70–100)
HCT VFR BLD AUTO: 46.2 % (ref 34.5–44)
HDLC SERPL-MCNC: 52 MG/DL (ref 40–60)
HGB BLD-MCNC: 15.6 G/DL (ref 11.5–15.5)
IRON SATN MFR SERPL: 36 % (ref 15–55)
IRON SERPL-MCNC: 117 MCG/DL (ref 50–175)
LDLC SERPL CALC-MCNC: 121 MG/DL (ref 0–99)
LYMPHOCYTES # BLD AUTO: (no result) 10*3/UL
LYMPHOCYTES NFR BLD AUTO: (no result) %
MCH RBC QN AUTO: 32.7 PG (ref 27–31)
MCHC RBC AUTO-ENTMCNC: 33.8 G/DL (ref 32–36)
MCV RBC AUTO: 96.9 FL (ref 80–99)
MONOCYTES NFR BLD AUTO: (no result) %
NEUTROPHILS NFR BLD AUTO: (no result) %
PLATELET # BLD AUTO: 167 10E3/MM3 (ref 150–450)
POTASSIUM SERPL-SCNC: 3.8 MMOL/L (ref 3.5–5.5)
PROT SERPL-MCNC: 7.8 G/DL (ref 5.7–8.2)
RBC # BLD AUTO: 4.77 10E6/MM3 (ref 3.89–5.14)
SODIUM SERPL-SCNC: 140 MMOL/L (ref 132–146)
T4 FREE SERPL-MCNC: 1.2 NG/DL (ref 0.89–1.76)
TIBC SERPL-MCNC: 323 UG/DL (ref 250–450)
TRIGL SERPL-MCNC: 235 MG/DL (ref 0–150)
TSH SERPL DL<=0.005 MIU/L-ACNC: 1.25 MIU/ML (ref 0.35–5.35)
UIBC SERPL-MCNC: 206 UG/DL (ref 118–369)
VIT B12 SERPL-MCNC: 609 PG/ML (ref 211–911)
VLDLC SERPL CALC-MCNC: 47 MG/DL
WBC # BLD AUTO: 8.21 10E3/MM3 (ref 3.5–10.8)

## 2018-08-01 PROCEDURE — 25010000002 PROMETHAZINE PER 50 MG: Performed by: NURSE ANESTHETIST, CERTIFIED REGISTERED

## 2018-08-01 PROCEDURE — G0378 HOSPITAL OBSERVATION PER HR: HCPCS

## 2018-08-01 PROCEDURE — 88304 TISSUE EXAM BY PATHOLOGIST: CPT | Performed by: SURGERY

## 2018-08-01 PROCEDURE — 25010000003 CEFAZOLIN IN DEXTROSE 2-4 GM/100ML-% SOLUTION: Performed by: SURGERY

## 2018-08-01 PROCEDURE — 25010000002 BUPRENORPHINE PER 0.1 MG: Performed by: NURSE ANESTHETIST, CERTIFIED REGISTERED

## 2018-08-01 PROCEDURE — A9270 NON-COVERED ITEM OR SERVICE: HCPCS | Performed by: SURGERY

## 2018-08-01 PROCEDURE — 25010000002 PROPOFOL 10 MG/ML EMULSION: Performed by: NURSE ANESTHETIST, CERTIFIED REGISTERED

## 2018-08-01 PROCEDURE — 63710000001 TRAZODONE 50 MG TABLET: Performed by: SURGERY

## 2018-08-01 PROCEDURE — 0JN83ZZ RELEASE ABDOMEN SUBCUTANEOUS TISSUE AND FASCIA, PERCUTANEOUS APPROACH: ICD-10-PCS | Performed by: SURGERY

## 2018-08-01 PROCEDURE — 0FT44ZZ RESECTION OF GALLBLADDER, PERCUTANEOUS ENDOSCOPIC APPROACH: ICD-10-PCS | Performed by: SURGERY

## 2018-08-01 PROCEDURE — 25010000002 NEOSTIGMINE 10 MG/10ML SOLUTION: Performed by: NURSE ANESTHETIST, CERTIFIED REGISTERED

## 2018-08-01 PROCEDURE — 25010000002 MORPHINE PER 10 MG: Performed by: SURGERY

## 2018-08-01 PROCEDURE — 63710000001 SACUBITRIL-VALSARTAN 49-51 MG TABLET: Performed by: SURGERY

## 2018-08-01 PROCEDURE — 25010000002 ONDANSETRON PER 1 MG: Performed by: NURSE ANESTHETIST, CERTIFIED REGISTERED

## 2018-08-01 PROCEDURE — 25010000002 DEXAMETHASONE SODIUM PHOSPHATE 10 MG/ML SOLUTION 1 ML VIAL: Performed by: NURSE ANESTHETIST, CERTIFIED REGISTERED

## 2018-08-01 PROCEDURE — 63710000001 INSULIN LISPRO (HUMAN) PER 5 UNITS: Performed by: SURGERY

## 2018-08-01 PROCEDURE — 25010000002 FENTANYL CITRATE (PF) 100 MCG/2ML SOLUTION: Performed by: NURSE ANESTHETIST, CERTIFIED REGISTERED

## 2018-08-01 PROCEDURE — 63710000001 ROPINIROLE 0.5 MG TABLET: Performed by: SURGERY

## 2018-08-01 PROCEDURE — 63710000001 GABAPENTIN 300 MG CAPSULE: Performed by: SURGERY

## 2018-08-01 PROCEDURE — 63710000001 ATORVASTATIN 20 MG TABLET: Performed by: SURGERY

## 2018-08-01 PROCEDURE — 82962 GLUCOSE BLOOD TEST: CPT

## 2018-08-01 RX ORDER — CEFAZOLIN SODIUM 2 G/100ML
2 INJECTION, SOLUTION INTRAVENOUS EVERY 8 HOURS
Status: COMPLETED | OUTPATIENT
Start: 2018-08-01 | End: 2018-08-02

## 2018-08-01 RX ORDER — NALOXONE HCL 0.4 MG/ML
0.4 VIAL (ML) INJECTION
Status: DISCONTINUED | OUTPATIENT
Start: 2018-08-01 | End: 2018-08-02 | Stop reason: HOSPADM

## 2018-08-01 RX ORDER — GABAPENTIN 300 MG/1
300 CAPSULE ORAL EVERY 6 HOURS SCHEDULED
Status: DISCONTINUED | OUTPATIENT
Start: 2018-08-01 | End: 2018-08-02 | Stop reason: HOSPADM

## 2018-08-01 RX ORDER — SODIUM CHLORIDE 0.9 % (FLUSH) 0.9 %
1-10 SYRINGE (ML) INJECTION AS NEEDED
Status: DISCONTINUED | OUTPATIENT
Start: 2018-08-01 | End: 2018-08-01 | Stop reason: HOSPADM

## 2018-08-01 RX ORDER — PROMETHAZINE HYDROCHLORIDE 25 MG/ML
6.25 INJECTION, SOLUTION INTRAMUSCULAR; INTRAVENOUS ONCE AS NEEDED
Status: COMPLETED | OUTPATIENT
Start: 2018-08-01 | End: 2018-08-01

## 2018-08-01 RX ORDER — DEXTROSE MONOHYDRATE 25 G/50ML
25 INJECTION, SOLUTION INTRAVENOUS
Status: DISCONTINUED | OUTPATIENT
Start: 2018-08-01 | End: 2018-08-02 | Stop reason: HOSPADM

## 2018-08-01 RX ORDER — LIDOCAINE HYDROCHLORIDE 10 MG/ML
INJECTION, SOLUTION EPIDURAL; INFILTRATION; INTRACAUDAL; PERINEURAL AS NEEDED
Status: DISCONTINUED | OUTPATIENT
Start: 2018-08-01 | End: 2018-08-01 | Stop reason: SURG

## 2018-08-01 RX ORDER — TRAZODONE HYDROCHLORIDE 50 MG/1
50 TABLET ORAL NIGHTLY
Status: DISCONTINUED | OUTPATIENT
Start: 2018-08-01 | End: 2018-08-02 | Stop reason: HOSPADM

## 2018-08-01 RX ORDER — SODIUM CHLORIDE 9 MG/ML
INJECTION, SOLUTION INTRAVENOUS AS NEEDED
Status: DISCONTINUED | OUTPATIENT
Start: 2018-08-01 | End: 2018-08-01 | Stop reason: HOSPADM

## 2018-08-01 RX ORDER — FUROSEMIDE 40 MG/1
40 TABLET ORAL DAILY
Status: DISCONTINUED | OUTPATIENT
Start: 2018-08-01 | End: 2018-08-02 | Stop reason: HOSPADM

## 2018-08-01 RX ORDER — ASPIRIN 81 MG/1
81 TABLET ORAL DAILY
Status: DISCONTINUED | OUTPATIENT
Start: 2018-08-01 | End: 2018-08-02 | Stop reason: HOSPADM

## 2018-08-01 RX ORDER — PROMETHAZINE HYDROCHLORIDE 25 MG/1
25 SUPPOSITORY RECTAL ONCE AS NEEDED
Status: COMPLETED | OUTPATIENT
Start: 2018-08-01 | End: 2018-08-01

## 2018-08-01 RX ORDER — NICOTINE POLACRILEX 4 MG
15 LOZENGE BUCCAL
Status: DISCONTINUED | OUTPATIENT
Start: 2018-08-01 | End: 2018-08-02 | Stop reason: HOSPADM

## 2018-08-01 RX ORDER — SODIUM CHLORIDE, SODIUM LACTATE, POTASSIUM CHLORIDE, CALCIUM CHLORIDE 600; 310; 30; 20 MG/100ML; MG/100ML; MG/100ML; MG/100ML
9 INJECTION, SOLUTION INTRAVENOUS CONTINUOUS
Status: DISCONTINUED | OUTPATIENT
Start: 2018-08-01 | End: 2018-08-01

## 2018-08-01 RX ORDER — NEOSTIGMINE METHYLSULFATE 1 MG/ML
INJECTION, SOLUTION INTRAVENOUS AS NEEDED
Status: DISCONTINUED | OUTPATIENT
Start: 2018-08-01 | End: 2018-08-01 | Stop reason: SURG

## 2018-08-01 RX ORDER — LABETALOL HYDROCHLORIDE 5 MG/ML
INJECTION, SOLUTION INTRAVENOUS AS NEEDED
Status: DISCONTINUED | OUTPATIENT
Start: 2018-08-01 | End: 2018-08-01 | Stop reason: SURG

## 2018-08-01 RX ORDER — GLYCOPYRROLATE 0.2 MG/ML
INJECTION INTRAMUSCULAR; INTRAVENOUS AS NEEDED
Status: DISCONTINUED | OUTPATIENT
Start: 2018-08-01 | End: 2018-08-01 | Stop reason: SURG

## 2018-08-01 RX ORDER — CEFAZOLIN SODIUM 1 G/50ML
1 INJECTION, SOLUTION INTRAVENOUS EVERY 8 HOURS
Status: DISCONTINUED | OUTPATIENT
Start: 2018-08-01 | End: 2018-08-01 | Stop reason: SDUPTHER

## 2018-08-01 RX ORDER — FAMOTIDINE 10 MG/ML
20 INJECTION, SOLUTION INTRAVENOUS ONCE
Status: DISCONTINUED | OUTPATIENT
Start: 2018-08-01 | End: 2018-08-01 | Stop reason: HOSPADM

## 2018-08-01 RX ORDER — ONDANSETRON 2 MG/ML
INJECTION INTRAMUSCULAR; INTRAVENOUS AS NEEDED
Status: DISCONTINUED | OUTPATIENT
Start: 2018-08-01 | End: 2018-08-01 | Stop reason: SURG

## 2018-08-01 RX ORDER — FENTANYL CITRATE 50 UG/ML
50 INJECTION, SOLUTION INTRAMUSCULAR; INTRAVENOUS
Status: DISCONTINUED | OUTPATIENT
Start: 2018-08-01 | End: 2018-08-01 | Stop reason: HOSPADM

## 2018-08-01 RX ORDER — FAMOTIDINE 20 MG/1
20 TABLET, FILM COATED ORAL ONCE
Status: COMPLETED | OUTPATIENT
Start: 2018-08-01 | End: 2018-08-01

## 2018-08-01 RX ORDER — CARVEDILOL 6.25 MG/1
6.25 TABLET ORAL 2 TIMES DAILY WITH MEALS
Status: DISCONTINUED | OUTPATIENT
Start: 2018-08-01 | End: 2018-08-02 | Stop reason: HOSPADM

## 2018-08-01 RX ORDER — ATORVASTATIN CALCIUM 20 MG/1
20 TABLET, FILM COATED ORAL NIGHTLY
Status: DISCONTINUED | OUTPATIENT
Start: 2018-08-01 | End: 2018-08-02 | Stop reason: HOSPADM

## 2018-08-01 RX ORDER — MIDAZOLAM HYDROCHLORIDE 1 MG/ML
1 INJECTION INTRAMUSCULAR; INTRAVENOUS
Status: DISCONTINUED | OUTPATIENT
Start: 2018-08-01 | End: 2018-08-01 | Stop reason: HOSPADM

## 2018-08-01 RX ORDER — OXYCODONE AND ACETAMINOPHEN 10; 325 MG/1; MG/1
1 TABLET ORAL EVERY 4 HOURS PRN
Status: DISCONTINUED | OUTPATIENT
Start: 2018-08-01 | End: 2018-08-01 | Stop reason: SDUPTHER

## 2018-08-01 RX ORDER — SODIUM CHLORIDE, SODIUM LACTATE, POTASSIUM CHLORIDE, CALCIUM CHLORIDE 600; 310; 30; 20 MG/100ML; MG/100ML; MG/100ML; MG/100ML
75 INJECTION, SOLUTION INTRAVENOUS CONTINUOUS
Status: DISCONTINUED | OUTPATIENT
Start: 2018-08-01 | End: 2018-08-02 | Stop reason: HOSPADM

## 2018-08-01 RX ORDER — ROPINIROLE 0.5 MG/1
1 TABLET, FILM COATED ORAL NIGHTLY
Status: DISCONTINUED | OUTPATIENT
Start: 2018-08-01 | End: 2018-08-02 | Stop reason: HOSPADM

## 2018-08-01 RX ORDER — MEPERIDINE HYDROCHLORIDE 25 MG/ML
12.5 INJECTION INTRAMUSCULAR; INTRAVENOUS; SUBCUTANEOUS
Status: COMPLETED | OUTPATIENT
Start: 2018-08-01 | End: 2018-08-01

## 2018-08-01 RX ORDER — ATRACURIUM BESYLATE 10 MG/ML
INJECTION, SOLUTION INTRAVENOUS AS NEEDED
Status: DISCONTINUED | OUTPATIENT
Start: 2018-08-01 | End: 2018-08-01 | Stop reason: SURG

## 2018-08-01 RX ORDER — ONDANSETRON 2 MG/ML
4 INJECTION INTRAMUSCULAR; INTRAVENOUS ONCE AS NEEDED
Status: COMPLETED | OUTPATIENT
Start: 2018-08-01 | End: 2018-08-01

## 2018-08-01 RX ORDER — PROMETHAZINE HYDROCHLORIDE 25 MG/ML
6.25 INJECTION, SOLUTION INTRAMUSCULAR; INTRAVENOUS EVERY 6 HOURS PRN
Status: DISCONTINUED | OUTPATIENT
Start: 2018-08-01 | End: 2018-08-01 | Stop reason: SDUPTHER

## 2018-08-01 RX ORDER — LIDOCAINE HYDROCHLORIDE 10 MG/ML
0.5 INJECTION, SOLUTION EPIDURAL; INFILTRATION; INTRACAUDAL; PERINEURAL ONCE AS NEEDED
Status: COMPLETED | OUTPATIENT
Start: 2018-08-01 | End: 2018-08-01

## 2018-08-01 RX ORDER — PROPOFOL 10 MG/ML
VIAL (ML) INTRAVENOUS AS NEEDED
Status: DISCONTINUED | OUTPATIENT
Start: 2018-08-01 | End: 2018-08-01 | Stop reason: SURG

## 2018-08-01 RX ORDER — MORPHINE SULFATE 4 MG/ML
4 INJECTION, SOLUTION INTRAMUSCULAR; INTRAVENOUS
Status: DISCONTINUED | OUTPATIENT
Start: 2018-08-01 | End: 2018-08-02 | Stop reason: HOSPADM

## 2018-08-01 RX ORDER — ONDANSETRON 4 MG/1
4 TABLET, FILM COATED ORAL EVERY 6 HOURS PRN
Status: DISCONTINUED | OUTPATIENT
Start: 2018-08-01 | End: 2018-08-02 | Stop reason: HOSPADM

## 2018-08-01 RX ORDER — ONDANSETRON 2 MG/ML
4 INJECTION INTRAMUSCULAR; INTRAVENOUS EVERY 6 HOURS PRN
Status: DISCONTINUED | OUTPATIENT
Start: 2018-08-01 | End: 2018-08-02 | Stop reason: HOSPADM

## 2018-08-01 RX ORDER — SODIUM CHLORIDE 9 MG/ML
9 INJECTION, SOLUTION INTRAVENOUS CONTINUOUS
Status: DISCONTINUED | OUTPATIENT
Start: 2018-08-01 | End: 2018-08-01 | Stop reason: SDUPTHER

## 2018-08-01 RX ORDER — PROMETHAZINE HYDROCHLORIDE 25 MG/1
25 TABLET ORAL ONCE AS NEEDED
Status: COMPLETED | OUTPATIENT
Start: 2018-08-01 | End: 2018-08-01

## 2018-08-01 RX ORDER — PROMETHAZINE HYDROCHLORIDE 25 MG/ML
12.5 INJECTION, SOLUTION INTRAMUSCULAR; INTRAVENOUS EVERY 6 HOURS PRN
Status: DISCONTINUED | OUTPATIENT
Start: 2018-08-01 | End: 2018-08-01 | Stop reason: SDUPTHER

## 2018-08-01 RX ORDER — TRAZODONE HYDROCHLORIDE 50 MG/1
TABLET ORAL
Qty: 60 TABLET | Refills: 3 | Status: SHIPPED | OUTPATIENT
Start: 2018-08-01 | End: 2019-02-13 | Stop reason: SDUPTHER

## 2018-08-01 RX ADMIN — ONDANSETRON HYDROCHLORIDE 4 MG: 2 INJECTION, SOLUTION INTRAMUSCULAR; INTRAVENOUS at 14:19

## 2018-08-01 RX ADMIN — PROMETHAZINE HYDROCHLORIDE 6.25 MG: 25 INJECTION INTRAMUSCULAR; INTRAVENOUS at 15:42

## 2018-08-01 RX ADMIN — LIDOCAINE HYDROCHLORIDE 0.2 ML: 10 INJECTION, SOLUTION EPIDURAL; INFILTRATION; INTRACAUDAL; PERINEURAL at 11:00

## 2018-08-01 RX ADMIN — MORPHINE SULFATE 4 MG: 4 INJECTION, SOLUTION INTRAMUSCULAR; INTRAVENOUS at 22:38

## 2018-08-01 RX ADMIN — CEFAZOLIN SODIUM 2 G: 2 INJECTION, SOLUTION INTRAVENOUS at 12:16

## 2018-08-01 RX ADMIN — ATRACURIUM BESYLATE 10 MG: 10 INJECTION, SOLUTION INTRAVENOUS at 13:20

## 2018-08-01 RX ADMIN — MEPERIDINE HYDROCHLORIDE 12.5 MG: 25 INJECTION, SOLUTION INTRAMUSCULAR; INTRAVENOUS; SUBCUTANEOUS at 14:17

## 2018-08-01 RX ADMIN — MORPHINE SULFATE 4 MG: 4 INJECTION, SOLUTION INTRAMUSCULAR; INTRAVENOUS at 19:54

## 2018-08-01 RX ADMIN — LIDOCAINE HYDROCHLORIDE 50 MG: 10 INJECTION, SOLUTION EPIDURAL; INFILTRATION; INTRACAUDAL; PERINEURAL at 12:20

## 2018-08-01 RX ADMIN — INSULIN LISPRO 5 UNITS: 100 INJECTION, SOLUTION INTRAVENOUS; SUBCUTANEOUS at 22:29

## 2018-08-01 RX ADMIN — ATORVASTATIN CALCIUM 20 MG: 20 TABLET, FILM COATED ORAL at 20:59

## 2018-08-01 RX ADMIN — CEFAZOLIN SODIUM 2 G: 2 INJECTION, SOLUTION INTRAVENOUS at 20:58

## 2018-08-01 RX ADMIN — LABETALOL HYDROCHLORIDE 5 MG: 5 INJECTION, SOLUTION INTRAVENOUS at 12:39

## 2018-08-01 RX ADMIN — FENTANYL CITRATE 50 MCG: 50 INJECTION INTRAMUSCULAR; INTRAVENOUS at 15:45

## 2018-08-01 RX ADMIN — TRAZODONE HYDROCHLORIDE 50 MG: 50 TABLET ORAL at 20:59

## 2018-08-01 RX ADMIN — DEXAMETHASONE SODIUM PHOSPHATE 60 ML: 10 INJECTION, SOLUTION INTRAMUSCULAR; INTRAVENOUS at 12:23

## 2018-08-01 RX ADMIN — LABETALOL HYDROCHLORIDE 5 MG: 5 INJECTION, SOLUTION INTRAVENOUS at 13:33

## 2018-08-01 RX ADMIN — ATRACURIUM BESYLATE 30 MG: 10 INJECTION, SOLUTION INTRAVENOUS at 12:20

## 2018-08-01 RX ADMIN — SODIUM CHLORIDE: 9 INJECTION, SOLUTION INTRAVENOUS at 12:16

## 2018-08-01 RX ADMIN — PROPOFOL 150 MG: 10 INJECTION, EMULSION INTRAVENOUS at 12:20

## 2018-08-01 RX ADMIN — MEPERIDINE HYDROCHLORIDE 12.5 MG: 25 INJECTION, SOLUTION INTRAMUSCULAR; INTRAVENOUS; SUBCUTANEOUS at 14:25

## 2018-08-01 RX ADMIN — PROPOFOL 50 MG: 10 INJECTION, EMULSION INTRAVENOUS at 12:39

## 2018-08-01 RX ADMIN — ONDANSETRON 4 MG: 2 INJECTION INTRAMUSCULAR; INTRAVENOUS at 13:52

## 2018-08-01 RX ADMIN — GABAPENTIN 300 MG: 300 CAPSULE ORAL at 23:55

## 2018-08-01 RX ADMIN — LIDOCAINE HYDROCHLORIDE 1 ML: 20 JELLY TOPICAL at 12:23

## 2018-08-01 RX ADMIN — SACUBITRIL AND VALSARTAN 1 TABLET: 49; 51 TABLET, FILM COATED ORAL at 20:59

## 2018-08-01 RX ADMIN — FENTANYL CITRATE 50 MCG: 50 INJECTION INTRAMUSCULAR; INTRAVENOUS at 14:43

## 2018-08-01 RX ADMIN — EPHEDRINE SULFATE 5 MG: 50 INJECTION INTRAMUSCULAR; INTRAVENOUS; SUBCUTANEOUS at 12:31

## 2018-08-01 RX ADMIN — GLYCOPYRROLATE 0.4 MG: 0.2 INJECTION, SOLUTION INTRAMUSCULAR; INTRAVENOUS at 13:52

## 2018-08-01 RX ADMIN — SODIUM CHLORIDE, POTASSIUM CHLORIDE, SODIUM LACTATE AND CALCIUM CHLORIDE 75 ML/HR: 600; 310; 30; 20 INJECTION, SOLUTION INTRAVENOUS at 18:06

## 2018-08-01 RX ADMIN — ATRACURIUM BESYLATE 20 MG: 10 INJECTION, SOLUTION INTRAVENOUS at 12:53

## 2018-08-01 RX ADMIN — FENTANYL CITRATE 50 MCG: 50 INJECTION INTRAMUSCULAR; INTRAVENOUS at 14:55

## 2018-08-01 RX ADMIN — SODIUM CHLORIDE 9 ML/HR: 9 INJECTION, SOLUTION INTRAVENOUS at 11:00

## 2018-08-01 RX ADMIN — ROPINIROLE 1 MG: 0.5 TABLET, FILM COATED ORAL at 20:58

## 2018-08-01 RX ADMIN — FAMOTIDINE 20 MG: 20 TABLET ORAL at 11:23

## 2018-08-01 RX ADMIN — NEOSTIGMINE METHYLSULFATE 3 MG: 1 INJECTION, SOLUTION INTRAVENOUS at 13:52

## 2018-08-01 NOTE — OP NOTE
CHOLECYSTECTOMY LAPAROSCOPIC POSSIBLE OPEN CHOLECYSTECTOMY  Procedure Note    Angelita Shay  Date of Surgery:  8/1/2018    Pre-op Diagnosis:   Cholelithiasis    Post-op Diagnosis:     Same; extensive adhesions    Operative Procedure:   Laparoscopic cholecystectomy; extensive lysis of adhesions    Indications:  Mrs. Shay is a 72-year-old female with symptomatic cholelithiasis.  She has had a previous colectomy through a right paramedian incision.  She also has significant cardiac disease and, therefore, she was done in the main all R.    Operative note:  On 8/1/18, the patient was taken to the operating room and placed supine on the operating table.  Following adequate induction of general endotracheal anesthesia, an orogastric tube was placed in the stomach and connected to low wall suction.  The abdomen was then prepped and draped in the standard fashion.  A subxiphoid incision was made with an 11 blade.  The fascia was identified in the midline and incised with an 11 blade.  The posterior rectus fascia was identified and elevated into the wound between hemostats.  This was opened with the Metzenbaum scissors.  This allowed free entrance into the abdomen.  A blunt 11 mm trocar was placed into the abdomen and CO2 gas insufflated under normal flows and pressures.  The 0° angled 10 mm camera was placed into the abdomen and numerous adhesions from the left of the midline over to the right side of the abdomen were identified.  These extended from the level of the liver down into the pelvis.  I was able to find one small free area in the right upper quadrant and a 5 mm trocar placed there.  I tried to take down adhesions from this trocar site, but this was very difficult technically because of the limited room in the abdomen.  I next placed a trocar in the left lower quadrant under direct vision.  I was able to take down some of the adhesions beginning in the right lower quadrant with a combination of blunt dissection  and the bipolar cautery.  In this area there was no bowel.  I then switched to the subxiphoid trocar and then took down the remainder of the adhesions going from cephalad to caudad.  In so doing, 2 fascial defects in the right paramedian wound were identified.  One of these contained a loop of bowel and I was able to reduce this without obvious injury to the bowel.  Approximately two thirds of the entire operative time was spent taking down adhesions.  Once I had the right side of the abdomen free, I switched the left lower quadrant 5 mm port for an 11 mm port and 2 other ports were placed in the right upper abdomen.  The gallbladder was grasped.  Adhesions to the gallbladder were taken down to expose the infundibulum.  At the infundibulum, the cystic duct and cystic artery were identified in their usual anatomic locations.  The junction of the gallbladder and cystic duct was thoroughly visualized.  There was no evidence of aberrant ductal anatomy and the dissection appeared to be well away from the wall of the common bile duct.  The cystic duct and cystic artery were divided between clips and the gallbladder taken out in antegrade fashion with cautery.  The gallbladder was delivered through the subxiphoid port site and the fascia there closed with 0 Vicryl.  The right upper quadrant was reinspected.  Hemostasis was excellent.  The remainder of the abdomen was then inspected.  There was no active bleeding and no obvious injury to the bowel.  There had been some oozing from the lysed omentum the areas were thoroughly irrigated and clots suctioned.  No further bleeding was noted.  At this point, the remaining trochars removed under direct vision as gas was evacuated from the abdomen.  The puncture wounds were closed with subcuticular 4-0 Vicryl suture and sterile dressings applied.  The patient tolerated the procedure well and there were no complications.  Estimated blood loss is 50 mL's.  Both preoperative and  postoperative sponge and needle counts were correct.  The patient was taken to the recovery room in satisfactory condition.        Santos Pantoja MD     Date: 8/1/2018  Time: 2:11 PM     Addendum: The laparoscopic lysis of adhesions was a necessary and required portion of this procedure to perform a laparoscopic cholecystectomy.  The adhesions were numerous and prevented easy access to the right upper quadrant.  Approximately two thirds of the entire operative time was spent lysing adhesions.

## 2018-08-01 NOTE — ANESTHESIA POSTPROCEDURE EVALUATION
Patient: Angelita Shay    Procedure Summary     Date:  08/01/18 Room / [facility]:   [facility]    Anesthesia Start:  1216 Anesthesia Stop:      Procedure:  CHOLECYSTECTOMY LAPAROSCOPIC , lysis of adhesions (N/A Abdomen) Diagnosis:      Surgeon:  Santos TURNER MD Provider:  Luther Orta MD    Anesthesia Type:  general ASA Status:  3          Anesthesia Type: general  Last vitals  BP   147/92 (08/01/18 1108)   Temp   98.9 °F (37.2 °C) (08/01/18 1108)   Pulse   66 (08/01/18 1108)   Resp   18 (08/01/18 1108)     SpO2   93 % (08/01/18 1108)     Post Anesthesia Care and Evaluation    Patient location during evaluation: PACU  Patient participation: complete - patient participated  Level of consciousness: sleepy but conscious  Pain score: 0  Pain management: adequate  Airway patency: patent  Anesthetic complications: No anesthetic complications  PONV Status: none  Cardiovascular status: hemodynamically stable and acceptable  Respiratory status: nonlabored ventilation, acceptable and nasal cannula  Hydration status: acceptable    Comments: 144/79 88 16 98.5F 96%

## 2018-08-01 NOTE — PLAN OF CARE
Problem: Patient Care Overview  Goal: Plan of Care Review  Outcome: Ongoing (interventions implemented as appropriate)   08/01/18 1752   Coping/Psychosocial   Plan of Care Reviewed With patient;daughter   Plan of Care Review   Progress improving       Problem: Fall Risk (Adult)  Goal: Identify Related Risk Factors and Signs and Symptoms  Outcome: Ongoing (interventions implemented as appropriate)   08/01/18 1752   Fall Risk (Adult)   Related Risk Factors (Fall Risk) culprit medication(s);sleep pattern alteration;polypharmacy   Signs and Symptoms (Fall Risk) presence of risk factors

## 2018-08-01 NOTE — BRIEF OP NOTE
CHOLECYSTECTOMY LAPAROSCOPIC POSSIBLE OPEN CHOLECYSTECTOMY  Progress Note    Angelita Shay  8/1/2018    Pre-op Diagnosis:   cholelithiasis       Post-Op Diagnosis Codes:   same;extensive adhesions    Procedure/CPT® Codes:      Procedure(s):  CHOLECYSTECTOMY LAPAROSCOPIC , lysis of adhesions    Surgeon(s):  Santos TURNER MD    Anesthesia: General    Staff:   Circulator: Apurva Pisano RN; Latonya Fuentes RN  Scrub Person: Duy Ulloa; Judith Ulloa  Assistant: Shae Vega PA-C    Estimated Blood Loss: 50ml    Urine Voided: * No values recorded between 8/1/2018 12:15 PM and 8/1/2018  1:55 PM *    Specimens:                ID Type Source Tests Collected by Time   A : GALLBLADDER Tissue Gallbladder TISSUE PATHOLOGY EXAM Santos TURNER MD 8/1/2018 1328         Drains:      Findings: gallstones;extensive adhesions along the midline incision;at least 2 fascial defects, one with bowel that was reduced    Complications: none      Santos Pantoja MD     Date: 8/1/2018  Time: 1:55 PM

## 2018-08-01 NOTE — INTERVAL H&P NOTE
[facility] Pre-op    Full history and physical note from office is up to date.  See office note attached.    /92 (BP Location: Right arm, Patient Position: Sitting)   Pulse 66   Temp 98.9 °F (37.2 °C) (Temporal Artery )   Resp 18   SpO2 93%   Breastfeeding? No       Breastfeeding? No     NOHEMY 3/11/18  Interpretation Summary:    · Left atrial cavity size is mildly dilated.  · Left ventricular wall thickness is consistent with mild concentric hypertrophy.  · Left ventricular systolic function is moderately decreased. Estimated EF = 36%.  · Mild mitral valve regurgitation is present  · Mild tricuspid valve regurgitation is present.  · Estimated right ventricular systolic pressure is moderately elevated (45-55 mmHg).        Cancer Staging (if applicable)  Cancer Patient: __ yes _X_no __unknown__N/A; If yes, clinical stage T:__ N:__M:__, stage group or __N/A    Assessment: Calculus of gallbladder    Plan: Laparoscopic cholecystectomy, possible open cholecystectomy    Catalina Vicente, STONEY 8/1/2018 11:04 AM

## 2018-08-01 NOTE — ANESTHESIA PREPROCEDURE EVALUATION
Anesthesia Evaluation                  Airway   Mallampati: I  TM distance: >3 FB  Neck ROM: full  No difficulty expected  Dental      Pulmonary    (+) sleep apnea,   Cardiovascular     ECG reviewed    (+) hypertension, CAD, dysrhythmias, angina, CHF, hyperlipidemia,       Neuro/Psych  (+) headaches, psychiatric history,     GI/Hepatic/Renal/Endo    (+) obesity,  GERD, GI bleeding, liver disease, diabetes mellitus,     Musculoskeletal     Abdominal    Substance History      OB/GYN          Other                        Anesthesia Plan    ASA 3     general   (Tap()  intravenous induction   Anesthetic plan and risks discussed with patient.    Plan discussed with CRNA.

## 2018-08-01 NOTE — PROGRESS NOTES
GEN SURG PROGRESS NOTE     LOS: 0 days   Patient Care Team:  Sharon Saldivar MD as PCP - General (Family Medicine)  Festus Bowie IV, MD as Cardiologist (Interventional Cardiology)  Tosha Melvin APRN as Nurse Practitioner (Cardiology)  Brandon Cash MD as Consulting Physician (Gastroenterology)    Chief Complaint:      Subjective     Interval History:     Patient Complaints: sore  Patient Denies:    History taken from: patient    Review of Systems:        Objective     Vital Signs  /80 (BP Location: Left arm, Patient Position: Lying)   Pulse 66   Temp 97.4 °F (36.3 °C) (Oral)   Resp 18   SpO2 91%   Breastfeeding? No     Physical Exam:   Abdomen soft and mildly diffusely tender.  Dressings dry.     Results Review:     I reviewed the patient's new clinical results.  Labs  Lab Results (last 72 hours)     Procedure Component Value Units Date/Time    POC Glucose Once [[id number]]  (Abnormal) Collected:  08/01/18 1449    Specimen:  Blood Updated:  08/01/18 1506     Glucose 217 (H) mg/dL     Narrative:       Meter: [id number] : [id number] Tashi Haney    Tissue Pathology Exam [[id number]] Collected:  08/01/18 1328    Specimen:  Tissue from Gallbladder Updated:  08/01/18 1438    POC Glucose Once [[id number]]  (Abnormal) Collected:  08/01/18 1102    Specimen:  Blood Updated:  08/01/18 1106     Glucose 166 (H) mg/dL     Narrative:       Meter: [id number] : [id number] Sanket Yoon  Imaging Results (last 72 hours)     ** No results found for the last 72 hours. **          Assessment/Plan    Stable postop    Active Problems:    Cholelithiasis        Santos Pantoja MD  08/01/18  5:12 PM

## 2018-08-02 VITALS
SYSTOLIC BLOOD PRESSURE: 111 MMHG | DIASTOLIC BLOOD PRESSURE: 68 MMHG | RESPIRATION RATE: 18 BRPM | HEIGHT: 63 IN | HEART RATE: 85 BPM | BODY MASS INDEX: 37.73 KG/M2 | OXYGEN SATURATION: 96 % | WEIGHT: 212.96 LBS | TEMPERATURE: 98.6 F

## 2018-08-02 LAB
ANION GAP SERPL CALCULATED.3IONS-SCNC: 7 MMOL/L (ref 3–11)
BUN BLD-MCNC: 23 MG/DL (ref 9–23)
BUN/CREAT SERPL: 19.2 (ref 7–25)
CALCIUM SPEC-SCNC: 7.9 MG/DL (ref 8.7–10.4)
CHLORIDE SERPL-SCNC: 105 MMOL/L (ref 99–109)
CO2 SERPL-SCNC: 26 MMOL/L (ref 20–31)
CREAT BLD-MCNC: 1.2 MG/DL (ref 0.6–1.3)
CYTO UR: NORMAL
GFR SERPL CREATININE-BSD FRML MDRD: 44 ML/MIN/1.73
GLUCOSE BLD-MCNC: 203 MG/DL (ref 70–100)
GLUCOSE BLDC GLUCOMTR-MCNC: 164 MG/DL (ref 70–130)
GLUCOSE BLDC GLUCOMTR-MCNC: 167 MG/DL (ref 70–130)
GLUCOSE BLDC GLUCOMTR-MCNC: 284 MG/DL (ref 70–130)
LAB AP CASE REPORT: NORMAL
LAB AP CLINICAL INFORMATION: NORMAL
PATH REPORT.FINAL DX SPEC: NORMAL
PATH REPORT.GROSS SPEC: NORMAL
POTASSIUM BLD-SCNC: 3.9 MMOL/L (ref 3.5–5.5)
SODIUM BLD-SCNC: 138 MMOL/L (ref 132–146)

## 2018-08-02 PROCEDURE — 82962 GLUCOSE BLOOD TEST: CPT

## 2018-08-02 PROCEDURE — A9270 NON-COVERED ITEM OR SERVICE: HCPCS | Performed by: SURGERY

## 2018-08-02 PROCEDURE — 63710000001 MAGNESIUM HYDROXIDE 2400 MG/10ML SUSPENSION: Performed by: SURGERY

## 2018-08-02 PROCEDURE — 63710000001 ASPIRIN 81 MG TABLET DELAYED-RELEASE: Performed by: SURGERY

## 2018-08-02 PROCEDURE — G0378 HOSPITAL OBSERVATION PER HR: HCPCS

## 2018-08-02 PROCEDURE — 25010000002 MORPHINE PER 10 MG: Performed by: SURGERY

## 2018-08-02 PROCEDURE — 63710000001 CARVEDILOL 6.25 MG TABLET: Performed by: SURGERY

## 2018-08-02 PROCEDURE — 80048 BASIC METABOLIC PNL TOTAL CA: CPT | Performed by: SURGERY

## 2018-08-02 PROCEDURE — 63710000001 HYDROCODONE-ACETAMINOPHEN 10-325 MG TABLET: Performed by: SURGERY

## 2018-08-02 PROCEDURE — 25010000003 CEFAZOLIN IN DEXTROSE 2-4 GM/100ML-% SOLUTION: Performed by: SURGERY

## 2018-08-02 PROCEDURE — 63710000001 FUROSEMIDE 40 MG TABLET: Performed by: SURGERY

## 2018-08-02 PROCEDURE — 63710000001 GABAPENTIN 300 MG CAPSULE: Performed by: SURGERY

## 2018-08-02 PROCEDURE — 63710000001 INSULIN LISPRO (HUMAN) PER 5 UNITS: Performed by: SURGERY

## 2018-08-02 PROCEDURE — 63710000001 SACUBITRIL-VALSARTAN 49-51 MG TABLET: Performed by: SURGERY

## 2018-08-02 RX ORDER — HYDROCODONE BITARTRATE AND ACETAMINOPHEN 10; 325 MG/1; MG/1
1 TABLET ORAL EVERY 4 HOURS PRN
Status: DISCONTINUED | OUTPATIENT
Start: 2018-08-02 | End: 2018-08-02 | Stop reason: HOSPADM

## 2018-08-02 RX ORDER — HYDROCODONE BITARTRATE AND ACETAMINOPHEN 10; 325 MG/1; MG/1
1 TABLET ORAL EVERY 4 HOURS PRN
Qty: 20 TABLET | Refills: 0 | Status: SHIPPED | OUTPATIENT
Start: 2018-08-02 | End: 2018-08-12

## 2018-08-02 RX ADMIN — SACUBITRIL AND VALSARTAN 1 TABLET: 49; 51 TABLET, FILM COATED ORAL at 08:03

## 2018-08-02 RX ADMIN — GABAPENTIN 300 MG: 300 CAPSULE ORAL at 11:55

## 2018-08-02 RX ADMIN — CARVEDILOL 6.25 MG: 6.25 TABLET, FILM COATED ORAL at 08:03

## 2018-08-02 RX ADMIN — CEFAZOLIN SODIUM 2 G: 2 INJECTION, SOLUTION INTRAVENOUS at 05:15

## 2018-08-02 RX ADMIN — MORPHINE SULFATE 4 MG: 4 INJECTION, SOLUTION INTRAMUSCULAR; INTRAVENOUS at 08:04

## 2018-08-02 RX ADMIN — FUROSEMIDE 40 MG: 40 TABLET ORAL at 08:03

## 2018-08-02 RX ADMIN — MORPHINE SULFATE 4 MG: 4 INJECTION, SOLUTION INTRAMUSCULAR; INTRAVENOUS at 05:15

## 2018-08-02 RX ADMIN — GABAPENTIN 300 MG: 300 CAPSULE ORAL at 05:15

## 2018-08-02 RX ADMIN — INSULIN LISPRO 3 UNITS: 100 INJECTION, SOLUTION INTRAVENOUS; SUBCUTANEOUS at 11:55

## 2018-08-02 RX ADMIN — SODIUM CHLORIDE, POTASSIUM CHLORIDE, SODIUM LACTATE AND CALCIUM CHLORIDE 75 ML/HR: 600; 310; 30; 20 INJECTION, SOLUTION INTRAVENOUS at 08:19

## 2018-08-02 RX ADMIN — MAGNESIUM HYDROXIDE 10 ML: 2400 SUSPENSION ORAL at 08:03

## 2018-08-02 RX ADMIN — MORPHINE SULFATE 4 MG: 4 INJECTION, SOLUTION INTRAMUSCULAR; INTRAVENOUS at 12:00

## 2018-08-02 RX ADMIN — INSULIN LISPRO 8 UNITS: 100 INJECTION, SOLUTION INTRAVENOUS; SUBCUTANEOUS at 08:14

## 2018-08-02 RX ADMIN — HYDROCODONE BITARTRATE AND ACETAMINOPHEN 1 TABLET: 10; 325 TABLET ORAL at 14:29

## 2018-08-02 RX ADMIN — ASPIRIN 81 MG: 81 TABLET, COATED ORAL at 08:03

## 2018-08-02 RX ADMIN — INSULIN LISPRO 3 UNITS: 100 INJECTION, SOLUTION INTRAVENOUS; SUBCUTANEOUS at 16:30

## 2018-08-02 NOTE — PROGRESS NOTES
"GEN SURG PROGRESS NOTE     LOS: 0 days   Patient Care Team:  Sharon Saldivar MD as PCP - General (Family Medicine)  Festus Bowie IV, MD as Cardiologist (Interventional Cardiology)  Tosha Melvin APRN as Nurse Practitioner (Cardiology)  Brandon Cash MD as Consulting Physician (Gastroenterology)    Chief Complaint:      Subjective     Interval History:     Patient Complaints: sore..  Some difficulty voiding.  Patient Denies:    History taken from: patient    Review of Systems:        Objective     Vital Signs  BP (!) 82/53 (BP Location: Right arm, Patient Position: Lying)   Pulse 90   Temp 97.8 °F (36.6 °C) (Oral)   Resp 18   Ht 160 cm (62.99\")   Wt 96.6 kg (212 lb 15.4 oz)   SpO2 96%   Breastfeeding? No   BMI 37.73 kg/m²     Physical Exam:   Abdomen soft and diffusely tender, which is unchanged from..  Dressings are dry.     Results Review:     I reviewed the patient's new clinical results.  Labs  Lab Results (last 72 hours)     Procedure Component Value Units Date/Time    POC Glucose Once [[id number]]  (Abnormal) Collected:  08/01/18 2038    Specimen:  Blood Updated:  08/01/18 2040     Glucose 219 (H) mg/dL     Narrative:       Meter: [id number] : [id number] Josefa Tripp    POC Glucose Once [[id number]]  (Abnormal) Collected:  08/01/18 1449    Specimen:  Blood Updated:  08/01/18 1506     Glucose 217 (H) mg/dL     Narrative:       Meter: [id number] : [id number] Tashi Haney    Tissue Pathology Exam [[id number]] Collected:  08/01/18 1328    Specimen:  Tissue from Gallbladder Updated:  08/01/18 1438    POC Glucose Once [[id number]]  (Abnormal) Collected:  08/01/18 1102    Specimen:  Blood Updated:  08/01/18 1106     Glucose 166 (H) mg/dL     Narrative:       Meter: [id number] : [id number] Sanket Yoon  Imaging Results (last 72 hours)     ** No results found for the last 72 hours. **          Assessment/Plan    She has generally done well.  Her blood " pressure is a little low this morning, although she is not tachycardic.  We will check labs and reassess for discharge later today.    Active Problems:    Cholelithiasis        Santos Pantoja MD  08/02/18  7:20 AM

## 2018-08-02 NOTE — PLAN OF CARE
Problem: Patient Care Overview  Goal: Plan of Care Review  Outcome: Ongoing (interventions implemented as appropriate)    Goal: Individualization and Mutuality  Outcome: Ongoing (interventions implemented as appropriate)    Goal: Discharge Needs Assessment  Outcome: Ongoing (interventions implemented as appropriate)    Goal: Interprofessional Rounds/Family Conf  Outcome: Ongoing (interventions implemented as appropriate)      Problem: Fall Risk (Adult)  Goal: Identify Related Risk Factors and Signs and Symptoms  Outcome: Ongoing (interventions implemented as appropriate)    Goal: Absence of Fall  Outcome: Ongoing (interventions implemented as appropriate)

## 2018-08-02 NOTE — PROGRESS NOTES
"Date of Discharge:  8/2/2018    Discharge Diagnosis:   Cholelithiasis; numerous adhesions    Problem List:  Active Problems:    Cholelithiasis      Presenting Problem/History of Present Illness  Cholelithiasis [K80.20]        Hospital Course  Patient is a 72 y.o. female presented with symptomatic cholelithiasis.  She has had a previous open colectomy that potentially complicate a laparoscopic approach.  She also has significant cardiac disease.  She was an a.m. admission on 8/1/18 at which time she underwent a laparoscopic cholecystectomy.  She required an extensive lysis of adhesions to accomplish this laparoscopically.  Postoperatively she has done well.  She has had modest pain which is to be expected.  The morning after surgery her blood pressure was a little low, which I felt was probably cardiac as much as anything.  Labs were checked and were acceptable.  During the day of 82 she has been stable  from a cardiovascular standpoint, has tolerated diet, and has been able to void.  She is discharged home in satisfactory condition with wound care and activity limitations thoroughly discussed with her.  She is to resume her medications at home as she normally would.  I will follow up with her in my office in 2 weeks.  .      Procedures Performed  Procedure(s):  CHOLECYSTECTOMY LAPAROSCOPIC , LYSIS OF ADHESIONS       Consults:   Consults     No orders found for last 30 day(s).          Pertinent Test Results:       Vital Signs  /63   Pulse 85   Temp 96 °F (35.6 °C) (Oral)   Resp 18   Ht 160 cm (62.99\")   Wt 96.6 kg (212 lb 15.4 oz)   SpO2 96%   Breastfeeding? No   BMI 37.73 kg/m²     Discharge Disposition  Where: home      Discharge Medications     Discharge Medications      New Medications      Instructions Start Date   HYDROcodone-acetaminophen  MG per tablet  Commonly known as:  NORCO   1 tablet, Oral, Every 4 Hours PRN         Changes to Medications      Instructions Start Date   NOVOLOG 100 " UNIT/ML injection  Generic drug:  insulin aspart  What changed:  · how to take this  · additional instructions   **Per patient's Sliding scale**      nystatin 043928 UNIT/GM powder  Commonly known as:  MYCOSTATIN  What changed:  how much to take   Topical, Every 12 Hours Scheduled         Continue These Medications      Instructions Start Date   aspirin 81 MG EC tablet   81 mg, Oral, Daily, Resume in 2 weeks      atorvastatin 20 MG tablet  Commonly known as:  LIPITOR   20 mg, Oral, Nightly      carvedilol 3.125 MG tablet  Commonly known as:  COREG   6.25 mg, Oral, 2 Times Daily With Meals      furosemide 40 MG tablet  Commonly known as:  LASIX   40 mg, Oral, Daily      gabapentin 300 MG capsule  Commonly known as:  NEURONTIN   300 mg, Oral, 4 Times Daily      LINZESS 290 MCG capsule capsule  Generic drug:  linaclotide   290 mcg, Oral, Every Morning Before Breakfast      MILK OF MAGNESIA 400 MG/5ML suspension  Generic drug:  magnesium hydroxide   15 mL, Oral, Daily PRN      ondansetron 4 MG tablet  Commonly known as:  ZOFRAN   4 mg, Oral, Every 6 Hours PRN      polyethylene glycol powder  Commonly known as:  MIRALAX   17 g, Oral, Daily PRN      rOPINIRole 1 MG tablet  Commonly known as:  REQUIP   1-3 mg, Oral, Nightly, Take 1 hour before bedtime.      sacubitril-valsartan 49-51 MG tablet  Commonly known as:  ENTRESTO   1 tablet, Oral, Every 12 Hours Scheduled      traZODone 50 MG tablet  Commonly known as:  DESYREL   TAKE ONE TO TWO TABLETS BY MOUTH EVERY NIGHT AT BEDTIME             Discharge Diet regular      Activity at Discharge ad nadya.      Follow-up Appointments Dr. noguera in 2 weeks  Future Appointments  Date Time Provider Department Center   8/20/2018 1:00 PM Sharon Saldivar MD [facility] None   8/23/2018 10:45 AM Tosha Melvin APRN [facility]   9/17/2018 9:45 AM Brennen Back MD [facility] None   11/5/2018 9:00 AM Wilbur Galarza MD [facility] None         Test Results Pending at  Discharge final pathology        Santos Pantoja MD  08/02/18  2:49 PM

## 2018-08-02 NOTE — PLAN OF CARE
Problem: Patient Care Overview  Goal: Plan of Care Review  Outcome: Ongoing (interventions implemented as appropriate)   08/02/18 1439   Coping/Psychosocial   Plan of Care Reviewed With patient   Plan of Care Review   Progress improving   OTHER   Outcome Summary Pt walked in the yin and is tolerating a regular diet. PT may be discharged later today       Problem: Fall Risk (Adult)  Goal: Identify Related Risk Factors and Signs and Symptoms  Outcome: Ongoing (interventions implemented as appropriate)   08/02/18 1439   Fall Risk (Adult)   Related Risk Factors (Fall Risk) environment unfamiliar;culprit medication(s);sleep pattern alteration   Signs and Symptoms (Fall Risk) presence of risk factors     Goal: Absence of Fall  Outcome: Ongoing (interventions implemented as appropriate)   08/02/18 1439   Fall Risk (Adult)   Absence of Fall making progress toward outcome

## 2018-08-03 ENCOUNTER — APPOINTMENT (OUTPATIENT)
Dept: NUCLEAR MEDICINE | Facility: HOSPITAL | Age: 73
End: 2018-08-03

## 2018-08-03 ENCOUNTER — APPOINTMENT (OUTPATIENT)
Dept: GENERAL RADIOLOGY | Facility: HOSPITAL | Age: 73
End: 2018-08-03

## 2018-08-03 ENCOUNTER — APPOINTMENT (OUTPATIENT)
Dept: CT IMAGING | Facility: HOSPITAL | Age: 73
End: 2018-08-03

## 2018-08-03 ENCOUNTER — TELEPHONE (OUTPATIENT)
Dept: INTERNAL MEDICINE | Facility: CLINIC | Age: 73
End: 2018-08-03

## 2018-08-03 ENCOUNTER — READMISSION MANAGEMENT (OUTPATIENT)
Dept: CALL CENTER | Facility: HOSPITAL | Age: 73
End: 2018-08-03

## 2018-08-03 ENCOUNTER — HOSPITAL ENCOUNTER (INPATIENT)
Facility: HOSPITAL | Age: 73
LOS: 3 days | Discharge: HOME | End: 2018-08-06
Attending: EMERGENCY MEDICINE | Admitting: HOSPITALIST

## 2018-08-03 DIAGNOSIS — R55 SYNCOPE, UNSPECIFIED SYNCOPE TYPE: ICD-10-CM

## 2018-08-03 DIAGNOSIS — I26.99 OTHER ACUTE PULMONARY EMBOLISM WITHOUT ACUTE COR PULMONALE (HCC): Primary | ICD-10-CM

## 2018-08-03 DIAGNOSIS — N17.9 ACUTE KIDNEY INJURY (HCC): ICD-10-CM

## 2018-08-03 DIAGNOSIS — R09.02 HYPOXEMIA: ICD-10-CM

## 2018-08-03 LAB
ALBUMIN SERPL-MCNC: 4.27 G/DL (ref 3.2–4.8)
ALBUMIN/GLOB SERPL: 1.5 G/DL (ref 1.5–2.5)
ALP SERPL-CCNC: 67 U/L (ref 25–100)
ALT SERPL W P-5'-P-CCNC: 33 U/L (ref 7–40)
ANION GAP SERPL CALCULATED.3IONS-SCNC: 12 MMOL/L (ref 3–11)
APTT PPP: 26.3 SECONDS (ref 24–31)
AST SERPL-CCNC: 67 U/L (ref 0–33)
BASOPHILS # BLD AUTO: 0.01 10*3/MM3 (ref 0–0.2)
BASOPHILS NFR BLD AUTO: 0.1 % (ref 0–1)
BILIRUB SERPL-MCNC: 0.4 MG/DL (ref 0.3–1.2)
BUN BLD-MCNC: 42 MG/DL (ref 9–23)
BUN/CREAT SERPL: 16.6 (ref 7–25)
CALCIUM SPEC-SCNC: 8.5 MG/DL (ref 8.7–10.4)
CHLORIDE SERPL-SCNC: 100 MMOL/L (ref 99–109)
CO2 SERPL-SCNC: 28 MMOL/L (ref 20–31)
CREAT BLD-MCNC: 2.53 MG/DL (ref 0.6–1.3)
DEPRECATED RDW RBC AUTO: 55.6 FL (ref 37–54)
EOSINOPHIL # BLD AUTO: 0.26 10*3/MM3 (ref 0–0.3)
EOSINOPHIL NFR BLD AUTO: 3.4 % (ref 0–3)
ERYTHROCYTE [DISTWIDTH] IN BLOOD BY AUTOMATED COUNT: 14.8 % (ref 11.3–14.5)
GFR SERPL CREATININE-BSD FRML MDRD: 19 ML/MIN/1.73
GLOBULIN UR ELPH-MCNC: 2.8 GM/DL
GLUCOSE BLD-MCNC: 142 MG/DL (ref 70–100)
HCT VFR BLD AUTO: 40.3 % (ref 34.5–44)
HGB BLD-MCNC: 12.9 G/DL (ref 11.5–15.5)
IMM GRANULOCYTES # BLD: 0.02 10*3/MM3 (ref 0–0.03)
IMM GRANULOCYTES NFR BLD: 0.3 % (ref 0–0.6)
INR PPP: 0.95 (ref 0.91–1.09)
LYMPHOCYTES # BLD AUTO: 1.73 10*3/MM3 (ref 0.6–4.8)
LYMPHOCYTES NFR BLD AUTO: 22.8 % (ref 24–44)
MCH RBC QN AUTO: 32.7 PG (ref 27–31)
MCHC RBC AUTO-ENTMCNC: 32 G/DL (ref 32–36)
MCV RBC AUTO: 102.3 FL (ref 80–99)
MONOCYTES # BLD AUTO: 0.59 10*3/MM3 (ref 0–1)
MONOCYTES NFR BLD AUTO: 7.8 % (ref 0–12)
NEUTROPHILS # BLD AUTO: 5.01 10*3/MM3 (ref 1.5–8.3)
NEUTROPHILS NFR BLD AUTO: 65.9 % (ref 41–71)
PLATELET # BLD AUTO: 145 10*3/MM3 (ref 150–450)
PMV BLD AUTO: 11.1 FL (ref 6–12)
POTASSIUM BLD-SCNC: 4.6 MMOL/L (ref 3.5–5.5)
PROT SERPL-MCNC: 7.1 G/DL (ref 5.7–8.2)
PROTHROMBIN TIME: 10 SECONDS (ref 9.6–11.5)
RBC # BLD AUTO: 3.94 10*6/MM3 (ref 3.89–5.14)
SODIUM BLD-SCNC: 140 MMOL/L (ref 132–146)
WBC NRBC COR # BLD: 7.6 10*3/MM3 (ref 3.5–10.8)

## 2018-08-03 PROCEDURE — 94640 AIRWAY INHALATION TREATMENT: CPT

## 2018-08-03 PROCEDURE — 85025 COMPLETE CBC W/AUTO DIFF WBC: CPT | Performed by: EMERGENCY MEDICINE

## 2018-08-03 PROCEDURE — 72192 CT PELVIS W/O DYE: CPT

## 2018-08-03 PROCEDURE — 85610 PROTHROMBIN TIME: CPT | Performed by: EMERGENCY MEDICINE

## 2018-08-03 PROCEDURE — 94760 N-INVAS EAR/PLS OXIMETRY 1: CPT

## 2018-08-03 PROCEDURE — 80053 COMPREHEN METABOLIC PANEL: CPT | Performed by: EMERGENCY MEDICINE

## 2018-08-03 PROCEDURE — 99284 EMERGENCY DEPT VISIT MOD MDM: CPT

## 2018-08-03 PROCEDURE — A9558 XE133 XENON 10MCI: HCPCS | Performed by: INTERNAL MEDICINE

## 2018-08-03 PROCEDURE — 99223 1ST HOSP IP/OBS HIGH 75: CPT | Performed by: INTERNAL MEDICINE

## 2018-08-03 PROCEDURE — A9540 TC99M MAA: HCPCS | Performed by: INTERNAL MEDICINE

## 2018-08-03 PROCEDURE — 0 TECHNETIUM ALBUMIN AGGREGATED: Performed by: INTERNAL MEDICINE

## 2018-08-03 PROCEDURE — 94799 UNLISTED PULMONARY SVC/PX: CPT

## 2018-08-03 PROCEDURE — 93005 ELECTROCARDIOGRAM TRACING: CPT | Performed by: EMERGENCY MEDICINE

## 2018-08-03 PROCEDURE — 0 XENON XE 133: Performed by: INTERNAL MEDICINE

## 2018-08-03 PROCEDURE — 78582 LUNG VENTILAT&PERFUS IMAGING: CPT

## 2018-08-03 PROCEDURE — 85730 THROMBOPLASTIN TIME PARTIAL: CPT | Performed by: EMERGENCY MEDICINE

## 2018-08-03 PROCEDURE — 71046 X-RAY EXAM CHEST 2 VIEWS: CPT

## 2018-08-03 RX ORDER — ASPIRIN 81 MG/1
81 TABLET ORAL DAILY
Status: DISCONTINUED | OUTPATIENT
Start: 2018-08-04 | End: 2018-08-06 | Stop reason: HOSPADM

## 2018-08-03 RX ORDER — TRAZODONE HYDROCHLORIDE 50 MG/1
25 TABLET ORAL NIGHTLY PRN
Status: DISCONTINUED | OUTPATIENT
Start: 2018-08-03 | End: 2018-08-06 | Stop reason: HOSPADM

## 2018-08-03 RX ORDER — HEPARIN SODIUM 1000 [USP'U]/ML
80 INJECTION, SOLUTION INTRAVENOUS; SUBCUTANEOUS ONCE
Status: COMPLETED | OUTPATIENT
Start: 2018-08-03 | End: 2018-08-04

## 2018-08-03 RX ORDER — ATORVASTATIN CALCIUM 20 MG/1
20 TABLET, FILM COATED ORAL NIGHTLY
Status: DISCONTINUED | OUTPATIENT
Start: 2018-08-03 | End: 2018-08-06 | Stop reason: HOSPADM

## 2018-08-03 RX ORDER — CARVEDILOL 6.25 MG/1
6.25 TABLET ORAL 2 TIMES DAILY WITH MEALS
Status: DISCONTINUED | OUTPATIENT
Start: 2018-08-03 | End: 2018-08-06

## 2018-08-03 RX ORDER — ROPINIROLE 0.5 MG/1
1 TABLET, FILM COATED ORAL NIGHTLY
Status: DISCONTINUED | OUTPATIENT
Start: 2018-08-03 | End: 2018-08-06 | Stop reason: HOSPADM

## 2018-08-03 RX ORDER — IPRATROPIUM BROMIDE AND ALBUTEROL SULFATE 2.5; .5 MG/3ML; MG/3ML
3 SOLUTION RESPIRATORY (INHALATION) ONCE
Status: COMPLETED | OUTPATIENT
Start: 2018-08-03 | End: 2018-08-03

## 2018-08-03 RX ORDER — IPRATROPIUM BROMIDE AND ALBUTEROL SULFATE 2.5; .5 MG/3ML; MG/3ML
3 SOLUTION RESPIRATORY (INHALATION) EVERY 4 HOURS PRN
Status: DISCONTINUED | OUTPATIENT
Start: 2018-08-03 | End: 2018-08-06 | Stop reason: HOSPADM

## 2018-08-03 RX ORDER — HEPARIN SODIUM 5000 [USP'U]/ML
5000 INJECTION, SOLUTION INTRAVENOUS; SUBCUTANEOUS EVERY 12 HOURS SCHEDULED
Status: CANCELLED | OUTPATIENT
Start: 2018-08-03

## 2018-08-03 RX ORDER — IPRATROPIUM BROMIDE AND ALBUTEROL SULFATE 2.5; .5 MG/3ML; MG/3ML
3 SOLUTION RESPIRATORY (INHALATION) ONCE
Status: DISCONTINUED | OUTPATIENT
Start: 2018-08-03 | End: 2018-08-06 | Stop reason: HOSPADM

## 2018-08-03 RX ORDER — HYDROCODONE BITARTRATE AND ACETAMINOPHEN 10; 325 MG/1; MG/1
1 TABLET ORAL EVERY 4 HOURS PRN
Status: DISCONTINUED | OUTPATIENT
Start: 2018-08-03 | End: 2018-08-06 | Stop reason: HOSPADM

## 2018-08-03 RX ORDER — GABAPENTIN 300 MG/1
300 CAPSULE ORAL EVERY 8 HOURS SCHEDULED
Status: DISCONTINUED | OUTPATIENT
Start: 2018-08-03 | End: 2018-08-06 | Stop reason: HOSPADM

## 2018-08-03 RX ORDER — HEPARIN SODIUM 1000 [USP'U]/ML
40 INJECTION, SOLUTION INTRAVENOUS; SUBCUTANEOUS AS NEEDED
Status: DISCONTINUED | OUTPATIENT
Start: 2018-08-03 | End: 2018-08-03

## 2018-08-03 RX ORDER — HEPARIN SODIUM 1000 [USP'U]/ML
80 INJECTION, SOLUTION INTRAVENOUS; SUBCUTANEOUS AS NEEDED
Status: DISCONTINUED | OUTPATIENT
Start: 2018-08-03 | End: 2018-08-03

## 2018-08-03 RX ADMIN — SODIUM CHLORIDE 1000 ML: 9 INJECTION, SOLUTION INTRAVENOUS at 18:50

## 2018-08-03 RX ADMIN — Medication 1 DOSE: at 22:35

## 2018-08-03 RX ADMIN — XENON XE-133 40 MILLICURIE: 10 GAS RESPIRATORY (INHALATION) at 22:25

## 2018-08-03 RX ADMIN — IPRATROPIUM BROMIDE AND ALBUTEROL SULFATE 3 ML: 2.5; .5 SOLUTION RESPIRATORY (INHALATION) at 19:12

## 2018-08-03 NOTE — TELEPHONE ENCOUNTER
"Patient reports that she fell early this morning around 0100 going to the restroom.  She fell on her stomach and hurt her hips.  She denies hitting her head or loosing consciousness.  She rates her pain a 10 out of 10.  Her daughter is taking her to the ED.  She reports \"trouble thinking.\"  She denies SOA and fever.  Her bowels have not moved since Monday.  She is not bleeding at incision site. Appointment confirmed with patient.    "

## 2018-08-03 NOTE — ED PROVIDER NOTES
Subjective   Ms. Angelita Shay is a 72-year-old female presenting to the emergency department for evaluation s/p falling in her home this morning. She states that she felt light-headed and dizzy before falling, and reports loss of consciousness before her family came to help her shortly after. She hit her ribs and left hip during the fall, but denies hitting her head. After the fall, she has been experiencing SOA, cough, pedal edema, right rib pain, left hip pain, and diaphoresis. She experiences moderate pain in the left hip upon ambulation. The patient had cholecystectomy 2 days ago and was sent home yesterday. She patient has a hx of cancer, resolved in 2000. She denies hx of asthma, emphysema, COPD, or blood clots. She was previously on blood thinner medication for AFib, but she was taken off the medicine 3 weeks ago after hematochezia. She is not currently taking any hormones. There are no other acute complaints at this time.        History provided by:  Patient  Fall   Mechanism of injury: fall    Injury location:  Torso and leg  Torso injury location:  R chest  Leg injury location:  L hip  Incident location:  Home  Time since incident:  8 hours  Arrived directly from scene: no    Fall:     Fall occurred:  In the bathroom  Associated symptoms: chest pain (Right lower chest pain), difficulty breathing and loss of consciousness  Back pain: Left hip.    Risk factors: CHF    Risk factors: no anticoagulation therapy and no COPD        Review of Systems   Constitutional: Positive for diaphoresis.   Respiratory: Positive for cough and shortness of breath.    Cardiovascular: Positive for chest pain (Right lower chest pain) and leg swelling.   Musculoskeletal: Positive for arthralgias. Back pain: Left hip.   Neurological: Positive for dizziness, loss of consciousness and light-headedness.   All other systems reviewed and are negative.      Past Medical History:   Diagnosis Date   • Anxiety    • Arthritis    • AV block,  1st degree    • Breast injury     recent fall in early June, bruising left breast   • Cataract     left   • Cellulitis of both lower extremities    • CKD (chronic kidney disease), stage III    • Colon cancer (CMS/HCC) 2000   • Colon polyp 2015    x 3 adenomatous   • Colon polyp 04/18/2018    x 5 Dr Cash   • Coronary artery disease     1 stent   • Depression    • Diabetes mellitus (CMS/HCC)     dx 2 years ago- checks fsbs bid   • Gallstone    • GI bleed 07/2018   • Hearing loss    • History of atrial fibrillation    • History of chemotherapy     11/2000   • History of transfusion     [address]   • Hyperlipidemia    • Hypertension    • Knee pain    • Pap smear for cervical cancer screening 2013   • Rectal bleeding    • RLS (restless legs syndrome)    • Sleep apnea    • Wears eyeglasses        Allergies   Allergen Reactions   • Oxycodone Shortness Of Breath   • Zolpidem Other (See Comments)     nightmares       Past Surgical History:   Procedure Laterality Date   • APPENDECTOMY  05/1963   • CARDIAC CATHETERIZATION  08/2015    no stents   • CARDIAC CATHETERIZATION  03/13/2018   • CARDIAC CATHETERIZATION N/A 3/13/2018    Procedure: Left Heart Cath;  Surgeon: Pierre Jones III, MD;  Location:  [facility];  Service: Cardiovascular   • CHOLECYSTECTOMY N/A 8/1/2018    Procedure: CHOLECYSTECTOMY LAPAROSCOPIC , LYSIS OF ADHESIONS;  Surgeon: Santos Pantoja MD;  Location:  [facility];  Service: General   • COLON RESECTION  04/08/2000    colon cancer   • COLONOSCOPY  2015   • COLONOSCOPY  04/18/2018    with 5 polyps removed 1yr f/u. Dr Cash- REPEAT YEARLY   • CORONARY ANGIOPLASTY WITH STENT PLACEMENT  12/2015   • ENDOSCOPY     • JOINT REPLACEMENT     • MS TOTAL KNEE ARTHROPLASTY Left 11/3/2016    Procedure: LEFT TOTAL KNEE REPLACEMENT;  Surgeon: Laurent Zuniga MD;  Location:  [facility];  Service: Orthopedics   • TONSILLECTOMY  12/1961   • TOTAL KNEE ARTHROPLASTY Right  2008    revision 2010   • TOTAL SHOULDER ARTHROPLASTY Bilateral     right 2014, left 2015       Family History   Problem Relation Age of Onset   • Colon cancer Other    • Diabetes Other    • Heart disease Other    • Hypertension Other    • Stroke Other    • Tuberculosis Other    • Hypertension Mother    • Colon cancer Father    • Stroke Father    • Diabetes Father    • Colon cancer Sister    • Diabetes Sister    • Diabetes Maternal Grandmother    • Coronary artery disease Maternal Grandfather    • No Known Problems Paternal Grandmother    • No Known Problems Paternal Grandfather    • Breast cancer Neg Hx    • Ovarian cancer Neg Hx        Social History     Social History   • Marital status:    • Number of children: 5     Occupational History   • Retired       Social History Main Topics   • Smoking status: Former Smoker     Packs/day: 1.00     Years: 22.00     Types: Cigarettes     Quit date: 1979   • Smokeless tobacco: Never Used      Comment: quit 1979   • Alcohol use 0.6 oz/week     1 Glasses of wine per week      Comment: occas   • Drug use: No   • Sexual activity: Defer     Other Topics Concern   • Not on file     Social History Narrative    Caffeine: 0-1 servings per day    Patient lives at her home with her daughter and her family         Objective   Physical Exam   Constitutional: She is oriented to person, place, and time. She appears well-developed and well-nourished. No distress.   HENT:   Head: Normocephalic and atraumatic.   Nose: Nose normal.   Normal pharynx. TTP over right forehead at temple area.   Eyes: Conjunctivae are normal. No scleral icterus.   Normal eye color.   Neck: Normal range of motion. Neck supple.   Cardiovascular: Normal rate and regular rhythm.    No murmur heard.  Muted heart sounds.   Pulmonary/Chest: Effort normal. No respiratory distress. She has wheezes.   Abdominal: Soft. There is no tenderness.   Diffuse wheezes.   Musculoskeletal: She exhibits  tenderness.   TTP to the central and right lower chest. Normal in all extremities except left lower. Some pain in posterior left hip with rotation and holding leg up, is able to hold it up against gravity. No pre-tibular edema.   Neurological: She is alert and oriented to person, place, and time.   Skin: Skin is warm and dry.   Psychiatric: She has a normal mood and affect. Her behavior is normal.   Nursing note and vitals reviewed.      Procedures         ED Course  ED Course as of Aug 04 0231   Fri Aug 03, 2018   2006 Will not perform CTA due to unexpected creatinine elevation.  Will see if CXR gives obvious reason for dyspnea.  She may have passed out from volume depletion.  Creatinine was 1.2 yesterday morning.  Considering dose of anticoagulation with consideration for further PE testing in the AM, perhaps VQ scan tonight.  Will discuss with admitting MD before deciding.  [LI]   Sat Aug 04, 2018   0229 I received the VQ scan report from [facility] radiologist and promptly notified Dr. Boswell of the findings.  Pt already admitted, so Dr. Boswell is taking responsibility for anticoagulation.  [LI]      ED Course User Index  [LI] Chris Shay MD     Recent Results (from the past 24 hour(s))   Comprehensive Metabolic Panel    Collection Time: 08/03/18  7:16 PM   Result Value Ref Range    Glucose 142 (H) 70 - 100 mg/dL    BUN 42 (H) 9 - 23 mg/dL    Creatinine 2.53 (H) 0.60 - 1.30 mg/dL    Sodium 140 132 - 146 mmol/L    Potassium 4.6 3.5 - 5.5 mmol/L    Chloride 100 99 - 109 mmol/L    CO2 28.0 20.0 - 31.0 mmol/L    Calcium 8.5 (L) 8.7 - 10.4 mg/dL    Total Protein 7.1 5.7 - 8.2 g/dL    Albumin 4.27 3.20 - 4.80 g/dL    ALT (SGPT) 33 7 - 40 U/L    AST (SGOT) 67 (H) 0 - 33 U/L    Alkaline Phosphatase 67 25 - 100 U/L    Total Bilirubin 0.4 0.3 - 1.2 mg/dL    eGFR Non African Amer 19 (L) >60 mL/min/1.73    Globulin 2.8 gm/dL    A/G Ratio 1.5 1.5 - 2.5 g/dL    BUN/Creatinine Ratio 16.6 7.0 - 25.0    Anion Gap 12.0 (H) 3.0 - 11.0  mmol/L   Protime-INR    Collection Time: 08/03/18  7:16 PM   Result Value Ref Range    Protime 10.0 9.6 - 11.5 Seconds    INR 0.95 0.91 - 1.09   aPTT    Collection Time: 08/03/18  7:16 PM   Result Value Ref Range    PTT 26.3 24.0 - 31.0 seconds   CBC Auto Differential    Collection Time: 08/03/18  7:16 PM   Result Value Ref Range    WBC 7.60 3.50 - 10.80 10*3/mm3    RBC 3.94 3.89 - 5.14 10*6/mm3    Hemoglobin 12.9 11.5 - 15.5 g/dL    Hematocrit 40.3 34.5 - 44.0 %    .3 (H) 80.0 - 99.0 fL    MCH 32.7 (H) 27.0 - 31.0 pg    MCHC 32.0 32.0 - 36.0 g/dL    RDW 14.8 (H) 11.3 - 14.5 %    RDW-SD 55.6 (H) 37.0 - 54.0 fl    MPV 11.1 6.0 - 12.0 fL    Platelets 145 (L) 150 - 450 10*3/mm3    Neutrophil % 65.9 41.0 - 71.0 %    Lymphocyte % 22.8 (L) 24.0 - 44.0 %    Monocyte % 7.8 0.0 - 12.0 %    Eosinophil % 3.4 (H) 0.0 - 3.0 %    Basophil % 0.1 0.0 - 1.0 %    Immature Grans % 0.3 0.0 - 0.6 %    Neutrophils, Absolute 5.01 1.50 - 8.30 10*3/mm3    Lymphocytes, Absolute 1.73 0.60 - 4.80 10*3/mm3    Monocytes, Absolute 0.59 0.00 - 1.00 10*3/mm3    Eosinophils, Absolute 0.26 0.00 - 0.30 10*3/mm3    Basophils, Absolute 0.01 0.00 - 0.20 10*3/mm3    Immature Grans, Absolute 0.02 0.00 - 0.03 10*3/mm3   Protime-INR    Collection Time: 08/04/18 12:14 AM   Result Value Ref Range    Protime 10.2 9.6 - 11.5 Seconds    INR 0.97 0.91 - 1.09   aPTT    Collection Time: 08/04/18 12:14 AM   Result Value Ref Range    PTT 26.7 (L) 55.0 - 70.0 seconds   CBC Auto Differential    Collection Time: 08/04/18 12:14 AM   Result Value Ref Range    WBC 7.14 3.50 - 10.80 10*3/mm3    RBC 3.70 (L) 3.89 - 5.14 10*6/mm3    Hemoglobin 12.0 11.5 - 15.5 g/dL    Hematocrit 37.9 34.5 - 44.0 %    .4 (H) 80.0 - 99.0 fL    MCH 32.4 (H) 27.0 - 31.0 pg    MCHC 31.7 (L) 32.0 - 36.0 g/dL    RDW 14.7 (H) 11.3 - 14.5 %    RDW-SD 54.3 (H) 37.0 - 54.0 fl    MPV 11.1 6.0 - 12.0 fL    Platelets 132 (L) 150 - 450 10*3/mm3    Neutrophil % 62.4 41.0 - 71.0 %    Lymphocyte %  26.1 24.0 - 44.0 %    Monocyte % 6.6 0.0 - 12.0 %    Eosinophil % 4.6 (H) 0.0 - 3.0 %    Basophil % 0.3 0.0 - 1.0 %    Immature Grans % 0.3 0.0 - 0.6 %    Neutrophils, Absolute 4.46 1.50 - 8.30 10*3/mm3    Lymphocytes, Absolute 1.86 0.60 - 4.80 10*3/mm3    Monocytes, Absolute 0.47 0.00 - 1.00 10*3/mm3    Eosinophils, Absolute 0.33 (H) 0.00 - 0.30 10*3/mm3    Basophils, Absolute 0.02 0.00 - 0.20 10*3/mm3    Immature Grans, Absolute 0.02 0.00 - 0.03 10*3/mm3     Note: In addition to lab results from this visit, the labs listed above may include labs taken at another facility or during a different encounter within the last 24 hours. Please correlate lab times with ED admission and discharge times for further clarification of the services performed during this visit.    NM Lung Ventilation Perfusion   Final Result   Addendum 1 of 1   THIS REPORT CONTAINS FINDINGS THAT MAY BE CRITICAL TO PATIENT CARE. The    findings were verbally communicated via telephone conference with PADDY Waller at 11:27 PM EDT on 8/3/2018. The findings were acknowledged and    understood.      THIS DOCUMENT HAS BEEN ELECTRONICALLY SIGNED BY TEX VILLALTA JR. MD      Final     High probability study for pulmonary embolism.      THIS DOCUMENT HAS BEEN ELECTRONICALLY SIGNED BY TEX VILLALTA JR. MD      CT Pelvis Without Contrast   Final Result   Addendum 1 of 1   Addendum:   The patient is reportedly status post relatively recent laparoscopic    cholecystectomy.      The findings were discussed with PADDY GROVE on 8/3/2018 9:50 PM EDT.      THIS DOCUMENT HAS BEEN ELECTRONICALLY SIGNED BY BILL VIVEROS MD      Final   No acute fracture or dislocation is seen.      One or more suspected areas of subcutaneous contusion.      Complex-appearing pelvic fluid fluid at least partly compatible with blood    products is seen but of uncertain origin. Abdominal and pelvic postoperative    changes are also noted. For further evaluation, follow-up  imaging might include    the upper abdomen.      Other findings as above.      THIS DOCUMENT HAS BEEN ELECTRONICALLY SIGNED BY BILL VIVEROS MD      XR Chest 2 View   Final Result   No definite acute disease process is seen in the chest.      Other findings as above.         THIS DOCUMENT HAS BEEN ELECTRONICALLY SIGNED BY BILL VIVEROS MD        Vitals:    08/03/18 2159 08/03/18 2200 08/04/18 0024 08/04/18 0055   BP:  109/70  110/71   BP Location:       Patient Position:       Pulse: 83   89   Resp:       Temp:       TempSrc:       SpO2: 96%      Weight:   103 kg (227 lb)    Height:         Medications   ipratropium-albuterol (DUO-NEB) nebulizer solution 3 mL (not administered)   ipratropium-albuterol (DUO-NEB) nebulizer solution 3 mL (3 mL Nebulization Not Given 8/3/18 2235)   aspirin EC tablet 81 mg (not administered)   atorvastatin (LIPITOR) tablet 20 mg (20 mg Oral Given 8/4/18 0053)   carvedilol (COREG) tablet 6.25 mg (6.25 mg Oral Given 8/4/18 0057)   gabapentin (NEURONTIN) capsule 300 mg (300 mg Oral Given 8/4/18 0053)   HYDROcodone-acetaminophen (NORCO)  MG per tablet 1 tablet (1 tablet Oral Given 8/4/18 0053)   rOPINIRole (REQUIP) tablet 1 mg (1 mg Oral Given 8/4/18 0052)   sacubitril-valsartan (ENTRESTO) 49-51 MG tablet 1 tablet (1 tablet Oral Given 8/4/18 0058)   traZODone (DESYREL) tablet 25 mg (25 mg Oral Given 8/4/18 0053)   heparin 79492 units/250 mL (100 units/mL) in 0.45 % NaCl infusion (1,500 Units/hr Intravenous New Bag 8/4/18 0057)   Pharmacy to Dose Heparin (not administered)   sodium chloride 0.9 % bolus 1,000 mL (1,000 mL Intravenous Restarted 8/3/18 1957)   ipratropium-albuterol (DUO-NEB) nebulizer solution 3 mL (3 mL Nebulization Given 8/3/18 1912)   polyethylene glycol 3350 powder (packet) (17 g Oral Given 8/4/18 0143)   xenon xe 133 gas 10 mCi 40 millicurie (40 millicuries Inhalation Given 8/3/18 2225)   technetium albumin aggregated (MAA) solution 1 dose (1 dose Intravenous  Given 8/3/18 2235)   heparin (porcine) injection 7,730 Units (7,730 Units Intravenous Given 8/4/18 0055)     ECG/EMG Results (last 24 hours)     ** No results found for the last 24 hours. **                        [facility]    Final diagnoses:   Acute kidney injury (CMS/[facility])   Syncope, unspecified syncope type   Hypoxemia   Other acute pulmonary embolism without acute cor pulmonale (CMS/[facility])       Documentation assistance provided by tejal Freedman.  Information recorded by the scribe was done at my direction and has been verified and validated by me.     Cyril Freedman  08/03/18 1924       Cyril Freedman  08/03/18 2051       Chris Shay MD  08/04/18 [id number]

## 2018-08-03 NOTE — OUTREACH NOTE
Prep Survey      Responses   Facility patient discharged from?  [facility]   Is patient eligible?  Yes   Discharge diagnosis  Cholelithiasis, s/p lap cholecystectomy   Does the patient have one of the following disease processes/diagnoses(primary or secondary)?  General Surgery   Does the patient have Home health ordered?  No   Is there a DME ordered?  No   Prep survey completed?  Yes          Judith Rodrigeuz RN

## 2018-08-04 ENCOUNTER — READMISSION MANAGEMENT (OUTPATIENT)
Dept: CALL CENTER | Facility: HOSPITAL | Age: 73
End: 2018-08-04

## 2018-08-04 ENCOUNTER — APPOINTMENT (OUTPATIENT)
Dept: CARDIOLOGY | Facility: HOSPITAL | Age: 73
End: 2018-08-04
Attending: INTERNAL MEDICINE

## 2018-08-04 PROBLEM — I26.99 OTHER PULMONARY EMBOLISM WITHOUT ACUTE COR PULMONALE (HCC): Status: ACTIVE | Noted: 2018-08-04

## 2018-08-04 LAB
ANION GAP SERPL CALCULATED.3IONS-SCNC: 8 MMOL/L (ref 3–11)
APTT PPP: 26.7 SECONDS (ref 55–70)
APTT PPP: 57.4 SECONDS (ref 55–70)
APTT PPP: 64 SECONDS (ref 55–70)
APTT PPP: 75.8 SECONDS (ref 55–70)
BASOPHILS # BLD AUTO: 0.02 10*3/MM3 (ref 0–0.2)
BASOPHILS # BLD AUTO: 0.02 10*3/MM3 (ref 0–0.2)
BASOPHILS NFR BLD AUTO: 0.3 % (ref 0–1)
BASOPHILS NFR BLD AUTO: 0.3 % (ref 0–1)
BUN BLD-MCNC: 37 MG/DL (ref 9–23)
BUN/CREAT SERPL: 19.9 (ref 7–25)
CALCIUM SPEC-SCNC: 8 MG/DL (ref 8.7–10.4)
CHLORIDE SERPL-SCNC: 105 MMOL/L (ref 99–109)
CO2 SERPL-SCNC: 25 MMOL/L (ref 20–31)
CREAT BLD-MCNC: 1.86 MG/DL (ref 0.6–1.3)
DEPRECATED RDW RBC AUTO: 53.7 FL (ref 37–54)
DEPRECATED RDW RBC AUTO: 54.3 FL (ref 37–54)
EOSINOPHIL # BLD AUTO: 0.33 10*3/MM3 (ref 0–0.3)
EOSINOPHIL # BLD AUTO: 0.34 10*3/MM3 (ref 0–0.3)
EOSINOPHIL NFR BLD AUTO: 4.6 % (ref 0–3)
EOSINOPHIL NFR BLD AUTO: 5.1 % (ref 0–3)
ERYTHROCYTE [DISTWIDTH] IN BLOOD BY AUTOMATED COUNT: 14.4 % (ref 11.3–14.5)
ERYTHROCYTE [DISTWIDTH] IN BLOOD BY AUTOMATED COUNT: 14.7 % (ref 11.3–14.5)
GFR SERPL CREATININE-BSD FRML MDRD: 27 ML/MIN/1.73
GLUCOSE BLD-MCNC: 144 MG/DL (ref 70–100)
GLUCOSE BLDC GLUCOMTR-MCNC: 151 MG/DL (ref 70–130)
HCT VFR BLD AUTO: 36.3 % (ref 34.5–44)
HCT VFR BLD AUTO: 36.8 % (ref 34.5–44)
HCT VFR BLD AUTO: 37.9 % (ref 34.5–44)
HGB BLD-MCNC: 11.5 G/DL (ref 11.5–15.5)
HGB BLD-MCNC: 11.5 G/DL (ref 11.5–15.5)
HGB BLD-MCNC: 12 G/DL (ref 11.5–15.5)
IMM GRANULOCYTES # BLD: 0.02 10*3/MM3 (ref 0–0.03)
IMM GRANULOCYTES # BLD: 0.02 10*3/MM3 (ref 0–0.03)
IMM GRANULOCYTES NFR BLD: 0.3 % (ref 0–0.6)
IMM GRANULOCYTES NFR BLD: 0.3 % (ref 0–0.6)
INR PPP: 0.97 (ref 0.91–1.09)
LYMPHOCYTES # BLD AUTO: 1.56 10*3/MM3 (ref 0.6–4.8)
LYMPHOCYTES # BLD AUTO: 1.86 10*3/MM3 (ref 0.6–4.8)
LYMPHOCYTES NFR BLD AUTO: 23.6 % (ref 24–44)
LYMPHOCYTES NFR BLD AUTO: 26.1 % (ref 24–44)
MCH RBC QN AUTO: 32.3 PG (ref 27–31)
MCH RBC QN AUTO: 32.4 PG (ref 27–31)
MCHC RBC AUTO-ENTMCNC: 31.7 G/DL (ref 32–36)
MCHC RBC AUTO-ENTMCNC: 31.7 G/DL (ref 32–36)
MCV RBC AUTO: 102 FL (ref 80–99)
MCV RBC AUTO: 102.4 FL (ref 80–99)
MONOCYTES # BLD AUTO: 0.47 10*3/MM3 (ref 0–1)
MONOCYTES # BLD AUTO: 0.47 10*3/MM3 (ref 0–1)
MONOCYTES NFR BLD AUTO: 6.6 % (ref 0–12)
MONOCYTES NFR BLD AUTO: 7.1 % (ref 0–12)
NEUTROPHILS # BLD AUTO: 4.23 10*3/MM3 (ref 1.5–8.3)
NEUTROPHILS # BLD AUTO: 4.46 10*3/MM3 (ref 1.5–8.3)
NEUTROPHILS NFR BLD AUTO: 62.4 % (ref 41–71)
NEUTROPHILS NFR BLD AUTO: 63.9 % (ref 41–71)
PLATELET # BLD AUTO: 126 10*3/MM3 (ref 150–450)
PLATELET # BLD AUTO: 132 10*3/MM3 (ref 150–450)
PMV BLD AUTO: 11.1 FL (ref 6–12)
PMV BLD AUTO: 11.1 FL (ref 6–12)
POTASSIUM BLD-SCNC: 3.8 MMOL/L (ref 3.5–5.5)
PROTHROMBIN TIME: 10.2 SECONDS (ref 9.6–11.5)
RBC # BLD AUTO: 3.56 10*6/MM3 (ref 3.89–5.14)
RBC # BLD AUTO: 3.7 10*6/MM3 (ref 3.89–5.14)
SODIUM BLD-SCNC: 138 MMOL/L (ref 132–146)
WBC NRBC COR # BLD: 6.62 10*3/MM3 (ref 3.5–10.8)
WBC NRBC COR # BLD: 7.14 10*3/MM3 (ref 3.5–10.8)

## 2018-08-04 PROCEDURE — 85018 HEMOGLOBIN: CPT | Performed by: INTERNAL MEDICINE

## 2018-08-04 PROCEDURE — 94640 AIRWAY INHALATION TREATMENT: CPT

## 2018-08-04 PROCEDURE — 99222 1ST HOSP IP/OBS MODERATE 55: CPT | Performed by: INTERNAL MEDICINE

## 2018-08-04 PROCEDURE — 94799 UNLISTED PULMONARY SVC/PX: CPT

## 2018-08-04 PROCEDURE — 93970 EXTREMITY STUDY: CPT

## 2018-08-04 PROCEDURE — 85014 HEMATOCRIT: CPT | Performed by: INTERNAL MEDICINE

## 2018-08-04 PROCEDURE — 80048 BASIC METABOLIC PNL TOTAL CA: CPT | Performed by: INTERNAL MEDICINE

## 2018-08-04 PROCEDURE — 99232 SBSQ HOSP IP/OBS MODERATE 35: CPT | Performed by: HOSPITALIST

## 2018-08-04 PROCEDURE — 93970 EXTREMITY STUDY: CPT | Performed by: INTERNAL MEDICINE

## 2018-08-04 PROCEDURE — 25010000002 HEPARIN (PORCINE) PER 1000 UNITS

## 2018-08-04 PROCEDURE — 85025 COMPLETE CBC W/AUTO DIFF WBC: CPT | Performed by: INTERNAL MEDICINE

## 2018-08-04 PROCEDURE — 85730 THROMBOPLASTIN TIME PARTIAL: CPT

## 2018-08-04 PROCEDURE — 25010000002 HEPARIN (PORCINE) PER 1000 UNITS: Performed by: INTERNAL MEDICINE

## 2018-08-04 PROCEDURE — 63710000001 PROMETHAZINE PER 12.5 MG: Performed by: INTERNAL MEDICINE

## 2018-08-04 PROCEDURE — 82962 GLUCOSE BLOOD TEST: CPT

## 2018-08-04 PROCEDURE — 85610 PROTHROMBIN TIME: CPT | Performed by: INTERNAL MEDICINE

## 2018-08-04 PROCEDURE — 85730 THROMBOPLASTIN TIME PARTIAL: CPT | Performed by: INTERNAL MEDICINE

## 2018-08-04 RX ORDER — SODIUM CHLORIDE 0.9 % (FLUSH) 0.9 %
1-10 SYRINGE (ML) INJECTION AS NEEDED
Status: DISCONTINUED | OUTPATIENT
Start: 2018-08-04 | End: 2018-08-06 | Stop reason: HOSPADM

## 2018-08-04 RX ORDER — SODIUM CHLORIDE 9 MG/ML
75 INJECTION, SOLUTION INTRAVENOUS CONTINUOUS
Status: DISCONTINUED | OUTPATIENT
Start: 2018-08-04 | End: 2018-08-05

## 2018-08-04 RX ORDER — PROMETHAZINE HYDROCHLORIDE 12.5 MG/1
12.5 TABLET ORAL EVERY 6 HOURS PRN
Status: DISCONTINUED | OUTPATIENT
Start: 2018-08-04 | End: 2018-08-06 | Stop reason: HOSPADM

## 2018-08-04 RX ADMIN — SODIUM CHLORIDE 75 ML/HR: 9 INJECTION, SOLUTION INTRAVENOUS at 08:01

## 2018-08-04 RX ADMIN — ATORVASTATIN CALCIUM 20 MG: 20 TABLET, FILM COATED ORAL at 00:53

## 2018-08-04 RX ADMIN — HYDROCODONE BITARTRATE AND ACETAMINOPHEN 1 TABLET: 10; 325 TABLET ORAL at 10:31

## 2018-08-04 RX ADMIN — HEPARIN SODIUM 7730 UNITS: 1000 INJECTION, SOLUTION INTRAVENOUS; SUBCUTANEOUS at 00:55

## 2018-08-04 RX ADMIN — CARVEDILOL 6.25 MG: 6.25 TABLET, FILM COATED ORAL at 00:57

## 2018-08-04 RX ADMIN — HYDROCODONE BITARTRATE AND ACETAMINOPHEN 1 TABLET: 10; 325 TABLET ORAL at 00:53

## 2018-08-04 RX ADMIN — ROPINIROLE 1 MG: 0.5 TABLET, FILM COATED ORAL at 21:25

## 2018-08-04 RX ADMIN — ATORVASTATIN CALCIUM 20 MG: 20 TABLET, FILM COATED ORAL at 21:25

## 2018-08-04 RX ADMIN — TRAZODONE HYDROCHLORIDE 25 MG: 50 TABLET ORAL at 21:25

## 2018-08-04 RX ADMIN — GABAPENTIN 300 MG: 300 CAPSULE ORAL at 00:53

## 2018-08-04 RX ADMIN — SODIUM CHLORIDE 75 ML/HR: 9 INJECTION, SOLUTION INTRAVENOUS at 21:26

## 2018-08-04 RX ADMIN — SACUBITRIL AND VALSARTAN 1 TABLET: 49; 51 TABLET, FILM COATED ORAL at 21:24

## 2018-08-04 RX ADMIN — SACUBITRIL AND VALSARTAN 1 TABLET: 49; 51 TABLET, FILM COATED ORAL at 00:58

## 2018-08-04 RX ADMIN — HYDROCODONE BITARTRATE AND ACETAMINOPHEN 1 TABLET: 10; 325 TABLET ORAL at 21:25

## 2018-08-04 RX ADMIN — GABAPENTIN 300 MG: 300 CAPSULE ORAL at 07:02

## 2018-08-04 RX ADMIN — SODIUM CHLORIDE 75 ML/HR: 9 INJECTION, SOLUTION INTRAVENOUS at 07:03

## 2018-08-04 RX ADMIN — GABAPENTIN 300 MG: 300 CAPSULE ORAL at 15:00

## 2018-08-04 RX ADMIN — CARVEDILOL 6.25 MG: 6.25 TABLET, FILM COATED ORAL at 10:31

## 2018-08-04 RX ADMIN — CARVEDILOL 6.25 MG: 6.25 TABLET, FILM COATED ORAL at 18:55

## 2018-08-04 RX ADMIN — TRAZODONE HYDROCHLORIDE 25 MG: 50 TABLET ORAL at 00:53

## 2018-08-04 RX ADMIN — HEPARIN SODIUM 1500 UNITS/HR: 10000 INJECTION, SOLUTION INTRAVENOUS at 00:57

## 2018-08-04 RX ADMIN — ROPINIROLE 1 MG: 0.5 TABLET, FILM COATED ORAL at 00:52

## 2018-08-04 RX ADMIN — PROMETHAZINE HYDROCHLORIDE 12.5 MG: 12.5 TABLET ORAL at 08:55

## 2018-08-04 RX ADMIN — HEPARIN SODIUM 14 UNITS/KG/HR: 10000 INJECTION, SOLUTION INTRAVENOUS at 19:07

## 2018-08-04 RX ADMIN — SACUBITRIL AND VALSARTAN 1 TABLET: 49; 51 TABLET, FILM COATED ORAL at 10:30

## 2018-08-04 RX ADMIN — GABAPENTIN 300 MG: 300 CAPSULE ORAL at 21:25

## 2018-08-04 RX ADMIN — IPRATROPIUM BROMIDE AND ALBUTEROL SULFATE 3 ML: 2.5; .5 SOLUTION RESPIRATORY (INHALATION) at 12:24

## 2018-08-04 RX ADMIN — POLYETHYLENE GLYCOL 3350 17 G: 17 POWDER, FOR SOLUTION ORAL at 01:43

## 2018-08-04 RX ADMIN — ASPIRIN 81 MG: 81 TABLET, COATED ORAL at 10:29

## 2018-08-04 NOTE — PROGRESS NOTES
[facility] Medicine Services  PROGRESS NOTE    Patient Name: Angelita Shay  : 1945  MRN: [id number]    Date of Admission: 8/3/2018  Length of Stay: 1  Primary Care Physician: Sharon Saldivar MD    Subjective   Subjective     CC:  F/U poss PE    HPI:  C/o pleuritic chest pain and abd pain, no N/V, or F/C. No cough. No worsening SOA.    Review of Systems  Otherwise ROS is negative except as mentioned in the HPI.    Objective   Objective     Vital Signs:   Temp:  [96.8 °F (36 °C)-98 °F (36.7 °C)] 97 °F (36.1 °C)  Heart Rate:  [77-93] 90  Resp:  [15-20] 18  BP: ()/(64-71) 113/69        Physical Exam:  General Assessment: No acute cardiopulmonary distress. Well developed and well nourished.    HEENT: NCAT, PERRL, MM moist    Neck: Supple    CVS: RRR, S1S2 normal    Resp: CTAB, no adventitious sound    Abd: soft, diffuse mild abd wall tenderness, ND, normal BS, no guarding or peritoneal signs    Ext: RLE slightly larger than LLE, mild pitting    Neuro: Nonfocal    Skin: W/D/I. No rash.    Psych: Affect is appropriate      Results Reviewed:  I have personally reviewed current lab, radiology, and data and agree.      Results from last 7 days  Lab Units 18  1342 18  0651 18  0014 18  1444   WBC 10*3/mm3  --  6.62 7.14 7.60 7.02   HEMOGLOBIN g/dL 11.5 11.5 12.0 12.9 15.3   HEMATOCRIT % 36.8 36.3 37.9 40.3 46.0*   PLATELETS 10*3/mm3  --  126* 132* 145* 167   INR   --   --  0.97 0.95 0.97       Results from last 7 days  Lab Units 18  0651 18  0820   SODIUM mmol/L 138 140 138   POTASSIUM mmol/L 3.8 4.6 3.9   CHLORIDE mmol/L 105 100 105   CO2 mmol/L 25.0 28.0 26.0   BUN mg/dL 37* 42* 23   CREATININE mg/dL 1.86* 2.53* 1.20   GLUCOSE mg/dL 144* 142* 203*   CALCIUM mg/dL 8.0* 8.5* 7.9*   ALT (SGPT) U/L  --  33  --    AST (SGOT) U/L  --  67*  --      Estimated Creatinine Clearance: 32.1 mL/min (A) (by C-G formula based on  SCr of 1.86 mg/dL (H)).  No results found for: BNP    Microbiology Results Abnormal     None          Imaging Results (last 24 hours)     Procedure Component Value Units Date/Time    NM Lung Ventilation Perfusion [[id number]] Collected:  08/03/18 2111     Updated:  08/03/18 2327    Addenda:        THIS REPORT CONTAINS FINDINGS THAT MAY BE CRITICAL TO PATIENT CARE. The   findings were verbally communicated via telephone conference with PADDY Waller at 11:27 PM EDT on 8/3/2018. The findings were acknowledged and   understood.    THIS DOCUMENT HAS BEEN ELECTRONICALLY SIGNED BY ETX VILLALTA JR. MD  Signed:  08/03/18 2327 by Tex Villalta Jr., MD    Narrative:       EXAM:  NM Lung Perfusion and Ventilation Scan    EXAM DATE/TIME:  8/3/2018 9:11 PM    CLINICAL HISTORY:  72 years old, female; Acute kidney failure, unspecified;   Hypoxemia; Syncope and collapse; Signs and symptoms; Dyspnea and other: S/P   april < 1 wk ago; Additional info: Pe suspected, dyspnea, syncope, hypoxemia    TECHNIQUE:  Nuclear Medicine ventilation and perfusion images of the lungs were   obtained in multiple projections following inhalation of 40 mCi of xenon-133   followed by injection of 5.05 mCi of Tc99m MAA.    COMPARISON:  CR - XR CHEST 2 VW 2018-08-03 20:23    FINDINGS:    Ventilation:  Mildly heterogeneous bilateral ventilation with delayed left   lung washout.    Perfusion:  Large left upper and lower lobe mismatched perfusion defects.      Impression:         High probability study for pulmonary embolism.    THIS DOCUMENT HAS BEEN ELECTRONICALLY SIGNED BY TEX VILLALTA JR. MD    CT Pelvis Without Contrast [[id number]] Collected:  08/03/18 1843     Updated:  08/03/18 2151    Addenda:        Addendum:  The patient is reportedly status post relatively recent laparoscopic   cholecystectomy.    The findings were discussed with PADDY GROVE on 8/3/2018 9:50 PM EDT.    THIS DOCUMENT HAS BEEN ELECTRONICALLY SIGNED BY BILL VIVEROS  MD  Signed:  08/03/18 2151 by Jay Stovall MD    Narrative:       EXAM:  CT Pelvis Without Intravenous Contrast    EXAM DATE/TIME:  8/3/2018 6:43 PM    CLINICAL HISTORY:  72 years old, female; Signs and symptoms; Other: See notes; Additional info:   Pelvis trauma, FX suspected, first study    TECHNIQUE:  Axial computed tomography images of the pelvis without intravenous contrast.    All CT scans at this facility use at least one of these dose optimization   techniques: automated exposure control; mA and/or kV adjustment per patient   size (includes targeted exams where dose is matched to clinical indication); or   iterative reconstruction.  Coronal and sagittal reformatted images were created and reviewed.    COMPARISON:  CT ABDOMEN PELVIS WO CONTRAST 2018-07-10 12:42    FINDINGS:  No acute fracture or dislocation is seen. Some degenerative changes of the   femoroacetabular joints, including relative loss of upper joint space. Mild   relative asymmetry of some of the medial lateral aspects of the femoral necks   is noted but felt to be chronic and, given differences in technique between the   present and comparison scans, grossly unchanged. Chronic changes of the   sacroiliac joints. Degenerative changes of the visualized lower lumbar spine,   including at least mild anterior spinal canal narrowing at least at the   visualized L2-L3 level and at L3-L4.  No suspicious focal blastic or lytic bone   lesion is seen.     One or more more areas of suspected subcutaneous contusion and perhaps edema   particularly in the visualized left flank region, and to some extent relatively   conspicuous in the lower anterior abdominal wall and the right gluteal region.   No overlying radiopaque foreign body is seen.     At least mild amount of complex-appearing free fluid concerning for blood   products including relatively acute to subacute hematoma is seen in the deeper   pelvis, including the cul-de-sac; however, this  fluid is not seen on the   comparison study and is of uncertain origin.    Again noted is relative prominence of the visualized left adnexal region,   although somewhat nonspecific. No definite right adnexal mass. Grossly   unremarkable uterus.    Grossly unremarkable urinary bladder.    Incompletely imaged mid to lower anterior abdominal wall fat containing hernia   with some overlying gas, and a few small foci of gas elsewhere in the   visualized mid to lower anterior abdominal wall.    Some abdominal and upper right hemipelvis clips are noted and appear to at   least partly reflect a right hemicolectomy. Fluid or fluid-like material is   noted in the more proximal visualized colon and could reflect a diarrheal   illness but otherwise no definite evidence of acute colitis seen.        Impression:       No acute fracture or dislocation is seen.    One or more suspected areas of subcutaneous contusion.    Complex-appearing pelvic fluid fluid at least partly compatible with blood   products is seen but of uncertain origin. Abdominal and pelvic postoperative   changes are also noted. For further evaluation, follow-up imaging might include   the upper abdomen.    Other findings as above.    THIS DOCUMENT HAS BEEN ELECTRONICALLY SIGNED BY BILL VIVEROS MD    XR Chest 2 View [[id number]] Collected:  08/03/18 2001     Updated:  08/03/18 2127    Narrative:       EXAM:  XR Chest, 2 Views    EXAM DATE/TIME:  8/3/2018 8:01 PM    CLINICAL HISTORY:  72 years old, female; Pain; Chest pain; Additional info: Chest pain S/P fall,   dyspnea    TECHNIQUE:  Frontal and lateral views of the chest.    COMPARISON:  CR - XR RIBS LEFT W PA CHEST 2018-06-06 23:57    FINDINGS:  The patient is somewhat rotated. Otherwise:     Continued relatively prominent left heart border. Somewhat tortuous thoracic   aorta.     No dense lung consolidation is seen bilaterally. Otherwise, possible   atelectatic versus inflammatory changes toward the right  lung base.     No pleural effusion bilaterally.    No pneumothorax bilaterally.    Continued somewhat elevated or eventrated right hemidiaphragm.      Grossly intact visualized skeletal structures status post right and left   shoulder arthroplasties.        Impression:       No definite acute disease process is seen in the chest.    Other findings as above.      THIS DOCUMENT HAS BEEN ELECTRONICALLY SIGNED BY BILL VIVEROS MD        Results for orders placed during the hospital encounter of 03/10/18   Adult Transthoracic Echo Complete W/ Cont if Necessary Per Protocol    Narrative · Left atrial cavity size is mildly dilated.  · Left ventricular wall thickness is consistent with mild concentric   hypertrophy.  · Left ventricular systolic function is moderately decreased. Estimated EF   = 36%.  · Mild mitral valve regurgitation is present  · Mild tricuspid valve regurgitation is present.  · Estimated right ventricular systolic pressure is moderately elevated   (45-55 mmHg).          I have reviewed the medications.    Assessment/Plan   Assessment / Plan     Hospital Problem List     * (Principal)Other pulmonary embolism without acute cor pulmonale (CMS/HCC)    GERD (gastroesophageal reflux disease)    Coronary artery disease involving native coronary artery of native heart with angina pectoris (CMS/HCC)    Overview Addendum 3/13/2018  5:16 PM by Festus Bowie IV, MD     · Previous PCI to the LAD  · Cardiac catheterization (8/2/2015): Mild nonobstructive CAD. Widely patent LAD stent. Normal LVEF.  · Cardiac catheterization (3/13/18): Mild to moderate nonobstructive CAD. Previously placed LAD stent widely patent.         Moderate obstructive sleep apnea    Paroxysmal atrial fibrillation (CMS/HCC) (Chronic)    Overview Addendum 3/13/2018  6:14 PM by Festus Bowie IV, MD     · Chads VASC = 6 (age, female, CHF, CAD, HTN, DM)         Dehydration    CKD (chronic kidney disease), stage III    Acute  "on chronic respiratory failure with hypoxia (CMS/HCC)    Acute kidney injury (CMS/HCC)             Brief Hospital Course to date:  Angelita Shay is a 72 y.o. female who has h/o chronic systolic CHF with estimated LVEF of 36%,  PAH, CKD III, CAD, s/p prior PCI, DM2, depression/anxiety, chronic a-fib( but not currently anticoagulated due to recent GIB), and recent CCY by Dr Pantoja on 8/1, who presented to the ED after a fall/poss syncope and SOA/chest pain. She was found to have high probability of a PE on VQ scan and started on hep gtt.      Assessment & Plan:  - So far tolerating hep gtt without obvious bleeding, will transition to PO AC soon, appreciate pulm eval.  - Recent CCY by Dr Pantoja, will consult PRN, at this time no obvious acute surgical issues, but CT pelvis reported \" complex-appearing pelvic fluid fluid at least partly compatible with blood products is seen but of uncertain origin.\" H/H slightly down, but could be hemodilution. Monitor for now. No peritoneal signs.  - Echo and LE duplex pending  - Poss syncope? BP was low on admission, poss from dehydration/orthostatic.  - GORDON improving, c/w pre-renal.    DVT Prophylaxis:  Hep gtt    CODE STATUS:   Code Status and Medical Interventions:   Ordered at: 08/03/18 3390     Level Of Support Discussed With:    Patient     Code Status:    CPR     Medical Interventions (Level of Support Prior to Arrest):    Full       Disposition:TBD    Electronically signed by Isaías Aguiar MD, 08/04/18, 7:04 PM.      "

## 2018-08-04 NOTE — H&P
"    [facility] Medicine Services  HISTORY AND PHYSICAL    Patient Name: Angelita Shay  : 1945  MRN: [id number]  Primary Care Physician: Sharon Saldivar MD    Subjective   Subjective     Chief Complaint:  Fall with ?syncope.    HPI:  Angelita Shay is a 72 y.o. female who has h/o chronic systolic CHF with estimated LVEF of 36% based on the 2D echo obtained in 2018. In addition, patient has h/o PAH, CKD III, CAD, s/p prior PCI, DM2, depression/anxiety, chronic a-fib( but not currently anticoagulated). Patient presented to ER after an episode of fall at home. It is reported that patient possibly \"passed out\" but did not hit her head. Patients states that she was dizzy and very weak prior to the fall. She reportedly hit her ribs and left  Hip. After the fall, family came to her rescue. She lives with her daughter. Patient also reports some SOB, cough productive of whitish sputum and right chest wall pain. No report of fever, chills, nausea, vomiting, diarrhea or headache. Pt had a recent lap cholecystectomy 2 days and was discharged home yesterday. She is currently not on anticoagulants as a result of a GI bleed 3 weeks ago.  On presentation to ER, patient was reportedly hypotensive with BP 89/64. Patient was wheezing and received a dose of nebulized bronchiodilators. CXR is negative for acute pulmonary disease. Abdominal CT scan suggests a complex appearing pelvic fluid at least partially compatible with blood products of uncertain origin. However, labs revealed Cr of 2.53 ( 1.20 on 18). H/H has changed since 18 from 15.3/46.0 to 12.9/40.3 today.  Patient does not report any bleeding. IVF was started in the ER and patient admitted for further care.  Note: V/Q scan is reportedly high probability mismatch for PE. Will start IV heparin drip.    Review of Systems   Constitutional: Positive for fatigue. Negative for chills, diaphoresis and fever.   HENT: Negative for " congestion, sinus pressure, sore throat and trouble swallowing.    Eyes: Negative for photophobia and visual disturbance.   Respiratory: Positive for cough, shortness of breath and wheezing. Negative for choking and chest tightness.    Cardiovascular: Negative for chest pain and palpitations.   Gastrointestinal: Negative for abdominal distention, abdominal pain, blood in stool, diarrhea, nausea and vomiting.   Endocrine: Negative for polyuria.   Genitourinary: Negative for dysuria, frequency, hematuria and urgency.   Musculoskeletal: Negative for back pain, neck pain and neck stiffness.   Skin: Negative for rash.   Neurological: Positive for dizziness, syncope and light-headedness. Negative for seizures, numbness and headaches.   Psychiatric/Behavioral: Negative for agitation and hallucinations.   All other systems reviewed and are negative.         Personal History     Past Medical History:   Diagnosis Date   • Anxiety    • Arthritis    • AV block, 1st degree    • Breast injury     recent fall in early June, bruising left breast   • Cataract     left   • Cellulitis of both lower extremities    • CKD (chronic kidney disease), stage III    • Colon cancer (CMS/[facility]) 2000   • Colon polyp 2015    x 3 adenomatous   • Colon polyp 04/18/2018    x 5 Dr Cash   • Coronary artery disease     1 stent   • Depression    • Diabetes mellitus (CMS/[facility])     dx 2 years ago- checks fsbs bid   • Gallstone    • GI bleed 07/2018   • Hearing loss    • History of atrial fibrillation    • History of chemotherapy     11/2000   • History of transfusion     approx 1999 [facility]   • Hyperlipidemia    • Hypertension    • Knee pain    • Pap smear for cervical cancer screening 2013   • Rectal bleeding    • RLS (restless legs syndrome)    • Sleep apnea    • Wears eyeglasses        Past Surgical History:   Procedure Laterality Date   • APPENDECTOMY  05/1963   • CARDIAC CATHETERIZATION  08/2015    no stents   • CARDIAC  CATHETERIZATION  03/13/2018   • CARDIAC CATHETERIZATION N/A 3/13/2018    Procedure: Left Heart Cath;  Surgeon: Pierre Jones III, MD;  Location:  [facility];  Service: Cardiovascular   • CHOLECYSTECTOMY N/A 8/1/2018    Procedure: CHOLECYSTECTOMY LAPAROSCOPIC , LYSIS OF ADHESIONS;  Surgeon: Santos Pantoja MD;  Location:  [facility] OR;  Service: General   • COLON RESECTION  04/08/2000    colon cancer   • COLONOSCOPY  2015   • COLONOSCOPY  04/18/2018    with 5 polyps removed 1yr f/u. Dr Cash- REPEAT YEARLY   • CORONARY ANGIOPLASTY WITH STENT PLACEMENT  12/2015   • ENDOSCOPY     • JOINT REPLACEMENT     • MD TOTAL KNEE ARTHROPLASTY Left 11/3/2016    Procedure: LEFT TOTAL KNEE REPLACEMENT;  Surgeon: Laurent Zuniga MD;  Location:  [facility] OR;  Service: Orthopedics   • TONSILLECTOMY  12/1961   • TOTAL KNEE ARTHROPLASTY Right 2008    revision 2010   • TOTAL SHOULDER ARTHROPLASTY Bilateral     right 2014, left 2015       Family History: family history includes Colon cancer in her father, other, and sister; Coronary artery disease in her maternal grandfather; Diabetes in her father, maternal grandmother, other, and sister; Heart disease in her other; Hypertension in her mother and other; No Known Problems in her paternal grandfather and paternal grandmother; Stroke in her father and other; Tuberculosis in her other.     Social History:  reports that she quit smoking about 39 years ago. Her smoking use included Cigarettes. She has a 22.00 pack-year smoking history. She has never used smokeless tobacco. She reports that she drinks about 0.6 oz of alcohol per week . She reports that she does not use drugs.  Social History     Social History Narrative    Caffeine: 0-1 servings per day    Patient lives at her home with her daughter and her family       Medications:    (Not in a hospital admission)    Allergies   Allergen Reactions   • Oxycodone Shortness Of Breath   • Zolpidem Other (See Comments)      nightmares       Objective   Objective     Vital Signs:   Temp:  [97.7 °F (36.5 °C)] 97.7 °F (36.5 °C)  Heart Rate:  [83-93] 83  Resp:  [18] 18  BP: ()/(64-72) 109/70        Physical Exam   Constitutional: No acute distress, awake, alert and oriented x 4.  Eyes: PERRLA, sclerae anicteric, no conjunctival injection  HENT: NCAT, mucous membranes moist  Neck: Supple, no thyromegaly, no lymphadenopathy, trachea midline  Respiratory: Diffuse expiratory wheezes , nonlabored respirations   Cardiovascular: RRR, no murmurs, rubs, or gallops, palpable pedal pulses bilaterally  Gastrointestinal: Positive bowel sounds, soft, nontender, nondistended  Musculoskeletal: Trace bilateral ankle edema, no clubbing or cyanosis to extremities  Psychiatric: Appropriate affect, cooperative  Neurologic: Oriented x 3, no focal neurologic deficit, Cranial Nerves grossly intact, speech clear  Skin: No rashes    Results Reviewed:  I have personally reviewed current lab, radiology, and data and agree.      Results from last 7 days  Lab Units 08/03/18  1916   WBC 10*3/mm3 7.60   HEMOGLOBIN g/dL 12.9   HEMATOCRIT % 40.3   PLATELETS 10*3/mm3 145*   INR  0.95       Results from last 7 days  Lab Units 08/03/18  1916   SODIUM mmol/L 140   POTASSIUM mmol/L 4.6   CHLORIDE mmol/L 100   CO2 mmol/L 28.0   BUN mg/dL 42*   CREATININE mg/dL 2.53*   GLUCOSE mg/dL 142*   CALCIUM mg/dL 8.5*   ALT (SGPT) U/L 33   AST (SGOT) U/L 67*     Estimated Creatinine Clearance: 21.9 mL/min (A) (by C-G formula based on SCr of 2.53 mg/dL (H)).  Brief Urine Lab Results  (Last result in the past 365 days)      Color   Clarity   Blood   Leuk Est   Nitrite   Protein   CREAT   Urine HCG        05/16/18 1038 Dark Yellow(A) Cloudy(A) Negative Negative Negative 100 mg/dL (2+)(A)             No results found for: BNP  Imaging Results (last 24 hours)     Procedure Component Value Units Date/Time    CT Pelvis Without Contrast [[id number]] Collected:  08/03/18 9443     Updated:   08/03/18 2151    Addenda:        Addendum:  The patient is reportedly status post relatively recent laparoscopic   cholecystectomy.    The findings were discussed with PADDY GROVE on 8/3/2018 9:50 PM EDT.    THIS DOCUMENT HAS BEEN ELECTRONICALLY SIGNED BY BILL VIVEROS MD  Signed:  08/03/18 2151 by Bill Viveros MD    Narrative:       EXAM:  CT Pelvis Without Intravenous Contrast    EXAM DATE/TIME:  8/3/2018 6:43 PM    CLINICAL HISTORY:  72 years old, female; Signs and symptoms; Other: See notes; Additional info:   Pelvis trauma, FX suspected, first study    TECHNIQUE:  Axial computed tomography images of the pelvis without intravenous contrast.    All CT scans at this facility use at least one of these dose optimization   techniques: automated exposure control; mA and/or kV adjustment per patient   size (includes targeted exams where dose is matched to clinical indication); or   iterative reconstruction.  Coronal and sagittal reformatted images were created and reviewed.    COMPARISON:  CT ABDOMEN PELVIS WO CONTRAST 2018-07-10 12:42    FINDINGS:  No acute fracture or dislocation is seen. Some degenerative changes of the   femoroacetabular joints, including relative loss of upper joint space. Mild   relative asymmetry of some of the medial lateral aspects of the femoral necks   is noted but felt to be chronic and, given differences in technique between the   present and comparison scans, grossly unchanged. Chronic changes of the   sacroiliac joints. Degenerative changes of the visualized lower lumbar spine,   including at least mild anterior spinal canal narrowing at least at the   visualized L2-L3 level and at L3-L4.  No suspicious focal blastic or lytic bone   lesion is seen.     One or more more areas of suspected subcutaneous contusion and perhaps edema   particularly in the visualized left flank region, and to some extent relatively   conspicuous in the lower anterior abdominal wall and the  right gluteal region.   No overlying radiopaque foreign body is seen.     At least mild amount of complex-appearing free fluid concerning for blood   products including relatively acute to subacute hematoma is seen in the deeper   pelvis, including the cul-de-sac; however, this fluid is not seen on the   comparison study and is of uncertain origin.    Again noted is relative prominence of the visualized left adnexal region,   although somewhat nonspecific. No definite right adnexal mass. Grossly   unremarkable uterus.    Grossly unremarkable urinary bladder.    Incompletely imaged mid to lower anterior abdominal wall fat containing hernia   with some overlying gas, and a few small foci of gas elsewhere in the   visualized mid to lower anterior abdominal wall.    Some abdominal and upper right hemipelvis clips are noted and appear to at   least partly reflect a right hemicolectomy. Fluid or fluid-like material is   noted in the more proximal visualized colon and could reflect a diarrheal   illness but otherwise no definite evidence of acute colitis seen.        Impression:       No acute fracture or dislocation is seen.    One or more suspected areas of subcutaneous contusion.    Complex-appearing pelvic fluid fluid at least partly compatible with blood   products is seen but of uncertain origin. Abdominal and pelvic postoperative   changes are also noted. For further evaluation, follow-up imaging might include   the upper abdomen.    Other findings as above.    THIS DOCUMENT HAS BEEN ELECTRONICALLY SIGNED BY BILL VIVEROS MD    XR Chest 2 View [[id number]] Collected:  08/03/18 2001     Updated:  08/03/18 2127    Narrative:       EXAM:  XR Chest, 2 Views    EXAM DATE/TIME:  8/3/2018 8:01 PM    CLINICAL HISTORY:  72 years old, female; Pain; Chest pain; Additional info: Chest pain S/P fall,   dyspnea    TECHNIQUE:  Frontal and lateral views of the chest.    COMPARISON:  CR - XR RIBS LEFT W PA CHEST 2018-06-06  23:57    FINDINGS:  The patient is somewhat rotated. Otherwise:     Continued relatively prominent left heart border. Somewhat tortuous thoracic   aorta.     No dense lung consolidation is seen bilaterally. Otherwise, possible   atelectatic versus inflammatory changes toward the right lung base.     No pleural effusion bilaterally.    No pneumothorax bilaterally.    Continued somewhat elevated or eventrated right hemidiaphragm.      Grossly intact visualized skeletal structures status post right and left   shoulder arthroplasties.        Impression:       No definite acute disease process is seen in the chest.    Other findings as above.      THIS DOCUMENT HAS BEEN ELECTRONICALLY SIGNED BY BILL VIVEROS MD        Results for orders placed during the hospital encounter of 03/10/18   Adult Transthoracic Echo Complete W/ Cont if Necessary Per Protocol    Narrative · Left atrial cavity size is mildly dilated.  · Left ventricular wall thickness is consistent with mild concentric   hypertrophy.  · Left ventricular systolic function is moderately decreased. Estimated EF   = 36%.  · Mild mitral valve regurgitation is present  · Mild tricuspid valve regurgitation is present.  · Estimated right ventricular systolic pressure is moderately elevated   (45-55 mmHg).          Assessment/Plan   Assessment / Plan     Hospital Problem List     GERD (gastroesophageal reflux disease)    Coronary artery disease involving native coronary artery of native heart with angina pectoris (CMS/HCC)    Overview Addendum 3/13/2018  5:16 PM by Festus Bowie IV, MD     · Previous PCI to the LAD  · Cardiac catheterization (8/2/2015): Mild nonobstructive CAD. Widely patent LAD stent. Normal LVEF.  · Cardiac catheterization (3/13/18): Mild to moderate nonobstructive CAD. Previously placed LAD stent widely patent.         Moderate obstructive sleep apnea    Paroxysmal atrial fibrillation (CMS/HCC) (Chronic)    Overview Addendum 3/13/2018   6:14 PM by Festus Bowie IV, MD     · [facility] VASC = 6 (age, female, CHF, CAD, HTN, DM)         Dehydration    CKD (chronic kidney disease), stage III    Acute on chronic respiratory failure with hypoxia (CMS/HCC)    Acute kidney injury (CMS/HCC)            Assessment & Plan:  1. Acute Kidney Injury, most likely pre-renal from dehydration  2. Possible orthostatic syncope  3. Hypotension, unspecified.  4. High probability VQ scan suggestive of PE.  5. Acute bronchospasm  6. S/p recent lap cholecystectomy (08/01/18)  7. H/o chronic systolic CHF  8. H/o chronic a-fib not anticoagulated.    Admit patient to telemetry and continue gentle IV hydration. Patient has chronic systolic CHF with reported LVEF of 38%. We must monitor I/O carefully. Will avoid nephrotoxics. Will monitor renal functions and may consult nephrology if there is no improvement after adequate hydration. In the meantime, we will hold lasix and other diuretics. Will continue nebs prn. Fall and aspiration precautions. Will monitor H/H closely .Patient started on heparin drip for PE per protocol. Will obtain bilateral lower extremities venous doppler. Will consult pulmonary in the morning.    DVT prophylaxis: on heparin drip    CODE STATUS:    There are no questions and answers to display.         Electronically signed by Shayan Boswell MD, 08/03/18, 10:07 PM.

## 2018-08-04 NOTE — PLAN OF CARE
Problem: Fall Risk (Adult)  Goal: Identify Related Risk Factors and Signs and Symptoms  Outcome: Ongoing (interventions implemented as appropriate)   08/04/18 0625   Fall Risk (Adult)   Related Risk Factors (Fall Risk) homeostatic imbalance;environment unfamiliar   Signs and Symptoms (Fall Risk) presence of risk factors     Goal: Absence of Fall  Outcome: Ongoing (interventions implemented as appropriate)  NO FALL THUS FAR   08/04/18 0625   Fall Risk (Adult)   Absence of Fall making progress toward outcome

## 2018-08-04 NOTE — OUTREACH NOTE
General Surgery Week 1 Survey      Responses   Facility patient discharged from?  [facility]   Does the patient have one of the following disease processes/diagnoses(primary or secondary)?  General Surgery   Is there a successful TCM telephone encounter documented?  No   Week 1 attempt successful?  No   Revoke  Readmitted          Melissa Barber RN

## 2018-08-04 NOTE — CONSULTS
Pulmonary New Patient Evaluation     REFERRING PHYSICIAN:  MD Cosme    Subjective       Chief Complaint   Patient presents with   • Fall            SUBJECTIVE     Ms. Shay is a 71yo F who presented to the [facility] ED after a fall at home. She notes that she began to feel dizzy and weak while walking through her bathroom. She knows that she fell but she does not remember much after that. She had a recent lap april approximately 2 days ago. Since that time, she reports that she may have felt more short of breath than usual. She has not noticed any pain or swelling in her lower extremities.     She had a V/Q scan in the ED as she could not have a CT angiogram secondary to GORDON/CKD. This was reported as high probability for PE and she was started on a heparin drip. She was previously on Eliquis for Afib but this was stopped approximately 3 weeks ago secondary to rectal bleeding. She had a colonoscopy approximately 5 months ago due to her history of colon cancer 18 years ago with 5 polyps removed. She did not have any further workup for the GI bleeding a couple of weeks ago and has not noticed any GI bleeding since that time.       PMH: She  has a past medical history of Anxiety; Arthritis; AV block, 1st degree; Breast injury; Cataract; Cellulitis of both lower extremities; CKD (chronic kidney disease), stage III; Colon cancer (CMS/[facility]) (2000); Colon polyp (2015); Colon polyp (04/18/2018); Coronary artery disease; Depression; Diabetes mellitus (CMS/[facility]); Gallstone; GI bleed (07/2018); Hearing loss; History of atrial fibrillation; History of chemotherapy; History of transfusion; Hyperlipidemia; Hypertension; Knee pain; Pap smear for cervical cancer screening (2013); Rectal bleeding; RLS (restless legs syndrome); Sleep apnea; and Wears eyeglasses.   PSxH: She  has a past surgical history that includes Total shoulder arthroplasty (Bilateral); Coronary angioplasty with stent (12/2015); Cardiac catheterization (08/2015); pr  total knee arthroplasty (Left, 11/3/2016); Total knee arthroplasty (Right, 2008); Cardiac catheterization (03/13/2018); Cardiac catheterization (N/A, 3/13/2018); Colectomy (04/08/2000); Appendectomy (05/1963); Tonsillectomy (12/1961); Colonoscopy (2015); Colonoscopy (04/18/2018); Joint replacement; Esophagogastroduodenoscopy; and Cholecystectomy (N/A, 8/1/2018).        Medications:     Current Facility-Administered Medications:   •  aspirin EC tablet 81 mg, 81 mg, Oral, Daily, Shayan Boswell MD, 81 mg at 08/04/18 1029  •  atorvastatin (LIPITOR) tablet 20 mg, 20 mg, Oral, Nightly, Shayan Boswell MD, 20 mg at 08/04/18 0053  •  carvedilol (COREG) tablet 6.25 mg, 6.25 mg, Oral, BID With Meals, Shayan Boswell MD, 6.25 mg at 08/04/18 1031  •  gabapentin (NEURONTIN) capsule 300 mg, 300 mg, Oral, Q8H, Shayan Boswell MD, 300 mg at 08/04/18 0702  •  heparin 48220 units/250 mL (100 units/mL) in 0.45 % NaCl infusion, 14 Units/kg/hr, Intravenous, Titrated, Shae Hill, [facility], Last Rate: 14.42 mL/hr at 08/04/18 0807, 14 Units/kg/hr at 08/04/18 0807  •  HYDROcodone-acetaminophen (NORCO)  MG per tablet 1 tablet, 1 tablet, Oral, Q4H PRN, Shayan Boswell MD, 1 tablet at 08/04/18 1031  •  ipratropium-albuterol (DUO-NEB) nebulizer solution 3 mL, 3 mL, Nebulization, Q4H PRN, Shayan Boswell MD, 3 mL at 08/04/18 1224  •  ipratropium-albuterol (DUO-NEB) nebulizer solution 3 mL, 3 mL, Nebulization, Once, Shayan Boswell MD  •  Pharmacy to Dose Heparin, , Does not apply, Continuous PRN, Mitesh Bearden, [facility]  •  promethazine (PHENERGAN) tablet 12.5 mg, 12.5 mg, Oral, Q6H PRN, Shayan Boswell MD, 12.5 mg at 08/04/18 0855  •  rOPINIRole (REQUIP) tablet 1 mg, 1 mg, Oral, Nightly, Shayan Boswell MD, 1 mg at 08/04/18 0052  •  sacubitril-valsartan (ENTRESTO) 49-51 MG tablet 1 tablet, 1 tablet, Oral, Q12H, Shayan Boswell MD, 1 tablet at 08/04/18  1030  •  sodium chloride 0.9 % flush 1-10 mL, 1-10 mL, Intravenous, PRN, Shayan Boswell MD  •  sodium chloride 0.9 % infusion, 75 mL/hr, Intravenous, Continuous, Shayan Boswell MD, Last Rate: 75 mL/hr at 08/04/18 0801, 75 mL/hr at 08/04/18 0801  •  traZODone (DESYREL) tablet 25 mg, 25 mg, Oral, Nightly PRN, Shayan Boswell MD, 25 mg at 08/04/18 0053    Allergies: She is allergic to oxycodone and zolpidem.   FH: Her family history includes Colon cancer in her father, other, and sister; Coronary artery disease in her maternal grandfather; Diabetes in her father, maternal grandmother, other, and sister; Heart disease in her other; Hypertension in her mother and other; No Known Problems in her paternal grandfather and paternal grandmother; Stroke in her father and other; Tuberculosis in her other.   SH: She  reports that she quit smoking about 39 years ago. Her smoking use included Cigarettes. She has a 22.00 pack-year smoking history. She has never used smokeless tobacco. She reports that she drinks about 0.6 oz of alcohol per week . She reports that she does not use drugs.     The patient's relevant past medical, surgical and social history were reviewed and updated in Epic as appropriate.    ROS (14):   Review of Systems   Constitutional: Positive for fatigue.   HENT: Negative.    Eyes: Negative.    Respiratory: Positive for cough and shortness of breath.    Cardiovascular: Negative.    Gastrointestinal: Positive for abdominal pain.   Endocrine: Negative.    Genitourinary: Negative.    Musculoskeletal: Positive for back pain.   Skin: Negative.    Allergic/Immunologic: Negative.    Neurological: Positive for dizziness and weakness.   Hematological: Negative.    Psychiatric/Behavioral: Negative.            Objective   OBJECTIVE     Physical Examination   Vitals:    08/04/18 0348 08/04/18 0832 08/04/18 1029 08/04/18 1221   BP: 101/64 99/68 93/68 108/66   BP Location: Left arm Left arm  Left arm    Patient Position: Lying Lying  Lying   Pulse: 77 93 91 77   Resp: 18 15  20   Temp: 98 °F (36.7 °C) 97.4 °F (36.3 °C)  96.8 °F (36 °C)   TempSrc: Oral Oral  Axillary   SpO2: 92% 92%  95%   Weight:       Height:           Body mass index is 37.77 kg/m².    Physical Examination    Constitutional:  No acute distress.  Lying in bed.   Difficulty staying awake upon questioning.    Neck:  Normal nasal mucosa and turbinates.   Oropharynx clear.  Normal range of motion. Neck supple.   No JVD present. No tracheal deviation present.  No thyromegaly present.    Cardiovascular: Normal rate, regular and rhythm. Normal heart sounds.  No murmurs, gallop or rub.  No peripheral edema.   Respiratory: No respiratory distress. Normal respiratory effort.  Normal breath sounds  Clear to ascultation and percussion.    Abdominal:  Soft. No masses. Non-tender. No distension. No HSM.   Extremities: No digital cyanosis. No clubbing.   Lymphadenopathy: None.   Skin: Skin is warm and dry. No rashes, lesions or ulcers noted.    Neurological:   Sedated but awakens to answer questions.   Alert and Oriented to person, place, and time.    Psychiatric:  Normal affect. Normal behavior.         Diagnostic Data    Imaging Results (last 24 hours)     Procedure Component Value Units Date/Time    NM Lung Ventilation Perfusion [[id number]] Collected:  08/03/18 2111     Updated:  08/03/18 2327    Addenda:        THIS REPORT CONTAINS FINDINGS THAT MAY BE CRITICAL TO PATIENT CARE. The   findings were verbally communicated via telephone conference with PADDY Waller at 11:27 PM EDT on 8/3/2018. The findings were acknowledged and   understood.    THIS DOCUMENT HAS BEEN ELECTRONICALLY SIGNED BY TEX VILLALTA JR. MD  Signed:  08/03/18 2327 by Tex Villalta Jr., MD    Narrative:       EXAM:  NM Lung Perfusion and Ventilation Scan    EXAM DATE/TIME:  8/3/2018 9:11 PM    CLINICAL HISTORY:  72 years old, female; Acute kidney failure, unspecified;    Hypoxemia; Syncope and collapse; Signs and symptoms; Dyspnea and other: S/P   april < 1 wk ago; Additional info: Pe suspected, dyspnea, syncope, hypoxemia    TECHNIQUE:  Nuclear Medicine ventilation and perfusion images of the lungs were   obtained in multiple projections following inhalation of 40 mCi of xenon-133   followed by injection of 5.05 mCi of Tc99m MAA.    COMPARISON:  CR - XR CHEST 2 VW 2018-08-03 20:23    FINDINGS:    Ventilation:  Mildly heterogeneous bilateral ventilation with delayed left   lung washout.    Perfusion:  Large left upper and lower lobe mismatched perfusion defects.      Impression:         High probability study for pulmonary embolism.    THIS DOCUMENT HAS BEEN ELECTRONICALLY SIGNED BY TEX VILLALTA JR. MD    CT Pelvis Without Contrast [[id number]] Collected:  08/03/18 1843     Updated:  08/03/18 2151    Addenda:        Addendum:  The patient is reportedly status post relatively recent laparoscopic   cholecystectomy.    The findings were discussed with PADDY GROVE on 8/3/2018 9:50 PM EDT.    THIS DOCUMENT HAS BEEN ELECTRONICALLY SIGNED BY JAY STOVALL MD  Signed:  08/03/18 2151 by Jay Stovall MD    Narrative:       EXAM:  CT Pelvis Without Intravenous Contrast    EXAM DATE/TIME:  8/3/2018 6:43 PM    CLINICAL HISTORY:  72 years old, female; Signs and symptoms; Other: See notes; Additional info:   Pelvis trauma, FX suspected, first study    TECHNIQUE:  Axial computed tomography images of the pelvis without intravenous contrast.    All CT scans at this facility use at least one of these dose optimization   techniques: automated exposure control; mA and/or kV adjustment per patient   size (includes targeted exams where dose is matched to clinical indication); or   iterative reconstruction.  Coronal and sagittal reformatted images were created and reviewed.    COMPARISON:  CT ABDOMEN PELVIS WO CONTRAST 2018-07-10 12:42    FINDINGS:  No acute fracture or dislocation is  seen. Some degenerative changes of the   femoroacetabular joints, including relative loss of upper joint space. Mild   relative asymmetry of some of the medial lateral aspects of the femoral necks   is noted but felt to be chronic and, given differences in technique between the   present and comparison scans, grossly unchanged. Chronic changes of the   sacroiliac joints. Degenerative changes of the visualized lower lumbar spine,   including at least mild anterior spinal canal narrowing at least at the   visualized L2-L3 level and at L3-L4.  No suspicious focal blastic or lytic bone   lesion is seen.     One or more more areas of suspected subcutaneous contusion and perhaps edema   particularly in the visualized left flank region, and to some extent relatively   conspicuous in the lower anterior abdominal wall and the right gluteal region.   No overlying radiopaque foreign body is seen.     At least mild amount of complex-appearing free fluid concerning for blood   products including relatively acute to subacute hematoma is seen in the deeper   pelvis, including the cul-de-sac; however, this fluid is not seen on the   comparison study and is of uncertain origin.    Again noted is relative prominence of the visualized left adnexal region,   although somewhat nonspecific. No definite right adnexal mass. Grossly   unremarkable uterus.    Grossly unremarkable urinary bladder.    Incompletely imaged mid to lower anterior abdominal wall fat containing hernia   with some overlying gas, and a few small foci of gas elsewhere in the   visualized mid to lower anterior abdominal wall.    Some abdominal and upper right hemipelvis clips are noted and appear to at   least partly reflect a right hemicolectomy. Fluid or fluid-like material is   noted in the more proximal visualized colon and could reflect a diarrheal   illness but otherwise no definite evidence of acute colitis seen.        Impression:       No acute fracture or  dislocation is seen.    One or more suspected areas of subcutaneous contusion.    Complex-appearing pelvic fluid fluid at least partly compatible with blood   products is seen but of uncertain origin. Abdominal and pelvic postoperative   changes are also noted. For further evaluation, follow-up imaging might include   the upper abdomen.    Other findings as above.    THIS DOCUMENT HAS BEEN ELECTRONICALLY SIGNED BY BILL VIVEROS MD    XR Chest 2 View [[id number]] Collected:  08/03/18 2001     Updated:  08/03/18 2127    Narrative:       EXAM:  XR Chest, 2 Views    EXAM DATE/TIME:  8/3/2018 8:01 PM    CLINICAL HISTORY:  72 years old, female; Pain; Chest pain; Additional info: Chest pain S/P fall,   dyspnea    TECHNIQUE:  Frontal and lateral views of the chest.    COMPARISON:  CR - XR RIBS LEFT W PA CHEST 2018-06-06 23:57    FINDINGS:  The patient is somewhat rotated. Otherwise:     Continued relatively prominent left heart border. Somewhat tortuous thoracic   aorta.     No dense lung consolidation is seen bilaterally. Otherwise, possible   atelectatic versus inflammatory changes toward the right lung base.     No pleural effusion bilaterally.    No pneumothorax bilaterally.    Continued somewhat elevated or eventrated right hemidiaphragm.      Grossly intact visualized skeletal structures status post right and left   shoulder arthroplasties.        Impression:       No definite acute disease process is seen in the chest.    Other findings as above.      THIS DOCUMENT HAS BEEN ELECTRONICALLY SIGNED BY BILL VIVEROS MD          Assessment/Plan   ASSESSMENT / PLAN     Assessment:    Hospital Problem List     * (Principal)Other pulmonary embolism without acute cor pulmonale (CMS/HCC)    GERD (gastroesophageal reflux disease)    Coronary artery disease involving native coronary artery of native heart with angina pectoris (CMS/HCC)    Overview Addendum 3/13/2018  5:16 PM by Festus Bowie IV, MD      · Previous PCI to the LAD  · Cardiac catheterization (8/2/2015): Mild nonobstructive CAD. Widely patent LAD stent. Normal LVEF.  · Cardiac catheterization (3/13/18): Mild to moderate nonobstructive CAD. Previously placed LAD stent widely patent.         Moderate obstructive sleep apnea    Paroxysmal atrial fibrillation (CMS/HCC) (Chronic)    Overview Addendum 3/13/2018  6:14 PM by Festus Bowie IV, MD     · Chads VASC = 6 (age, female, CHF, CAD, HTN, DM)         Dehydration    CKD (chronic kidney disease), stage III    Acute on chronic respiratory failure with hypoxia (CMS/HCC)    Acute kidney injury (CMS/HCC)        Ms. Shay is a 71yo F who is admitted with the finding of a high probability V/Q scan which likely represents a PE. She has a recent history of surgery and this may have been a provoked PE. She also has a recent history of GI bleeding approximately 3 weeks ago and her Eliquis has been on hold since that time. She is currently being treated with a heparin drip.     Plan:  1. Continue heparin drip. Monitor for recurrent GI bleeding. If she has no evidence of bleeding, may transition to Eliquis  2. Check echocardiogram  3. Venous duplex of the lower extremities pending.   4. Minimize narcotics in the setting of GORDON  5. We will follow up on Monday.       I discussed the diagnosis, evaluation, and  treatment options with the patient and/or appropriate family members.    Thank you very much for allowing me to participate in the care of your patient.        Nancy White, DO  Pulmonary and Critical Care Medicine    C.C.:  Patient Care Team:  Sharon Saldivar MD as PCP - General (Family Medicine)  Festus Bowie IV, MD as Cardiologist (Interventional Cardiology)  Tosha Melvin APRN as Nurse Practitioner (Cardiology)  Brandon Cash MD as Consulting Physician (Gastroenterology)

## 2018-08-04 NOTE — PROGRESS NOTES
"HEPARIN INFUSION  Angelita Shay is a  72 y.o. female receiving heparin infusion for \"high probability VQ scan suggestive of PE\".         Therapy for (VTE/Cardiac):   VTE  Patient Weight: 103 kg  Initial Bolus (Y/N):   y  Any Bolus (Y/N):   y       Signs or Symptoms of Bleeding: no      VTE (PE/DVT)   Initial Bolus: 80 units/kg (Max 10,000 units)  Initial rate: 18 units/kg/hr (Max 1,500 units/hr)   aPTT    (sec) Bolus Dose Stop Infusion Rate Change Repeat aPTT      < 45  80 units/kg 0 hrs Increase rate by   4 units/kg/hr 6 hrs      45 - 54 40 units/kg 0 hrs Increase rate by 2 units/kg/hr 6 hrs    55 - 70 0 0 hrs No change 6 hrs      71 - 83  0  0 hrs Decrease rate by 2 units/kg/hr 6 hrs      > 83  0 Hold 1 hr Decrease rate by 3 units/kg/hr 6 hrs        Results from last 7 days  Lab Units 08/04/18  1342 08/04/18  0651 08/04/18  0014 08/03/18  1916 07/30/18  1444   INR   --   --  0.97 0.95 0.97   HEMOGLOBIN g/dL 11.5 11.5 12.0 12.9 15.3   HEMATOCRIT % 36.8 36.3 37.9 40.3 46.0*   PLATELETS 10*3/mm3  --  126* 132* 145* 167          Date   Time   aPTT Current Rate (Unit/kg/hr) Bolus   (Units) Rate Change   (Unit/kg/hr) New Rate (Unit/kg/hr) Next aPTT Comments  Pump Check Daily   8/3 1155 26.3  7730  14.6  0700 New start   8/4 0651 75.8 14.6 -- - 0.6 14 1400 D/w EVY Kaufman who stated the pump was currently running at 15. Will still decrease by 1 and check again at 1400. Order was in as units/hr; modified to reflect dosing as units/kg/hr   8/4 1342 64 14 -- -- 14 2000 D/w Shae. Pump weight and rate verified in room.                                                                                                                                                                                                           Delicia Vega, PharmD  Pharmacy Resident  8/4/2018  3:26 PM            "

## 2018-08-05 ENCOUNTER — APPOINTMENT (OUTPATIENT)
Dept: CARDIOLOGY | Facility: HOSPITAL | Age: 73
End: 2018-08-05
Attending: INTERNAL MEDICINE

## 2018-08-05 LAB
ANION GAP SERPL CALCULATED.3IONS-SCNC: 4 MMOL/L (ref 3–11)
APTT PPP: 24.7 SECONDS (ref 55–70)
APTT PPP: 56.1 SECONDS (ref 55–70)
BH CV LOWER VASCULAR LEFT COMMON FEMORAL AUGMENT: NORMAL
BH CV LOWER VASCULAR LEFT COMMON FEMORAL COMPETENT: NORMAL
BH CV LOWER VASCULAR LEFT COMMON FEMORAL COMPRESS: NORMAL
BH CV LOWER VASCULAR LEFT COMMON FEMORAL PHASIC: NORMAL
BH CV LOWER VASCULAR LEFT COMMON FEMORAL SPONT: NORMAL
BH CV LOWER VASCULAR LEFT DISTAL FEMORAL COMPRESS: NORMAL
BH CV LOWER VASCULAR LEFT GASTRONEMIUS COMPRESS: NORMAL
BH CV LOWER VASCULAR LEFT GREATER SAPH AK COMPRESS: NORMAL
BH CV LOWER VASCULAR LEFT GREATER SAPH BK COMPRESS: NORMAL
BH CV LOWER VASCULAR LEFT MID FEMORAL AUGMENT: NORMAL
BH CV LOWER VASCULAR LEFT MID FEMORAL COMPETENT: NORMAL
BH CV LOWER VASCULAR LEFT MID FEMORAL COMPRESS: NORMAL
BH CV LOWER VASCULAR LEFT MID FEMORAL PHASIC: NORMAL
BH CV LOWER VASCULAR LEFT MID FEMORAL SPONT: NORMAL
BH CV LOWER VASCULAR LEFT PERONEAL COMPRESS: NORMAL
BH CV LOWER VASCULAR LEFT POPLITEAL AUGMENT: NORMAL
BH CV LOWER VASCULAR LEFT POPLITEAL COMPETENT: NORMAL
BH CV LOWER VASCULAR LEFT POPLITEAL COMPRESS: NORMAL
BH CV LOWER VASCULAR LEFT POPLITEAL PHASIC: NORMAL
BH CV LOWER VASCULAR LEFT POPLITEAL SPONT: NORMAL
BH CV LOWER VASCULAR LEFT POSTERIOR TIBIAL COMPRESS: NORMAL
BH CV LOWER VASCULAR LEFT PROXIMAL FEMORAL COMPRESS: NORMAL
BH CV LOWER VASCULAR LEFT SAPHENOFEMORAL JUNCTION AUGMENT: NORMAL
BH CV LOWER VASCULAR LEFT SAPHENOFEMORAL JUNCTION COMPETENT: NORMAL
BH CV LOWER VASCULAR LEFT SAPHENOFEMORAL JUNCTION COMPRESS: NORMAL
BH CV LOWER VASCULAR LEFT SAPHENOFEMORAL JUNCTION PHASIC: NORMAL
BH CV LOWER VASCULAR LEFT SAPHENOFEMORAL JUNCTION SPONT: NORMAL
BH CV LOWER VASCULAR RIGHT COMMON FEMORAL AUGMENT: NORMAL
BH CV LOWER VASCULAR RIGHT COMMON FEMORAL COMPETENT: NORMAL
BH CV LOWER VASCULAR RIGHT COMMON FEMORAL COMPRESS: NORMAL
BH CV LOWER VASCULAR RIGHT COMMON FEMORAL PHASIC: NORMAL
BH CV LOWER VASCULAR RIGHT COMMON FEMORAL SPONT: NORMAL
BH CV LOWER VASCULAR RIGHT DISTAL FEMORAL COMPRESS: NORMAL
BH CV LOWER VASCULAR RIGHT GASTRONEMIUS COMPRESS: NORMAL
BH CV LOWER VASCULAR RIGHT GREATER SAPH AK COMPRESS: NORMAL
BH CV LOWER VASCULAR RIGHT GREATER SAPH BK COMPRESS: NORMAL
BH CV LOWER VASCULAR RIGHT MID FEMORAL AUGMENT: NORMAL
BH CV LOWER VASCULAR RIGHT MID FEMORAL COMPETENT: NORMAL
BH CV LOWER VASCULAR RIGHT MID FEMORAL COMPRESS: NORMAL
BH CV LOWER VASCULAR RIGHT MID FEMORAL PHASIC: NORMAL
BH CV LOWER VASCULAR RIGHT MID FEMORAL SPONT: NORMAL
BH CV LOWER VASCULAR RIGHT PERONEAL COMPRESS: NORMAL
BH CV LOWER VASCULAR RIGHT POPLITEAL AUGMENT: NORMAL
BH CV LOWER VASCULAR RIGHT POPLITEAL COMPETENT: NORMAL
BH CV LOWER VASCULAR RIGHT POPLITEAL COMPRESS: NORMAL
BH CV LOWER VASCULAR RIGHT POPLITEAL PHASIC: NORMAL
BH CV LOWER VASCULAR RIGHT POPLITEAL SPONT: NORMAL
BH CV LOWER VASCULAR RIGHT POSTERIOR TIBIAL COMPRESS: NORMAL
BH CV LOWER VASCULAR RIGHT PROXIMAL FEMORAL COMPRESS: NORMAL
BH CV LOWER VASCULAR RIGHT SAPHENOFEMORAL JUNCTION AUGMENT: NORMAL
BH CV LOWER VASCULAR RIGHT SAPHENOFEMORAL JUNCTION COMPETENT: NORMAL
BH CV LOWER VASCULAR RIGHT SAPHENOFEMORAL JUNCTION COMPRESS: NORMAL
BH CV LOWER VASCULAR RIGHT SAPHENOFEMORAL JUNCTION PHASIC: NORMAL
BH CV LOWER VASCULAR RIGHT SAPHENOFEMORAL JUNCTION SPONT: NORMAL
BUN BLD-MCNC: 33 MG/DL (ref 9–23)
BUN/CREAT SERPL: 31.1 (ref 7–25)
CALCIUM SPEC-SCNC: 7.9 MG/DL (ref 8.7–10.4)
CHLORIDE SERPL-SCNC: 107 MMOL/L (ref 99–109)
CO2 SERPL-SCNC: 28 MMOL/L (ref 20–31)
CREAT BLD-MCNC: 1.06 MG/DL (ref 0.6–1.3)
GFR SERPL CREATININE-BSD FRML MDRD: 51 ML/MIN/1.73
GLUCOSE BLD-MCNC: 165 MG/DL (ref 70–100)
GLUCOSE BLDC GLUCOMTR-MCNC: 143 MG/DL (ref 70–130)
GLUCOSE BLDC GLUCOMTR-MCNC: 152 MG/DL (ref 70–130)
GLUCOSE BLDC GLUCOMTR-MCNC: 176 MG/DL (ref 70–130)
GLUCOSE BLDC GLUCOMTR-MCNC: 230 MG/DL (ref 70–130)
HCT VFR BLD AUTO: 36.3 % (ref 34.5–44)
HCT VFR BLD AUTO: 37.7 % (ref 34.5–44)
HGB BLD-MCNC: 11.4 G/DL (ref 11.5–15.5)
HGB BLD-MCNC: 11.8 G/DL (ref 11.5–15.5)
POTASSIUM BLD-SCNC: 4.7 MMOL/L (ref 3.5–5.5)
SODIUM BLD-SCNC: 139 MMOL/L (ref 132–146)

## 2018-08-05 PROCEDURE — 85730 THROMBOPLASTIN TIME PARTIAL: CPT

## 2018-08-05 PROCEDURE — 25010000002 HEPARIN (PORCINE) PER 1000 UNITS

## 2018-08-05 PROCEDURE — 94799 UNLISTED PULMONARY SVC/PX: CPT

## 2018-08-05 PROCEDURE — 82962 GLUCOSE BLOOD TEST: CPT

## 2018-08-05 PROCEDURE — 85018 HEMOGLOBIN: CPT | Performed by: INTERNAL MEDICINE

## 2018-08-05 PROCEDURE — 85014 HEMATOCRIT: CPT | Performed by: INTERNAL MEDICINE

## 2018-08-05 PROCEDURE — 93306 TTE W/DOPPLER COMPLETE: CPT | Performed by: INTERNAL MEDICINE

## 2018-08-05 PROCEDURE — 63710000001 PROMETHAZINE PER 12.5 MG: Performed by: INTERNAL MEDICINE

## 2018-08-05 PROCEDURE — 93306 TTE W/DOPPLER COMPLETE: CPT

## 2018-08-05 PROCEDURE — 63710000001 INSULIN LISPRO (HUMAN) PER 5 UNITS: Performed by: HOSPITALIST

## 2018-08-05 PROCEDURE — 80048 BASIC METABOLIC PNL TOTAL CA: CPT | Performed by: HOSPITALIST

## 2018-08-05 PROCEDURE — 99233 SBSQ HOSP IP/OBS HIGH 50: CPT | Performed by: HOSPITALIST

## 2018-08-05 RX ORDER — SENNA AND DOCUSATE SODIUM 50; 8.6 MG/1; MG/1
2 TABLET, FILM COATED ORAL NIGHTLY
Status: DISCONTINUED | OUTPATIENT
Start: 2018-08-05 | End: 2018-08-06 | Stop reason: HOSPADM

## 2018-08-05 RX ORDER — DEXTROSE MONOHYDRATE 25 G/50ML
25 INJECTION, SOLUTION INTRAVENOUS
Status: DISCONTINUED | OUTPATIENT
Start: 2018-08-05 | End: 2018-08-06 | Stop reason: HOSPADM

## 2018-08-05 RX ORDER — NICOTINE POLACRILEX 4 MG
15 LOZENGE BUCCAL
Status: DISCONTINUED | OUTPATIENT
Start: 2018-08-05 | End: 2018-08-06 | Stop reason: HOSPADM

## 2018-08-05 RX ADMIN — PROMETHAZINE HYDROCHLORIDE 12.5 MG: 12.5 TABLET ORAL at 09:30

## 2018-08-05 RX ADMIN — ROPINIROLE 1 MG: 0.5 TABLET, FILM COATED ORAL at 22:51

## 2018-08-05 RX ADMIN — Medication 2 TABLET: at 22:52

## 2018-08-05 RX ADMIN — CARVEDILOL 6.25 MG: 6.25 TABLET, FILM COATED ORAL at 17:22

## 2018-08-05 RX ADMIN — HEPARIN SODIUM 14 UNITS/KG/HR: 10000 INJECTION, SOLUTION INTRAVENOUS at 00:22

## 2018-08-05 RX ADMIN — TRAZODONE HYDROCHLORIDE 25 MG: 50 TABLET ORAL at 22:52

## 2018-08-05 RX ADMIN — HYDROCODONE BITARTRATE AND ACETAMINOPHEN 1 TABLET: 10; 325 TABLET ORAL at 22:58

## 2018-08-05 RX ADMIN — APIXABAN 10 MG: 5 TABLET, FILM COATED ORAL at 16:23

## 2018-08-05 RX ADMIN — GABAPENTIN 300 MG: 300 CAPSULE ORAL at 14:30

## 2018-08-05 RX ADMIN — CARVEDILOL 6.25 MG: 6.25 TABLET, FILM COATED ORAL at 09:30

## 2018-08-05 RX ADMIN — INSULIN LISPRO 3 UNITS: 100 INJECTION, SOLUTION INTRAVENOUS; SUBCUTANEOUS at 16:30

## 2018-08-05 RX ADMIN — SODIUM CHLORIDE 75 ML/HR: 9 INJECTION, SOLUTION INTRAVENOUS at 00:23

## 2018-08-05 RX ADMIN — HYDROCODONE BITARTRATE AND ACETAMINOPHEN 1 TABLET: 10; 325 TABLET ORAL at 07:09

## 2018-08-05 RX ADMIN — SACUBITRIL AND VALSARTAN 1 TABLET: 49; 51 TABLET, FILM COATED ORAL at 11:55

## 2018-08-05 RX ADMIN — GABAPENTIN 300 MG: 300 CAPSULE ORAL at 22:52

## 2018-08-05 RX ADMIN — GABAPENTIN 300 MG: 300 CAPSULE ORAL at 06:38

## 2018-08-05 RX ADMIN — ATORVASTATIN CALCIUM 20 MG: 20 TABLET, FILM COATED ORAL at 22:52

## 2018-08-05 RX ADMIN — ASPIRIN 81 MG: 81 TABLET, COATED ORAL at 09:31

## 2018-08-05 RX ADMIN — Medication 10 ML: at 09:32

## 2018-08-05 RX ADMIN — SACUBITRIL AND VALSARTAN 1 TABLET: 49; 51 TABLET, FILM COATED ORAL at 22:52

## 2018-08-05 NOTE — PLAN OF CARE
Problem: Fall Risk (Adult)  Goal: Identify Related Risk Factors and Signs and Symptoms  Outcome: Ongoing (interventions implemented as appropriate)   08/04/18 0625   Fall Risk (Adult)   Related Risk Factors (Fall Risk) homeostatic imbalance;environment unfamiliar   Signs and Symptoms (Fall Risk) presence of risk factors     Goal: Absence of Fall  Outcome: Ongoing (interventions implemented as appropriate)   08/05/18 0526   Fall Risk (Adult)   Absence of Fall making progress toward outcome

## 2018-08-05 NOTE — PROGRESS NOTES
[facility] Medicine Services  PROGRESS NOTE    Patient Name: Angelita Shay  : 1945  MRN: [id number]    Date of Admission: 8/3/2018  Length of Stay: 2  Primary Care Physician: Sharon Saldivar MD    Subjective   Subjective     CC:  F/U poss PE    HPI:  Feeling much better and more alert, pleuritic chest pain and abd pain have improved and controlled with meds, no N/V, but c/o chronic constipation-no BM in 1 week. No F/C. No cough. No worsening SOA.    Review of Systems  Otherwise ROS is negative except as mentioned in the HPI.    Objective   Objective     Vital Signs:   Temp:  [97 °F (36.1 °C)-99.2 °F (37.3 °C)] 97.7 °F (36.5 °C)  Heart Rate:  [78-92] 88  Resp:  [18] 18  BP: (103-132)/(66-81) 124/81        Physical Exam:  General Assessment: No acute cardiopulmonary distress. Well developed and well nourished.    HEENT: NCAT, PERRL, MM moist    Neck: Supple    CVS: RRR, S1S2 normal    Resp: CTAB, no adventitious sound    Abd: soft, diffuse mild abd wall tenderness, ND, normal BS, no guarding or peritoneal signs    Ext: Today LLE large than RLE, different than yesterday, pt reporting that it's normal for her; 1+ pitting edema.    Neuro: Nonfocal    Skin: W/D/I. No rash.    Psych: Affect is appropriate      Results Reviewed:  I have personally reviewed current lab, radiology, and data and agree.      Results from last 7 days  Lab Units 18  0736 18  0043 18  1342 18  0651 18  0014 18  1916 18  1444   WBC 10*3/mm3  --   --   --  6.62 7.14 7.60 7.02   HEMOGLOBIN g/dL 11.8 11.4* 11.5 11.5 12.0 12.9 15.3   HEMATOCRIT % 37.7 36.3 36.8 36.3 37.9 40.3 46.0*   PLATELETS 10*3/mm3  --   --   --  126* 132* 145* 167   INR   --   --   --   --  0.97 0.95 0.97       Results from last 7 days  Lab Units 18  0736 18  0651 18  1916   SODIUM mmol/L 139 138 140   POTASSIUM mmol/L 4.7 3.8 4.6   CHLORIDE mmol/L 107 105 100   CO2 mmol/L 28.0 25.0  28.0   BUN mg/dL 33* 37* 42*   CREATININE mg/dL 1.06 1.86* 2.53*   GLUCOSE mg/dL 165* 144* 142*   CALCIUM mg/dL 7.9* 8.0* 8.5*   ALT (SGPT) U/L  --   --  33   AST (SGOT) U/L  --   --  67*     Estimated Creatinine Clearance: 56.3 mL/min (by C-G formula based on SCr of 1.06 mg/dL).  No results found for: BNP    Microbiology Results Abnormal     None          Imaging Results (last 24 hours)     ** No results found for the last 24 hours. **        Results for orders placed during the hospital encounter of 03/10/18   Adult Transthoracic Echo Complete W/ Cont if Necessary Per Protocol    Narrative · Left atrial cavity size is mildly dilated.  · Left ventricular wall thickness is consistent with mild concentric   hypertrophy.  · Left ventricular systolic function is moderately decreased. Estimated EF   = 36%.  · Mild mitral valve regurgitation is present  · Mild tricuspid valve regurgitation is present.  · Estimated right ventricular systolic pressure is moderately elevated   (45-55 mmHg).          I have reviewed the medications.    Assessment/Plan   Assessment / Plan     Hospital Problem List     * (Principal)Other pulmonary embolism without acute cor pulmonale (CMS/HCC)    GERD (gastroesophageal reflux disease)    Coronary artery disease involving native coronary artery of native heart with angina pectoris (CMS/[facility])    Overview Addendum 3/13/2018  5:16 PM by Festus Bowie IV, MD     · Previous PCI to the LAD  · Cardiac catheterization (8/2/2015): Mild nonobstructive CAD. Widely patent LAD stent. Normal LVEF.  · Cardiac catheterization (3/13/18): Mild to moderate nonobstructive CAD. Previously placed LAD stent widely patent.         Moderate obstructive sleep apnea    Paroxysmal atrial fibrillation (CMS/HCC) (Chronic)    Overview Addendum 3/13/2018  6:14 PM by Festus Bowie IV, MD     · Chads VASC = 6 (age, female, CHF, CAD, HTN, DM)         Dehydration    CKD (chronic kidney disease), stage III  "   Acute on chronic respiratory failure with hypoxia (CMS/HCC)    Acute kidney injury (CMS/HCC)             Brief Hospital Course to date:  Angelita Shay is a 72 y.o. female who has h/o chronic systolic CHF with estimated LVEF of 36%,  PAH, CKD III, CAD, s/p prior PCI, DM2, depression/anxiety, chronic a-fib( but not currently anticoagulated due to recent GIB), and recent CCY by Dr Pantoja on 8/1, who presented to the ED after a fall/poss syncope and SOA/chest pain. She was found to have high probability of a PE on VQ scan and started on hep gtt.      Assessment & Plan:  - Tolerating hep gtt w/o any signs of bleeding. H/H relatively stable. Will switch to PO Eliquis tonight, pharmacy to discontinue hep gtt as appropriate.    - Recent CCY by Dr Pantoja, will consult PRN, at this time no obvious acute surgical issues, but CT pelvis reported \" complex-appearing pelvic fluid fluid at least partly compatible with blood products is seen but of uncertain origin.\" H/H slightly down, but could be hemodilution. Monitor for now. No peritoneal signs.    - Echo and LE duplex pending    - Poss syncope? BP was low on admission, poss from dehydration/orthostatic.    - GORDON improving, c/w pre-renal, DC IVF.    - Senna-S bow constipation, plus pt requesting manual disimpaction/soap suds enema since she had not had a BM in 1 wk      DVT Prophylaxis:  Hep gtt    CODE STATUS:   Code Status and Medical Interventions:   Ordered at: 08/03/18 1440     Level Of Support Discussed With:    Patient     Code Status:    CPR     Medical Interventions (Level of Support Prior to Arrest):    Full       Disposition:TBD,poss home tomorrow if still stable.    Electronically signed by Isaías Aguiar MD, 08/05/18, 4:06 PM.      "

## 2018-08-05 NOTE — PLAN OF CARE
Problem: Patient Care Overview  Goal: Plan of Care Review  Outcome: Ongoing (interventions implemented as appropriate)   08/05/18 1855   Coping/Psychosocial   Plan of Care Reviewed With patient;daughter   Plan of Care Review   Progress improving   OTHER   Outcome Summary Heparin stopped, Eliquis started. Resp function improving. SpO2 stable, VSS. N/V improving. Good results from SS Enema today. Insulin supplemented.      Goal: Individualization and Mutuality  Outcome: Ongoing (interventions implemented as appropriate)   08/05/18 1855   Mutuality/Individual Preferences   How Would You and/or Your Support Person Like to Participate in Your Care? Daughter to participate fully.     Goal: Discharge Needs Assessment  Outcome: Ongoing (interventions implemented as appropriate)   08/03/18 2324 08/05/18 1855   Discharge Needs Assessment   Readmission Within the Last 30 Days --  previous discharge plan unsuccessful  (returned w/ P.E., s/p cherrie.april.)   Concerns to be Addressed --  discharge planning   Patient/Family Anticipates Transition to --  home with family   Patient/Family Anticipated Services at Transition --  durable medical equipment;home health care;respiratory services   Transportation Concerns --  car, none   Transportation Anticipated --  family or friend will provide   Current Discharge Risk --  chronically ill;dependent with mobility/activities of daily living   Disability   Equipment Currently Used at Home glucometer;shower chair --        Problem: Fall Risk (Adult)  Goal: Absence of Fall  Outcome: Ongoing (interventions implemented as appropriate)   08/05/18 1855   Fall Risk (Adult)   Absence of Fall making progress toward outcome       Problem: Constipation (Adult)  Goal: Identify Related Risk Factors and Signs and Symptoms  Outcome: Ongoing (interventions implemented as appropriate)   08/05/18 1855   Constipation (Adult)   Related Risk Factors (Constipation) impaired mobility;medication  effects;weakness/fatigue   Signs and Symptoms (Constipation) abdominal pain/cramps;feeling full;hard dry stool;nausea and vomiting;straining     Goal: Effective Bowel Elimination  Outcome: Ongoing (interventions implemented as appropriate)   08/05/18 1855   Constipation (Adult)   Effective Bowel Elimination making progress toward outcome     Goal: Comfort  Outcome: Ongoing (interventions implemented as appropriate)   08/05/18 1855   Constipation (Adult)   Comfort making progress toward outcome

## 2018-08-05 NOTE — PLAN OF CARE
Problem: Fall Risk (Adult)  Goal: Identify Related Risk Factors and Signs and Symptoms  Outcome: Ongoing (interventions implemented as appropriate)   08/04/18 0625   Fall Risk (Adult)   Related Risk Factors (Fall Risk) homeostatic imbalance;environment unfamiliar   Signs and Symptoms (Fall Risk) presence of risk factors     Goal: Absence of Fall  Outcome: Ongoing (interventions implemented as appropriate)   08/05/18 0529   Fall Risk (Adult)   Absence of Fall making progress toward outcome

## 2018-08-06 VITALS
HEART RATE: 58 BPM | WEIGHT: 227 LBS | RESPIRATION RATE: 16 BRPM | OXYGEN SATURATION: 99 % | BODY MASS INDEX: 37.82 KG/M2 | DIASTOLIC BLOOD PRESSURE: 85 MMHG | TEMPERATURE: 98.4 F | SYSTOLIC BLOOD PRESSURE: 155 MMHG | HEIGHT: 65 IN

## 2018-08-06 LAB
ANION GAP SERPL CALCULATED.3IONS-SCNC: 5 MMOL/L (ref 3–11)
BASOPHILS # BLD AUTO: 0.02 10*3/MM3 (ref 0–0.2)
BASOPHILS NFR BLD AUTO: 0.3 % (ref 0–1)
BH CV ECHO MEAS - AO MAX PG (FULL): 12 MMHG
BH CV ECHO MEAS - AO MAX PG: 16 MMHG
BH CV ECHO MEAS - AO MEAN PG (FULL): 5.4 MMHG
BH CV ECHO MEAS - AO MEAN PG: 7.3 MMHG
BH CV ECHO MEAS - AO ROOT AREA (BSA CORRECTED): 1.5
BH CV ECHO MEAS - AO ROOT AREA: 7.4 CM^2
BH CV ECHO MEAS - AO ROOT DIAM: 3.1 CM
BH CV ECHO MEAS - AO V2 MAX: 200.9 CM/SEC
BH CV ECHO MEAS - AO V2 MEAN: 126.6 CM/SEC
BH CV ECHO MEAS - AO V2 VTI: 35.7 CM
BH CV ECHO MEAS - ASC AORTA: 2.5 CM
BH CV ECHO MEAS - AVA(I,A): 2.3 CM^2
BH CV ECHO MEAS - AVA(I,D): 2.3 CM^2
BH CV ECHO MEAS - AVA(V,A): 1.8 CM^2
BH CV ECHO MEAS - AVA(V,D): 1.8 CM^2
BH CV ECHO MEAS - BSA(HAYCOCK): 2.2 M^2
BH CV ECHO MEAS - BSA: 2.1 M^2
BH CV ECHO MEAS - BZI_BMI: 37.8 KILOGRAMS/M^2
BH CV ECHO MEAS - BZI_METRIC_HEIGHT: 165.1 CM
BH CV ECHO MEAS - BZI_METRIC_WEIGHT: 103 KG
BH CV ECHO MEAS - CONTRAST EF (2CH): 64.5 ML/M^2
BH CV ECHO MEAS - CONTRAST EF 4CH: 59.7 ML/M^2
BH CV ECHO MEAS - EDV(CUBED): 70.6 ML
BH CV ECHO MEAS - EDV(MOD-SP2): 62 ML
BH CV ECHO MEAS - EDV(MOD-SP4): 67 ML
BH CV ECHO MEAS - EDV(TEICH): 75.6 ML
BH CV ECHO MEAS - EF(CUBED): 70.3 %
BH CV ECHO MEAS - EF(MOD-SP2): 64.5 %
BH CV ECHO MEAS - EF(MOD-SP4): 59.7 %
BH CV ECHO MEAS - EF(TEICH): 62.4 %
BH CV ECHO MEAS - ESV(CUBED): 21 ML
BH CV ECHO MEAS - ESV(MOD-SP2): 22 ML
BH CV ECHO MEAS - ESV(MOD-SP4): 27 ML
BH CV ECHO MEAS - ESV(TEICH): 28.4 ML
BH CV ECHO MEAS - FS: 33.3 %
BH CV ECHO MEAS - IVS/LVPW: 1
BH CV ECHO MEAS - IVSD: 1.2 CM
BH CV ECHO MEAS - LAT PEAK E' VEL: 13 CM/SEC
BH CV ECHO MEAS - LV DIASTOLIC VOL/BSA (35-75): 32.1 ML/M^2
BH CV ECHO MEAS - LV MASS(C)D: 173.9 GRAMS
BH CV ECHO MEAS - LV MASS(C)DI: 83.3 GRAMS/M^2
BH CV ECHO MEAS - LV MAX PG: 4 MMHG
BH CV ECHO MEAS - LV MEAN PG: 1.9 MMHG
BH CV ECHO MEAS - LV SYSTOLIC VOL/BSA (12-30): 12.9 ML/M^2
BH CV ECHO MEAS - LV V1 MAX: 99.7 CM/SEC
BH CV ECHO MEAS - LV V1 MEAN: 61.5 CM/SEC
BH CV ECHO MEAS - LV V1 VTI: 22.7 CM
BH CV ECHO MEAS - LVIDD: 4.1 CM
BH CV ECHO MEAS - LVIDS: 2.8 CM
BH CV ECHO MEAS - LVLD AP2: 6.5 CM
BH CV ECHO MEAS - LVLD AP4: 7.4 CM
BH CV ECHO MEAS - LVLS AP2: 4.7 CM
BH CV ECHO MEAS - LVLS AP4: 6.3 CM
BH CV ECHO MEAS - LVOT AREA (M): 3.5 CM^2
BH CV ECHO MEAS - LVOT AREA: 3.6 CM^2
BH CV ECHO MEAS - LVOT DIAM: 2.1 CM
BH CV ECHO MEAS - LVPWD: 1.2 CM
BH CV ECHO MEAS - MED PEAK E' VEL: 8.11 CM/SEC
BH CV ECHO MEAS - MV A MAX VEL: 98.2 CM/SEC
BH CV ECHO MEAS - MV DEC TIME: 0.27 SEC
BH CV ECHO MEAS - MV E MAX VEL: 61.7 CM/SEC
BH CV ECHO MEAS - MV E/A: 0.63
BH CV ECHO MEAS - MV MAX PG: 4 MMHG
BH CV ECHO MEAS - MV MEAN PG: 1.6 MMHG
BH CV ECHO MEAS - MV V2 MAX: 100.2 CM/SEC
BH CV ECHO MEAS - MV V2 MEAN: 57.6 CM/SEC
BH CV ECHO MEAS - MV V2 VTI: 20.3 CM
BH CV ECHO MEAS - MVA(VTI): 4 CM^2
BH CV ECHO MEAS - PA ACC SLOPE: 817.7 CM/SEC^2
BH CV ECHO MEAS - PA ACC TIME: 0.11 SEC
BH CV ECHO MEAS - PA MAX PG: 5.6 MMHG
BH CV ECHO MEAS - PA PR(ACCEL): 31.5 MMHG
BH CV ECHO MEAS - PA V2 MAX: 118.1 CM/SEC
BH CV ECHO MEAS - SI(AO): 126.7 ML/M^2
BH CV ECHO MEAS - SI(CUBED): 23.8 ML/M^2
BH CV ECHO MEAS - SI(LVOT): 39 ML/M^2
BH CV ECHO MEAS - SI(MOD-SP2): 19.2 ML/M^2
BH CV ECHO MEAS - SI(MOD-SP4): 19.2 ML/M^2
BH CV ECHO MEAS - SI(TEICH): 22.6 ML/M^2
BH CV ECHO MEAS - SV(AO): 264.5 ML
BH CV ECHO MEAS - SV(CUBED): 49.6 ML
BH CV ECHO MEAS - SV(LVOT): 81.4 ML
BH CV ECHO MEAS - SV(MOD-SP2): 40 ML
BH CV ECHO MEAS - SV(MOD-SP4): 40 ML
BH CV ECHO MEAS - SV(TEICH): 47.2 ML
BH CV ECHO MEAS - TAPSE (>1.6): 3.13 CM2
BH CV ECHO MEASUREMENTS AVERAGE E/E' RATIO: 5.85
BH CV XLRA - RV BASE: 3.4 CM
BH CV XLRA - RV LENGTH: 6.5 CM
BH CV XLRA - RV MID: 2.9 CM
BH CV XLRA - TDI S': 14.6 CM/SEC
BUN BLD-MCNC: 21 MG/DL (ref 9–23)
BUN/CREAT SERPL: 26.9 (ref 7–25)
CALCIUM SPEC-SCNC: 7.9 MG/DL (ref 8.7–10.4)
CHLORIDE SERPL-SCNC: 108 MMOL/L (ref 99–109)
CO2 SERPL-SCNC: 28 MMOL/L (ref 20–31)
CREAT BLD-MCNC: 0.78 MG/DL (ref 0.6–1.3)
DEPRECATED RDW RBC AUTO: 51 FL (ref 37–54)
EOSINOPHIL # BLD AUTO: 0.38 10*3/MM3 (ref 0–0.3)
EOSINOPHIL NFR BLD AUTO: 6.6 % (ref 0–3)
ERYTHROCYTE [DISTWIDTH] IN BLOOD BY AUTOMATED COUNT: 13.9 % (ref 11.3–14.5)
GFR SERPL CREATININE-BSD FRML MDRD: 72 ML/MIN/1.73
GLUCOSE BLD-MCNC: 122 MG/DL (ref 70–100)
GLUCOSE BLDC GLUCOMTR-MCNC: 136 MG/DL (ref 70–130)
GLUCOSE BLDC GLUCOMTR-MCNC: 163 MG/DL (ref 70–130)
GLUCOSE BLDC GLUCOMTR-MCNC: 179 MG/DL (ref 70–130)
HCT VFR BLD AUTO: 34.7 % (ref 34.5–44)
HGB BLD-MCNC: 11.1 G/DL (ref 11.5–15.5)
IMM GRANULOCYTES # BLD: 0.02 10*3/MM3 (ref 0–0.03)
IMM GRANULOCYTES NFR BLD: 0.3 % (ref 0–0.6)
LEFT ATRIUM VOLUME INDEX: 23 ML/M2
LV EF 2D ECHO EST: 55 %
LYMPHOCYTES # BLD AUTO: 1.33 10*3/MM3 (ref 0.6–4.8)
LYMPHOCYTES NFR BLD AUTO: 23 % (ref 24–44)
MAXIMAL PREDICTED HEART RATE: 147 BPM
MCH RBC QN AUTO: 32.4 PG (ref 27–31)
MCHC RBC AUTO-ENTMCNC: 32 G/DL (ref 32–36)
MCV RBC AUTO: 101.2 FL (ref 80–99)
MONOCYTES # BLD AUTO: 0.47 10*3/MM3 (ref 0–1)
MONOCYTES NFR BLD AUTO: 8.1 % (ref 0–12)
NEUTROPHILS # BLD AUTO: 3.58 10*3/MM3 (ref 1.5–8.3)
NEUTROPHILS NFR BLD AUTO: 62 % (ref 41–71)
PLATELET # BLD AUTO: 136 10*3/MM3 (ref 150–450)
PMV BLD AUTO: 10.5 FL (ref 6–12)
POTASSIUM BLD-SCNC: 4.4 MMOL/L (ref 3.5–5.5)
RBC # BLD AUTO: 3.43 10*6/MM3 (ref 3.89–5.14)
SODIUM BLD-SCNC: 141 MMOL/L (ref 132–146)
STRESS TARGET HR: 125 BPM
WBC NRBC COR # BLD: 5.78 10*3/MM3 (ref 3.5–10.8)

## 2018-08-06 PROCEDURE — 85025 COMPLETE CBC W/AUTO DIFF WBC: CPT | Performed by: HOSPITALIST

## 2018-08-06 PROCEDURE — 80048 BASIC METABOLIC PNL TOTAL CA: CPT | Performed by: HOSPITALIST

## 2018-08-06 PROCEDURE — 99232 SBSQ HOSP IP/OBS MODERATE 35: CPT | Performed by: INTERNAL MEDICINE

## 2018-08-06 PROCEDURE — 82962 GLUCOSE BLOOD TEST: CPT

## 2018-08-06 PROCEDURE — 63710000001 PROMETHAZINE PER 12.5 MG: Performed by: INTERNAL MEDICINE

## 2018-08-06 PROCEDURE — 94799 UNLISTED PULMONARY SVC/PX: CPT

## 2018-08-06 PROCEDURE — 94640 AIRWAY INHALATION TREATMENT: CPT

## 2018-08-06 PROCEDURE — 99239 HOSP IP/OBS DSCHRG MGMT >30: CPT | Performed by: HOSPITALIST

## 2018-08-06 PROCEDURE — G0108 DIAB MANAGE TRN  PER INDIV: HCPCS

## 2018-08-06 RX ORDER — CARVEDILOL 12.5 MG/1
12.5 TABLET ORAL 2 TIMES DAILY WITH MEALS
Status: DISCONTINUED | OUTPATIENT
Start: 2018-08-06 | End: 2018-08-06 | Stop reason: HOSPADM

## 2018-08-06 RX ADMIN — CARVEDILOL 6.25 MG: 6.25 TABLET, FILM COATED ORAL at 08:56

## 2018-08-06 RX ADMIN — INSULIN LISPRO 2 UNITS: 100 INJECTION, SOLUTION INTRAVENOUS; SUBCUTANEOUS at 08:57

## 2018-08-06 RX ADMIN — APIXABAN 10 MG: 5 TABLET, FILM COATED ORAL at 06:32

## 2018-08-06 RX ADMIN — HYDROCODONE BITARTRATE AND ACETAMINOPHEN 1 TABLET: 10; 325 TABLET ORAL at 06:33

## 2018-08-06 RX ADMIN — SACUBITRIL AND VALSARTAN 1 TABLET: 49; 51 TABLET, FILM COATED ORAL at 08:56

## 2018-08-06 RX ADMIN — GABAPENTIN 300 MG: 300 CAPSULE ORAL at 06:26

## 2018-08-06 RX ADMIN — IPRATROPIUM BROMIDE AND ALBUTEROL SULFATE 3 ML: 2.5; .5 SOLUTION RESPIRATORY (INHALATION) at 06:43

## 2018-08-06 RX ADMIN — PROMETHAZINE HYDROCHLORIDE 12.5 MG: 12.5 TABLET ORAL at 08:55

## 2018-08-06 RX ADMIN — ASPIRIN 81 MG: 81 TABLET, COATED ORAL at 08:55

## 2018-08-06 RX ADMIN — GABAPENTIN 300 MG: 300 CAPSULE ORAL at 15:57

## 2018-08-06 NOTE — DISCHARGE SUMMARY
[facility] Medicine Services  DISCHARGE SUMMARY    Patient Name: Angelita Shay  : 1945  MRN: [id number]    Date of Admission: 8/3/2018  Date of Discharge:  2018  Primary Care Physician: Sharon Saldivar MD    Consults     Date and Time Order Name Status Description    8/3/2018 5617 Inpatient Pulmonology Consult Completed         Hospital Course     Presenting Problem:   Acute kidney injury (CMS/LTAC, located within [facility]) [N17.9]    Active Hospital Problems    Diagnosis Date Noted   • **Other pulmonary embolism without acute cor pulmonale (CMS/HCC) [I26.99] 2018   • Acute kidney injury (CMS/HCC) [N17.9] 2018   • Acute on chronic respiratory failure with hypoxia (CMS/LTAC, located within [facility]) [J96.21] 2018   • CKD (chronic kidney disease), stage III [N18.3] 2018   • Dehydration [E86.0] 2018   • Paroxysmal atrial fibrillation (CMS/[facility]AC, located within [facility]) [I48.0] 2018   • Coronary artery disease involving native coronary artery of native heart with angina pectoris (CMS/LTAC, located within [facility]) [I25.119] 2016   • GERD (gastroesophageal reflux disease) [K21.9] 2016   • Moderate obstructive sleep apnea [G47.33] 2016      Resolved Hospital Problems    Diagnosis Date Noted Date Resolved   No resolved problems to display.          Hospital Course:  Angelita Shay is a 72 y.o. female who has h/o chronic systolic CHF with estimated LVEF of 36%,  PAH, CKD III, CAD, s/p prior PCI, DM2, depression/anxiety, chronic a-fib( but not currently anticoagulated due to recent GIB/surgery), and recent CCY by Dr Pantoja on , who presented to the ED after a fall/poss syncope and SOA/chest pain. She was found to have high probability of a PE on VQ scan and started on hep gtt, which was later transitioned to Eliquis after H/H remained stable. PE thought to be provokeby by recent CCY. Echo and LE duplex unrevealing. Pulm did follow pt and recommended at least 3 months of AC for PE and indefinitely for Afib if tolerating it (h/o  prior GIB). CHADSVASC 5. Of note, pt may have had a syncopal episode prior to admission as well, likely from dehydration/hypotension. She was found to be dehydrated with associated GORDON, which resolved after IVF.        Discharge Follow Up Recommendations for labs/diagnostics:          Day of Discharge     HPI:   Doing well, up in chair and breathing comfortably on RA. Co epigastric/RUQ pain, c/w MSK/incisional pain on exam. No other complaints.    Review of Systems  Otherwise ROS is negative except as mentioned in the HPI.    Vital Signs:   Temp:  [97.7 °F (36.5 °C)-98.6 °F (37 °C)] 98.5 °F (36.9 °C)  Heart Rate:  [] 86  Resp:  [16-18] 16  BP: (124-159)/(74-96) 136/96     Physical Exam:  General Assessment: No acute cardiopulmonary distress. Well developed and well nourished.     HEENT: NCAT, PERRL, MM moist     Neck: Supple     CVS: RRR, S1S2 normal     Resp: CTAB, no adventitious sound     Abd: soft, diffuse mild abd wall tenderness, ND, normal BS, no guarding or peritoneal signs, dressings intact     Ext:  1+ pitting edema.     Neuro: Nonfocal     Skin: W/D/I. No rash.     Psych: Affect is appropriate    Pertinent  and/or Most Recent Results       Results from last 7 days  Lab Units 08/06/18  0443 08/05/18  0736 08/05/18  0043 08/04/18  1342 08/04/18  0651 08/04/18  0014 08/03/18  1916 08/02/18  0820   WBC 10*3/mm3 5.78  --   --   --  6.62 7.14 7.60  --    HEMOGLOBIN g/dL 11.1* 11.8 11.4* 11.5 11.5 12.0 12.9  --    HEMATOCRIT % 34.7 37.7 36.3 36.8 36.3 37.9 40.3  --    PLATELETS 10*3/mm3 136*  --   --   --  126* 132* 145*  --    SODIUM mmol/L 141 139  --   --  138  --  140 138   POTASSIUM mmol/L 4.4 4.7  --   --  3.8  --  4.6 3.9   CHLORIDE mmol/L 108 107  --   --  105  --  100 105   CO2 mmol/L 28.0 28.0  --   --  25.0  --  28.0 26.0   BUN mg/dL 21 33*  --   --  37*  --  42* 23   CREATININE mg/dL 0.78 1.06  --   --  1.86*  --  2.53* 1.20   GLUCOSE mg/dL 122* 165*  --   --  144*  --  142* 203*   CALCIUM  mg/dL 7.9* 7.9*  --   --  8.0*  --  8.5* 7.9*       Results from last 7 days  Lab Units 08/05/18  1335 08/05/18  0736 08/04/18 2021 08/04/18  1342 08/04/18  0651 08/04/18  0014 08/03/18  1916   BILIRUBIN mg/dL  --   --   --   --   --   --  0.4   ALK PHOS U/L  --   --   --   --   --   --  67   ALT (SGPT) U/L  --   --   --   --   --   --  33   AST (SGOT) U/L  --   --   --   --   --   --  67*   PROTIME Seconds  --   --   --   --   --  10.2 10.0   INR   --   --   --   --   --  0.97 0.95   APTT seconds 24.7* 56.1 57.4 64.0 75.8* 26.7* 26.3           Invalid input(s): TG, LDLCALC, LDLREALC      Brief Urine Lab Results  (Last result in the past 365 days)      Color   Clarity   Blood   Leuk Est   Nitrite   Protein   CREAT   Urine HCG        05/16/18 1038 Dark Yellow(A) Cloudy(A) Negative Negative Negative 100 mg/dL (2+)(A)               Microbiology Results Abnormal     None          Imaging Results (all)     Procedure Component Value Units Date/Time    NM Lung Ventilation Perfusion [[id number]] Collected:  08/03/18 2111     Updated:  08/03/18 2327    Addenda:        THIS REPORT CONTAINS FINDINGS THAT MAY BE CRITICAL TO PATIENT CARE. The   findings were verbally communicated via telephone conference with PADDY Waller at 11:27 PM EDT on 8/3/2018. The findings were acknowledged and   understood.    THIS DOCUMENT HAS BEEN ELECTRONICALLY SIGNED BY TEX VILLALTA JR. MD  Signed:  08/03/18 2327 by Tex Villalta Jr., MD    Narrative:       EXAM:  NM Lung Perfusion and Ventilation Scan    EXAM DATE/TIME:  8/3/2018 9:11 PM    CLINICAL HISTORY:  72 years old, female; Acute kidney failure, unspecified;   Hypoxemia; Syncope and collapse; Signs and symptoms; Dyspnea and other: S/P   april < 1 wk ago; Additional info: Pe suspected, dyspnea, syncope, hypoxemia    TECHNIQUE:  Nuclear Medicine ventilation and perfusion images of the lungs were   obtained in multiple projections following inhalation of 40 mCi of xenon-133    followed by injection of 5.05 mCi of Tc99m MAA.    COMPARISON:  CR - XR CHEST 2 VW 2018-08-03 20:23    FINDINGS:    Ventilation:  Mildly heterogeneous bilateral ventilation with delayed left   lung washout.    Perfusion:  Large left upper and lower lobe mismatched perfusion defects.      Impression:         High probability study for pulmonary embolism.    THIS DOCUMENT HAS BEEN ELECTRONICALLY SIGNED BY TEX VILLALTA JR. MD    CT Pelvis Without Contrast [[id number]] Collected:  08/03/18 1843     Updated:  08/03/18 2151    Addenda:        Addendum:  The patient is reportedly status post relatively recent laparoscopic   cholecystectomy.    The findings were discussed with PADDY GROVE on 8/3/2018 9:50 PM EDT.    THIS DOCUMENT HAS BEEN ELECTRONICALLY SIGNED BY BILL STOVALL MD  Signed:  08/03/18 2151 by Bill Stovall MD    Narrative:       EXAM:  CT Pelvis Without Intravenous Contrast    EXAM DATE/TIME:  8/3/2018 6:43 PM    CLINICAL HISTORY:  72 years old, female; Signs and symptoms; Other: See notes; Additional info:   Pelvis trauma, FX suspected, first study    TECHNIQUE:  Axial computed tomography images of the pelvis without intravenous contrast.    All CT scans at this facility use at least one of these dose optimization   techniques: automated exposure control; mA and/or kV adjustment per patient   size (includes targeted exams where dose is matched to clinical indication); or   iterative reconstruction.  Coronal and sagittal reformatted images were created and reviewed.    COMPARISON:  CT ABDOMEN PELVIS WO CONTRAST 2018-07-10 12:42    FINDINGS:  No acute fracture or dislocation is seen. Some degenerative changes of the   femoroacetabular joints, including relative loss of upper joint space. Mild   relative asymmetry of some of the medial lateral aspects of the femoral necks   is noted but felt to be chronic and, given differences in technique between the   present and comparison scans, grossly  unchanged. Chronic changes of the   sacroiliac joints. Degenerative changes of the visualized lower lumbar spine,   including at least mild anterior spinal canal narrowing at least at the   visualized L2-L3 level and at L3-L4.  No suspicious focal blastic or lytic bone   lesion is seen.     One or more more areas of suspected subcutaneous contusion and perhaps edema   particularly in the visualized left flank region, and to some extent relatively   conspicuous in the lower anterior abdominal wall and the right gluteal region.   No overlying radiopaque foreign body is seen.     At least mild amount of complex-appearing free fluid concerning for blood   products including relatively acute to subacute hematoma is seen in the deeper   pelvis, including the cul-de-sac; however, this fluid is not seen on the   comparison study and is of uncertain origin.    Again noted is relative prominence of the visualized left adnexal region,   although somewhat nonspecific. No definite right adnexal mass. Grossly   unremarkable uterus.    Grossly unremarkable urinary bladder.    Incompletely imaged mid to lower anterior abdominal wall fat containing hernia   with some overlying gas, and a few small foci of gas elsewhere in the   visualized mid to lower anterior abdominal wall.    Some abdominal and upper right hemipelvis clips are noted and appear to at   least partly reflect a right hemicolectomy. Fluid or fluid-like material is   noted in the more proximal visualized colon and could reflect a diarrheal   illness but otherwise no definite evidence of acute colitis seen.        Impression:       No acute fracture or dislocation is seen.    One or more suspected areas of subcutaneous contusion.    Complex-appearing pelvic fluid fluid at least partly compatible with blood   products is seen but of uncertain origin. Abdominal and pelvic postoperative   changes are also noted. For further evaluation, follow-up imaging might include   the  upper abdomen.    Other findings as above.    THIS DOCUMENT HAS BEEN ELECTRONICALLY SIGNED BY BILL VIVEROS MD    XR Chest 2 View [[id number]] Collected:  08/03/18 2001     Updated:  08/03/18 2127    Narrative:       EXAM:  XR Chest, 2 Views    EXAM DATE/TIME:  8/3/2018 8:01 PM    CLINICAL HISTORY:  72 years old, female; Pain; Chest pain; Additional info: Chest pain S/P fall,   dyspnea    TECHNIQUE:  Frontal and lateral views of the chest.    COMPARISON:  CR - XR RIBS LEFT W PA CHEST 2018-06-06 23:57    FINDINGS:  The patient is somewhat rotated. Otherwise:     Continued relatively prominent left heart border. Somewhat tortuous thoracic   aorta.     No dense lung consolidation is seen bilaterally. Otherwise, possible   atelectatic versus inflammatory changes toward the right lung base.     No pleural effusion bilaterally.    No pneumothorax bilaterally.    Continued somewhat elevated or eventrated right hemidiaphragm.      Grossly intact visualized skeletal structures status post right and left   shoulder arthroplasties.        Impression:       No definite acute disease process is seen in the chest.    Other findings as above.      THIS DOCUMENT HAS BEEN ELECTRONICALLY SIGNED BY BILL VIVEROS MD          Results for orders placed during the hospital encounter of 08/03/18   Duplex Venous Lower Extremity - Bilateral CAR    Narrative · Normal bilateral lower extremity venous duplex scan.          Results for orders placed during the hospital encounter of 08/03/18   Duplex Venous Lower Extremity - Bilateral CAR    Narrative · Normal bilateral lower extremity venous duplex scan.          Results for orders placed during the hospital encounter of 08/03/18   Adult Transthoracic Echo Complete W/ Cont if Necessary Per Protocol    Narrative · Left ventricular systolic function is normal. Estimated EF = 55%.  · Left ventricular wall thickness is consistent with mild concentric   hypertrophy.  · Left ventricular diastolic  dysfunction (grade I) consistent with   impaired relaxation.            Discharge Details        Discharge Medications      New Medications      Instructions Start Date   apixaban 5 MG tablet tablet  Commonly known as:  ELIQUIS   10 mg, Oral, Every 12 Hours Scheduled      apixaban 5 MG tablet tablet  Commonly known as:  ELIQUIS   5 mg, Oral, Every 12 Hours Scheduled   Start Date:  8/12/2018        Changes to Medications      Instructions Start Date   NOVOLOG 100 UNIT/ML injection  Generic drug:  insulin aspart  What changed:  · how to take this  · additional instructions   **Per patient's Sliding scale**      nystatin 354121 UNIT/GM powder  Commonly known as:  MYCOSTATIN  What changed:  how much to take   Topical, Every 12 Hours Scheduled         Continue These Medications      Instructions Start Date   aspirin 81 MG EC tablet   81 mg, Oral, Daily, Resume in 2 weeks      atorvastatin 20 MG tablet  Commonly known as:  LIPITOR   20 mg, Oral, Nightly      carvedilol 3.125 MG tablet  Commonly known as:  COREG   6.25 mg, Oral, 2 Times Daily With Meals      furosemide 40 MG tablet  Commonly known as:  LASIX   40 mg, Oral, Daily      gabapentin 300 MG capsule  Commonly known as:  NEURONTIN   300 mg, Oral, 4 Times Daily      HYDROcodone-acetaminophen  MG per tablet  Commonly known as:  NORCO   1 tablet, Oral, Every 4 Hours PRN      LINZESS 290 MCG capsule capsule  Generic drug:  linaclotide   290 mcg, Oral, Every Morning Before Breakfast      MILK OF MAGNESIA 400 MG/5ML suspension  Generic drug:  magnesium hydroxide   15 mL, Oral, Daily PRN      ondansetron 4 MG tablet  Commonly known as:  ZOFRAN   4 mg, Oral, Every 6 Hours PRN      polyethylene glycol powder  Commonly known as:  MIRALAX   17 g, Oral, Daily PRN      rOPINIRole 1 MG tablet  Commonly known as:  REQUIP   1-3 mg, Oral, Nightly, Take 1 hour before bedtime.      sacubitril-valsartan 49-51 MG tablet  Commonly known as:  ENTRESTO   1 tablet, Oral, Every 12  Hours Scheduled      traZODone 50 MG tablet  Commonly known as:  DESYREL   TAKE ONE TO TWO TABLETS BY MOUTH EVERY NIGHT AT BEDTIME               Discharge Disposition:  Home or Self Care    Discharge Diet:      Discharge Activity:           Special Instructions:    Code Status/Level of Support:  Code Status and Medical Interventions:   Ordered at: 08/03/18 2210     Level Of Support Discussed With:    Patient     Code Status:    CPR     Medical Interventions (Level of Support Prior to Arrest):    Full       Future Appointments  Date Time Provider Department Center   8/10/2018 1:45 PM Sharon Saldivar MD [facility] None   8/20/2018 1:00 PM Sharon Saldivar MD [facility] None   8/23/2018 10:45 AM Tosha Melvin APRN [facility]   9/17/2018 9:45 AM Brennen Back MD [facility] None   11/5/2018 9:00 AM Wilbur Galarza MD [facility] None       Additional Instructions for the Follow-ups that You Need to Schedule     Discharge Follow-up with PCP    As directed      Currently Documented PCP:  Sharon Saldivar MD  PCP Phone Number:  [phone]    Follow Up Details:  Please call for appnt         Discharge Follow-up with Specialty: Dr Pantoja    As directed      Specialty:  Dr Pantoja    Follow Up Details:  Please keep previously scheduled appnt         Discharge Follow-up with Specialty: Pulm    As directed      Specialty:  Pulm    Follow Up Details:  Please call for appnt               Time Spent on Discharge:  50 minutes    Electronically signed by Isaías Aguiar MD, 08/06/18, 3:19 PM.

## 2018-08-06 NOTE — PROGRESS NOTES
INTENSIVIST / PULMONARY FOLLOW UP NOTE     Hospital:  LOS: 3 days   Ms. Angelita Shay, 72 y.o. female is followed for:   Principal Problem:    Other pulmonary embolism without acute cor pulmonale (CMS/HCC)  Active Problems:    GERD (gastroesophageal reflux disease)    Coronary artery disease involving native coronary artery of native heart with angina pectoris (CMS/HCC)    Moderate obstructive sleep apnea    Paroxysmal atrial fibrillation (CMS/HCC)    Dehydration    CKD (chronic kidney disease), stage III    Acute on chronic respiratory failure with hypoxia (CMS/HCC)    Acute kidney injury (CMS/HCC)          SUBJECTIVE   Doing well, no complaints, no overt bleeding    The patient's relevant past medical, surgical, family, and social history were reviewed    Allergies and medications were reviewed    ROS:  Per subjective, all other systems were reviewed and were negative        OBJECTIVE     Vital Sign Min/Max for last 24 hours:  Temp  Min: 97.7 °F (36.5 °C)  Max: 98.6 °F (37 °C)   BP  Min: 124/81  Max: 159/87   Pulse  Min: 68  Max: 130   Resp  Min: 16  Max: 18   SpO2  Min: 99 %  Max: 100 %   No Data Recorded     Physical Exam:  General Appearance:  Conversant, in no acute distress  Eyes:  No scleral icterus or pallor, pupils normal  Ears, Nose, Mouth, Throat:  Atraumatic, oropharynx clear  Neck:  Trachea midline, thyroid normal  Respiratory:  Clear to auscultation bilaterally, normal effort, no tenderness to palpation  Cardiovascular:  Regular rate and rhythm, no murmurs, no peripheral edema, no thrill  Gastrointestinal:  Soft, non-tender, non-distended, no hepatosplenomegaly  Skin:  Normal temperature, no rash  Psychiatric:  Alert and oriented x 3, normal judgement and insight  Neuro:  No new focal neurologic deficits observed    Telemetry:              Hemodynamics:   CVP:     PAP:     PAOP:     CO:     CI:     SVI:     SVR:       SpO2: 99 % SpO2  Min: 99 %  Max: 100 %   Device:      Flow Rate:   No Data Recorded      Mechanical Ventilator Settings:                                         Intake/Ouptut 24 hrs (7:00AM - 6:59 AM)  Intake & Output (last 3 days)       08/03 0701 - 08/04 0700 08/04 0701 - 08/05 0700 08/05 0701 - 08/06 0700 08/06 0701 - 08/07 0700    P.O.   720 240    I.V. (mL/kg)  1000 (9.7)      Total Intake(mL/kg)  1000 (9.7) 720 (7) 240 (2.3)    Urine (mL/kg/hr)  1800 (0.7) 2650 (1.1)     Total Output   1800 2650      Net   -800 -1930 +240            Unmeasured Urine Occurrence  1 x      Unmeasured Stool Occurrence   2 x           Lines, Drains & Airways    Active LDAs     Name:   Placement date:   Placement time:   Site:   Days:    Peripheral IV 08/04/18 0900 Right;Lower Forearm  08/04/18    0900    Forearm    2                Hematology:    Results from last 7 days  Lab Units 08/06/18  0443 08/05/18  0736 08/05/18  0043 08/04/18  1342 08/04/18  0651 08/04/18  0014 08/03/18  1916 07/30/18  1444   WBC 10*3/mm3 5.78  --   --   --  6.62 7.14 7.60 7.02   HEMOGLOBIN g/dL 11.1* 11.8 11.4* 11.5 11.5 12.0 12.9 15.3   HEMATOCRIT % 34.7 37.7 36.3 36.8 36.3 37.9 40.3 46.0*   PLATELETS 10*3/mm3 136*  --   --   --  126* 132* 145* 167     Electrolytes, Magnesium and Phosphorus:    Results from last 7 days  Lab Units 08/06/18  0443 08/05/18  0736 08/04/18  0651 08/03/18  1916 08/02/18  0820 07/30/18  1444   SODIUM mmol/L 141 139 138 140 138 142   CHLORIDE mmol/L 108 107 105 100 105 105   POTASSIUM mmol/L 4.4 4.7 3.8 4.6 3.9 3.8   CO2 mmol/L 28.0 28.0 25.0 28.0 26.0 27.0     Renal:    Results from last 7 days  Lab Units 08/06/18  0443 08/05/18  0736 08/04/18  0651 08/03/18  1916 08/02/18  0820 07/30/18  1444   CREATININE mg/dL 0.78 1.06 1.86* 2.53* 1.20 1.41*   BUN mg/dL 21 33* 37* 42* 23 25*     Estimated Creatinine Clearance: 74.5 mL/min (by C-G formula based on SCr of 0.78 mg/dL).  Hepatic:    Results from last 7 days  Lab Units 08/03/18 1916   ALK PHOS U/L 67   BILIRUBIN mg/dL 0.4   ALT (SGPT) U/L 33   AST (SGOT) U/L 67*      Arterial Blood Gases:              Lab Results   Component Value Date    LACTATE 0.7 05/17/2018       Relevant imaging studies and labs from 08/06/18 were reviewed and interpreted by me    Medications (drips):         apixaban 10 mg Oral Q12H   Followed by      [START ON 8/12/2018] apixaban 5 mg Oral Q12H   aspirin 81 mg Oral Daily   atorvastatin 20 mg Oral Nightly   carvedilol 12.5 mg Oral BID With Meals   gabapentin 300 mg Oral Q8H   insulin lispro 0-7 Units Subcutaneous 4x Daily With Meals & Nightly   ipratropium-albuterol 3 mL Nebulization Once   rOPINIRole 1 mg Oral Nightly   sacubitril-valsartan 1 tablet Oral Q12H   sennosides-docusate sodium 2 tablet Oral Nightly       Assessment/Plan   IMPRESSION / PLAN     Inpatient Problem List:  72 y.o.female:  Hospital Problem List     * (Principal)Other pulmonary embolism without acute cor pulmonale (CMS/HCC)    GERD (gastroesophageal reflux disease)    Coronary artery disease involving native coronary artery of native heart with angina pectoris (CMS/HCC)    Overview Addendum 3/13/2018  5:16 PM by Festus Bowie IV, MD     · Previous PCI to the LAD  · Cardiac catheterization (8/2/2015): Mild nonobstructive CAD. Widely patent LAD stent. Normal LVEF.  · Cardiac catheterization (3/13/18): Mild to moderate nonobstructive CAD. Previously placed LAD stent widely patent.         Moderate obstructive sleep apnea    Paroxysmal atrial fibrillation (CMS/HCC) (Chronic)    Overview Addendum 3/13/2018  6:14 PM by Festus Bowie IV, MD     · Chads VASC = 6 (age, female, CHF, CAD, HTN, DM)         Dehydration    CKD (chronic kidney disease), stage III    Acute on chronic respiratory failure with hypoxia (CMS/HCC)    Acute kidney injury (CMS/HCC)           Impression:  72 y.o.female with relevant PMH of Afib on Eliquis, recent lap april admitted 8/3/2018 w/ SOA and GORDON.  V/Q on admission high probability.  Recent GIB, appears to be resolved.    Plan:  Presumed  PE - provoked by recent surgery and discontinuation of Eliquis.  Now stable back on Eliquis.  Recommend at least 3 months of full AC.  She will likely remain on this for Afib.  No LE DVT.    Call if any other questions       Warren Herman MD  Intensive Care Medicine  08/06/18 1:21 PM

## 2018-08-06 NOTE — PROGRESS NOTES
Continued Stay Note  [facility]     Patient Name: Angelita Shay  MRN: [id number]  Today's Date: 8/6/2018    Admit Date: 8/3/2018    Consent obtained for the participation in the [facility]. Vilma Vlileda RN        Discharge Plan    No documentation.             Discharge Codes    No documentation.           Vilma Villeda RN  Continued Stay Note  [facility]     Patient Name: Angelita Shay  MRN: [id number]  Today's Date: 8/6/2018    Admit Date: 8/3/2018          Discharge Plan    No documentation.             Discharge Codes    No documentation.           Vilma Villeda RN

## 2018-08-06 NOTE — PROGRESS NOTES
Discharge Planning Assessment  [facility]     Patient Name: Angelita Shay  MRN: [id number]  Today's Date: 8/6/2018    Admit Date: 8/3/2018          Discharge Needs Assessment     Row Name 08/06/18 1450       Living Environment    Lives With child(kaz), adult   pt resides in [facility]    Name(s) of Who Lives With Patient Medina Romeo    Current Living Arrangements home/apartment/condo    Primary Care Provided by self    Provides Primary Care For no one    Family Caregiver if Needed child(kaz), adult    Family Caregiver Names Medina and Pat    Quality of Family Relationships helpful;involved;supportive    Able to Return to Prior Arrangements yes       Resource/Environmental Concerns    Resource/Environmental Concerns none       Transition Planning    Patient/Family Anticipates Transition to home with family    Transportation Anticipated family or friend will provide       Discharge Needs Assessment    Readmission Within the Last 30 Days current reason for admission unrelated to previous admission    Concerns to be Addressed no discharge needs identified;denies needs/concerns at this time    Equipment Currently Used at Home glucometer;shower chair    Anticipated Changes Related to Illness none    Equipment Needed After Discharge none            Discharge Plan     Row Name 08/06/18 8956       Plan    Plan home     Patient/Family in Agreement with Plan yes    Plan Comments CM spoke with pt at bedside. Pt resides with her daughter and has assistance as needed. Pt reports prior to admission she was ind. of adl's. Pt plans to return home and denies discharge needs at this time. Pt was previously on Eliquis and has been placed back on it for discharge home. Pt reports she has had no issues with her insurance covering medication and denies issues affording the Eliquis. CM will continue to follow.         Destination     No service coordination in this encounter.      Durable Medical Equipment     No service  coordination in this encounter.      Dialysis/Infusion     No service coordination in this encounter.      Home Medical Care     No service coordination in this encounter.      Social Care     No service coordination in this encounter.                Demographic Summary     Row Name 08/06/18 1449       General Information    Referral Source admission list    Preferred Language English    General Information Comments PCP- Sharon Saldivar       Contact Information    Permission Granted to Share Info With     Contact Information Comments [phone]            Functional Status     Row Name 08/06/18 1449       Functional Status    Usual Activity Tolerance moderate    Current Activity Tolerance moderate       Functional Status, IADL    Medications independent    Meal Preparation independent    Housekeeping independent    Laundry independent    Shopping independent       Employment/    Employment/ Comments pt confirms she has [employer] Medicare and denies concerns or disruption in coverage. Pt reports she has prescription drug coverage and denies issues obtaining or affording curernt medications            Psychosocial    No documentation.           Abuse/Neglect    No documentation.           Legal    No documentation.           Substance Abuse    No documentation.           Patient Forms    No documentation.         Ophelia Rosas

## 2018-08-06 NOTE — CONSULTS
"Diabetes Education  Assessment/Teaching    Patient Name:  Angelita Shay  YOB: 1945  MRN: [id number]  Admit Date:  8/3/2018      Assessment Date:  8/6/2018    Most Recent Value   General Information    Referral From:  A1c   Height  165.1 cm (65\")   Height Method  Stated   Weight  103 kg (227 lb)   Weight Method  Standing scale   Have you had weight changes?  Yes   Describe weight changes  70 pound weight loss over the past year   Are you currently involved in an activity/exercise program?   No   Do you have high blood pressure?  yes   Are you currently being treated for high blood pressure?  yes   How would you rate your current health?  good   Pregnancy Assessment   Diabetes History   What type of diabetes do you have?  Type 2   Length of Diabetes Diagnosis  1 - 5 years   Current DM knowledge  good   Do you test your blood sugar at home?  yes   Frequency of checks  every morning    Meter type  Accu Check    Who performs the test?  self    Typical readings  \"120's\"    Have you had low blood sugar? (<70mg/dl)  -- [\"Maybe once\"]   How would you rate your diabetes control?  good   How often do you check your feet?  weekly   Do you have any diabetes complications?  circulation problems, heart disease, neuropathy   Education Preferences   What areas of diabetes would you like to learn about?  avoiding high blood sugar, avoiding low blood sugar, diabetes complications, exercise information, medications for diabetes, resources on diabetes   Nutrition Information   Are you currently following a special meal plan?  frequently [\"portion control with limited salt and sugar\"]   Assessment Topics   Healthy Eating - Assessment  Competent   Being Active - Assessment  Needs education   Taking Medication - Assessment  Competent   Problem Solving - Assessment  Competent   Reducing Risk - Assessment  Needs education   Healthy Coping - Assessment  Needs education   Monitoring - Assessment  Needs education   DM Goals    " "        Most Recent Value   DM Education Needs   Meter  Has own   Meter Type  Accuchek   Frequency of Testing  AC   Medication  Insulin   Problem Solving  Hypoglycemia, Hyperglycemia, Sick days, Signs, Symptoms, Treatment   Reducing Risks  Foot care, A1C testing   Physical Activity  None   Healthy Coping  Appropriate   Discharge Plan  Home [with daughter and son in law]   Motivation  Engaged   Teaching Method  Explanation, Discussion, Handouts   Patient Response  Verbalized understanding          Pt granted permission to be seen by diabetes educator. Pt education on DM completed. Pt given the [facility] \"what is diabetes\" and \"What's my A1C\"  education handouts and a blood sugar goal sheet. All hand outs reviewed with pt. Pt states she has an Accu Chek monitor at home and is comfortable performing the test. Reviewed recommended testing times and goals per ADA. States she self injects with Novolog, per sliding scale. Contact information provided as well as outpatient follow up class offered. Pt states will call if decides to attend.  Pt also advised to call us with any other questions or concerns. Thank you for the referral.       Electronically signed by:  Yuly Davenport RN  08/06/18 11:19 AM  "

## 2018-08-07 ENCOUNTER — TELEPHONE (OUTPATIENT)
Dept: INTERNAL MEDICINE | Facility: CLINIC | Age: 73
End: 2018-08-07

## 2018-08-07 ENCOUNTER — TRANSITIONAL CARE MANAGEMENT TELEPHONE ENCOUNTER (OUTPATIENT)
Dept: INTERNAL MEDICINE | Facility: CLINIC | Age: 73
End: 2018-08-07

## 2018-08-07 ENCOUNTER — READMISSION MANAGEMENT (OUTPATIENT)
Dept: CALL CENTER | Facility: HOSPITAL | Age: 73
End: 2018-08-07

## 2018-08-07 RX ORDER — GABAPENTIN 300 MG/1
300 CAPSULE ORAL 4 TIMES DAILY
Qty: 60 CAPSULE | Refills: 0 | Status: SHIPPED | OUTPATIENT
Start: 2018-08-07 | End: 2018-08-15 | Stop reason: SDUPTHER

## 2018-08-07 NOTE — OUTREACH NOTE
LAI call completed.  Please refer to TCM call flowsheet for call documentation.    [facility] recent admissions cholecystectomy / PE. The patient says she is not feeling the best today. She says she is feeling SOA. Pt says sx's worse when she is sleeping and she awakens feeling SOA. She denies any chest pain. The pt also c/o BLE edema. The pt also c/o nausea but not vomiting. She states she weighed herself yesterday 229lbs. She took a laxative last night second to constipation and a lasix 40mg today and weight today 214lbs. She c/o pain in her feet 10/10 for which she usually takes gabapentin but states she is out of the medication. She c/o CCY incisional pain 9/10, denies any incisional drainage or foul odors. She states her HH nurse is scheduled to visit her within the hour. She is planning to address sx's with HH nurse and plans to report to [facility] ED today if HH nurse agrees. Encouraged pt to report to ED and/or contact [facility] if sx's persist, worsen, or do not improve and she voiced understanding.    The pt also wants to follow up on gabapentin rx.  She says she recently lost the rx and states PCP Dr. Saldivar was going to run a Vu.  She wants to f/u on this and would like to know if Dr. Saldivar will be able to prescribe gabapentin.  Please call pt [phone]. Than you.

## 2018-08-07 NOTE — OUTREACH NOTE
Prep Survey      Responses   Facility patient discharged from?  [place]   Is patient eligible?  Yes   Discharge diagnosis  pulmonary embolism, s/p fall/poss syncope, Acute kidney injury    Does the patient have one of the following disease processes/diagnoses(primary or secondary)?  Other   Does the patient have Home health ordered?  No   Is there a DME ordered?  No   Prep survey completed?  Yes          Judith Rodriguez RN

## 2018-08-07 NOTE — TELEPHONE ENCOUNTER
Please see patient sx's in note.  Please also see question regarding gina and gabapentin / lost Rx.  Please call pt [phone].  Thank you! (Routing comment)   She lost her script for the gabapentin.  This is from her LAI call.

## 2018-08-08 ENCOUNTER — READMISSION MANAGEMENT (OUTPATIENT)
Dept: CALL CENTER | Facility: HOSPITAL | Age: 73
End: 2018-08-08

## 2018-08-08 RX ORDER — GABAPENTIN 300 MG/1
CAPSULE ORAL
Qty: 90 CAPSULE | Refills: 0 | OUTPATIENT
Start: 2018-08-08

## 2018-08-08 NOTE — OUTREACH NOTE
Medical Week 1 Survey      Responses   Facility patient discharged from?  [place]   Does the patient have one of the following disease processes/diagnoses(primary or secondary)?  Other   Is there a successful TCM telephone encounter documented?  Yes          Henry Lazcano RN

## 2018-08-15 ENCOUNTER — TELEPHONE (OUTPATIENT)
Dept: INTERNAL MEDICINE | Facility: CLINIC | Age: 73
End: 2018-08-15

## 2018-08-15 RX ORDER — GABAPENTIN 300 MG/1
300 CAPSULE ORAL 4 TIMES DAILY
Qty: 120 CAPSULE | Refills: 0 | Status: SHIPPED | OUTPATIENT
Start: 2018-08-15 | End: 2018-09-14 | Stop reason: SDUPTHER

## 2018-08-15 NOTE — TELEPHONE ENCOUNTER
PATIENT WAS CALLED TO RESCHEDULE FOR HER Monday APPT.  SHE STATED SHE WILL NEED A REFILL ON HER gabapentin (NEURONTIN) 300 MG capsule BEFORE SHE WILL BE BACK IN THE OFFICE

## 2018-08-16 ENCOUNTER — READMISSION MANAGEMENT (OUTPATIENT)
Dept: CALL CENTER | Facility: HOSPITAL | Age: 73
End: 2018-08-16

## 2018-08-16 NOTE — OUTREACH NOTE
Medical Week 2 Survey      Responses   Facility patient discharged from?  [place]   Does the patient have one of the following disease processes/diagnoses(primary or secondary)?  Other   Week 2 attempt successful?  Yes   Call start time  1630   Discharge diagnosis  pulmonary embolism, s/p fall/poss syncope, Acute kidney injury    Call end time  1642   Meds reviewed with patient/caregiver?  Yes   Is the patient having any side effects they believe may be caused by any medication additions or changes?  No   Does the patient have all medications ordered at discharge?  Yes   Is the patient taking all medications as directed (includes completed medication regime)?  Yes   Comments regarding appointments  Sees Tosha Melvin 08/23   Does the patient have a primary care provider?   Yes   Does the patient have an appointment with their PCP within 7 days of discharge?  Yes   Comments regarding PCP  Has a followup on 09/14 with Dr Saldivar   Has the patient kept scheduled appointments due by today?  Yes   Psychosocial issues?  No   Comments  Patient had 18 lb weight gain after hospitalization . She is weighing every day. Taking Lasix appropriately.    Did the patient receive a copy of their discharge instructions?  Yes   Nursing interventions  Reviewed instructions with patient   What is the patient's perception of their health status since discharge?  Improving   Is the patient/caregiver able to teach back signs and symptoms related to disease process for when to call PCP?  Yes   Is the patient/caregiver able to teach back signs and symptoms related to disease process for when to call 911?  Yes   Is the patient/caregiver able to teach back the hierarchy of who to call/visit for symptoms/problems? PCP, Specialist, Home health nurse, Urgent Care, ED, 911  Yes   Week 2 Call Completed?  Yes          David Duggan RN

## 2018-08-23 ENCOUNTER — HOSPITAL ENCOUNTER (OUTPATIENT)
Dept: GENERAL RADIOLOGY | Facility: HOSPITAL | Age: 73
Discharge: HOME | End: 2018-08-23
Admitting: NURSE PRACTITIONER

## 2018-08-23 ENCOUNTER — OFFICE VISIT (OUTPATIENT)
Dept: CARDIOLOGY | Facility: HOSPITAL | Age: 73
End: 2018-08-23

## 2018-08-23 VITALS
WEIGHT: 211.8 LBS | TEMPERATURE: 97.6 F | BODY MASS INDEX: 35.29 KG/M2 | HEIGHT: 65 IN | HEART RATE: 80 BPM | DIASTOLIC BLOOD PRESSURE: 80 MMHG | SYSTOLIC BLOOD PRESSURE: 158 MMHG | RESPIRATION RATE: 18 BRPM | OXYGEN SATURATION: 93 %

## 2018-08-23 DIAGNOSIS — I26.99 OTHER PULMONARY EMBOLISM WITHOUT ACUTE COR PULMONALE, UNSPECIFIED CHRONICITY (HCC): ICD-10-CM

## 2018-08-23 DIAGNOSIS — I42.8 NICM (NONISCHEMIC CARDIOMYOPATHY) (HCC): Primary | ICD-10-CM

## 2018-08-23 DIAGNOSIS — R53.83 FATIGUE, UNSPECIFIED TYPE: ICD-10-CM

## 2018-08-23 DIAGNOSIS — R06.02 SHORTNESS OF BREATH: ICD-10-CM

## 2018-08-23 DIAGNOSIS — Z00.00 PE (PHYSICAL EXAM), ANNUAL: ICD-10-CM

## 2018-08-23 DIAGNOSIS — R05.3 PERSISTENT COUGH: ICD-10-CM

## 2018-08-23 LAB
ANION GAP SERPL CALCULATED.3IONS-SCNC: 11 MMOL/L (ref 3–11)
BASOPHILS # BLD AUTO: 0.04 10*3/MM3 (ref 0–0.2)
BASOPHILS NFR BLD AUTO: 0.5 % (ref 0–1)
BNP SERPL-MCNC: 62 PG/ML (ref 0–100)
BUN BLD-MCNC: 13 MG/DL (ref 9–23)
BUN/CREAT SERPL: 13.3 (ref 7–25)
CALCIUM SPEC-SCNC: 9 MG/DL (ref 8.7–10.4)
CHLORIDE SERPL-SCNC: 102 MMOL/L (ref 99–109)
CO2 SERPL-SCNC: 27 MMOL/L (ref 20–31)
CREAT BLD-MCNC: 0.98 MG/DL (ref 0.6–1.3)
DEPRECATED RDW RBC AUTO: 50.2 FL (ref 37–54)
EOSINOPHIL # BLD AUTO: 0.52 10*3/MM3 (ref 0–0.3)
EOSINOPHIL NFR BLD AUTO: 6.9 % (ref 0–3)
ERYTHROCYTE [DISTWIDTH] IN BLOOD BY AUTOMATED COUNT: 14 % (ref 11.3–14.5)
GFR SERPL CREATININE-BSD FRML MDRD: 56 ML/MIN/1.73
GLUCOSE BLD-MCNC: 198 MG/DL (ref 70–100)
HCT VFR BLD AUTO: 43.1 % (ref 34.5–44)
HGB BLD-MCNC: 14.2 G/DL (ref 11.5–15.5)
IMM GRANULOCYTES # BLD: 0.01 10*3/MM3 (ref 0–0.03)
IMM GRANULOCYTES NFR BLD: 0.1 % (ref 0–0.6)
LYMPHOCYTES # BLD AUTO: 1.43 10*3/MM3 (ref 0.6–4.8)
LYMPHOCYTES NFR BLD AUTO: 19.1 % (ref 24–44)
MCH RBC QN AUTO: 32.6 PG (ref 27–31)
MCHC RBC AUTO-ENTMCNC: 32.9 G/DL (ref 32–36)
MCV RBC AUTO: 98.9 FL (ref 80–99)
MONOCYTES # BLD AUTO: 0.35 10*3/MM3 (ref 0–1)
MONOCYTES NFR BLD AUTO: 4.7 % (ref 0–12)
NEUTROPHILS # BLD AUTO: 5.16 10*3/MM3 (ref 1.5–8.3)
NEUTROPHILS NFR BLD AUTO: 68.8 % (ref 41–71)
PLATELET # BLD AUTO: 168 10*3/MM3 (ref 150–450)
PMV BLD AUTO: 11.1 FL (ref 6–12)
POTASSIUM BLD-SCNC: 3.2 MMOL/L (ref 3.5–5.5)
RBC # BLD AUTO: 4.36 10*6/MM3 (ref 3.89–5.14)
SODIUM BLD-SCNC: 140 MMOL/L (ref 132–146)
WBC NRBC COR # BLD: 7.5 10*3/MM3 (ref 3.5–10.8)

## 2018-08-23 PROCEDURE — 80048 BASIC METABOLIC PNL TOTAL CA: CPT | Performed by: NURSE PRACTITIONER

## 2018-08-23 PROCEDURE — 99214 OFFICE O/P EST MOD 30 MIN: CPT | Performed by: NURSE PRACTITIONER

## 2018-08-23 PROCEDURE — 83880 ASSAY OF NATRIURETIC PEPTIDE: CPT | Performed by: NURSE PRACTITIONER

## 2018-08-23 PROCEDURE — 85025 COMPLETE CBC W/AUTO DIFF WBC: CPT | Performed by: NURSE PRACTITIONER

## 2018-08-23 PROCEDURE — 71046 X-RAY EXAM CHEST 2 VIEWS: CPT

## 2018-08-23 RX ORDER — CHOLECALCIFEROL (VITAMIN D3) 50 MCG
2000 TABLET ORAL DAILY
Status: ON HOLD | COMMUNITY
End: 2023-01-30

## 2018-08-23 NOTE — PROGRESS NOTES
Encounter Date:08/23/2018      Patient ID: Angelita Shay is a 72 y.o. female.        Subjective:     Chief Complaint: Follow-up   [facility]  History of Present Illness patient presents to the office today for ongoing evaluation of her chronic systolic heart failure. Since last office visit, she has undergone CCY and was then admitted with pulmonary edema. She notes her home weight today was 209lbs. She notes intermittent pedal edema but denies dyspnea. She notes ongoing fatigue. She also reports coughing,dizziness.     Patient Active Problem List   Diagnosis   • Essential hypertension   • Anxiety   • DM type 2 (diabetes mellitus, type 2), on insulin therapy   • GERD (gastroesophageal reflux disease)   • Coronary artery disease involving native coronary artery of native heart with angina pectoris (CMS/HCC)   • Moderate obstructive sleep apnea   • RLS (restless legs syndrome)   • Gout   • Chronic systolic congestive heart failure (CMS/HCC)   • Paroxysmal atrial fibrillation (CMS/HCC)   • Nonischemic cardiomyopathy (CMS/HCC)   • Biliary colic   • Cholelithiasis   • Acute kidney injury superimposed on chronic kidney disease stage 3   • Chronic passive hepatic congestion   • Diabetic polyneuropathy associated with type 2 diabetes mellitus (CMS/HCC)   • Nonintractable headache   • Dehydration   • Sepsis (CMS/HCC)   • CKD (chronic kidney disease), stage III   • Acute on chronic respiratory failure with hypoxia (CMS/HCC)   • Hyperlipidemia LDL goal <70   • Obesity (BMI 30-39.9)   • Hematochezia   • Vitamin D deficiency   • Acute kidney injury (CMS/HCC)   • Other pulmonary embolism without acute cor pulmonale (CMS/HCC)       Past Surgical History:   Procedure Laterality Date   • APPENDECTOMY  05/1963   • CARDIAC CATHETERIZATION  08/2015    no stents   • CARDIAC CATHETERIZATION  03/13/2018   • CARDIAC CATHETERIZATION N/A 3/13/2018    Procedure: Left Heart Cath;  Surgeon: Pierre Jones III, MD;  Location: [facility] INVASIVE  LOCATION;  Service: Cardiovascular   • CHOLECYSTECTOMY N/A 8/1/2018    Procedure: CHOLECYSTECTOMY LAPAROSCOPIC , LYSIS OF ADHESIONS;  Surgeon: Santos Pantoja MD;  Location:  [facility];  Service: General   • COLON RESECTION  04/08/2000    colon cancer   • COLONOSCOPY  2015   • COLONOSCOPY  04/18/2018    with 5 polyps removed 1yr f/u. Dr Cash- REPEAT YEARLY   • CORONARY ANGIOPLASTY WITH STENT PLACEMENT  12/2015   • ENDOSCOPY     • JOINT REPLACEMENT     • VT TOTAL KNEE ARTHROPLASTY Left 11/3/2016    Procedure: LEFT TOTAL KNEE REPLACEMENT;  Surgeon: Laurent Zuniga MD;  Location:  [facility];  Service: Orthopedics   • TONSILLECTOMY  12/1961   • TOTAL KNEE ARTHROPLASTY Right 2008    revision 2010   • TOTAL SHOULDER ARTHROPLASTY Bilateral     right 2014, left 2015       Allergies   Allergen Reactions   • Oxycodone Shortness Of Breath   • Zolpidem Other (See Comments)     nightmares         Current Outpatient Prescriptions:   •  apixaban (ELIQUIS) 5 MG tablet tablet, Take 1 tablet by mouth Every 12 (Twelve) Hours., Disp: 60 tablet, Rfl: 0  •  atorvastatin (LIPITOR) 20 MG tablet, Take 1 tablet by mouth Every Night for 90 days., Disp: 90 tablet, Rfl: 3  •  carvedilol (COREG) 3.125 MG tablet, Take 6.25 mg by mouth 2 (Two) Times a Day With Meals., Disp: , Rfl:   •  Cholecalciferol (VITAMIN D) 2000 units tablet, Take 2,000 Units by mouth Daily., Disp: , Rfl:   •  furosemide (LASIX) 40 MG tablet, Take 1 tablet by mouth Daily., Disp: 30 tablet, Rfl: 11  •  gabapentin (NEURONTIN) 300 MG capsule, Take 1 capsule by mouth 4 (Four) Times a Day., Disp: 120 capsule, Rfl: 0  •  insulin aspart (NOVOLOG) 100 UNIT/ML injection, **Per patient's Sliding scale** (Patient taking differently: Inject  under the skin into the appropriate area as directed. **Per patient's Sliding scale**), Disp: 10 mL, Rfl: 2  •  linaclotide (LINZESS) 290 MCG capsule capsule, Take 1 capsule by mouth Every Morning Before Breakfast., Disp: 30 capsule, Rfl:  1  •  magnesium hydroxide (MILK OF MAGNESIA) 400 MG/5ML suspension, Take 15 mL by mouth Daily As Needed for Constipation., Disp: 473 mL, Rfl: 0  •  nystatin (MYCOSTATIN) 230878 UNIT/GM powder, Apply  topically Every 12 (Twelve) Hours. (Patient taking differently: Apply 1 application topically to the appropriate area as directed Every 12 (Twelve) Hours.), Disp: 30 g, Rfl: 0  •  ondansetron (ZOFRAN) 4 MG tablet, Take 1 tablet by mouth Every 6 (Six) Hours As Needed for Nausea or Vomiting., Disp: 21 tablet, Rfl: 0  •  polyethylene glycol (MIRALAX) powder, Take 17 g by mouth Daily As Needed., Disp: , Rfl:   •  rOPINIRole (REQUIP) 1 MG tablet, Take 1-3 tablets by mouth Every Night. Take 1 hour before bedtime., Disp: 90 tablet, Rfl: 1  •  sacubitril-valsartan (ENTRESTO) 49-51 MG tablet, Take 1 tablet by mouth Every 12 (Twelve) Hours., Disp: 60 tablet, Rfl: 5  •  traZODone (DESYREL) 50 MG tablet, TAKE ONE TO TWO TABLETS BY MOUTH EVERY NIGHT AT BEDTIME, Disp: 60 tablet, Rfl: 3    The following portions of the chart were reviewed and updated as appropriate: Allergies, current medications, past family history, social history, past medical history.     Review of Systems   Constitution: Positive for malaise/fatigue. Negative for chills, decreased appetite, diaphoresis, fever, weakness, night sweats, weight gain and weight loss.   HENT: Positive for congestion. Negative for hearing loss, hoarse voice and nosebleeds.    Eyes: Positive for blurred vision. Negative for visual disturbance and visual halos.   Cardiovascular: Positive for leg swelling. Negative for chest pain, claudication, cyanosis, dyspnea on exertion, irregular heartbeat, near-syncope, orthopnea, palpitations, paroxysmal nocturnal dyspnea and syncope.   Respiratory: Positive for cough. Negative for hemoptysis, shortness of breath, sleep disturbances due to breathing, snoring, sputum production and wheezing.    Hematologic/Lymphatic: Negative for bleeding problem.  "Bruises/bleeds easily.   Skin: Positive for itching. Negative for dry skin and rash.   Musculoskeletal: Negative for arthritis, joint pain, joint swelling and myalgias.   Gastrointestinal: Positive for bloating, abdominal pain, nausea and vomiting. Negative for constipation, diarrhea, flatus, heartburn, hematemesis, hematochezia and melena.   Genitourinary: Negative for dysuria, frequency, hematuria, nocturia and urgency.   Neurological: Positive for excessive daytime sleepiness and dizziness. Negative for headaches, light-headedness and loss of balance.   Psychiatric/Behavioral: Positive for depression. The patient is nervous/anxious. The patient does not have insomnia.    Allergic/Immunologic: Positive for environmental allergies.           Objective:     Vitals:    08/23/18 1057 08/23/18 1106 08/23/18 1108   BP: 169/91 170/95 158/80   BP Location: Right arm Left arm Left arm   Patient Position: Sitting Sitting Standing   Pulse: 88  80   Resp: 18     Temp: 97.6 °F (36.4 °C)     TempSrc: Temporal Artery      SpO2: 93%     Weight: 96.1 kg (211 lb 12.8 oz)     Height: 165.1 cm (65\")           Physical Exam   Constitutional: She is oriented to person, place, and time. She appears well-developed and well-nourished. She is active and cooperative. No distress.   HENT:   Head: Normocephalic and atraumatic.   Mouth/Throat: Oropharynx is clear and moist.   Eyes: Pupils are equal, round, and reactive to light. Conjunctivae and EOM are normal.   Neck: Normal range of motion. Neck supple. No JVD present. No tracheal deviation present. No thyromegaly present.   Cardiovascular: Normal rate, regular rhythm, normal heart sounds and intact distal pulses.    Pulmonary/Chest: Effort normal and breath sounds normal.   Abdominal: Soft. Bowel sounds are normal. She exhibits no distension. There is no tenderness.   Musculoskeletal: Normal range of motion.   Neurological: She is alert and oriented to person, place, and time.   Skin: Skin " is warm, dry and intact.   Psychiatric: She has a normal mood and affect. Her behavior is normal.   Nursing note and vitals reviewed.      Lab and Diagnostic Review:      Results for orders placed or performed in visit on 08/23/18   BNP   Result Value Ref Range    BNP 62.0 0.0 - 100.0 pg/mL   Basic Metabolic Panel   Result Value Ref Range    Glucose 198 (H) 70 - 100 mg/dL    BUN 13 9 - 23 mg/dL    Creatinine 0.98 0.60 - 1.30 mg/dL    Sodium 140 132 - 146 mmol/L    Potassium 3.2 (L) 3.5 - 5.5 mmol/L    Chloride 102 99 - 109 mmol/L    CO2 27.0 20.0 - 31.0 mmol/L    Calcium 9.0 8.7 - 10.4 mg/dL    eGFR Non African Amer 56 (L) >60 mL/min/1.73    BUN/Creatinine Ratio 13.3 7.0 - 25.0    Anion Gap 11.0 3.0 - 11.0 mmol/L   CBC Auto Differential   Result Value Ref Range    WBC 7.50 3.50 - 10.80 10*3/mm3    RBC 4.36 3.89 - 5.14 10*6/mm3    Hemoglobin 14.2 11.5 - 15.5 g/dL    Hematocrit 43.1 34.5 - 44.0 %    MCV 98.9 80.0 - 99.0 fL    MCH 32.6 (H) 27.0 - 31.0 pg    MCHC 32.9 32.0 - 36.0 g/dL    RDW 14.0 11.3 - 14.5 %    RDW-SD 50.2 37.0 - 54.0 fl    MPV 11.1 6.0 - 12.0 fL    Platelets 168 150 - 450 10*3/mm3    Neutrophil % 68.8 41.0 - 71.0 %    Lymphocyte % 19.1 (L) 24.0 - 44.0 %    Monocyte % 4.7 0.0 - 12.0 %    Eosinophil % 6.9 (H) 0.0 - 3.0 %    Basophil % 0.5 0.0 - 1.0 %    Immature Grans % 0.1 0.0 - 0.6 %    Neutrophils, Absolute 5.16 1.50 - 8.30 10*3/mm3    Lymphocytes, Absolute 1.43 0.60 - 4.80 10*3/mm3    Monocytes, Absolute 0.35 0.00 - 1.00 10*3/mm3    Eosinophils, Absolute 0.52 (H) 0.00 - 0.30 10*3/mm3    Basophils, Absolute 0.04 0.00 - 0.20 10*3/mm3    Immature Grans, Absolute 0.01 0.00 - 0.03 10*3/mm3       CXR:Minimal chronic changes at the lung bases without acute  cardiopulmonary process specifically no focal consolidation or overt  edema.    Assessment and Plan:         1. NICM (nonischemic cardiomyopathy) (CMS/HCC)    - XR Chest PA & Lateral  - Basic Metabolic Panel; Future  - BNP  - Basic Metabolic  Panel  Heart failure education today including signs and symptoms, the role of the heart failure center, daily weights, low sodium diet (less than 1500 mg per day), and daily physical activity. Reviewed HF Zones with patient and family.  Patient to continue current medications as previously ordered.   2. Shortness of breath    - XR Chest PA & Lateral  - Basic Metabolic Panel; Future  - BNP  - Basic Metabolic Panel    3. Persistent cough    - XR Chest PA & Lateral    4. Other pulmonary embolism without acute cor pulmonale, unspecified chronicity (CMS/HCC)  Continue eliquis   - XR Chest PA & Lateral    5. Fatigue, unspecified type    - CBC & Differential  - CBC Auto Differential    It has been a pleasure to participate in the care of this patient.  Patient was instructed to call the [facility] with any questions, concerns, or worsening symptoms.    * Please note that portions of this note were completed with a voice recognition program. Efforts were made to edit the dictation but occasionally words are transcribed.

## 2018-08-24 ENCOUNTER — TELEPHONE (OUTPATIENT)
Dept: CARDIOLOGY | Facility: HOSPITAL | Age: 73
End: 2018-08-24

## 2018-08-24 RX ORDER — POTASSIUM CHLORIDE 20 MEQ/1
TABLET, EXTENDED RELEASE ORAL
Qty: 34 TABLET | Refills: 0 | Status: SHIPPED | OUTPATIENT
Start: 2018-08-24 | End: 2018-09-24 | Stop reason: SDUPTHER

## 2018-08-24 NOTE — TELEPHONE ENCOUNTER
Reviewed labs and chest xray  with patient. K is low at 3.2. Patient to begin kcl 40 meq x 2 days, then continue 20 meq daily.

## 2018-08-27 ENCOUNTER — READMISSION MANAGEMENT (OUTPATIENT)
Dept: CALL CENTER | Facility: HOSPITAL | Age: 73
End: 2018-08-27

## 2018-08-27 NOTE — OUTREACH NOTE
Medical Week 3 Survey      Responses   Facility patient discharged from?  [place]   Does the patient have one of the following disease processes/diagnoses(primary or secondary)?  Other   Week 3 attempt successful?  Yes   Call start time  0928   Call end time  0940   Discharge diagnosis  pulmonary embolism, s/p fall/poss syncope, Acute kidney injury    Is patient permission given to speak with other caregiver?  Yes   Person spoke with today (if not patient) and relationship  Medina Young/daughter    Meds reviewed with patient/caregiver?  Yes   Is the patient having any side effects they believe may be caused by any medication additions or changes?  No   Does the patient have all medications ordered at discharge?  Yes   Is the patient taking all medications as directed (includes completed medication regime)?  Yes   Comments regarding appointments  Next appt is with PCP on 09/14   Does the patient have a primary care provider?   Yes   Comments regarding PCP  Has a followup on 09/14 with Dr Saldivar   Does the patient have an appointment with their PCP within 7 days of discharge?  Yes   Has the patient kept scheduled appointments due by today?  Yes   Psychosocial issues?  No   Comments  Daughter reports patient is doing well. No further weight gain reported.    Did the patient receive a copy of their discharge instructions?  Yes   Nursing interventions  Reviewed instructions with patient   What is the patient's perception of their health status since discharge?  Improving   Is the patient/caregiver able to teach back signs and symptoms related to disease process for when to call PCP?  Yes   Is the patient/caregiver able to teach back signs and symptoms related to disease process for when to call 911?  Yes   Is the patient/caregiver able to teach back the hierarchy of who to call/visit for symptoms/problems? PCP, Specialist, Home health nurse, Urgent Care, ED, 911  Yes   Week 3 Call Completed?  Yes          David XIONG  [place] RN

## 2018-09-01 ENCOUNTER — APPOINTMENT (OUTPATIENT)
Dept: CT IMAGING | Facility: HOSPITAL | Age: 73
End: 2018-09-01

## 2018-09-01 ENCOUNTER — APPOINTMENT (OUTPATIENT)
Dept: GENERAL RADIOLOGY | Facility: HOSPITAL | Age: 73
End: 2018-09-01

## 2018-09-01 ENCOUNTER — HOSPITAL ENCOUNTER (EMERGENCY)
Facility: HOSPITAL | Age: 73
Discharge: HOME | End: 2018-09-02
Attending: EMERGENCY MEDICINE | Admitting: EMERGENCY MEDICINE
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
## 2018-09-01 DIAGNOSIS — S40.012A CONTUSION OF LEFT SHOULDER, INITIAL ENCOUNTER: ICD-10-CM

## 2018-09-01 DIAGNOSIS — S00.83XA FACIAL CONTUSION, INITIAL ENCOUNTER: Primary | ICD-10-CM

## 2018-09-01 LAB
ALBUMIN SERPL-MCNC: 4.54 G/DL (ref 3.2–4.8)
ALBUMIN/GLOB SERPL: 1.7 G/DL (ref 1.5–2.5)
ALP SERPL-CCNC: 74 U/L (ref 25–100)
ALT SERPL W P-5'-P-CCNC: 22 U/L (ref 7–40)
ANION GAP SERPL CALCULATED.3IONS-SCNC: 6 MMOL/L (ref 3–11)
AST SERPL-CCNC: 21 U/L (ref 0–33)
BASOPHILS # BLD AUTO: 0.02 10*3/MM3 (ref 0–0.2)
BASOPHILS NFR BLD AUTO: 0.4 % (ref 0–1)
BILIRUB SERPL-MCNC: 0.4 MG/DL (ref 0.3–1.2)
BUN BLD-MCNC: 14 MG/DL (ref 9–23)
BUN/CREAT SERPL: 12.7 (ref 7–25)
CALCIUM SPEC-SCNC: 9.4 MG/DL (ref 8.7–10.4)
CHLORIDE SERPL-SCNC: 104 MMOL/L (ref 99–109)
CO2 SERPL-SCNC: 32 MMOL/L (ref 20–31)
CREAT BLD-MCNC: 1.1 MG/DL (ref 0.6–1.3)
DEPRECATED RDW RBC AUTO: 49 FL (ref 37–54)
EOSINOPHIL # BLD AUTO: 0.39 10*3/MM3 (ref 0–0.3)
EOSINOPHIL NFR BLD AUTO: 6.9 % (ref 0–3)
ERYTHROCYTE [DISTWIDTH] IN BLOOD BY AUTOMATED COUNT: 13.6 % (ref 11.3–14.5)
GFR SERPL CREATININE-BSD FRML MDRD: 49 ML/MIN/1.73
GLOBULIN UR ELPH-MCNC: 2.7 GM/DL
GLUCOSE BLD-MCNC: 223 MG/DL (ref 70–100)
HCT VFR BLD AUTO: 43.8 % (ref 34.5–44)
HGB BLD-MCNC: 14.4 G/DL (ref 11.5–15.5)
IMM GRANULOCYTES # BLD: 0.01 10*3/MM3 (ref 0–0.03)
IMM GRANULOCYTES NFR BLD: 0.2 % (ref 0–0.6)
LYMPHOCYTES # BLD AUTO: 1.27 10*3/MM3 (ref 0.6–4.8)
LYMPHOCYTES NFR BLD AUTO: 22.6 % (ref 24–44)
MCH RBC QN AUTO: 32.6 PG (ref 27–31)
MCHC RBC AUTO-ENTMCNC: 32.9 G/DL (ref 32–36)
MCV RBC AUTO: 99.1 FL (ref 80–99)
MONOCYTES # BLD AUTO: 0.32 10*3/MM3 (ref 0–1)
MONOCYTES NFR BLD AUTO: 5.7 % (ref 0–12)
NEUTROPHILS # BLD AUTO: 3.62 10*3/MM3 (ref 1.5–8.3)
NEUTROPHILS NFR BLD AUTO: 64.4 % (ref 41–71)
PLATELET # BLD AUTO: 161 10*3/MM3 (ref 150–450)
PMV BLD AUTO: 11.3 FL (ref 6–12)
POTASSIUM BLD-SCNC: 3.5 MMOL/L (ref 3.5–5.5)
PROT SERPL-MCNC: 7.2 G/DL (ref 5.7–8.2)
RBC # BLD AUTO: 4.42 10*6/MM3 (ref 3.89–5.14)
SODIUM BLD-SCNC: 142 MMOL/L (ref 132–146)
TROPONIN I SERPL-MCNC: 0 NG/ML (ref 0–0.07)
WBC NRBC COR # BLD: 5.62 10*3/MM3 (ref 3.5–10.8)

## 2018-09-01 PROCEDURE — 83690 ASSAY OF LIPASE: CPT | Performed by: EMERGENCY MEDICINE

## 2018-09-01 PROCEDURE — 70450 CT HEAD/BRAIN W/O DYE: CPT

## 2018-09-01 PROCEDURE — 70486 CT MAXILLOFACIAL W/O DYE: CPT

## 2018-09-01 PROCEDURE — 25010000002 MORPHINE PER 10 MG: Performed by: EMERGENCY MEDICINE

## 2018-09-01 PROCEDURE — 72131 CT LUMBAR SPINE W/O DYE: CPT

## 2018-09-01 PROCEDURE — 73030 X-RAY EXAM OF SHOULDER: CPT

## 2018-09-01 PROCEDURE — 73560 X-RAY EXAM OF KNEE 1 OR 2: CPT

## 2018-09-01 PROCEDURE — 96361 HYDRATE IV INFUSION ADD-ON: CPT

## 2018-09-01 PROCEDURE — 25010000002 ONDANSETRON PER 1 MG: Performed by: EMERGENCY MEDICINE

## 2018-09-01 PROCEDURE — 80053 COMPREHEN METABOLIC PANEL: CPT | Performed by: EMERGENCY MEDICINE

## 2018-09-01 PROCEDURE — 72128 CT CHEST SPINE W/O DYE: CPT

## 2018-09-01 PROCEDURE — 85025 COMPLETE CBC W/AUTO DIFF WBC: CPT | Performed by: EMERGENCY MEDICINE

## 2018-09-01 PROCEDURE — 93005 ELECTROCARDIOGRAM TRACING: CPT | Performed by: EMERGENCY MEDICINE

## 2018-09-01 PROCEDURE — 99284 EMERGENCY DEPT VISIT MOD MDM: CPT

## 2018-09-01 PROCEDURE — 71045 X-RAY EXAM CHEST 1 VIEW: CPT

## 2018-09-01 PROCEDURE — 72125 CT NECK SPINE W/O DYE: CPT

## 2018-09-01 PROCEDURE — 96375 TX/PRO/DX INJ NEW DRUG ADDON: CPT

## 2018-09-01 PROCEDURE — 96374 THER/PROPH/DIAG INJ IV PUSH: CPT

## 2018-09-01 PROCEDURE — 84484 ASSAY OF TROPONIN QUANT: CPT

## 2018-09-01 RX ORDER — MORPHINE SULFATE 4 MG/ML
4 INJECTION, SOLUTION INTRAMUSCULAR; INTRAVENOUS ONCE
Status: COMPLETED | OUTPATIENT
Start: 2018-09-01 | End: 2018-09-01

## 2018-09-01 RX ORDER — SODIUM CHLORIDE 0.9 % (FLUSH) 0.9 %
10 SYRINGE (ML) INJECTION AS NEEDED
Status: DISCONTINUED | OUTPATIENT
Start: 2018-09-01 | End: 2018-09-02 | Stop reason: HOSPADM

## 2018-09-01 RX ORDER — ONDANSETRON 2 MG/ML
4 INJECTION INTRAMUSCULAR; INTRAVENOUS ONCE
Status: COMPLETED | OUTPATIENT
Start: 2018-09-01 | End: 2018-09-01

## 2018-09-01 RX ADMIN — MORPHINE SULFATE 4 MG: 4 INJECTION INTRAVENOUS at 22:57

## 2018-09-01 RX ADMIN — Medication 10 ML: at 22:57

## 2018-09-01 RX ADMIN — ONDANSETRON HYDROCHLORIDE 4 MG: 2 INJECTION, SOLUTION INTRAMUSCULAR; INTRAVENOUS at 22:57

## 2018-09-01 RX ADMIN — SODIUM CHLORIDE 1000 ML: 9 INJECTION, SOLUTION INTRAVENOUS at 22:56

## 2018-09-02 VITALS
HEART RATE: 62 BPM | RESPIRATION RATE: 16 BRPM | DIASTOLIC BLOOD PRESSURE: 98 MMHG | WEIGHT: 208 LBS | HEIGHT: 64 IN | BODY MASS INDEX: 35.51 KG/M2 | SYSTOLIC BLOOD PRESSURE: 160 MMHG | TEMPERATURE: 98.6 F | OXYGEN SATURATION: 96 %

## 2018-09-02 LAB
BACTERIA UR QL AUTO: ABNORMAL /HPF
BILIRUB UR QL STRIP: NEGATIVE
CLARITY UR: ABNORMAL
COLOR UR: YELLOW
GLUCOSE UR STRIP-MCNC: ABNORMAL MG/DL
HGB UR QL STRIP.AUTO: ABNORMAL
HOLD SPECIMEN: NORMAL
HOLD SPECIMEN: NORMAL
HYALINE CASTS UR QL AUTO: ABNORMAL /LPF
KETONES UR QL STRIP: NEGATIVE
LEUKOCYTE ESTERASE UR QL STRIP.AUTO: NEGATIVE
LIPASE SERPL-CCNC: 59 U/L (ref 6–51)
NITRITE UR QL STRIP: NEGATIVE
PH UR STRIP.AUTO: 6.5 [PH] (ref 5–8)
PROT UR QL STRIP: ABNORMAL
RBC # UR: ABNORMAL /HPF
REF LAB TEST METHOD: ABNORMAL
SP GR UR STRIP: 1.02 (ref 1–1.03)
SQUAMOUS #/AREA URNS HPF: ABNORMAL /HPF
UROBILINOGEN UR QL STRIP: ABNORMAL
WBC UR QL AUTO: ABNORMAL /HPF
WHOLE BLOOD HOLD SPECIMEN: NORMAL
WHOLE BLOOD HOLD SPECIMEN: NORMAL

## 2018-09-02 PROCEDURE — 81001 URINALYSIS AUTO W/SCOPE: CPT | Performed by: EMERGENCY MEDICINE

## 2018-09-02 PROCEDURE — 25010000002 MORPHINE PER 10 MG: Performed by: EMERGENCY MEDICINE

## 2018-09-02 PROCEDURE — 96376 TX/PRO/DX INJ SAME DRUG ADON: CPT

## 2018-09-02 RX ORDER — HYDROCODONE BITARTRATE AND ACETAMINOPHEN 5; 325 MG/1; MG/1
1 TABLET ORAL EVERY 6 HOURS PRN
Qty: 12 TABLET | Refills: 0 | Status: SHIPPED | OUTPATIENT
Start: 2018-09-02 | End: 2020-07-13

## 2018-09-02 RX ORDER — MORPHINE SULFATE 4 MG/ML
4 INJECTION, SOLUTION INTRAMUSCULAR; INTRAVENOUS ONCE
Status: COMPLETED | OUTPATIENT
Start: 2018-09-02 | End: 2018-09-02

## 2018-09-02 RX ADMIN — MORPHINE SULFATE 4 MG: 4 INJECTION INTRAVENOUS at 02:41

## 2018-09-02 NOTE — DISCHARGE INSTRUCTIONS
Patient is advised to apply ice to the areas of pain.    Take Lortab as needed for pain.    Follow-up with primary care physician for repeat evaluation in 1 week.

## 2018-09-02 NOTE — ED PROVIDER NOTES
Subjective   Angelita Shay is a 72 y.o.female who presents to the ED with multiple complaints with onset two days ago. She reports that she fell down a flight of steps and was immediately rushed to [facility] to be evaluated. She states that she hit her head, neck, and back. She notes that she had imaging performed and was later discharged home. She states that her head pain, neck pain, and back pain has worsened and has now developed chest pain, SoA, and abd pain which prompted her visit to the ED. The pt also complains of N/V, cough, and congestion but denies any other complaints at this time.        History provided by:  Patient and relative  Abdominal Pain   Pain location:  Periumbilical  Pain quality: aching    Pain radiates to:  Does not radiate  Pain severity:  Moderate  Onset quality:  Sudden  Timing:  Constant  Progression:  Worsening  Chronicity:  New  Relieved by:  None tried  Worsened by:  Nothing  Ineffective treatments:  None tried  Associated symptoms: chest pain, cough, nausea, shortness of breath and vomiting        Review of Systems   HENT: Positive for congestion.         Head pain.   Respiratory: Positive for cough and shortness of breath.    Cardiovascular: Positive for chest pain.   Gastrointestinal: Positive for abdominal pain, nausea and vomiting.   Musculoskeletal: Positive for arthralgias, back pain and neck pain.   All other systems reviewed and are negative.      Past Medical History:   Diagnosis Date   • Anxiety    • Arthritis    • AV block, 1st degree    • Breast injury     recent fall in early June, bruising left breast   • Cataract     left   • Cellulitis of both lower extremities    • CKD (chronic kidney disease), stage III    • Colon cancer (CMS/HCC) 2000   • Colon polyp 2015    x 3 adenomatous   • Colon polyp 04/18/2018    x 5 Dr Cash   • Coronary artery disease     1 stent   • Depression    • Diabetes mellitus (CMS/HCC)     dx 2 years ago- checks fsbs bid   • Gallstone    • GI bleed  07/2018   • H/O echocardiogram 08/05/2018    Dr Greene, EF 55%, mild concentric hypertrophy, impaired relaxation   • Hearing loss    • History of atrial fibrillation    • History of chemotherapy     11/2000   • History of transfusion     [address]   • Hyperlipidemia    • Hypertension    • Knee pain    • Pap smear for cervical cancer screening 2013   • Rectal bleeding    • RLS (restless legs syndrome)    • Sleep apnea    • Wears eyeglasses        Allergies   Allergen Reactions   • Oxycodone Shortness Of Breath   • Zolpidem Other (See Comments)     nightmares       Past Surgical History:   Procedure Laterality Date   • APPENDECTOMY  05/1963   • CARDIAC CATHETERIZATION  08/2015    no stents   • CARDIAC CATHETERIZATION  03/13/2018   • CARDIAC CATHETERIZATION N/A 3/13/2018    Procedure: Left Heart Cath;  Surgeon: Pierre Jones III, MD;  Location:  [facility];  Service: Cardiovascular   • CHOLECYSTECTOMY N/A 8/1/2018    Procedure: CHOLECYSTECTOMY LAPAROSCOPIC , LYSIS OF ADHESIONS;  Surgeon: Santos Pantoja MD;  Location:  [facility];  Service: General   • COLON RESECTION  04/08/2000    colon cancer   • COLONOSCOPY  2015   • COLONOSCOPY  04/18/2018    with 5 polyps removed 1yr f/u. Dr Cash- REPEAT YEARLY   • CORONARY ANGIOPLASTY WITH STENT PLACEMENT  12/2015   • ENDOSCOPY     • JOINT REPLACEMENT     • MO TOTAL KNEE ARTHROPLASTY Left 11/3/2016    Procedure: LEFT TOTAL KNEE REPLACEMENT;  Surgeon: Laurent Zuniga MD;  Location:  [facility];  Service: Orthopedics   • TONSILLECTOMY  12/1961   • TOTAL KNEE ARTHROPLASTY Right 2008    revision 2010   • TOTAL SHOULDER ARTHROPLASTY Bilateral     right 2014, left 2015       Family History   Problem Relation Age of Onset   • Colon cancer Other    • Diabetes Other    • Heart disease Other    • Hypertension Other    • Stroke Other    • Tuberculosis Other    • Hypertension Mother    • Colon cancer Father    • Stroke Father    •  Diabetes Father    • Colon cancer Sister    • Diabetes Sister    • Diabetes Maternal Grandmother    • Coronary artery disease Maternal Grandfather    • No Known Problems Paternal Grandmother    • No Known Problems Paternal Grandfather    • Breast cancer Neg Hx    • Ovarian cancer Neg Hx        Social History     Social History   • Marital status:    • Number of children: 5     Occupational History   • Retired       Social History Main Topics   • Smoking status: Former Smoker     Packs/day: 1.00     Years: 22.00     Types: Cigarettes     Quit date: 1979   • Smokeless tobacco: Never Used      Comment: quit 1979   • Alcohol use 0.6 oz/week     1 Glasses of wine per week      Comment: occas   • Drug use: No   • Sexual activity: Defer     Other Topics Concern   • Not on file     Social History Narrative    Caffeine: 0-1 servings per day    Patient lives at her home with her daughter and her family         Objective   Physical Exam   Constitutional: She is oriented to person, place, and time. She appears well-developed and well-nourished.   HENT:   Head: Normocephalic.   Nose: Nose normal.   Bruising and swelling to the right forehead. No pain along jaw. No signs of injury to the mouth. Bite was normal. Tongue was normal. Posterior oropharynx was patent. Pain across periorbital region of the face.   Eyes: Pupils are equal, round, and reactive to light. Conjunctivae are normal. No scleral icterus.   Periorbital swelling bilaterally. EOM intact without restriction.    Neck: Normal range of motion. Neck supple.   Cardiovascular: Normal rate, regular rhythm and normal heart sounds.    No murmur heard.  Pulmonary/Chest: Effort normal and breath sounds normal. No respiratory distress.   Abdominal: Soft. Bowel sounds are normal. There is tenderness.   Mild tenderness diffusely. No focal area of tenderness.     Musculoskeletal: She exhibits tenderness.   Pain along mid C spine and lower portion of the T  spine. No signs of trauma to the back. Bruising and pain across left anterior lateral shoulder.   Neurological: She is alert and oriented to person, place, and time.   Skin: Skin is warm and dry.   Psychiatric: She has a normal mood and affect. Her behavior is normal.   Nursing note and vitals reviewed.      Procedures         ED Course     Recent Results (from the past 24 hour(s))   Comprehensive Metabolic Panel    Collection Time: 09/01/18 10:59 PM   Result Value Ref Range    Glucose 223 (H) 70 - 100 mg/dL    BUN 14 9 - 23 mg/dL    Creatinine 1.10 0.60 - 1.30 mg/dL    Sodium 142 132 - 146 mmol/L    Potassium 3.5 3.5 - 5.5 mmol/L    Chloride 104 99 - 109 mmol/L    CO2 32.0 (H) 20.0 - 31.0 mmol/L    Calcium 9.4 8.7 - 10.4 mg/dL    Total Protein 7.2 5.7 - 8.2 g/dL    Albumin 4.54 3.20 - 4.80 g/dL    ALT (SGPT) 22 7 - 40 U/L    AST (SGOT) 21 0 - 33 U/L    Alkaline Phosphatase 74 25 - 100 U/L    Total Bilirubin 0.4 0.3 - 1.2 mg/dL    eGFR Non African Amer 49 (L) >60 mL/min/1.73    Globulin 2.7 gm/dL    A/G Ratio 1.7 1.5 - 2.5 g/dL    BUN/Creatinine Ratio 12.7 7.0 - 25.0    Anion Gap 6.0 3.0 - 11.0 mmol/L   Lipase    Collection Time: 09/01/18 10:59 PM   Result Value Ref Range    Lipase 59 (H) 6 - 51 U/L   Light Blue Top    Collection Time: 09/01/18 10:59 PM   Result Value Ref Range    Extra Tube hold for add-on    Green Top (Gel)    Collection Time: 09/01/18 10:59 PM   Result Value Ref Range    Extra Tube Hold for add-ons.    Lavender Top    Collection Time: 09/01/18 10:59 PM   Result Value Ref Range    Extra Tube hold for add-on    Gold Top - SST    Collection Time: 09/01/18 10:59 PM   Result Value Ref Range    Extra Tube Hold for add-ons.    CBC Auto Differential    Collection Time: 09/01/18 10:59 PM   Result Value Ref Range    WBC 5.62 3.50 - 10.80 10*3/mm3    RBC 4.42 3.89 - 5.14 10*6/mm3    Hemoglobin 14.4 11.5 - 15.5 g/dL    Hematocrit 43.8 34.5 - 44.0 %    MCV 99.1 (H) 80.0 - 99.0 fL    MCH 32.6 (H) 27.0 - 31.0  pg    MCHC 32.9 32.0 - 36.0 g/dL    RDW 13.6 11.3 - 14.5 %    RDW-SD 49.0 37.0 - 54.0 fl    MPV 11.3 6.0 - 12.0 fL    Platelets 161 150 - 450 10*3/mm3    Neutrophil % 64.4 41.0 - 71.0 %    Lymphocyte % 22.6 (L) 24.0 - 44.0 %    Monocyte % 5.7 0.0 - 12.0 %    Eosinophil % 6.9 (H) 0.0 - 3.0 %    Basophil % 0.4 0.0 - 1.0 %    Immature Grans % 0.2 0.0 - 0.6 %    Neutrophils, Absolute 3.62 1.50 - 8.30 10*3/mm3    Lymphocytes, Absolute 1.27 0.60 - 4.80 10*3/mm3    Monocytes, Absolute 0.32 0.00 - 1.00 10*3/mm3    Eosinophils, Absolute 0.39 (H) 0.00 - 0.30 10*3/mm3    Basophils, Absolute 0.02 0.00 - 0.20 10*3/mm3    Immature Grans, Absolute 0.01 0.00 - 0.03 10*3/mm3   POC Troponin, Rapid    Collection Time: 09/01/18 11:04 PM   Result Value Ref Range    Troponin I 0.00 0.00 - 0.07 ng/mL   Urinalysis With Microscopic If Indicated (No Culture) - Urine, Clean Catch    Collection Time: 09/02/18 12:35 AM   Result Value Ref Range    Color, UA Yellow Yellow, Straw    Appearance, UA Cloudy (A) Clear    pH, UA 6.5 5.0 - 8.0    Specific Gravity, UA 1.023 1.001 - 1.030    Glucose,  mg/dL (2+) (A) Negative    Ketones, UA Negative Negative    Bilirubin, UA Negative Negative    Blood, UA Trace (A) Negative    Protein,  mg/dL (2+) (A) Negative    Leuk Esterase, UA Negative Negative    Nitrite, UA Negative Negative    Urobilinogen, UA 1.0 E.U./dL 0.2 - 1.0 E.U./dL   Urinalysis, Microscopic Only - Urine, Clean Catch    Collection Time: 09/02/18 12:35 AM   Result Value Ref Range    RBC, UA 3-6 (A) None Seen, 0-2 /HPF    WBC, UA 6-12 (A) None Seen, 0-2 /HPF    Bacteria, UA 2+ (A) None Seen, Trace /HPF    Squamous Epithelial Cells, UA 13-20 (A) None Seen, 0-2 /HPF    Hyaline Casts, UA 7-12 0 - 6 /LPF    Methodology Manual Light Microscopy      Note: In addition to lab results from this visit, the labs listed above may include labs taken at another facility or during a different encounter within the last 24 hours. Please correlate  lab times with ED admission and discharge times for further clarification of the services performed during this visit.    XR Shoulder 2+ View Left   Final Result   1.  Left TSA prosthesis, satisfactory alignment.   2.  No dislocation or acute fracture visualized.   3.  Mild soft tissue swelling.      THIS DOCUMENT HAS BEEN ELECTRONICALLY SIGNED BY EARL TOBIN MD      XR Knee 1 or 2 View Left   Final Result   1.  Left TKR prosthesis alignment unchanged.   2.  Small joint effusion.   3.  Mild soft tissue swelling.   4.  No dislocation or acute fracture visualized.      THIS DOCUMENT HAS BEEN ELECTRONICALLY SIGNED BY EALR TOBIN MD      CT Thoracic Spine Without Contrast   Final Result   1.  Mild scoliosis.   2.  Minimal anterolisthesis at T9-T10.   3.  DDD at T3-T4 through T10-T11.  Additional degenerative changes.   4.  No definite acute fracture visualized.   5.  Additional findings, as above.      THIS DOCUMENT HAS BEEN ELECTRONICALLY SIGNED BY EARL TOBIN MD      CT Lumbar Spine Without Contrast   Final Result   1.  No acute fracture visualized.   2.  No subluxation.   3.  Multilevel DDD, disc bulging, and facet DJD.  See above.   4.  Acquired spinal stenosis.  See above.   5.  Edema or contusion in midline subcutaneous fat of lumbar region.   6.  Mild DJD of bilateral sacroiliac joints.   7.  Mild scoliosis.      THIS DOCUMENT HAS BEEN ELECTRONICALLY SIGNED BY EARL TOBIN MD      CT Cervical Spine Without Contrast   Final Result   1.  Chronic mild anterolisthesis at C2-C3 and C3-C4.   2.  No acute subluxation.   3.  DDD at C3-C4, C4-C5, C5-C6.   4.  No acute fracture visualized.   5.  Straightened spinal curvature from muscle spasm or positional effects.      THIS DOCUMENT HAS BEEN ELECTRONICALLY SIGNED BY EARL TOBIN MD      CT Maxillofacial Without Contrast   Final Result   1.  Deviation of nasal septum.   2.  No acute fracture or dislocation of maxillofacial bones.   3.  Mild contusions of  forehead and cheeks.   4.  Mild anterolisthesis C2-C3.   5.  Additional findings, as above.      THIS DOCUMENT HAS BEEN ELECTRONICALLY SIGNED BY EARL TOBIN MD      CT Head Without Contrast   Final Result   1.  Mild forehead contusion, predominantly on right.   2.  Mild cerebral atrophy.   3.  No acute intracranial hemorrhage.   4.  Chronic small right occipital lobe CVA.   5.  Chronic microvascular ischemic disease of cerebral white matter.   6.  No acute brain abnormality demonstrated.   7.  Additional findings, as above.      THIS DOCUMENT HAS BEEN ELECTRONICALLY SIGNED BY EARL TOBIN MD      XR Chest 1 View   Final Result   1.  Mild cardiomegaly.   2.  No pneumonia or CHF.   3.  Borderline hyperinflation persists.      THIS DOCUMENT HAS BEEN ELECTRONICALLY SIGNED BY EARL TOBIN MD        Vitals:    09/02/18 0100 09/02/18 0130 09/02/18 0200 09/02/18 0230   BP: 160/82 161/90 154/83 169/100   Pulse:  72 67 66   Resp:       Temp:       TempSrc:       SpO2:  99% 97% 97%   Weight:       Height:         Medications   sodium chloride 0.9 % flush 10 mL (10 mL Intravenous Given 9/1/18 2257)   sodium chloride 0.9 % bolus 1,000 mL (0 mL Intravenous Stopped 9/2/18 0230)   Morphine sulfate (PF) injection 4 mg (4 mg Intravenous Given 9/1/18 2257)   ondansetron (ZOFRAN) injection 4 mg (4 mg Intravenous Given 9/1/18 2257)   Morphine sulfate (PF) injection 4 mg (4 mg Intravenous Given 9/2/18 0241)     ECG/EMG Results (last 24 hours)     ** No results found for the last 24 hours. **                        MDM  Number of Diagnoses or Management Options  Contusion of left shoulder, initial encounter: new and requires workup  Facial contusion, initial encounter: new and requires workup  Fall, initial encounter: new and requires workup  Diagnosis management comments: CT scan of the head, face, cervical spine, thoracic spine, lumbar spine does not show any acute traumatic injuries P    X-rays left shoulder left knee do not  show any acute traumatic injuries P    Patient has bruising in the bilateral periorbital region over the face, and to the anterior lateral portion of the left shoulder.    Patient will be discharged with the diagnosis of facial contusions and left shoulder contusion.    She will be discharged with the advised to apply ice to the area pain, and take Lortab as needed for more severe pain.       Amount and/or Complexity of Data Reviewed  Clinical lab tests: reviewed and ordered  Tests in the radiology section of CPT®: ordered and reviewed  Obtain history from someone other than the patient: yes  Review and summarize past medical records: yes  Independent visualization of images, tracings, or specimens: yes    Patient Progress  Patient progress: stable      Final diagnoses:   Facial contusion, initial encounter   Contusion of left shoulder, initial encounter   Fall, initial encounter       Documentation assistance provided by tejal Jose.  Information recorded by the tejal was done at my direction and has been verified and validated by me.     Prashanth Jose  09/01/18 [id number]       Sundeep Guan MD  09/02/18 [id number]

## 2018-09-05 ENCOUNTER — READMISSION MANAGEMENT (OUTPATIENT)
Dept: CALL CENTER | Facility: HOSPITAL | Age: 73
End: 2018-09-05

## 2018-09-05 NOTE — OUTREACH NOTE
"Medical Week 4 Survey      Responses   Facility patient discharged from?  [place]   Does the patient have one of the following disease processes/diagnoses(primary or secondary)?  Other   Week 4 attempt successful?  Yes   Call start time  0907   Call end time  0917   Discharge diagnosis  pulmonary embolism, s/p fall/poss syncope, Acute kidney injury    Meds reviewed with patient/caregiver?  Yes   Is the patient taking all medications as directed (includes completed medication regime)?  N/A   Has the patient kept scheduled appointments due by today?  Yes   Is the patient still receiving Home Health Services?  N/A   Comments  Doing well from hospital visit   What is the patient's perception of their health status since discharge?  New symptoms unrelated to diagnosis   Additional teach back comments  Pt says she fell down a flight of stars. Was taken to [facility], did not stay over night. No broken bones. Very sore and bruises on face. Says she does have \"a little SOB at times\"   Week 4 Call Completed?  Yes   Would the patient like one additional call?  No   Graduated  Yes   Did the patient feel the follow up calls were helpful during their recovery period?  Yes   Was the number of calls appropriate?  Yes          Bisi Horner RN  "

## 2018-09-14 ENCOUNTER — OFFICE VISIT (OUTPATIENT)
Dept: INTERNAL MEDICINE | Facility: CLINIC | Age: 73
End: 2018-09-14

## 2018-09-14 VITALS
OXYGEN SATURATION: 99 % | DIASTOLIC BLOOD PRESSURE: 82 MMHG | RESPIRATION RATE: 18 BRPM | HEART RATE: 70 BPM | SYSTOLIC BLOOD PRESSURE: 164 MMHG

## 2018-09-14 DIAGNOSIS — T07.XXXA MULTIPLE CONTUSIONS: ICD-10-CM

## 2018-09-14 DIAGNOSIS — Z23 NEED FOR IMMUNIZATION AGAINST INFLUENZA: ICD-10-CM

## 2018-09-14 DIAGNOSIS — G25.81 RLS (RESTLESS LEGS SYNDROME): Primary | ICD-10-CM
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
## 2018-09-14 PROCEDURE — 90662 IIV NO PRSV INCREASED AG IM: CPT | Performed by: FAMILY MEDICINE

## 2018-09-14 PROCEDURE — 99213 OFFICE O/P EST LOW 20 MIN: CPT | Performed by: FAMILY MEDICINE

## 2018-09-14 PROCEDURE — G0008 ADMIN INFLUENZA VIRUS VAC: HCPCS | Performed by: FAMILY MEDICINE

## 2018-09-14 RX ORDER — GABAPENTIN 300 MG/1
300 CAPSULE ORAL 4 TIMES DAILY
Qty: 120 CAPSULE | Refills: 2 | Status: SHIPPED | OUTPATIENT
Start: 2018-09-14 | End: 2019-05-14

## 2018-09-14 RX ORDER — ROPINIROLE 1 MG/1
1-3 TABLET, FILM COATED ORAL NIGHTLY
Qty: 90 TABLET | Refills: 2 | Status: SHIPPED | OUTPATIENT
Start: 2018-09-14

## 2018-09-14 RX ORDER — ROPINIROLE 1 MG/1
1-3 TABLET, FILM COATED ORAL NIGHTLY
Qty: 90 TABLET | Refills: 2 | Status: SHIPPED | OUTPATIENT
Start: 2018-09-14 | End: 2018-09-14 | Stop reason: SDUPTHER

## 2018-09-14 NOTE — PROGRESS NOTES
Subjective   Angelita Shay is a 72 y.o. female.     Chief Complaint   Patient presents with   • Hospital Follow up     pt fell down a flight of stairs. Here to follow up. Pt's neck is still in a lot of pain.       Visit Vitals  /82   Pulse 70   Resp 18   SpO2 99%         Fall   The accident occurred more than 1 week ago. Fall occurred: on stairs, 2 + 8 stairs. She fell from a height of 3 to 5 ft. She landed on carpet. The point of impact was the face, head, neck, left shoulder and left knee. The pain is present in the face, head, nose, neck, back, left shoulder, left upper arm and left knee. The pain is at a severity of 8/10. The pain is moderate. Exacerbated by: lifting, twisting neck. Associated symptoms include headaches and nausea. Pertinent negatives include no abdominal pain, bowel incontinence, fever, hearing loss, hematuria, loss of consciousness, numbness, tingling, visual change or vomiting. Treatments tried: norco. The treatment provided moderate relief.      Pt fell down flight of stairs after tripped on boxes. Pt went to  by ambulance and then to  2 days later on 9/1//18.  CT of the head, C-spine, T-Spine L-spine non acute.    Pt needs refill of meds for restless leg.   The following portions of the patient's history were reviewed and updated as appropriate: allergies, current medications, past family history, past medical history, past social history, past surgical history and problem list.    Past Medical History:   Diagnosis Date   • Anxiety    • Arthritis    • AV block, 1st degree    • Breast injury     recent fall in early June, bruising left breast   • Cataract     left   • Cellulitis of both lower extremities    • CKD (chronic kidney disease), stage III    • Colon cancer (CMS/HCC) 2000   • Colon polyp 2015    x 3 adenomatous   • Colon polyp 04/18/2018    x 5 Dr Cash   • Coronary artery disease     1 stent   • Depression    • Diabetes mellitus (CMS/HCC)     dx 2 years ago- checks  fsbs bid   • Gallstone    • GI bleed 07/2018   • H/O echocardiogram 08/05/2018    Dr Greene, EF 55%, mild concentric hypertrophy, impaired relaxation   • Hearing loss    • History of atrial fibrillation    • History of chemotherapy     11/2000   • History of transfusion     [address]   • Hyperlipidemia    • Hypertension    • Knee pain    • Pap smear for cervical cancer screening 2013   • Rectal bleeding    • RLS (restless legs syndrome)    • Sleep apnea    • Wears eyeglasses       Past Surgical History:   Procedure Laterality Date   • APPENDECTOMY  05/1963   • CARDIAC CATHETERIZATION  08/2015    no stents   • CARDIAC CATHETERIZATION  03/13/2018   • CARDIAC CATHETERIZATION N/A 3/13/2018    Procedure: Left Heart Cath;  Surgeon: Pierre Jones III, MD;  Location:  [facility];  Service: Cardiovascular   • CHOLECYSTECTOMY N/A 8/1/2018    Procedure: CHOLECYSTECTOMY LAPAROSCOPIC , LYSIS OF ADHESIONS;  Surgeon: Santos Pantoja MD;  Location:  [facility];  Service: General   • COLON RESECTION  04/08/2000    colon cancer   • COLONOSCOPY  2015   • COLONOSCOPY  04/18/2018    with 5 polyps removed 1yr f/u. Dr Cash- REPEAT YEARLY   • CORONARY ANGIOPLASTY WITH STENT PLACEMENT  12/2015   • ENDOSCOPY     • JOINT REPLACEMENT     • NE TOTAL KNEE ARTHROPLASTY Left 11/3/2016    Procedure: LEFT TOTAL KNEE REPLACEMENT;  Surgeon: Laurent Zuniga MD;  Location:  [facility];  Service: Orthopedics   • TONSILLECTOMY  12/1961   • TOTAL KNEE ARTHROPLASTY Right 2008    revision 2010   • TOTAL SHOULDER ARTHROPLASTY Bilateral     right 2014, left 2015      Family History   Problem Relation Age of Onset   • Colon cancer Other    • Diabetes Other    • Heart disease Other    • Hypertension Other    • Stroke Other    • Tuberculosis Other    • Hypertension Mother    • Colon cancer Father    • Stroke Father    • Diabetes Father    • Colon cancer Sister    • Diabetes Sister    • Diabetes Maternal  Grandmother    • Coronary artery disease Maternal Grandfather    • No Known Problems Paternal Grandmother    • No Known Problems Paternal Grandfather    • Breast cancer Neg Hx    • Ovarian cancer Neg Hx       Social History     Social History   • Marital status:      Spouse name: N/A   • Number of children: 5   • Years of education: N/A     Occupational History   • Retired       Social History Main Topics   • Smoking status: Former Smoker     Packs/day: 1.00     Years: 22.00     Types: Cigarettes     Quit date: 1979   • Smokeless tobacco: Never Used      Comment: quit 1979   • Alcohol use 0.6 oz/week     1 Glasses of wine per week      Comment: occas   • Drug use: No   • Sexual activity: Defer     Other Topics Concern   • Not on file     Social History Narrative    Caffeine: 0-1 servings per day    Patient lives at her home with her daughter and her family        Review of Systems   Constitutional: Negative.  Negative for chills, diaphoresis, fatigue and fever.   HENT: Positive for rhinorrhea and sneezing. Negative for ear pain, nosebleeds, postnasal drip, sinus pressure and sore throat.    Eyes: Negative.  Negative for redness and itching.   Respiratory: Negative.  Negative for cough, shortness of breath and wheezing.    Cardiovascular: Negative.  Negative for chest pain and palpitations.   Gastrointestinal: Positive for nausea. Negative for abdominal pain, bowel incontinence, constipation, diarrhea and vomiting.   Endocrine: Negative.  Negative for cold intolerance and heat intolerance.   Genitourinary: Negative.  Negative for dysuria, frequency, hematuria and urgency.   Musculoskeletal: Positive for arthralgias, back pain, myalgias and neck pain.   Skin: Negative.  Negative for color change and rash.        bruising   Allergic/Immunologic: Positive for environmental allergies.   Neurological: Positive for headaches. Negative for dizziness, tingling, loss of consciousness, syncope,  light-headedness and numbness.   Hematological: Negative for adenopathy. Bruises/bleeds easily.   Psychiatric/Behavioral: Negative.  Negative for dysphoric mood. The patient is not nervous/anxious.         Pt has moved to Son's house in [place] and depression has improved.        Objective   Physical Exam   Constitutional: She is oriented to person, place, and time. She appears well-developed.   HENT:   Head: Normocephalic.   Right Ear: External ear normal.   Left Ear: External ear normal.   Nose: Nose normal.   Eyes: Pupils are equal, round, and reactive to light. Conjunctivae, EOM and lids are normal.   Neck: Trachea normal and normal range of motion. Neck supple. Spinous process tenderness and muscular tenderness present. Carotid bruit is not present. No thyroid mass and no thyromegaly present.   Cardiovascular: Normal rate and regular rhythm.    No murmur heard.  Pulmonary/Chest: Effort normal and breath sounds normal. No respiratory distress. She has no decreased breath sounds. She has no wheezes. She has no rhonchi. She has no rales. She exhibits no tenderness.   Abdominal: Soft. Bowel sounds are normal. There is no tenderness.   Musculoskeletal: Normal range of motion.        Cervical back: She exhibits tenderness and spasm.        Thoracic back: She exhibits tenderness and spasm.        Lumbar back: She exhibits tenderness and spasm.   Neurological: She is alert and oriented to person, place, and time.   Skin: Skin is warm and dry. Bruising noted.        Psychiatric: She has a normal mood and affect. Her behavior is normal.   Nursing note and vitals reviewed.      Assessment/Plan   Angelita was seen today for hospital follow up.    Diagnoses and all orders for this visit:    RLS (restless legs syndrome)  -     gabapentin (NEURONTIN) 300 MG capsule; Take 1 capsule by mouth 4 (Four) Times a Day.  -     Discontinue: rOPINIRole (REQUIP) 1 MG tablet; Take 1-3 tablets by mouth Every Night. Take 1 hour before  bedtime.  -     rOPINIRole (REQUIP) 1 MG tablet; Take 1-3 tablets by mouth Every Night. Take 1 hour before bedtime.    Fall down stairs, initial encounter  -     Ambulatory Referral to Physical Therapy Evaluate and treat    Multiple contusions  -     Ambulatory Referral to Physical Therapy Evaluate and treat    Need for immunization against influenza  -     Flu Vaccine High Dose PF 65YR+ ([id number])                   Current Outpatient Prescriptions:   •  apixaban (ELIQUIS) 5 MG tablet tablet, Take 1 tablet by mouth Every 12 (Twelve) Hours., Disp: 60 tablet, Rfl: 0  •  atorvastatin (LIPITOR) 20 MG tablet, Take 1 tablet by mouth Every Night for 90 days., Disp: 90 tablet, Rfl: 3  •  carvedilol (COREG) 3.125 MG tablet, Take 6.25 mg by mouth 2 (Two) Times a Day With Meals., Disp: , Rfl:   •  Cholecalciferol (VITAMIN D) 2000 units tablet, Take 2,000 Units by mouth Daily., Disp: , Rfl:   •  furosemide (LASIX) 40 MG tablet, Take 1 tablet by mouth Daily., Disp: 30 tablet, Rfl: 11  •  gabapentin (NEURONTIN) 300 MG capsule, Take 1 capsule by mouth 4 (Four) Times a Day., Disp: 120 capsule, Rfl: 2  •  HYDROcodone-acetaminophen (NORCO) 5-325 MG per tablet, Take 1 tablet by mouth Every 6 (Six) Hours As Needed for Severe Pain  for up to 12 doses., Disp: 12 tablet, Rfl: 0  •  insulin aspart (NOVOLOG) 100 UNIT/ML injection, **Per patient's Sliding scale** (Patient taking differently: Inject  under the skin into the appropriate area as directed. **Per patient's Sliding scale**), Disp: 10 mL, Rfl: 2  •  linaclotide (LINZESS) 290 MCG capsule capsule, Take 1 capsule by mouth Every Morning Before Breakfast., Disp: 30 capsule, Rfl: 1  •  magnesium hydroxide (MILK OF MAGNESIA) 400 MG/5ML suspension, Take 15 mL by mouth Daily As Needed for Constipation., Disp: 473 mL, Rfl: 0  •  nystatin (MYCOSTATIN) 636993 UNIT/GM powder, Apply  topically Every 12 (Twelve) Hours. (Patient taking differently: Apply 1 application topically to the appropriate  area as directed Every 12 (Twelve) Hours.), Disp: 30 g, Rfl: 0  •  ondansetron (ZOFRAN) 4 MG tablet, Take 1 tablet by mouth Every 6 (Six) Hours As Needed for Nausea or Vomiting., Disp: 21 tablet, Rfl: 0  •  polyethylene glycol (MIRALAX) powder, Take 17 g by mouth Daily As Needed., Disp: , Rfl:   •  potassium chloride (K-DUR,KLOR-CON) 20 MEQ CR tablet, Take 2 tablets qd x 2 days, then continue 1 tablet daily., Disp: 34 tablet, Rfl: 0  •  rOPINIRole (REQUIP) 1 MG tablet, Take 1-3 tablets by mouth Every Night. Take 1 hour before bedtime., Disp: 90 tablet, Rfl: 2  •  sacubitril-valsartan (ENTRESTO) 49-51 MG tablet, Take 1 tablet by mouth Every 12 (Twelve) Hours., Disp: 60 tablet, Rfl: 5  •  traZODone (DESYREL) 50 MG tablet, TAKE ONE TO TWO TABLETS BY MOUTH EVERY NIGHT AT BEDTIME, Disp: 60 tablet, Rfl: 3    Return in about 3 months (around 12/14/2018), or if symptoms worsen or fail to improve, for Recheck.

## 2018-09-25 RX ORDER — POTASSIUM CHLORIDE 20 MEQ/1
20 TABLET, EXTENDED RELEASE ORAL DAILY
Qty: 30 TABLET | Refills: 3 | Status: SHIPPED | OUTPATIENT
Start: 2018-09-25 | End: 2020-08-04

## 2018-09-25 NOTE — TELEPHONE ENCOUNTER
Last seen on 8/23/18 and will follow up on 10/11/18. Refills for potassium chloride 20mEq daily sent to [facility].     Saloni Joshi, [place]

## 2018-10-08 RX ORDER — FUROSEMIDE 20 MG/1
TABLET ORAL
Qty: 60 TABLET | Refills: 2 | OUTPATIENT
Start: 2018-10-08

## 2018-10-11 ENCOUNTER — OFFICE VISIT (OUTPATIENT)
Dept: CARDIOLOGY | Facility: HOSPITAL | Age: 73
End: 2018-10-11

## 2018-10-11 DIAGNOSIS — I10 ESSENTIAL HYPERTENSION: ICD-10-CM

## 2018-10-11 DIAGNOSIS — N18.30 CKD (CHRONIC KIDNEY DISEASE), STAGE III (HCC): ICD-10-CM

## 2018-10-11 DIAGNOSIS — I48.0 PAROXYSMAL ATRIAL FIBRILLATION (HCC): ICD-10-CM

## 2018-10-11 DIAGNOSIS — I50.22 CHRONIC SYSTOLIC CONGESTIVE HEART FAILURE (HCC): Primary | ICD-10-CM

## 2018-10-11 PROCEDURE — 99214 OFFICE O/P EST MOD 30 MIN: CPT | Performed by: NURSE PRACTITIONER

## 2018-10-11 NOTE — PROGRESS NOTES
Encounter Date:10/11/2018      Patient ID: Angelita Shay is a 73 y.o. female.        Subjective:     Chief Complaint: Follow-up   [facility]  History of Present Illness patient presents to the office today for ongoing evaluation of her systolic heart failure. She notes she is doing well from a cardiac standpoint. She notes that she has not taken lasix in the last few days. She reports that she only takes lasix if she is experiencing pedal edema. She denies cp, dyspnea, pedal edema, palps, dizziness or lightheadedness. She notes that she was evaluated in the ED on 9/1/18 after she tripped down her stairs while carrying 2 boxes.  Patient Active Problem List   Diagnosis   • Essential hypertension   • Anxiety   • DM type 2 (diabetes mellitus, type 2), on insulin therapy   • GERD (gastroesophageal reflux disease)   • Coronary artery disease involving native coronary artery of native heart with angina pectoris (CMS/HCC)   • Moderate obstructive sleep apnea   • RLS (restless legs syndrome)   • Gout   • Chronic systolic congestive heart failure (CMS/HCC)   • Paroxysmal atrial fibrillation (CMS/HCC)   • Nonischemic cardiomyopathy (CMS/HCC)   • Biliary colic   • Cholelithiasis   • Acute kidney injury superimposed on chronic kidney disease stage 3   • Chronic passive hepatic congestion   • Diabetic polyneuropathy associated with type 2 diabetes mellitus (CMS/HCC)   • Nonintractable headache   • Dehydration   • Sepsis (CMS/HCC)   • CKD (chronic kidney disease), stage III (CMS/HCC)   • Acute on chronic respiratory failure with hypoxia (CMS/HCC)   • Hyperlipidemia LDL goal <70   • Obesity (BMI 30-39.9)   • Hematochezia   • Vitamin D deficiency   • Acute kidney injury (CMS/HCC)   • Other pulmonary embolism without acute cor pulmonale (CMS/HCC)       Past Surgical History:   Procedure Laterality Date   • APPENDECTOMY  05/1963   • CARDIAC CATHETERIZATION  08/2015    no stents   • CARDIAC CATHETERIZATION  03/13/2018   • CARDIAC  CATHETERIZATION N/A 3/13/2018    Procedure: Left Heart Cath;  Surgeon: Pierre Jones III, MD;  Location:  [facility];  Service: Cardiovascular   • CHOLECYSTECTOMY N/A 8/1/2018    Procedure: CHOLECYSTECTOMY LAPAROSCOPIC , LYSIS OF ADHESIONS;  Surgeon: Santos Pantoja MD;  Location:  [facility];  Service: General   • COLON RESECTION  04/08/2000    colon cancer   • COLONOSCOPY  2015   • COLONOSCOPY  04/18/2018    with 5 polyps removed 1yr f/u. Dr Cash- REPEAT YEARLY   • CORONARY ANGIOPLASTY WITH STENT PLACEMENT  12/2015   • ENDOSCOPY     • JOINT REPLACEMENT     • MA TOTAL KNEE ARTHROPLASTY Left 11/3/2016    Procedure: LEFT TOTAL KNEE REPLACEMENT;  Surgeon: Laurent Zuniga MD;  Location:  [facility];  Service: Orthopedics   • TONSILLECTOMY  12/1961   • TOTAL KNEE ARTHROPLASTY Right 2008    revision 2010   • TOTAL SHOULDER ARTHROPLASTY Bilateral     right 2014, left 2015       Allergies   Allergen Reactions   • Oxycodone Shortness Of Breath   • Zolpidem Other (See Comments)     nightmares         Current Outpatient Prescriptions:   •  apixaban (ELIQUIS) 5 MG tablet tablet, Take 1 tablet by mouth Every 12 (Twelve) Hours., Disp: 60 tablet, Rfl: 0  •  carvedilol (COREG) 3.125 MG tablet, Take 6.25 mg by mouth 2 (Two) Times a Day With Meals., Disp: , Rfl:   •  Cholecalciferol (VITAMIN D) 2000 units tablet, Take 2,000 Units by mouth Daily., Disp: , Rfl:   •  furosemide (LASIX) 40 MG tablet, Take 1 tablet by mouth Daily. (Patient taking differently: Take 20 mg by mouth Daily As Needed.), Disp: 30 tablet, Rfl: 11  •  gabapentin (NEURONTIN) 300 MG capsule, Take 1 capsule by mouth 4 (Four) Times a Day., Disp: 120 capsule, Rfl: 2  •  HYDROcodone-acetaminophen (NORCO) 5-325 MG per tablet, Take 1 tablet by mouth Every 6 (Six) Hours As Needed for Severe Pain  for up to 12 doses., Disp: 12 tablet, Rfl: 0  •  insulin aspart (NOVOLOG) 100 UNIT/ML injection, **Per patient's Sliding scale** (Patient taking  differently: Inject  under the skin into the appropriate area as directed. **Per patient's Sliding scale**), Disp: 10 mL, Rfl: 2  •  linaclotide (LINZESS) 290 MCG capsule capsule, Take 1 capsule by mouth Every Morning Before Breakfast., Disp: 30 capsule, Rfl: 1  •  magnesium hydroxide (MILK OF MAGNESIA) 400 MG/5ML suspension, Take 15 mL by mouth Daily As Needed for Constipation., Disp: 473 mL, Rfl: 0  •  nystatin (MYCOSTATIN) 762101 UNIT/GM powder, Apply  topically Every 12 (Twelve) Hours. (Patient taking differently: Apply 1 application topically to the appropriate area as directed Every 12 (Twelve) Hours.), Disp: 30 g, Rfl: 0  •  ondansetron (ZOFRAN) 4 MG tablet, Take 1 tablet by mouth Every 6 (Six) Hours As Needed for Nausea or Vomiting., Disp: 21 tablet, Rfl: 0  •  polyethylene glycol (MIRALAX) powder, Take 17 g by mouth Daily As Needed., Disp: , Rfl:   •  potassium chloride (K-DUR,KLOR-CON) 20 MEQ CR tablet, Take 1 tablet by mouth Daily., Disp: 30 tablet, Rfl: 3  •  rOPINIRole (REQUIP) 1 MG tablet, Take 1-3 tablets by mouth Every Night. Take 1 hour before bedtime., Disp: 90 tablet, Rfl: 2  •  sacubitril-valsartan (ENTRESTO) 49-51 MG tablet, Take 1 tablet by mouth Every 12 (Twelve) Hours., Disp: 60 tablet, Rfl: 5  •  traZODone (DESYREL) 50 MG tablet, TAKE ONE TO TWO TABLETS BY MOUTH EVERY NIGHT AT BEDTIME, Disp: 60 tablet, Rfl: 3    The following portions of the chart were reviewed and updated as appropriate: Allergies, current medications, past family history, social history, past medical history.     Review of Systems   Constitution: Negative for chills, decreased appetite, diaphoresis, fever, weakness, malaise/fatigue, night sweats, weight gain and weight loss.   HENT: Negative for congestion, hearing loss, hoarse voice and nosebleeds.    Eyes: Negative for blurred vision, visual disturbance and visual halos.   Cardiovascular: Negative for chest pain, claudication, cyanosis, dyspnea on exertion, irregular  "heartbeat, leg swelling, near-syncope, orthopnea, palpitations, paroxysmal nocturnal dyspnea and syncope.   Respiratory: Positive for snoring. Negative for cough, hemoptysis, shortness of breath, sleep disturbances due to breathing, sputum production and wheezing.    Hematologic/Lymphatic: Negative for bleeding problem. Bruises/bleeds easily.   Skin: Negative for dry skin, itching and rash.   Musculoskeletal: Negative for arthritis, falls, joint pain, joint swelling and myalgias.   Gastrointestinal: Positive for abdominal pain and diarrhea. Negative for bloating, constipation, flatus, heartburn, hematemesis, hematochezia, melena, nausea and vomiting.   Genitourinary: Negative for dysuria, frequency, hematuria, nocturia and urgency.   Neurological: Negative for excessive daytime sleepiness, dizziness, headaches, light-headedness and loss of balance.   Psychiatric/Behavioral: Negative for depression. The patient does not have insomnia and is not nervous/anxious.            Objective:     Vitals:    10/11/18 1328 10/11/18 1330 10/11/18 1331 10/11/18 1341   BP: 170/83 163/79 179/87 142/78   BP Location: Right arm Left arm Left arm Left arm   Patient Position: Sitting Sitting Standing Sitting   Pulse: 82  88    Resp: 16      Temp: 97.8 °F (36.6 °C)      TempSrc: Temporal Artery       SpO2: 94%      Weight: 96 kg (211 lb 9.6 oz)      Height: 162.6 cm (64.02\")            Physical Exam   Constitutional: She is oriented to person, place, and time. She appears well-developed and well-nourished. She is active and cooperative. No distress.   HENT:   Head: Normocephalic and atraumatic.   Mouth/Throat: Oropharynx is clear and moist.   Eyes: Pupils are equal, round, and reactive to light. Conjunctivae and EOM are normal.   Neck: Normal range of motion. Neck supple. No JVD present. No tracheal deviation present. No thyromegaly present.   Cardiovascular: Normal rate, regular rhythm, normal heart sounds and intact distal pulses.  "   Pulmonary/Chest: Effort normal and breath sounds normal.   Abdominal: Soft. Bowel sounds are normal. She exhibits no distension. There is no tenderness.   Musculoskeletal: Normal range of motion.   Neurological: She is alert and oriented to person, place, and time.   Skin: Skin is warm, dry and intact.   Psychiatric: She has a normal mood and affect. Her behavior is normal.   Nursing note and vitals reviewed.      Lab and Diagnostic Review:      Results for orders placed or performed during the hospital encounter of 09/01/18   Comprehensive Metabolic Panel   Result Value Ref Range    Glucose 223 (H) 70 - 100 mg/dL    BUN 14 9 - 23 mg/dL    Creatinine 1.10 0.60 - 1.30 mg/dL    Sodium 142 132 - 146 mmol/L    Potassium 3.5 3.5 - 5.5 mmol/L    Chloride 104 99 - 109 mmol/L    CO2 32.0 (H) 20.0 - 31.0 mmol/L    Calcium 9.4 8.7 - 10.4 mg/dL    Total Protein 7.2 5.7 - 8.2 g/dL    Albumin 4.54 3.20 - 4.80 g/dL    ALT (SGPT) 22 7 - 40 U/L    AST (SGOT) 21 0 - 33 U/L    Alkaline Phosphatase 74 25 - 100 U/L    Total Bilirubin 0.4 0.3 - 1.2 mg/dL    eGFR Non African Amer 49 (L) >60 mL/min/1.73    Globulin 2.7 gm/dL    A/G Ratio 1.7 1.5 - 2.5 g/dL    BUN/Creatinine Ratio 12.7 7.0 - 25.0    Anion Gap 6.0 3.0 - 11.0 mmol/L   Lipase   Result Value Ref Range    Lipase 59 (H) 6 - 51 U/L   Urinalysis With Microscopic If Indicated (No Culture) - Urine, Clean Catch   Result Value Ref Range    Color, UA Yellow Yellow, Straw    Appearance, UA Cloudy (A) Clear    pH, UA 6.5 5.0 - 8.0    Specific Gravity, UA 1.023 1.001 - 1.030    Glucose,  mg/dL (2+) (A) Negative    Ketones, UA Negative Negative    Bilirubin, UA Negative Negative    Blood, UA Trace (A) Negative    Protein,  mg/dL (2+) (A) Negative    Leuk Esterase, UA Negative Negative    Nitrite, UA Negative Negative    Urobilinogen, UA 1.0 E.U./dL 0.2 - 1.0 E.U./dL   CBC Auto Differential   Result Value Ref Range    WBC 5.62 3.50 - 10.80 10*3/mm3    RBC 4.42 3.89 - 5.14  10*6/mm3    Hemoglobin 14.4 11.5 - 15.5 g/dL    Hematocrit 43.8 34.5 - 44.0 %    MCV 99.1 (H) 80.0 - 99.0 fL    MCH 32.6 (H) 27.0 - 31.0 pg    MCHC 32.9 32.0 - 36.0 g/dL    RDW 13.6 11.3 - 14.5 %    RDW-SD 49.0 37.0 - 54.0 fl    MPV 11.3 6.0 - 12.0 fL    Platelets 161 150 - 450 10*3/mm3    Neutrophil % 64.4 41.0 - 71.0 %    Lymphocyte % 22.6 (L) 24.0 - 44.0 %    Monocyte % 5.7 0.0 - 12.0 %    Eosinophil % 6.9 (H) 0.0 - 3.0 %    Basophil % 0.4 0.0 - 1.0 %    Immature Grans % 0.2 0.0 - 0.6 %    Neutrophils, Absolute 3.62 1.50 - 8.30 10*3/mm3    Lymphocytes, Absolute 1.27 0.60 - 4.80 10*3/mm3    Monocytes, Absolute 0.32 0.00 - 1.00 10*3/mm3    Eosinophils, Absolute 0.39 (H) 0.00 - 0.30 10*3/mm3    Basophils, Absolute 0.02 0.00 - 0.20 10*3/mm3    Immature Grans, Absolute 0.01 0.00 - 0.03 10*3/mm3   Urinalysis, Microscopic Only - Urine, Clean Catch   Result Value Ref Range    RBC, UA 3-6 (A) None Seen, 0-2 /HPF    WBC, UA 6-12 (A) None Seen, 0-2 /HPF    Bacteria, UA 2+ (A) None Seen, Trace /HPF    Squamous Epithelial Cells, UA 13-20 (A) None Seen, 0-2 /HPF    Hyaline Casts, UA 7-12 0 - 6 /LPF    Methodology Manual Light Microscopy    POC Troponin, Rapid   Result Value Ref Range    Troponin I 0.00 0.00 - 0.07 ng/mL   Light Blue Top         Assessment and Plan:         1. Chronic systolic congestive heart failure (CMS/HCC)  euvolemic  Heart failure education today including signs and symptoms, the role of the heart failure center, daily weights, low sodium diet (less than 1500 mg per day), and daily physical activity. Reviewed HF Zones with patient and family.  Patient to continue current medications as previously ordered.     2. Essential hypertension  HTN Education provided today including signs and symptoms, medication management, daily blood pressure monitoring. Patient encouraged to call the Heart and Valve center with any abnormal readings.     3. Paroxysmal atrial fibrillation (CMS/[facility])  CHADS-VASc Risk Assessment             5       Total Score        1 CHF    1 Hypertension    1 DM    1 Age 65-74    1 Sex: Female      Anticoagulated with eliquis and denies any s.s of bleeding      4. CKD (chronic kidney disease), stage III (CMS/HCC)  Stable    It has been a pleasure to participate in the care of this patient.  Patient was instructed to call the [facility] with any questions, concerns, or worsening symptoms.        * Please note that portions of this note were completed with a voice recognition program. Efforts were made to edit the dictation but occasionally words are transcribed.

## 2018-10-15 VITALS
BODY MASS INDEX: 36.12 KG/M2 | RESPIRATION RATE: 16 BRPM | TEMPERATURE: 97.8 F | SYSTOLIC BLOOD PRESSURE: 142 MMHG | HEIGHT: 64 IN | WEIGHT: 211.6 LBS | DIASTOLIC BLOOD PRESSURE: 78 MMHG | HEART RATE: 88 BPM | OXYGEN SATURATION: 94 %

## 2018-10-22 RX ORDER — FUROSEMIDE 20 MG/1
TABLET ORAL
Qty: 60 TABLET | Refills: 2 | OUTPATIENT
Start: 2018-10-22

## 2018-12-21 ENCOUNTER — TELEPHONE (OUTPATIENT)
Dept: INTERNAL MEDICINE | Facility: CLINIC | Age: 73
End: 2018-12-21

## 2018-12-21 NOTE — TELEPHONE ENCOUNTER
LVM that she needs an appt to receive refill on gabapentin. She had an appt on 12/14/18 but canceled via phone message. She will have to be seen since it's controlled.

## 2018-12-21 NOTE — TELEPHONE ENCOUNTER
Patient would like a call back. She said that her pharmacy told her that her refills for Trazodone and Gabapentin were denied. She is almost out of medication and needs them.

## 2019-01-17 DIAGNOSIS — G25.81 RLS (RESTLESS LEGS SYNDROME): ICD-10-CM

## 2019-01-17 RX ORDER — ROPINIROLE 1 MG/1
TABLET, FILM COATED ORAL
Qty: 90 TABLET | Refills: 1 | OUTPATIENT
Start: 2019-01-17

## 2019-02-12 NOTE — PROGRESS NOTES
Encounter Date:02/13/2019      Patient ID: Angelita Shay is a 73 y.o. female.        Subjective:     Chief Complaint: Follow-up   shf, paf  History of Present Illness patient presents to the office today for ongoing evaluation of her chronic systolic heart failure and paf. She notes that she has been hospitalized twice at [facility] since her last office visit. She notes she was hospitalized in November for PNA and again in December for strep and flu.  She notes she is now working full time. She notes that she has been experiencing fatigue, dyspnea on exertion and intermittent palpitations. She notes that her blood sugars had been running  but notes that yesterday her blood sugar was close to 300. She notes that she occasionally experiences a dull ache in her chest that starts at rest and does not worsen with exertion.     Patient Active Problem List   Diagnosis   • Essential hypertension   • Anxiety   • DM type 2 (diabetes mellitus, type 2), on insulin therapy   • GERD (gastroesophageal reflux disease)   • Coronary artery disease involving native coronary artery of native heart with angina pectoris (CMS/HCC)   • Moderate obstructive sleep apnea   • RLS (restless legs syndrome)   • Gout   • Chronic systolic congestive heart failure (CMS/HCC)   • Paroxysmal atrial fibrillation (CMS/HCC)   • Nonischemic cardiomyopathy (CMS/HCC)   • Biliary colic   • Cholelithiasis   • Acute kidney injury superimposed on chronic kidney disease stage 3   • Chronic passive hepatic congestion   • Diabetic polyneuropathy associated with type 2 diabetes mellitus (CMS/HCC)   • Nonintractable headache   • Dehydration   • Sepsis (CMS/HCC)   • CKD (chronic kidney disease), stage III (CMS/HCC)   • Acute on chronic respiratory failure with hypoxia (CMS/HCC)   • Hyperlipidemia LDL goal <70   • Obesity (BMI 30-39.9)   • Hematochezia   • Vitamin D deficiency   • Acute kidney injury (CMS/HCC)   • Other pulmonary embolism without acute cor  pulmonale (CMS/HCC)       Past Surgical History:   Procedure Laterality Date   • APPENDECTOMY  05/1963   • CARDIAC CATHETERIZATION  08/2015    no stents   • CARDIAC CATHETERIZATION  03/13/2018   • CARDIAC CATHETERIZATION N/A 3/13/2018    Procedure: Left Heart Cath;  Surgeon: Pierre Jones III, MD;  Location:  [facility];  Service: Cardiovascular   • CHOLECYSTECTOMY N/A 8/1/2018    Procedure: CHOLECYSTECTOMY LAPAROSCOPIC , LYSIS OF ADHESIONS;  Surgeon: Santos Pantoja MD;  Location:  [facility] OR;  Service: General   • COLON RESECTION  04/08/2000    colon cancer   • COLONOSCOPY  2015   • COLONOSCOPY  04/18/2018    with 5 polyps removed 1yr f/u. Dr Cash- REPEAT YEARLY   • CORONARY ANGIOPLASTY WITH STENT PLACEMENT  12/2015   • ENDOSCOPY     • JOINT REPLACEMENT     • WA TOTAL KNEE ARTHROPLASTY Left 11/3/2016    Procedure: LEFT TOTAL KNEE REPLACEMENT;  Surgeon: Laurent Zuniga MD;  Location:  [facility];  Service: Orthopedics   • TONSILLECTOMY  12/1961   • TOTAL KNEE ARTHROPLASTY Right 2008    revision 2010   • TOTAL SHOULDER ARTHROPLASTY Bilateral     right 2014, left 2015       Allergies   Allergen Reactions   • Oxycodone Shortness Of Breath   • Zolpidem Other (See Comments)     nightmares         Current Outpatient Medications:   •  apixaban (ELIQUIS) 5 MG tablet tablet, Take 1 tablet by mouth Every 12 (Twelve) Hours., Disp: 60 tablet, Rfl: 0  •  carvedilol (COREG) 3.125 MG tablet, Take 6.25 mg by mouth 2 (Two) Times a Day With Meals., Disp: , Rfl:   •  Cholecalciferol (VITAMIN D) 2000 units tablet, Take 2,000 Units by mouth Daily., Disp: , Rfl:   •  furosemide (LASIX) 40 MG tablet, Take 1 tablet by mouth Daily., Disp: 30 tablet, Rfl: 11  •  gabapentin (NEURONTIN) 300 MG capsule, Take 1 capsule by mouth 4 (Four) Times a Day., Disp: 120 capsule, Rfl: 2  •  HYDROcodone-acetaminophen (NORCO) 5-325 MG per tablet, Take 1 tablet by mouth Every 6 (Six) Hours As Needed for Severe Pain  for up to 12  doses., Disp: 12 tablet, Rfl: 0  •  insulin aspart (NOVOLOG) 100 UNIT/ML injection, **Per patient's Sliding scale** (Patient taking differently: Inject  under the skin into the appropriate area as directed. **Per patient's Sliding scale**), Disp: 10 mL, Rfl: 2  •  linaclotide (LINZESS) 290 MCG capsule capsule, Take 1 capsule by mouth Every Morning Before Breakfast., Disp: 30 capsule, Rfl: 1  •  magnesium hydroxide (MILK OF MAGNESIA) 400 MG/5ML suspension, Take 15 mL by mouth Daily As Needed for Constipation., Disp: 473 mL, Rfl: 0  •  nystatin (MYCOSTATIN) 034645 UNIT/GM powder, Apply  topically Every 12 (Twelve) Hours. (Patient taking differently: Apply 1 application topically to the appropriate area as directed Every 12 (Twelve) Hours.), Disp: 30 g, Rfl: 0  •  ondansetron (ZOFRAN) 4 MG tablet, Take 1 tablet by mouth Every 6 (Six) Hours As Needed for Nausea or Vomiting., Disp: 21 tablet, Rfl: 0  •  polyethylene glycol (MIRALAX) powder, Take 17 g by mouth Daily As Needed., Disp: , Rfl:   •  potassium chloride (K-DUR,KLOR-CON) 20 MEQ CR tablet, Take 1 tablet by mouth Daily., Disp: 30 tablet, Rfl: 3  •  rOPINIRole (REQUIP) 1 MG tablet, Take 1-3 tablets by mouth Every Night. Take 1 hour before bedtime., Disp: 90 tablet, Rfl: 2  •  sacubitril-valsartan (ENTRESTO) 49-51 MG tablet, Take 1 tablet by mouth Every 12 (Twelve) Hours., Disp: 60 tablet, Rfl: 5  •  traZODone (DESYREL) 50 MG tablet, Take 1-2 tablets qhs, Disp: 60 tablet, Rfl: 3    The following portions of the chart were reviewed and updated as appropriate: Allergies, current medications, past family history, social history, past medical history.     Review of Systems   Constitution: Positive for malaise/fatigue. Negative for chills, decreased appetite, diaphoresis, fever, weakness, night sweats, weight gain and weight loss.   HENT: Negative for congestion, hearing loss, hoarse voice and nosebleeds.    Eyes: Negative for blurred vision, visual disturbance and  "visual halos.   Cardiovascular: Positive for chest pain (chest pressure) and dyspnea on exertion. Negative for claudication, cyanosis, irregular heartbeat, leg swelling, near-syncope, orthopnea, palpitations, paroxysmal nocturnal dyspnea and syncope.   Respiratory: Positive for snoring. Negative for cough, hemoptysis, shortness of breath, sleep disturbances due to breathing, sputum production and wheezing.    Endocrine: Negative.    Hematologic/Lymphatic: Negative for bleeding problem. Bruises/bleeds easily.   Skin: Negative for dry skin, itching and rash.   Musculoskeletal: Positive for joint pain. Negative for arthritis, falls, joint swelling and myalgias.   Gastrointestinal: Positive for bloating, abdominal pain, nausea and vomiting. Negative for constipation, diarrhea, flatus, heartburn, hematemesis, hematochezia and melena.   Genitourinary: Negative for dysuria, frequency, hematuria, nocturia and urgency.   Neurological: Positive for headaches. Negative for excessive daytime sleepiness, dizziness, light-headedness and loss of balance.   Psychiatric/Behavioral: Negative for depression. The patient has insomnia. The patient is not nervous/anxious.    Allergic/Immunologic: Positive for environmental allergies.           Objective:     Vitals:    02/13/19 1136 02/13/19 1145   BP: 155/98 128/68   BP Location: Right arm Left arm   Patient Position: Sitting Sitting   Pulse: 77    Resp: 18    Temp: 97 °F (36.1 °C)    TempSrc: Temporal    SpO2: 95%    Weight: 97.1 kg (214 lb)    Height: 162.6 cm (64.02\")          Physical Exam   Constitutional: She is oriented to person, place, and time. She appears well-developed and well-nourished. She is active and cooperative. No distress.   HENT:   Head: Normocephalic and atraumatic.   Mouth/Throat: Oropharynx is clear and moist.   Eyes: Conjunctivae and EOM are normal. Pupils are equal, round, and reactive to light.   Neck: Normal range of motion. Neck supple. No JVD present. No " tracheal deviation present. No thyromegaly present.   Cardiovascular: Normal rate, regular rhythm, normal heart sounds and intact distal pulses.   Pulmonary/Chest: Effort normal and breath sounds normal.   Abdominal: Soft. Bowel sounds are normal. She exhibits no distension. There is no tenderness.   Musculoskeletal: Normal range of motion.   Neurological: She is alert and oriented to person, place, and time.   Skin: Skin is warm, dry and intact.   Psychiatric: She has a normal mood and affect. Her behavior is normal.   Nursing note and vitals reviewed.      Lab and Diagnostic Review:    EKG today : Sinus rhythm with 1st degree AV block  Low voltage QRS  Septal infarct (cited on or before 13-FEB-2019)  Abnormal ECG  When compared with ECG of 13-FEB-2019 12:24, (Unconfirmed)  premature ventricular complexes are no longer present    Assessment and Plan:         1. Chest pain, unspecified type  Does not worsen with exertion, starts at rest  Patient notes that it is pressure  - ECG 12 Lead; Future    2. Chronic systolic congestive heart failure (CMS/HCC)  euvolemic  Heart failure education today including signs and symptoms, the role of the heart failure center, daily weights, low sodium diet (less than 1500 mg per day), and daily physical activity. Reviewed HF Zones with patient and family.  Patient to continue current medications as previously ordered.     3. Paroxysmal atrial fibrillation (CMS/HCC)  Maintaining nsr on coreg  CHADS-VASc Risk Assessment            5       Total Score        1 CHF    1 Hypertension    1 DM    1 Age 65-74    1 Sex: Female        Anticoagulated with eliquis and denies any s/s of bleeding    4. Essential hypertension  Well controlled  HTN Education provided today including signs and symptoms, medication management, daily blood pressure monitoring. Patient encouraged to call the Heart and Valve center with any abnormal readings.     5. CKD (chronic kidney disease), stage III  (CMS/[facility])  Stable    It has been a pleasure to participate in the care of this patient.  Patient was instructed to call the [facility] Center with any questions, concerns, or worsening symptoms.        * Please note that portions of this note were completed with a voice recognition program. Efforts were made to edit the dictation but occasionally words are transcribed.

## 2019-02-13 ENCOUNTER — HOSPITAL ENCOUNTER (OUTPATIENT)
Dept: CARDIOLOGY | Facility: HOSPITAL | Age: 74
Discharge: HOME | End: 2019-02-13
Admitting: NURSE PRACTITIONER

## 2019-02-13 ENCOUNTER — OFFICE VISIT (OUTPATIENT)
Dept: CARDIOLOGY | Facility: HOSPITAL | Age: 74
End: 2019-02-13

## 2019-02-13 VITALS
DIASTOLIC BLOOD PRESSURE: 68 MMHG | BODY MASS INDEX: 36.54 KG/M2 | HEART RATE: 77 BPM | TEMPERATURE: 97 F | OXYGEN SATURATION: 95 % | SYSTOLIC BLOOD PRESSURE: 128 MMHG | HEIGHT: 64 IN | RESPIRATION RATE: 18 BRPM | WEIGHT: 214 LBS

## 2019-02-13 DIAGNOSIS — I10 ESSENTIAL HYPERTENSION: ICD-10-CM

## 2019-02-13 DIAGNOSIS — I50.22 CHRONIC SYSTOLIC CONGESTIVE HEART FAILURE (HCC): ICD-10-CM

## 2019-02-13 DIAGNOSIS — I48.0 PAROXYSMAL ATRIAL FIBRILLATION (HCC): ICD-10-CM

## 2019-02-13 DIAGNOSIS — R07.9 CHEST PAIN, UNSPECIFIED TYPE: Primary | ICD-10-CM

## 2019-02-13 DIAGNOSIS — R07.9 CHEST PAIN, UNSPECIFIED TYPE: ICD-10-CM

## 2019-02-13 DIAGNOSIS — N18.30 CKD (CHRONIC KIDNEY DISEASE), STAGE III (HCC): ICD-10-CM

## 2019-02-13 PROCEDURE — 93010 ELECTROCARDIOGRAM REPORT: CPT | Performed by: INTERNAL MEDICINE

## 2019-02-13 PROCEDURE — 99214 OFFICE O/P EST MOD 30 MIN: CPT | Performed by: NURSE PRACTITIONER

## 2019-02-13 PROCEDURE — 93005 ELECTROCARDIOGRAM TRACING: CPT | Performed by: NURSE PRACTITIONER

## 2019-02-13 RX ORDER — TRAZODONE HYDROCHLORIDE 50 MG/1
TABLET ORAL
Qty: 60 TABLET | Refills: 3 | Status: ON HOLD | OUTPATIENT
Start: 2019-02-13 | End: 2023-01-30

## 2019-02-16 DIAGNOSIS — G25.81 RLS (RESTLESS LEGS SYNDROME): ICD-10-CM

## 2019-02-18 RX ORDER — ROPINIROLE 1 MG/1
TABLET, FILM COATED ORAL
Qty: 90 TABLET | Refills: 1 | OUTPATIENT
Start: 2019-02-18

## 2019-03-22 DIAGNOSIS — G25.81 RLS (RESTLESS LEGS SYNDROME): ICD-10-CM

## 2019-03-22 RX ORDER — ROPINIROLE 1 MG/1
TABLET, FILM COATED ORAL
Qty: 90 TABLET | Refills: 1 | OUTPATIENT
Start: 2019-03-22

## 2019-03-28 DIAGNOSIS — G25.81 RLS (RESTLESS LEGS SYNDROME): ICD-10-CM

## 2019-03-29 RX ORDER — ROPINIROLE 1 MG/1
TABLET, FILM COATED ORAL
Qty: 90 TABLET | Refills: 1 | OUTPATIENT
Start: 2019-03-29

## 2019-04-23 DIAGNOSIS — G25.81 RLS (RESTLESS LEGS SYNDROME): ICD-10-CM

## 2019-04-23 RX ORDER — ROPINIROLE 1 MG/1
TABLET, FILM COATED ORAL
Qty: 90 TABLET | Refills: 1 | OUTPATIENT
Start: 2019-04-23

## 2019-05-14 ENCOUNTER — OFFICE VISIT (OUTPATIENT)
Dept: CARDIOLOGY | Facility: CLINIC | Age: 74
End: 2019-05-14

## 2019-05-14 VITALS
WEIGHT: 219 LBS | HEIGHT: 64 IN | BODY MASS INDEX: 37.39 KG/M2 | SYSTOLIC BLOOD PRESSURE: 150 MMHG | HEART RATE: 69 BPM | DIASTOLIC BLOOD PRESSURE: 80 MMHG

## 2019-05-14 DIAGNOSIS — E66.01 MORBIDLY OBESE (HCC): ICD-10-CM

## 2019-05-14 DIAGNOSIS — E11.22 TYPE 2 DIABETES MELLITUS WITH STAGE 2 CHRONIC KIDNEY DISEASE, WITH LONG-TERM CURRENT USE OF INSULIN (HCC): Chronic | ICD-10-CM

## 2019-05-14 DIAGNOSIS — I48.0 PAROXYSMAL ATRIAL FIBRILLATION (HCC): Chronic | ICD-10-CM

## 2019-05-14 DIAGNOSIS — E78.5 HYPERLIPIDEMIA LDL GOAL <70: ICD-10-CM

## 2019-05-14 DIAGNOSIS — Z79.4 TYPE 2 DIABETES MELLITUS WITH STAGE 2 CHRONIC KIDNEY DISEASE, WITH LONG-TERM CURRENT USE OF INSULIN (HCC): Chronic | ICD-10-CM

## 2019-05-14 DIAGNOSIS — I10 ESSENTIAL HYPERTENSION: ICD-10-CM

## 2019-05-14 DIAGNOSIS — I50.22 CHRONIC SYSTOLIC CONGESTIVE HEART FAILURE (HCC): Primary | ICD-10-CM

## 2019-05-14 DIAGNOSIS — N18.2 TYPE 2 DIABETES MELLITUS WITH STAGE 2 CHRONIC KIDNEY DISEASE, WITH LONG-TERM CURRENT USE OF INSULIN (HCC): Chronic | ICD-10-CM

## 2019-05-14 DIAGNOSIS — I25.119 CORONARY ARTERY DISEASE INVOLVING NATIVE CORONARY ARTERY OF NATIVE HEART WITH ANGINA PECTORIS (HCC): ICD-10-CM

## 2019-05-14 PROBLEM — I42.8 NONISCHEMIC CARDIOMYOPATHY (HCC): Status: RESOLVED | Noted: 2018-03-28 | Resolved: 2019-05-14

## 2019-05-14 PROBLEM — N17.9 ACUTE KIDNEY INJURY: Status: RESOLVED | Noted: 2018-08-03 | Resolved: 2019-05-14

## 2019-05-14 PROBLEM — E86.0 DEHYDRATION: Status: RESOLVED | Noted: 2018-05-16 | Resolved: 2019-05-14

## 2019-05-14 PROBLEM — R51.9 NONINTRACTABLE HEADACHE: Status: RESOLVED | Noted: 2018-05-15 | Resolved: 2019-05-14

## 2019-05-14 PROBLEM — K76.1 CHRONIC PASSIVE HEPATIC CONGESTION: Status: RESOLVED | Noted: 2018-03-28 | Resolved: 2019-05-14

## 2019-05-14 PROBLEM — A41.9 SEPSIS: Status: RESOLVED | Noted: 2018-05-16 | Resolved: 2019-05-14

## 2019-05-14 PROBLEM — N18.9 ACUTE KIDNEY INJURY SUPERIMPOSED ON CHRONIC KIDNEY DISEASE: Status: RESOLVED | Noted: 2018-03-28 | Resolved: 2019-05-14

## 2019-05-14 PROBLEM — F33.1 MODERATE EPISODE OF RECURRENT MAJOR DEPRESSIVE DISORDER: Status: ACTIVE | Noted: 2019-05-14

## 2019-05-14 PROBLEM — N17.9 ACUTE KIDNEY INJURY SUPERIMPOSED ON CHRONIC KIDNEY DISEASE: Status: RESOLVED | Noted: 2018-03-28 | Resolved: 2019-05-14

## 2019-05-14 PROBLEM — J96.21 ACUTE ON CHRONIC RESPIRATORY FAILURE WITH HYPOXIA: Status: RESOLVED | Noted: 2018-05-16 | Resolved: 2019-05-14

## 2019-05-14 PROBLEM — E11.9 DIABETES MELLITUS: Chronic | Status: RESOLVED | Noted: 2018-03-28 | Resolved: 2019-05-14

## 2019-05-14 PROCEDURE — 99214 OFFICE O/P EST MOD 30 MIN: CPT | Performed by: INTERNAL MEDICINE

## 2019-05-14 RX ORDER — CARVEDILOL 25 MG/1
25 TABLET ORAL 2 TIMES DAILY WITH MEALS
Qty: 180 TABLET | Refills: 1 | Status: SHIPPED | OUTPATIENT
Start: 2019-05-14 | End: 2020-04-01

## 2019-05-14 RX ORDER — FUROSEMIDE 40 MG/1
40 TABLET ORAL DAILY
Qty: 90 TABLET | Refills: 1 | Status: SHIPPED | OUTPATIENT
Start: 2019-05-14 | End: 2019-08-15 | Stop reason: SDUPTHER

## 2019-05-14 RX ORDER — ASPIRIN 81 MG/1
81 TABLET, CHEWABLE ORAL DAILY
COMMUNITY
End: 2019-05-14

## 2019-05-14 NOTE — PROGRESS NOTES
Encounter Date:05/14/2019    Patient ID: Angelita Shay is a 73 y.o. female who resides in [place], [place].     CC/Reason for visit:  Congestive Heart Failure; Coronary Artery Disease; and Atrial Fibrillation           Problem List Items Addressed This Visit        Cardiology Problems    Chronic systolic congestive heart failure (CMS/HCC) - Primary    Overview     · Echocardiogram (3/11/2018): EF 36%, mild MR  · Cardiac catheterization (3/13/18): Mild to moderate nonobstructive CAD. Previously placed LAD stent widely patent.  · Echocardiogram (8/6/2018): EF 55%. Mild concentric LVH.          Current Assessment & Plan     · Functional class II symptoms  · Continue carvedilol and Entresto         Relevant Medications    carvedilol (COREG) 25 MG tablet    sacubitril-valsartan (ENTRESTO) 49-51 MG tablet    furosemide (LASIX) 40 MG tablet    Paroxysmal atrial fibrillation (CMS/[facility])    Overview     · Chads VASC = 6 (age, female, CHF, CAD, HTN, DM)         Current Assessment & Plan     · Asymptomatic  · Continue Entresto and carvedilol         Relevant Medications    apixaban (ELIQUIS) 5 MG tablet tablet    carvedilol (COREG) 25 MG tablet    sacubitril-valsartan (ENTRESTO) 49-51 MG tablet    Coronary artery disease involving native coronary artery of native heart with angina pectoris (CMS/[facility])    Overview     · Previous PCI to the LAD  · Cardiac catheterization (8/2/2015): Mild nonobstructive CAD. Widely patent LAD stent. Normal LVEF.  · Cardiac catheterization (3/13/18): Mild to moderate nonobstructive CAD. Previously placed LAD stent widely patent.         Current Assessment & Plan     · No angina  · Continue carvedilol and statin therapy         Relevant Medications    carvedilol (COREG) 25 MG tablet    sacubitril-valsartan (ENTRESTO) 49-51 MG tablet    Hyperlipidemia LDL goal <70    Overview     · High-intensity statin therapy indicated given presence coronary artery disease         Current Assessment & Plan      · Obtain lipids today         Essential hypertension    Relevant Medications    carvedilol (COREG) 25 MG tablet    furosemide (LASIX) 40 MG tablet       Other    DM type 2 (diabetes mellitus, type 2), on insulin therapy (Chronic)    Current Assessment & Plan     · ARB and statin therapy indicated given diabetic status         Morbidly obese (CMS/HCC)               · Discontinue aspirin  · Lipids and direct LDL today  · Consider uptitrating Entresto at future visit if blood pressure remains elevated.  No Follow-up on file.            Angelita Shay returns today for her overdue follow up of her chronic systolic heart failure due to nonischemic cardiomyopathy, paroxysmal atrial fibrillation, mild CAD.  The patient reports no cardiac issues since her last visit.  The patient has been hospitalized several occasions at [facility] for pneumonia, most recently this May.  She denies orthopnea, PND, palpitations, TIA, or stroke symptoms.      Review of Systems   Constitution: Positive for malaise/fatigue and weight gain. Negative for weakness.   HENT: Positive for tinnitus.    Eyes: Negative for vision loss in left eye and vision loss in right eye.   Cardiovascular: Positive for chest pain and leg swelling. Negative for dyspnea on exertion, near-syncope, orthopnea, palpitations, paroxysmal nocturnal dyspnea and syncope.   Hematologic/Lymphatic: Bruises/bleeds easily.   Skin: Positive for dry skin and poor wound healing.   Musculoskeletal: Negative for myalgias.   Gastrointestinal: Positive for abdominal pain and constipation.   Neurological: Negative for brief paralysis, excessive daytime sleepiness, focal weakness, numbness and paresthesias.   Allergic/Immunologic: Positive for environmental allergies.   All other systems reviewed and are negative.      The patient's past medical, social, family history and ROS reviewed in the patient's electronic medical record.    Allergies  Oxycodone and Zolpidem    Outpatient  Medications Marked as Taking for the 5/14/19 encounter (Office Visit) with Festus Bowie IV, MD   Medication Sig Dispense Refill   • apixaban (ELIQUIS) 5 MG tablet tablet Take 1 tablet by mouth Every 12 (Twelve) Hours. 180 tablet 3   • carvedilol (COREG) 25 MG tablet Take 1 tablet by mouth 2 (Two) Times a Day With Meals. 180 tablet 1   • Cholecalciferol (VITAMIN D) 2000 units tablet Take 2,000 Units by mouth Daily.     • furosemide (LASIX) 40 MG tablet Take 1 tablet by mouth Daily. 90 tablet 1   • HYDROcodone-acetaminophen (NORCO) 5-325 MG per tablet Take 1 tablet by mouth Every 6 (Six) Hours As Needed for Severe Pain  for up to 12 doses. 12 tablet 0   • insulin aspart (NOVOLOG) 100 UNIT/ML injection **Per patient's Sliding scale** (Patient taking differently: Inject  under the skin into the appropriate area as directed. **Per patient's Sliding scale**) 10 mL 2   • linaclotide (LINZESS) 290 MCG capsule capsule Take 1 capsule by mouth Every Morning Before Breakfast. 30 capsule 1   • magnesium hydroxide (MILK OF MAGNESIA) 400 MG/5ML suspension Take 15 mL by mouth Daily As Needed for Constipation. 473 mL 0   • nystatin (MYCOSTATIN) 308440 UNIT/GM powder Apply  topically Every 12 (Twelve) Hours. (Patient taking differently: Apply 1 application topically to the appropriate area as directed Every 12 (Twelve) Hours.) 30 g 0   • ondansetron (ZOFRAN) 4 MG tablet Take 1 tablet by mouth Every 6 (Six) Hours As Needed for Nausea or Vomiting. 21 tablet 0   • polyethylene glycol (MIRALAX) powder Take 17 g by mouth Daily As Needed.     • potassium chloride (K-DUR,KLOR-CON) 20 MEQ CR tablet Take 1 tablet by mouth Daily. 30 tablet 3   • rOPINIRole (REQUIP) 1 MG tablet Take 1-3 tablets by mouth Every Night. Take 1 hour before bedtime. 90 tablet 2   • sacubitril-valsartan (ENTRESTO) 49-51 MG tablet Take 1 tablet by mouth Every 12 (Twelve) Hours. 60 tablet 5   • traZODone (DESYREL) 50 MG tablet Take 1-2 tablets qhs 60 tablet  "3   • [DISCONTINUED] apixaban (ELIQUIS) 5 MG tablet tablet Take 1 tablet by mouth Every 12 (Twelve) Hours. 60 tablet 0   • [DISCONTINUED] aspirin 81 MG chewable tablet Chew 81 mg Daily.     • [DISCONTINUED] carvedilol (COREG) 25 MG tablet Take 25 mg by mouth 2 (Two) Times a Day With Meals.     • [DISCONTINUED] furosemide (LASIX) 40 MG tablet Take 1 tablet by mouth Daily. 30 tablet 11   • [DISCONTINUED] sacubitril-valsartan (ENTRESTO) 49-51 MG tablet Take 1 tablet by mouth Every 12 (Twelve) Hours. 60 tablet 5           Blood pressure 150/80, pulse 69, height 162.6 cm (64\"), weight 99.3 kg (219 lb), not currently breastfeeding.  Body mass index is 37.59 kg/m².  There were no vitals filed for this visit.    Physical Exam   Constitutional: She is oriented to person, place, and time. She appears well-developed and well-nourished.   HENT:   Head: Normocephalic and atraumatic.   Eyes: Pupils are equal, round, and reactive to light. No scleral icterus.   Neck: No JVD present. Carotid bruit is not present. No thyromegaly present.   Cardiovascular: Normal rate, regular rhythm, S1 normal and S2 normal. Exam reveals no gallop.   No murmur heard.  Pulmonary/Chest: Effort normal and breath sounds normal.   Abdominal: Soft. There is no hepatosplenomegaly. There is no tenderness.   Neurological: She is alert and oriented to person, place, and time.   Skin: Skin is warm and dry. No cyanosis. Nails show no clubbing.   Psychiatric: She has a normal mood and affect. Her behavior is normal.       Data Review:     Procedures    Labs (01/19/2019):  · Glucose 113, Na 143, K 3.8, Creat 1.0, BUN 16  · WBC 5.2, RBC 4.95, HGB 16.6, HCT 48.9,       Festus Bowie IV, MD  5/14/2019  "

## 2019-05-24 DIAGNOSIS — G25.81 RLS (RESTLESS LEGS SYNDROME): ICD-10-CM

## 2019-05-24 RX ORDER — ROPINIROLE 1 MG/1
TABLET, FILM COATED ORAL
Qty: 90 TABLET | Refills: 1 | OUTPATIENT
Start: 2019-05-24

## 2019-05-29 RX ORDER — TRAZODONE HYDROCHLORIDE 50 MG/1
TABLET ORAL
Qty: 60 TABLET | Refills: 2 | OUTPATIENT
Start: 2019-05-29

## 2019-06-24 RX ORDER — TRAZODONE HYDROCHLORIDE 50 MG/1
TABLET ORAL
Qty: 60 TABLET | Refills: 2 | OUTPATIENT
Start: 2019-06-24

## 2019-08-15 DIAGNOSIS — I50.22 CHRONIC SYSTOLIC CONGESTIVE HEART FAILURE (HCC): ICD-10-CM

## 2019-08-15 RX ORDER — FUROSEMIDE 40 MG/1
TABLET ORAL
Qty: 90 TABLET | Refills: 0 | Status: SHIPPED | OUTPATIENT
Start: 2019-08-15 | End: 2019-11-25 | Stop reason: SDUPTHER

## 2019-11-24 DIAGNOSIS — I50.22 CHRONIC SYSTOLIC CONGESTIVE HEART FAILURE (HCC): ICD-10-CM

## 2019-11-25 RX ORDER — FUROSEMIDE 40 MG/1
TABLET ORAL
Qty: 90 TABLET | Refills: 0 | Status: SHIPPED | OUTPATIENT
Start: 2019-11-25 | End: 2020-03-03

## 2020-03-03 DIAGNOSIS — I50.22 CHRONIC SYSTOLIC CONGESTIVE HEART FAILURE (HCC): ICD-10-CM

## 2020-03-03 RX ORDER — FUROSEMIDE 40 MG/1
TABLET ORAL
Qty: 90 TABLET | Refills: 0 | Status: SHIPPED | OUTPATIENT
Start: 2020-03-03 | End: 2020-06-03

## 2020-04-01 DIAGNOSIS — I50.22 CHRONIC SYSTOLIC CONGESTIVE HEART FAILURE (HCC): ICD-10-CM

## 2020-04-01 RX ORDER — CARVEDILOL 25 MG/1
TABLET ORAL
Qty: 180 TABLET | Refills: 0 | Status: SHIPPED | OUTPATIENT
Start: 2020-04-01 | End: 2020-07-30

## 2020-06-03 DIAGNOSIS — I50.22 CHRONIC SYSTOLIC CONGESTIVE HEART FAILURE (HCC): ICD-10-CM

## 2020-06-03 RX ORDER — FUROSEMIDE 40 MG/1
TABLET ORAL
Qty: 30 TABLET | Refills: 0 | Status: ON HOLD | OUTPATIENT
Start: 2020-06-03 | End: 2023-01-30

## 2020-07-13 ENCOUNTER — HOSPITAL ENCOUNTER (OUTPATIENT)
Facility: HOSPITAL | Age: 75
Discharge: HOME | End: 2020-07-14
Attending: EMERGENCY MEDICINE | Admitting: INTERNAL MEDICINE

## 2020-07-13 ENCOUNTER — APPOINTMENT (OUTPATIENT)
Dept: CT IMAGING | Facility: HOSPITAL | Age: 75
End: 2020-07-13

## 2020-07-13 ENCOUNTER — APPOINTMENT (OUTPATIENT)
Dept: GENERAL RADIOLOGY | Facility: HOSPITAL | Age: 75
End: 2020-07-13

## 2020-07-13 DIAGNOSIS — R07.9 ACUTE CHEST PAIN: Primary | ICD-10-CM

## 2020-07-13 DIAGNOSIS — R07.89 OTHER CHEST PAIN: ICD-10-CM

## 2020-07-13 LAB
ALBUMIN SERPL-MCNC: 4.6 G/DL (ref 3.5–5.2)
ALBUMIN/GLOB SERPL: 1.6 G/DL
ALP SERPL-CCNC: 67 U/L (ref 39–117)
ALT SERPL W P-5'-P-CCNC: 15 U/L (ref 1–33)
ANION GAP SERPL CALCULATED.3IONS-SCNC: 10 MMOL/L (ref 5–15)
AST SERPL-CCNC: 21 U/L (ref 1–32)
BASOPHILS # BLD AUTO: 0.05 10*3/MM3 (ref 0–0.2)
BASOPHILS NFR BLD AUTO: 0.5 % (ref 0–1.5)
BILIRUB SERPL-MCNC: 0.6 MG/DL (ref 0–1.2)
BUN SERPL-MCNC: 17 MG/DL (ref 8–23)
BUN/CREAT SERPL: 17.2 (ref 7–25)
CALCIUM SPEC-SCNC: 10 MG/DL (ref 8.6–10.5)
CHLORIDE SERPL-SCNC: 99 MMOL/L (ref 98–107)
CO2 SERPL-SCNC: 32 MMOL/L (ref 22–29)
CREAT SERPL-MCNC: 0.99 MG/DL (ref 0.57–1)
D DIMER PPP FEU-MCNC: 1.42 MCGFEU/ML (ref 0–0.56)
DEPRECATED RDW RBC AUTO: 43.2 FL (ref 37–54)
EOSINOPHIL # BLD AUTO: 0.26 10*3/MM3 (ref 0–0.4)
EOSINOPHIL NFR BLD AUTO: 2.8 % (ref 0.3–6.2)
ERYTHROCYTE [DISTWIDTH] IN BLOOD BY AUTOMATED COUNT: 12.1 % (ref 12.3–15.4)
GFR SERPL CREATININE-BSD FRML MDRD: 55 ML/MIN/1.73
GLOBULIN UR ELPH-MCNC: 2.9 GM/DL
GLUCOSE BLDC GLUCOMTR-MCNC: 121 MG/DL (ref 70–130)
GLUCOSE SERPL-MCNC: 129 MG/DL (ref 65–99)
HCT VFR BLD AUTO: 47.2 % (ref 34–46.6)
HGB BLD-MCNC: 15.6 G/DL (ref 12–15.9)
HOLD SPECIMEN: NORMAL
HOLD SPECIMEN: NORMAL
IMM GRANULOCYTES # BLD AUTO: 0.03 10*3/MM3 (ref 0–0.05)
IMM GRANULOCYTES NFR BLD AUTO: 0.3 % (ref 0–0.5)
LIPASE SERPL-CCNC: 45 U/L (ref 13–60)
LYMPHOCYTES # BLD AUTO: 1.66 10*3/MM3 (ref 0.7–3.1)
LYMPHOCYTES NFR BLD AUTO: 18.1 % (ref 19.6–45.3)
MCH RBC QN AUTO: 31.9 PG (ref 26.6–33)
MCHC RBC AUTO-ENTMCNC: 33.1 G/DL (ref 31.5–35.7)
MCV RBC AUTO: 96.5 FL (ref 79–97)
MONOCYTES # BLD AUTO: 0.66 10*3/MM3 (ref 0.1–0.9)
MONOCYTES NFR BLD AUTO: 7.2 % (ref 5–12)
NEUTROPHILS NFR BLD AUTO: 6.51 10*3/MM3 (ref 1.7–7)
NEUTROPHILS NFR BLD AUTO: 71.1 % (ref 42.7–76)
NRBC BLD AUTO-RTO: 0 /100 WBC (ref 0–0.2)
NT-PROBNP SERPL-MCNC: 331.9 PG/ML (ref 0–1800)
PLATELET # BLD AUTO: 181 10*3/MM3 (ref 140–450)
PMV BLD AUTO: 10.4 FL (ref 6–12)
POTASSIUM SERPL-SCNC: 3.7 MMOL/L (ref 3.5–5.2)
PROT SERPL-MCNC: 7.5 G/DL (ref 6–8.5)
RBC # BLD AUTO: 4.89 10*6/MM3 (ref 3.77–5.28)
SODIUM SERPL-SCNC: 141 MMOL/L (ref 136–145)
TROPONIN T SERPL-MCNC: <0.01 NG/ML (ref 0–0.03)
TROPONIN T SERPL-MCNC: <0.01 NG/ML (ref 0–0.03)
WBC # BLD AUTO: 9.17 10*3/MM3 (ref 3.4–10.8)
WHOLE BLOOD HOLD SPECIMEN: NORMAL
WHOLE BLOOD HOLD SPECIMEN: NORMAL

## 2020-07-13 PROCEDURE — 93005 ELECTROCARDIOGRAM TRACING: CPT

## 2020-07-13 PROCEDURE — 93010 ELECTROCARDIOGRAM REPORT: CPT | Performed by: INTERNAL MEDICINE

## 2020-07-13 PROCEDURE — 93005 ELECTROCARDIOGRAM TRACING: CPT | Performed by: PHYSICIAN ASSISTANT

## 2020-07-13 PROCEDURE — 71275 CT ANGIOGRAPHY CHEST: CPT

## 2020-07-13 PROCEDURE — 96372 THER/PROPH/DIAG INJ SC/IM: CPT

## 2020-07-13 PROCEDURE — 93005 ELECTROCARDIOGRAM TRACING: CPT | Performed by: EMERGENCY MEDICINE

## 2020-07-13 PROCEDURE — 71045 X-RAY EXAM CHEST 1 VIEW: CPT

## 2020-07-13 PROCEDURE — 83690 ASSAY OF LIPASE: CPT

## 2020-07-13 PROCEDURE — G0378 HOSPITAL OBSERVATION PER HR: HCPCS

## 2020-07-13 PROCEDURE — 25010000002 HEPARIN (PORCINE) PER 1000 UNITS: Performed by: PHYSICIAN ASSISTANT

## 2020-07-13 PROCEDURE — 80053 COMPREHEN METABOLIC PANEL: CPT

## 2020-07-13 PROCEDURE — 84484 ASSAY OF TROPONIN QUANT: CPT | Performed by: EMERGENCY MEDICINE

## 2020-07-13 PROCEDURE — 82962 GLUCOSE BLOOD TEST: CPT

## 2020-07-13 PROCEDURE — 99284 EMERGENCY DEPT VISIT MOD MDM: CPT

## 2020-07-13 PROCEDURE — 85025 COMPLETE CBC W/AUTO DIFF WBC: CPT

## 2020-07-13 PROCEDURE — 83880 ASSAY OF NATRIURETIC PEPTIDE: CPT

## 2020-07-13 PROCEDURE — 85379 FIBRIN DEGRADATION QUANT: CPT | Performed by: PHYSICIAN ASSISTANT

## 2020-07-13 PROCEDURE — 84484 ASSAY OF TROPONIN QUANT: CPT

## 2020-07-13 PROCEDURE — 0 IOPAMIDOL PER 1 ML: Performed by: HOSPITALIST

## 2020-07-13 PROCEDURE — 99220 PR INITIAL OBSERVATION CARE/DAY 70 MINUTES: CPT | Performed by: PHYSICIAN ASSISTANT

## 2020-07-13 RX ORDER — ROPINIROLE 2 MG/1
2 TABLET, FILM COATED ORAL NIGHTLY
Status: DISCONTINUED | OUTPATIENT
Start: 2020-07-13 | End: 2020-07-15 | Stop reason: HOSPADM

## 2020-07-13 RX ORDER — ACETAMINOPHEN 325 MG/1
650 TABLET ORAL EVERY 4 HOURS PRN
Status: DISCONTINUED | OUTPATIENT
Start: 2020-07-13 | End: 2020-07-15 | Stop reason: HOSPADM

## 2020-07-13 RX ORDER — CARVEDILOL 12.5 MG/1
25 TABLET ORAL EVERY 12 HOURS SCHEDULED
Status: DISCONTINUED | OUTPATIENT
Start: 2020-07-13 | End: 2020-07-15 | Stop reason: HOSPADM

## 2020-07-13 RX ORDER — TRAZODONE HYDROCHLORIDE 50 MG/1
25 TABLET ORAL NIGHTLY PRN
Status: DISCONTINUED | OUTPATIENT
Start: 2020-07-13 | End: 2020-07-14

## 2020-07-13 RX ORDER — METFORMIN HYDROCHLORIDE 750 MG/1
750 TABLET, EXTENDED RELEASE ORAL 2 TIMES DAILY
COMMUNITY

## 2020-07-13 RX ORDER — NICOTINE POLACRILEX 4 MG
15 LOZENGE BUCCAL
Status: DISCONTINUED | OUTPATIENT
Start: 2020-07-13 | End: 2020-07-15 | Stop reason: HOSPADM

## 2020-07-13 RX ORDER — NITROGLYCERIN 0.4 MG/1
0.4 TABLET SUBLINGUAL
Status: DISCONTINUED | OUTPATIENT
Start: 2020-07-13 | End: 2020-07-15 | Stop reason: HOSPADM

## 2020-07-13 RX ORDER — FUROSEMIDE 40 MG/1
40 TABLET ORAL DAILY
Status: DISCONTINUED | OUTPATIENT
Start: 2020-07-14 | End: 2020-07-15 | Stop reason: HOSPADM

## 2020-07-13 RX ORDER — DEXTROSE MONOHYDRATE 25 G/50ML
25 INJECTION, SOLUTION INTRAVENOUS
Status: DISCONTINUED | OUTPATIENT
Start: 2020-07-13 | End: 2020-07-15 | Stop reason: HOSPADM

## 2020-07-13 RX ORDER — CHOLECALCIFEROL (VITAMIN D3) 125 MCG
5 CAPSULE ORAL NIGHTLY PRN
Status: DISCONTINUED | OUTPATIENT
Start: 2020-07-13 | End: 2020-07-15 | Stop reason: HOSPADM

## 2020-07-13 RX ORDER — SODIUM CHLORIDE 0.9 % (FLUSH) 0.9 %
10 SYRINGE (ML) INJECTION AS NEEDED
Status: DISCONTINUED | OUTPATIENT
Start: 2020-07-13 | End: 2020-07-15 | Stop reason: HOSPADM

## 2020-07-13 RX ORDER — HEPARIN SODIUM 5000 [USP'U]/ML
5000 INJECTION, SOLUTION INTRAVENOUS; SUBCUTANEOUS EVERY 8 HOURS SCHEDULED
Status: DISCONTINUED | OUTPATIENT
Start: 2020-07-13 | End: 2020-07-15 | Stop reason: HOSPADM

## 2020-07-13 RX ORDER — ASPIRIN 81 MG/1
324 TABLET, CHEWABLE ORAL ONCE
Status: DISCONTINUED | OUTPATIENT
Start: 2020-07-13 | End: 2020-07-15 | Stop reason: HOSPADM

## 2020-07-13 RX ORDER — ASPIRIN 81 MG/1
81 TABLET, CHEWABLE ORAL DAILY
Status: DISCONTINUED | OUTPATIENT
Start: 2020-07-14 | End: 2020-07-15 | Stop reason: HOSPADM

## 2020-07-13 RX ORDER — SODIUM CHLORIDE 0.9 % (FLUSH) 0.9 %
10 SYRINGE (ML) INJECTION EVERY 12 HOURS SCHEDULED
Status: DISCONTINUED | OUTPATIENT
Start: 2020-07-13 | End: 2020-07-15 | Stop reason: HOSPADM

## 2020-07-13 RX ORDER — ONDANSETRON 2 MG/ML
4 INJECTION INTRAMUSCULAR; INTRAVENOUS EVERY 6 HOURS PRN
Status: DISCONTINUED | OUTPATIENT
Start: 2020-07-13 | End: 2020-07-15 | Stop reason: HOSPADM

## 2020-07-13 RX ADMIN — SODIUM CHLORIDE, PRESERVATIVE FREE 10 ML: 5 INJECTION INTRAVENOUS at 21:50

## 2020-07-13 RX ADMIN — IOPAMIDOL 67.4 ML: 755 INJECTION, SOLUTION INTRAVENOUS at 23:15

## 2020-07-13 RX ADMIN — HEPARIN SODIUM 5000 UNITS: 5000 INJECTION INTRAVENOUS; SUBCUTANEOUS at 22:36

## 2020-07-13 RX ADMIN — ACETAMINOPHEN 650 MG: 325 TABLET, FILM COATED ORAL at 22:35

## 2020-07-13 RX ADMIN — SACUBITRIL AND VALSARTAN 1 TABLET: 49; 51 TABLET, FILM COATED ORAL at 22:35

## 2020-07-13 RX ADMIN — ROPINIROLE HYDROCHLORIDE 2 MG: 2 TABLET, FILM COATED ORAL at 22:35

## 2020-07-13 RX ADMIN — CARVEDILOL 25 MG: 12.5 TABLET, FILM COATED ORAL at 22:35

## 2020-07-13 RX ADMIN — NITROGLYCERIN 0.4 MG: 0.4 TABLET SUBLINGUAL at 20:51

## 2020-07-14 ENCOUNTER — APPOINTMENT (OUTPATIENT)
Dept: CARDIOLOGY | Facility: HOSPITAL | Age: 75
End: 2020-07-14

## 2020-07-14 ENCOUNTER — APPOINTMENT (OUTPATIENT)
Dept: CT IMAGING | Facility: HOSPITAL | Age: 75
End: 2020-07-14

## 2020-07-14 VITALS
BODY MASS INDEX: 31.34 KG/M2 | HEIGHT: 66 IN | SYSTOLIC BLOOD PRESSURE: 137 MMHG | RESPIRATION RATE: 17 BRPM | TEMPERATURE: 98.3 F | DIASTOLIC BLOOD PRESSURE: 82 MMHG | WEIGHT: 195 LBS | HEART RATE: 77 BPM | OXYGEN SATURATION: 95 %

## 2020-07-14 LAB
ANION GAP SERPL CALCULATED.3IONS-SCNC: 9 MMOL/L (ref 5–15)
BUN SERPL-MCNC: 19 MG/DL (ref 8–23)
BUN/CREAT SERPL: 21.8 (ref 7–25)
CALCIUM SPEC-SCNC: 9.2 MG/DL (ref 8.6–10.5)
CHLORIDE SERPL-SCNC: 98 MMOL/L (ref 98–107)
CHOLEST SERPL-MCNC: 175 MG/DL (ref 0–200)
CO2 SERPL-SCNC: 31 MMOL/L (ref 22–29)
CREAT SERPL-MCNC: 0.87 MG/DL (ref 0.57–1)
DEPRECATED RDW RBC AUTO: 43.6 FL (ref 37–54)
ERYTHROCYTE [DISTWIDTH] IN BLOOD BY AUTOMATED COUNT: 12.2 % (ref 12.3–15.4)
GFR SERPL CREATININE-BSD FRML MDRD: 63 ML/MIN/1.73
GLUCOSE BLDC GLUCOMTR-MCNC: 149 MG/DL (ref 70–130)
GLUCOSE BLDC GLUCOMTR-MCNC: 181 MG/DL (ref 70–130)
GLUCOSE BLDC GLUCOMTR-MCNC: 214 MG/DL (ref 70–130)
GLUCOSE BLDC GLUCOMTR-MCNC: 242 MG/DL (ref 70–130)
GLUCOSE SERPL-MCNC: 189 MG/DL (ref 65–99)
HBA1C MFR BLD: 6.7 % (ref 4.8–5.6)
HCT VFR BLD AUTO: 41.6 % (ref 34–46.6)
HDLC SERPL-MCNC: 36 MG/DL (ref 40–60)
HGB BLD-MCNC: 14.1 G/DL (ref 12–15.9)
LDLC SERPL CALC-MCNC: 77 MG/DL (ref 0–100)
LDLC/HDLC SERPL: 2.14 {RATIO}
MCH RBC QN AUTO: 32.9 PG (ref 26.6–33)
MCHC RBC AUTO-ENTMCNC: 33.9 G/DL (ref 31.5–35.7)
MCV RBC AUTO: 97 FL (ref 79–97)
PLATELET # BLD AUTO: 146 10*3/MM3 (ref 140–450)
PMV BLD AUTO: 10.7 FL (ref 6–12)
POTASSIUM SERPL-SCNC: 3.2 MMOL/L (ref 3.5–5.2)
RBC # BLD AUTO: 4.29 10*6/MM3 (ref 3.77–5.28)
SARS-COV-2 RNA RESP QL NAA+PROBE: NOT DETECTED
SODIUM SERPL-SCNC: 138 MMOL/L (ref 136–145)
TRIGL SERPL-MCNC: 309 MG/DL (ref 0–150)
TROPONIN T SERPL-MCNC: <0.01 NG/ML (ref 0–0.03)
TSH SERPL DL<=0.05 MIU/L-ACNC: 3.15 UIU/ML (ref 0.27–4.2)
VLDLC SERPL-MCNC: 61.8 MG/DL
WBC # BLD AUTO: 6.58 10*3/MM3 (ref 3.4–10.8)

## 2020-07-14 PROCEDURE — 99215 OFFICE O/P EST HI 40 MIN: CPT | Performed by: INTERNAL MEDICINE

## 2020-07-14 PROCEDURE — 63710000001 POTASSIUM CHLORIDE 10 MEQ CAPSULE CONTROLLED-RELEASE: Performed by: INTERNAL MEDICINE

## 2020-07-14 PROCEDURE — 82962 GLUCOSE BLOOD TEST: CPT

## 2020-07-14 PROCEDURE — 85027 COMPLETE CBC AUTOMATED: CPT | Performed by: PHYSICIAN ASSISTANT

## 2020-07-14 PROCEDURE — A9270 NON-COVERED ITEM OR SERVICE: HCPCS | Performed by: INTERNAL MEDICINE

## 2020-07-14 PROCEDURE — 96361 HYDRATE IV INFUSION ADD-ON: CPT

## 2020-07-14 PROCEDURE — 93010 ELECTROCARDIOGRAM REPORT: CPT | Performed by: INTERNAL MEDICINE

## 2020-07-14 PROCEDURE — 80061 LIPID PANEL: CPT | Performed by: PHYSICIAN ASSISTANT

## 2020-07-14 PROCEDURE — 25010000002 HEPARIN (PORCINE) PER 1000 UNITS: Performed by: PHYSICIAN ASSISTANT

## 2020-07-14 PROCEDURE — 93572 IV DOP VEL&/PRESS C FLO EA: CPT | Performed by: INTERNAL MEDICINE

## 2020-07-14 PROCEDURE — G0378 HOSPITAL OBSERVATION PER HR: HCPCS

## 2020-07-14 PROCEDURE — 84443 ASSAY THYROID STIM HORMONE: CPT | Performed by: PHYSICIAN ASSISTANT

## 2020-07-14 PROCEDURE — 96372 THER/PROPH/DIAG INJ SC/IM: CPT

## 2020-07-14 PROCEDURE — 83036 HEMOGLOBIN GLYCOSYLATED A1C: CPT | Performed by: PHYSICIAN ASSISTANT

## 2020-07-14 PROCEDURE — 85347 COAGULATION TIME ACTIVATED: CPT

## 2020-07-14 PROCEDURE — 63710000001 INSULIN LISPRO (HUMAN) PER 5 UNITS: Performed by: INTERNAL MEDICINE

## 2020-07-14 PROCEDURE — 93571 IV DOP VEL&/PRESS C FLO 1ST: CPT | Performed by: INTERNAL MEDICINE

## 2020-07-14 PROCEDURE — C1887 CATHETER, GUIDING: HCPCS | Performed by: INTERNAL MEDICINE

## 2020-07-14 PROCEDURE — 25010000002 HYDROMORPHONE 1 MG/ML SOLUTION: Performed by: INTERNAL MEDICINE

## 2020-07-14 PROCEDURE — 63710000001 INSULIN LISPRO (HUMAN) PER 5 UNITS: Performed by: PHYSICIAN ASSISTANT

## 2020-07-14 PROCEDURE — 93458 L HRT ARTERY/VENTRICLE ANGIO: CPT | Performed by: INTERNAL MEDICINE

## 2020-07-14 PROCEDURE — 99217 PR OBSERVATION CARE DISCHARGE MANAGEMENT: CPT | Performed by: INTERNAL MEDICINE

## 2020-07-14 PROCEDURE — 63710000001 LUBIPROSTONE 8 MCG CAPSULE: Performed by: INTERNAL MEDICINE

## 2020-07-14 PROCEDURE — 0 IOPAMIDOL PER 1 ML: Performed by: INTERNAL MEDICINE

## 2020-07-14 PROCEDURE — 80048 BASIC METABOLIC PNL TOTAL CA: CPT | Performed by: PHYSICIAN ASSISTANT

## 2020-07-14 PROCEDURE — 25010000002 HEPARIN (PORCINE) PER 1000 UNITS: Performed by: INTERNAL MEDICINE

## 2020-07-14 PROCEDURE — C1769 GUIDE WIRE: HCPCS | Performed by: INTERNAL MEDICINE

## 2020-07-14 PROCEDURE — 84484 ASSAY OF TROPONIN QUANT: CPT | Performed by: PHYSICIAN ASSISTANT

## 2020-07-14 PROCEDURE — 87635 SARS-COV-2 COVID-19 AMP PRB: CPT | Performed by: INTERNAL MEDICINE

## 2020-07-14 PROCEDURE — 25010000002 MIDAZOLAM PER 1 MG: Performed by: INTERNAL MEDICINE

## 2020-07-14 PROCEDURE — 25010000002 IOPAMIDOL 61 % SOLUTION: Performed by: HOSPITALIST

## 2020-07-14 PROCEDURE — 93306 TTE W/DOPPLER COMPLETE: CPT

## 2020-07-14 PROCEDURE — 96374 THER/PROPH/DIAG INJ IV PUSH: CPT

## 2020-07-14 PROCEDURE — 74177 CT ABD & PELVIS W/CONTRAST: CPT

## 2020-07-14 PROCEDURE — 25010000002 FENTANYL CITRATE (PF) 100 MCG/2ML SOLUTION: Performed by: INTERNAL MEDICINE

## 2020-07-14 PROCEDURE — C1894 INTRO/SHEATH, NON-LASER: HCPCS | Performed by: INTERNAL MEDICINE

## 2020-07-14 PROCEDURE — 93306 TTE W/DOPPLER COMPLETE: CPT | Performed by: INTERNAL MEDICINE

## 2020-07-14 PROCEDURE — C9803 HOPD COVID-19 SPEC COLLECT: HCPCS | Performed by: INTERNAL MEDICINE

## 2020-07-14 PROCEDURE — 93005 ELECTROCARDIOGRAM TRACING: CPT | Performed by: PHYSICIAN ASSISTANT

## 2020-07-14 RX ORDER — POTASSIUM CHLORIDE 1.5 G/1.77G
40 POWDER, FOR SOLUTION ORAL AS NEEDED
Status: DISCONTINUED | OUTPATIENT
Start: 2020-07-14 | End: 2020-07-15 | Stop reason: HOSPADM

## 2020-07-14 RX ORDER — HEPARIN SODIUM 1000 [USP'U]/ML
INJECTION, SOLUTION INTRAVENOUS; SUBCUTANEOUS AS NEEDED
Status: DISCONTINUED | OUTPATIENT
Start: 2020-07-14 | End: 2020-07-14 | Stop reason: HOSPADM

## 2020-07-14 RX ORDER — SODIUM CHLORIDE 9 MG/ML
50 INJECTION, SOLUTION INTRAVENOUS CONTINUOUS
Status: ACTIVE | OUTPATIENT
Start: 2020-07-14 | End: 2020-07-14

## 2020-07-14 RX ORDER — MIDAZOLAM HYDROCHLORIDE 1 MG/ML
INJECTION INTRAMUSCULAR; INTRAVENOUS AS NEEDED
Status: DISCONTINUED | OUTPATIENT
Start: 2020-07-14 | End: 2020-07-14 | Stop reason: HOSPADM

## 2020-07-14 RX ORDER — LIDOCAINE HYDROCHLORIDE 10 MG/ML
INJECTION, SOLUTION EPIDURAL; INFILTRATION; INTRACAUDAL; PERINEURAL AS NEEDED
Status: DISCONTINUED | OUTPATIENT
Start: 2020-07-14 | End: 2020-07-14 | Stop reason: HOSPADM

## 2020-07-14 RX ORDER — ASPIRIN 81 MG/1
81 TABLET, CHEWABLE ORAL DAILY
Qty: 30 TABLET | Refills: 0 | Status: SHIPPED | OUTPATIENT
Start: 2020-07-15

## 2020-07-14 RX ORDER — LUBIPROSTONE 8 UG/1
8 CAPSULE ORAL 2 TIMES DAILY WITH MEALS
Status: DISCONTINUED | OUTPATIENT
Start: 2020-07-14 | End: 2020-07-15 | Stop reason: HOSPADM

## 2020-07-14 RX ORDER — POTASSIUM CHLORIDE 750 MG/1
40 CAPSULE, EXTENDED RELEASE ORAL AS NEEDED
Status: DISCONTINUED | OUTPATIENT
Start: 2020-07-14 | End: 2020-07-15 | Stop reason: HOSPADM

## 2020-07-14 RX ORDER — FENTANYL CITRATE 50 UG/ML
INJECTION, SOLUTION INTRAMUSCULAR; INTRAVENOUS AS NEEDED
Status: DISCONTINUED | OUTPATIENT
Start: 2020-07-14 | End: 2020-07-14 | Stop reason: HOSPADM

## 2020-07-14 RX ORDER — ISOSORBIDE MONONITRATE 30 MG/1
30 TABLET, EXTENDED RELEASE ORAL DAILY
Qty: 30 TABLET | Refills: 0 | Status: ON HOLD | OUTPATIENT
Start: 2020-07-14 | End: 2023-01-30

## 2020-07-14 RX ORDER — TRAZODONE HYDROCHLORIDE 100 MG/1
100 TABLET ORAL NIGHTLY PRN
Status: DISCONTINUED | OUTPATIENT
Start: 2020-07-14 | End: 2020-07-15 | Stop reason: HOSPADM

## 2020-07-14 RX ORDER — POTASSIUM CHLORIDE 7.45 MG/ML
10 INJECTION INTRAVENOUS
Status: DISCONTINUED | OUTPATIENT
Start: 2020-07-14 | End: 2020-07-15 | Stop reason: HOSPADM

## 2020-07-14 RX ORDER — SODIUM CHLORIDE 9 MG/ML
1 INJECTION, SOLUTION INTRAVENOUS CONTINUOUS
Status: ACTIVE | OUTPATIENT
Start: 2020-07-14 | End: 2020-07-14

## 2020-07-14 RX ADMIN — ASPIRIN 81 MG: 81 TABLET, CHEWABLE ORAL at 08:21

## 2020-07-14 RX ADMIN — TRAZODONE HYDROCHLORIDE 25 MG: 50 TABLET ORAL at 00:00

## 2020-07-14 RX ADMIN — CARVEDILOL 25 MG: 12.5 TABLET, FILM COATED ORAL at 08:21

## 2020-07-14 RX ADMIN — INSULIN LISPRO 2 UNITS: 100 INJECTION, SOLUTION INTRAVENOUS; SUBCUTANEOUS at 08:22

## 2020-07-14 RX ADMIN — HEPARIN SODIUM 5000 UNITS: 5000 INJECTION INTRAVENOUS; SUBCUTANEOUS at 06:17

## 2020-07-14 RX ADMIN — LUBIPROSTONE 8 MCG: 8 CAPSULE, GELATIN COATED ORAL at 17:05

## 2020-07-14 RX ADMIN — INSULIN LISPRO 3 UNITS: 100 INJECTION, SOLUTION INTRAVENOUS; SUBCUTANEOUS at 17:05

## 2020-07-14 RX ADMIN — POTASSIUM CHLORIDE 40 MEQ: 10 CAPSULE, COATED, EXTENDED RELEASE ORAL at 14:45

## 2020-07-14 RX ADMIN — HYDROMORPHONE HYDROCHLORIDE 1 MG: 1 INJECTION, SOLUTION INTRAMUSCULAR; INTRAVENOUS; SUBCUTANEOUS at 03:14

## 2020-07-14 RX ADMIN — POTASSIUM CHLORIDE 40 MEQ: 10 CAPSULE, COATED, EXTENDED RELEASE ORAL at 06:17

## 2020-07-14 RX ADMIN — IOPAMIDOL 100 ML: 612 INJECTION, SOLUTION INTRAVENOUS at 02:26

## 2020-07-14 RX ADMIN — SACUBITRIL AND VALSARTAN 1 TABLET: 49; 51 TABLET, FILM COATED ORAL at 08:21

## 2020-07-14 RX ADMIN — SODIUM CHLORIDE 50 ML/HR: 9 INJECTION, SOLUTION INTRAVENOUS at 03:14

## 2020-07-14 RX ADMIN — FUROSEMIDE 40 MG: 40 TABLET ORAL at 08:21

## 2020-07-14 RX ADMIN — SODIUM CHLORIDE 1 ML/KG/HR: 9 INJECTION, SOLUTION INTRAVENOUS at 12:14

## 2020-07-15 ENCOUNTER — READMISSION MANAGEMENT (OUTPATIENT)
Dept: CALL CENTER | Facility: HOSPITAL | Age: 75
End: 2020-07-15

## 2020-07-15 LAB
ACT BLD: 323 SECONDS (ref 82–152)
BH CV ECHO MEAS - AO MAX PG (FULL): 4.2 MMHG
BH CV ECHO MEAS - AO MAX PG: 7.3 MMHG
BH CV ECHO MEAS - AO MEAN PG (FULL): 2.8 MMHG
BH CV ECHO MEAS - AO MEAN PG: 4.1 MMHG
BH CV ECHO MEAS - AO ROOT AREA (BSA CORRECTED): 1.8
BH CV ECHO MEAS - AO ROOT AREA: 10.2 CM^2
BH CV ECHO MEAS - AO ROOT DIAM: 3.6 CM
BH CV ECHO MEAS - AO V2 MAX: 134.7 CM/SEC
BH CV ECHO MEAS - AO V2 MEAN: 94.5 CM/SEC
BH CV ECHO MEAS - AO V2 VTI: 29.6 CM
BH CV ECHO MEAS - AVA(I,A): 1.7 CM^2
BH CV ECHO MEAS - AVA(I,D): 1.7 CM^2
BH CV ECHO MEAS - AVA(V,A): 2.1 CM^2
BH CV ECHO MEAS - AVA(V,D): 2.1 CM^2
BH CV ECHO MEAS - BSA(HAYCOCK): 2.1 M^2
BH CV ECHO MEAS - BSA: 2 M^2
BH CV ECHO MEAS - BZI_BMI: 31.5 KILOGRAMS/M^2
BH CV ECHO MEAS - BZI_METRIC_HEIGHT: 167.6 CM
BH CV ECHO MEAS - BZI_METRIC_WEIGHT: 88.5 KG
BH CV ECHO MEAS - EDV(CUBED): 74.5 ML
BH CV ECHO MEAS - EDV(MOD-SP2): 38 ML
BH CV ECHO MEAS - EDV(MOD-SP4): 53 ML
BH CV ECHO MEAS - EDV(TEICH): 78.9 ML
BH CV ECHO MEAS - EF(CUBED): 73.8 %
BH CV ECHO MEAS - EF(MOD-BP): 64 %
BH CV ECHO MEAS - EF(MOD-SP2): 65.8 %
BH CV ECHO MEAS - EF(MOD-SP4): 64.2 %
BH CV ECHO MEAS - EF(TEICH): 66 %
BH CV ECHO MEAS - EF{MOD-BP}: 64 %
BH CV ECHO MEAS - ESV(CUBED): 19.5 ML
BH CV ECHO MEAS - ESV(MOD-SP2): 13 ML
BH CV ECHO MEAS - ESV(MOD-SP4): 19 ML
BH CV ECHO MEAS - ESV(TEICH): 26.8 ML
BH CV ECHO MEAS - FS: 36 %
BH CV ECHO MEAS - IVS/LVPW: 1
BH CV ECHO MEAS - IVSD: 1.2 CM
BH CV ECHO MEAS - LA DIMENSION: 3.9 CM
BH CV ECHO MEAS - LA/AO: 1.1
BH CV ECHO MEAS - LAD MAJOR: 5 CM
BH CV ECHO MEAS - LAT PEAK E' VEL: 4.4 CM/SEC
BH CV ECHO MEAS - LATERAL E/E' RATIO: 16.3
BH CV ECHO MEAS - LV DIASTOLIC VOL/BSA (35-75): 26.8 ML/M^2
BH CV ECHO MEAS - LV MASS(C)D: 165.3 GRAMS
BH CV ECHO MEAS - LV MASS(C)DI: 83.6 GRAMS/M^2
BH CV ECHO MEAS - LV MAX PG: 3.1 MMHG
BH CV ECHO MEAS - LV MEAN PG: 1.3 MMHG
BH CV ECHO MEAS - LV SYSTOLIC VOL/BSA (12-30): 9.6 ML/M^2
BH CV ECHO MEAS - LV V1 MAX: 87.9 CM/SEC
BH CV ECHO MEAS - LV V1 MEAN: 52 CM/SEC
BH CV ECHO MEAS - LV V1 VTI: 15.7 CM
BH CV ECHO MEAS - LVIDD: 4.2 CM
BH CV ECHO MEAS - LVIDS: 2.7 CM
BH CV ECHO MEAS - LVLD AP2: 6.3 CM
BH CV ECHO MEAS - LVLD AP4: 6.9 CM
BH CV ECHO MEAS - LVLS AP2: 4.7 CM
BH CV ECHO MEAS - LVLS AP4: 5.3 CM
BH CV ECHO MEAS - LVOT AREA (M): 3.1 CM^2
BH CV ECHO MEAS - LVOT AREA: 3.2 CM^2
BH CV ECHO MEAS - LVOT DIAM: 2 CM
BH CV ECHO MEAS - LVPWD: 1.1 CM
BH CV ECHO MEAS - MED PEAK E' VEL: 4.1 CM/SEC
BH CV ECHO MEAS - MEDIAL E/E' RATIO: 17.6
BH CV ECHO MEAS - MV A MAX VEL: 99.2 CM/SEC
BH CV ECHO MEAS - MV DEC TIME: 0.33 SEC
BH CV ECHO MEAS - MV E MAX VEL: 74 CM/SEC
BH CV ECHO MEAS - MV E/A: 0.75
BH CV ECHO MEAS - MV MAX PG: 4.2 MMHG
BH CV ECHO MEAS - MV MEAN PG: 1.7 MMHG
BH CV ECHO MEAS - MV V2 MAX: 102.7 CM/SEC
BH CV ECHO MEAS - MV V2 MEAN: 61 CM/SEC
BH CV ECHO MEAS - MV V2 VTI: 24.3 CM
BH CV ECHO MEAS - MVA(VTI): 2.1 CM^2
BH CV ECHO MEAS - PA ACC SLOPE: 758.9 CM/SEC^2
BH CV ECHO MEAS - PA ACC TIME: 0.1 SEC
BH CV ECHO MEAS - PA MAX PG: 2.7 MMHG
BH CV ECHO MEAS - PA PR(ACCEL): 33.1 MMHG
BH CV ECHO MEAS - PA V2 MAX: 81.7 CM/SEC
BH CV ECHO MEAS - SI(AO): 153.2 ML/M^2
BH CV ECHO MEAS - SI(CUBED): 27.8 ML/M^2
BH CV ECHO MEAS - SI(LVOT): 25.7 ML/M^2
BH CV ECHO MEAS - SI(MOD-SP2): 12.6 ML/M^2
BH CV ECHO MEAS - SI(MOD-SP4): 17.2 ML/M^2
BH CV ECHO MEAS - SI(TEICH): 26.3 ML/M^2
BH CV ECHO MEAS - SV(AO): 303.1 ML
BH CV ECHO MEAS - SV(CUBED): 55 ML
BH CV ECHO MEAS - SV(LVOT): 50.8 ML
BH CV ECHO MEAS - SV(MOD-SP2): 25 ML
BH CV ECHO MEAS - SV(MOD-SP4): 34 ML
BH CV ECHO MEAS - SV(TEICH): 52.1 ML
BH CV ECHO MEAS - TAPSE (>1.6): 2.2 CM2
BH CV ECHO MEASUREMENTS AVERAGE E/E' RATIO: 17.41
BH CV VAS BP RIGHT ARM: NORMAL MMHG
BH CV XLRA - RV BASE: 3.1 CM
BH CV XLRA - RV LENGTH: 5.4 CM
BH CV XLRA - RV MID: 2.2 CM
BH CV XLRA - TDI S': 10.3 CM/SEC
LEFT ATRIUM VOLUME INDEX: 17.2 ML/M^2
LEFT ATRIUM VOLUME: 34 ML
MAXIMAL PREDICTED HEART RATE: 145 BPM
STRESS TARGET HR: 123 BPM

## 2020-07-15 NOTE — OUTREACH NOTE
Prep Survey      Responses   [facility]?  [facility]   Is LACE score < 7 ?  No   Eligibility  Readm Mgmt   Discharge diagnosis  chest pain/CHF-heart cath this visit   COVID-19 Test Status  Negative   Does the patient have one of the following disease processes/diagnoses(primary or secondary)?  CHF   Does the patient have Home health ordered?  No   Is there a DME ordered?  No   Prep survey completed?  Yes          Maria L Ferraro RN

## 2020-07-20 ENCOUNTER — READMISSION MANAGEMENT (OUTPATIENT)
Dept: CALL CENTER | Facility: HOSPITAL | Age: 75
End: 2020-07-20

## 2020-07-20 NOTE — OUTREACH NOTE
CHF Week 1 Survey      Responses   [facility] patient discharged from?  [place]   Does the patient have one of the following disease processes/diagnoses(primary or secondary)?  CHF   CHF Week 1 attempt successful?  Yes   Call start time  1004   Call end time  1033   Discharge diagnosis  chest pain/CHF-heart cath this visit   Meds reviewed with patient/caregiver?  Yes   Is the patient having any side effects they believe may be caused by any medication additions or changes?  No   Does the patient have all medications ordered at discharge?  Yes   Is the patient taking all medications as directed (includes completed medication regime)?  Yes   Does the patient have a primary care provider?   Yes   Does the patient have an appointment with their PCP within 7 days of discharge?  Yes   Has the patient kept scheduled appointments due by today?  N/A   Has home health visited the patient within 72 hours of discharge?  N/A   Pulse Ox monitoring  None   Psychosocial issues?  No   Comments  CP resolved, however pain in abdomen still intermittent, has been chronic problem, has colonoscopy scheduled in Aug, 2020   Did the patient receive a copy of their discharge instructions?  Yes   Nursing interventions  Reviewed instructions with patient   What is the patient's perception of their health status since discharge?  Improving   Nursing interventions  Nurse provided patient education   Is the patient weighing daily?  Yes   Does the patient have scales?  Yes   Is the patient able to teach back Heart Failure diet management?  Yes   Is the patient able to teach back Heart Failure Zones?  Yes   Is the patient able to teach back signs and symptoms of worsening condition? (i.e. weight gain, shortness of air, etc.)  Yes   Is the patient/caregiver able to teach back the hierarchy of who to call/visit for symptoms/problems? PCP, Specialist, Home health nurse, Urgent Care, ED, 911  Yes   Additional teach back comments  states lost 7  lbs recently, maintains heart healthy diet, states is aware of fall prevention based on previous fall in 2018, lives in house now, staying on 1st level    CHF Week 1 call completed?  Yes   Wrap up additional comments  pt states all personnel at [facility] were kind, helpful, from ED dept throughout hospital stay, has always had wonderful experiences at [facility]          Perla Rosado RN

## 2020-07-28 ENCOUNTER — READMISSION MANAGEMENT (OUTPATIENT)
Dept: CALL CENTER | Facility: HOSPITAL | Age: 75
End: 2020-07-28

## 2020-07-28 NOTE — OUTREACH NOTE
CHF Week 2 Survey      Responses   [facility] patient discharged from?  [place]   Does the patient have one of the following disease processes/diagnoses(primary or secondary)?  CHF   Week 2 attempt successful?  No   Unsuccessful attempts  Attempt 1          Perla Rosado RN

## 2020-07-30 ENCOUNTER — READMISSION MANAGEMENT (OUTPATIENT)
Dept: CALL CENTER | Facility: HOSPITAL | Age: 75
End: 2020-07-30

## 2020-07-30 DIAGNOSIS — I50.22 CHRONIC SYSTOLIC CONGESTIVE HEART FAILURE (HCC): ICD-10-CM

## 2020-07-30 RX ORDER — CARVEDILOL 25 MG/1
TABLET ORAL
Qty: 60 TABLET | Refills: 0 | Status: SHIPPED | OUTPATIENT
Start: 2020-07-30 | End: 2020-08-31

## 2020-07-30 NOTE — OUTREACH NOTE
CHF Week 2 Survey      Responses   [facility] patient discharged from?  [place]   Does the patient have one of the following disease processes/diagnoses(primary or secondary)?  CHF   Week 2 attempt successful?  Yes   Call start time  1501   Unsuccessful attempts  Attempt 2   Call end time  1504   Discharge diagnosis  chest pain/CHF-heart cath this visit   Meds reviewed with patient/caregiver?  Yes   Is the patient taking all medications as directed (includes completed medication regime)?  Yes   Comments regarding appointments  8/26/20 appt with GI,  8/4/20 appt with cardiology   Has the patient kept scheduled appointments due by today?  Yes   What is the patient's perception of their health status since discharge?  Improving   Is the patient weighing daily?  Yes   Is the patient able to teach back Heart Failure Zones?  Yes   If the patient is a current smoker, are they able to teach back resources for cessation?  -- [Nonsmoker]   CHF Week 2 call completed?  Yes   Revoked  No further contact(revokes)-requires comment   Graduated/Revoked comments  Pt is doing well. She really appreciates the call.           Catrachita Vieyra RN

## 2020-08-03 PROBLEM — Z86.711 HISTORY OF PULMONARY EMBOLUS (PE): Status: ACTIVE | Noted: 2018-08-04

## 2020-08-03 PROBLEM — R07.9 CHEST PAIN: Status: RESOLVED | Noted: 2020-07-13 | Resolved: 2020-08-03

## 2020-08-03 NOTE — PROGRESS NOTES
[facility]  Office Visit Note    DATE: 08/04/2020    IDENTIFICATION: Angelita Shay is a 74 y.o. female who resides in [place], [place]    REASON FOR VISIT:  • Coronary artery disease  • Systolic heart failure  • Atrial fibrillation            Angelita Shay returns today for follow-up after recent hospitalization where patient presented to [facility] with chest pain.  Troponins were negative and EKG did not have acute ST changes however her symptoms were concerning for unstable angina.  She underwent cardiac catheterization where coronary angiography showed no clear culprit for her symptoms.  Imdur was started.  Since discharge she thinks the isosorbide has improved her symptoms and she has had no further chest pain.  She has a history of atrial fibrillation but went off her Eliquis several months ago when she experienced any bad nosebleed.  She denies signs or symptoms of TIA or CVA.  She is in normal sinus rhythm today.  She denies increased dyspnea, orthopnea, palpitations or syncope.  She uses Lasix for lower extremity edema approximately 3 times per week but she stopped taking potassium and is not quite sure why.  She reports blood pressures have stayed well controlled.  She is not been taking a statin.    Review of Systems   Constitution: Negative for malaise/fatigue.   Eyes: Negative for vision loss in left eye and vision loss in right eye.   Cardiovascular: Negative for chest pain, dyspnea on exertion, near-syncope, orthopnea, palpitations, paroxysmal nocturnal dyspnea and syncope.   Musculoskeletal: Negative for myalgias.   Neurological: Negative for brief paralysis, excessive daytime sleepiness, focal weakness, numbness, paresthesias and weakness.   All other systems reviewed and are negative.      The patient's past medical, social, family history and ROS reviewed in the patient's electronic medical record.    Allergies   Allergen Reactions   •  Oxycodone Shortness Of Breath   • Zolpidem Other (See Comments)     nightmares         Current Outpatient Medications:   •  aspirin 81 MG chewable tablet, Chew 1 tablet Daily., Disp: 30 tablet, Rfl: 0  •  carvedilol (COREG) 25 MG tablet, TAKE ONE TABLET BY MOUTH TWICE A DAY WITH MEALS* PLEASE CALL OFFICE FOR APPOINTMENT FOR REFILLS, Disp: 60 tablet, Rfl: 0  •  Cholecalciferol (VITAMIN D) 2000 units tablet, Take 2,000 Units by mouth Daily., Disp: , Rfl:   •  furosemide (LASIX) 40 MG tablet, TAKE ONE TABLET BY MOUTH DAILY **NEED TO MAKE APPOINTMENT FOR ADDITIONAL REFILLS*, Disp: 30 tablet, Rfl: 0  •  isosorbide mononitrate (IMDUR) 30 MG 24 hr tablet, Take 1 tablet by mouth Daily., Disp: 30 tablet, Rfl: 0  •  linaclotide (LINZESS) 290 MCG capsule capsule, Take 1 capsule by mouth Every Morning Before Breakfast., Disp: 30 capsule, Rfl: 1  •  metFORMIN ER (GLUCOPHAGE-XR) 750 MG 24 hr tablet, Take 750 mg by mouth Daily With Breakfast., Disp: , Rfl:   •  polyethylene glycol (MIRALAX) powder, Take 17 g by mouth Daily As Needed., Disp: , Rfl:   •  rOPINIRole (REQUIP) 1 MG tablet, Take 1-3 tablets by mouth Every Night. Take 1 hour before bedtime., Disp: 90 tablet, Rfl: 2  •  sacubitril-valsartan (ENTRESTO) 49-51 MG tablet, Take 1 tablet by mouth Every 12 (Twelve) Hours., Disp: 60 tablet, Rfl: 5  •  traZODone (DESYREL) 50 MG tablet, Take 1-2 tablets qhs, Disp: 60 tablet, Rfl: 3  •  apixaban (ELIQUIS) 5 MG tablet tablet, Take 1 tablet by mouth 2 (Two) Times a Day., Disp: 60 tablet, Rfl: 4  •  atorvastatin (LIPITOR) 20 MG tablet, Take 1 tablet by mouth Daily., Disp: 90 tablet, Rfl: 3  •  potassium chloride (K-DUR,KLOR-CON) 20 MEQ CR tablet, Take 1 tablet by mouth Daily., Disp: 30 tablet, Rfl: 3    Past Medical History:   Diagnosis Date   • Anxiety    • Arthritis    • AV block, 1st degree    • Breast injury     recent fall in early June, bruising left breast   • Cataract     left   • Cellulitis of both lower extremities    • CKD  (chronic kidney disease), stage III (CMS/HCC)    • Colon cancer (CMS/HCC) 2000   • Colon polyp 2015    x 3 adenomatous   • Colon polyp 04/18/2018    x 5 Dr Cash   • Coronary artery disease     1 stent   • Depression    • Diabetes mellitus (CMS/HCC)     dx 2 years ago- checks fsbs bid   • Gallstone    • GI bleed 07/2018   • H/O echocardiogram 08/05/2018    Dr Greene, EF 55%, mild concentric hypertrophy, impaired relaxation   • Hearing loss    • History of atrial fibrillation    • History of chemotherapy     11/2000   • History of transfusion     [address]   • Hyperlipidemia    • Hypertension    • Knee pain    • Pap smear for cervical cancer screening 2013   • Rectal bleeding    • RLS (restless legs syndrome)    • Sleep apnea    • Wears eyeglasses        Past Surgical History:   Procedure Laterality Date   • APPENDECTOMY  05/1963   • CARDIAC CATHETERIZATION  08/2015    no stents   • CARDIAC CATHETERIZATION  03/13/2018   • CARDIAC CATHETERIZATION N/A 3/13/2018    Procedure: Left Heart Cath;  Surgeon: Pierre Jones III, MD;  Location:  [facility];  Service: Cardiovascular   • CARDIAC CATHETERIZATION N/A 7/14/2020    Procedure: LEFT HEART CATH;  Surgeon: Galileo See MD;  Location:  [facility];  Service: Cardiovascular;  Laterality: N/A;   • CHOLECYSTECTOMY N/A 8/1/2018    Procedure: CHOLECYSTECTOMY LAPAROSCOPIC , LYSIS OF ADHESIONS;  Surgeon: Santos Pantoja MD;  Location:  [facility];  Service: General   • COLON RESECTION  04/08/2000    colon cancer   • COLONOSCOPY  2015   • COLONOSCOPY  04/18/2018    with 5 polyps removed 1yr f/u. Dr Cash- REPEAT YEARLY   • CORONARY ANGIOPLASTY WITH STENT PLACEMENT  12/2015   • ENDOSCOPY     • JOINT REPLACEMENT     • FL TOTAL KNEE ARTHROPLASTY Left 11/3/2016    Procedure: LEFT TOTAL KNEE REPLACEMENT;  Surgeon: Laurent Zuniga MD;  Location:  [facility];  Service: Orthopedics   • TONSILLECTOMY  12/1961   •  "TOTAL KNEE ARTHROPLASTY Right 2008    revision 2010   • TOTAL SHOULDER ARTHROPLASTY Bilateral     right 2014, left 2015       Family History   Problem Relation Age of Onset   • Colon cancer Other    • Diabetes Other    • Heart disease Other    • Hypertension Other    • Stroke Other    • Tuberculosis Other    • Hypertension Mother    • Colon cancer Father    • Stroke Father    • Diabetes Father    • Colon cancer Sister    • Diabetes Sister    • Diabetes Maternal Grandmother    • Coronary artery disease Maternal Grandfather    • No Known Problems Paternal Grandmother    • No Known Problems Paternal Grandfather    • Breast cancer Neg Hx    • Ovarian cancer Neg Hx        Social History     Tobacco Use   • Smoking status: Former Smoker     Packs/day: 1.00     Years: 22.00     Pack years: 22.00     Types: Cigarettes     Last attempt to quit:      Years since quittin.6   • Smokeless tobacco: Never Used   • Tobacco comment: quit    Substance Use Topics   • Alcohol use: Yes     Alcohol/week: 1.0 standard drinks     Types: 1 Glasses of wine per week     Comment: occas           Blood pressure 118/72, pulse 100, height 165.1 cm (65\"), weight 94.3 kg (208 lb), SpO2 96 %, not currently breastfeeding.  Body mass index is 34.61 kg/m².  Vitals:    20 1345   Patient Position: Sitting       Physical Exam   Constitutional: She is oriented to person, place, and time. She appears well-developed and well-nourished.   HENT:   Head: Normocephalic and atraumatic.   Eyes: Conjunctivae are normal. No scleral icterus.   Neck: Normal range of motion. No JVD present. Carotid bruit is not present. No thyromegaly present.   Cardiovascular: Normal rate and regular rhythm. Exam reveals no gallop.   No murmur heard.  Pulmonary/Chest: Effort normal and breath sounds normal.   Abdominal: Soft. She exhibits no distension and no mass. There is no hepatosplenomegaly.   Musculoskeletal: She exhibits no edema.   Neurological: She is alert " and oriented to person, place, and time.   Skin: Skin is warm and dry. No rash noted.   Psychiatric: She has a normal mood and affect. Her behavior is normal.       Data Review (reviewed with patient):     Procedures    Lab Results   Component Value Date    GLUCOSE 189 (H) 07/14/2020    BUN 19 07/14/2020    CREATININE 0.87 07/14/2020    EGFRIFNONA 63 07/14/2020    BCR 21.8 07/14/2020    K 3.2 (L) 07/14/2020    CO2 31.0 (H) 07/14/2020    CALCIUM 9.2 07/14/2020    ALBUMIN 4.60 07/13/2020    ALKPHOS 67 07/13/2020    AST 21 07/13/2020    ALT 15 07/13/2020      Lab Results   Component Value Date    CHOL 175 07/14/2020    CHLPL 200 07/23/2018    TRIG 309 (H) 07/14/2020    HDL 36 (L) 07/14/2020    LDL 77 07/14/2020     Lab Results   Component Value Date    HGBA1C 6.70 (H) 07/14/2020     Lab Results   Component Value Date    WBC 6.58 07/14/2020    HGB 14.1 07/14/2020    HCT 41.6 07/14/2020    MCV 97.0 07/14/2020     07/14/2020     Lab Results   Component Value Date    TSH 3.150 07/14/2020            Problem List Items Addressed This Visit        Cardiovascular and Mediastinum    Chronic systolic congestive heart failure (CMS/HCC)    Overview     · Echocardiogram (3/11/2018): EF 36%, mild MR  · Cardiac catheterization (3/13/18): Mild to moderate nonobstructive CAD. Previously placed LAD stent widely patent.  · Echocardiogram (8/6/2018): EF 55%. Mild concentric LVH.          Current Assessment & Plan     · Stable NYHA class II symptoms  · Continue beta-blocker, Entresto and Lasix         Coronary artery disease involving native coronary artery of native heart with angina pectoris (CMS/HCC)    Overview     · Previous PCI to the LAD 2012  · Cardiac catheterization (8/2/2015): Mild nonobstructive CAD. Widely patent LAD stent. Normal LVEF.  · Cardiac catheterization (3/13/18): Mild to moderate nonobstructive CAD. Previously placed LAD stent widely patent.  · Cardiac catheterization (7/14/2020): Essentially unchanged  coronary anatomy from 2008 cath, IFR carried out of the RCA negative at 0.94, left circumflex 1.0, LAD 0.98.  Widely patent proximal LAD stent.  LVEDP 8 mmHg.  LVEF 60-65% with normal wall motion.         Current Assessment & Plan     · No signs or symptoms of angina  · Continue aspirin 81 mg daily  · Continue isosorbide 30 mg daily         Essential hypertension    Current Assessment & Plan     · Hypertension is controlled  · Continue Coreg 25 mg twice daily         Hyperlipidemia LDL goal <70    Overview     · High-intensity statin therapy indicated given presence coronary artery disease         Relevant Medications    atorvastatin (LIPITOR) 20 MG tablet    Nonischemic cardiomyopathy (CMS/HCC)    Overview     · Cardiac catheterization (3/13/18): Mild to moderate nonobstructive CAD. Previously placed LAD stent widely patent.  · Echocardiogram (3/11/18):  LVEF 36%, mild MR  · Echocardiogram (8/6/2018): EF 55%. Mild concentric LVH.          Current Assessment & Plan     · Stable NYHA class II symptoms  · Continue Coreg 25 mg twice daily  · Continue Entresto 49/51 mg 1 tablet twice daily  · Continue Lasix 40 mg daily  · Restart potassium 20 mEq and only take when taking Lasix         Paroxysmal atrial fibrillation (CMS/HCC) - Primary    Overview     · Chads VASC = 6 (age, female, CHF, CAD, HTN, DM)         Current Assessment & Plan     · Maintaining normal sinus rhythm  · Restart Eliquis 5 mg twice daily  · Continue Coreg 25 mg twice daily             Patient is doing well from a cardiovascular standpoint and her chest pain is resolved with addition of isosorbide.  I would like to add Lipitor 20 mg daily not taking her statin and we are unsure why.  I spoke with her about the risk and benefits of not being on Eliquis and her risk of stroke.  She is agreeable to retrying Eliquis at 5 mg twice daily.  If she were to develop epistaxis she should contact us and we will send her to ENT for possible cauterization.  Patient  should also restart potassium 20 mEq and take only on days she is taking Lasix.  We will continue all of her other medications as prescribed and she will follow-up 6 months or sooner if needed.       · Restart Eliquis 5 mg twice daily.  If epistaxis recurs patient to contact us and will refer to ENT  · Restart Lipitor 20 mg daily  · Continue all other medications as prescribed  Return in about 6 months (around 2/4/2021), or if symptoms worsen or fail to improve.    Arlet Chávez, APRN    8/4/2020

## 2020-08-04 ENCOUNTER — OFFICE VISIT (OUTPATIENT)
Dept: CARDIOLOGY | Facility: CLINIC | Age: 75
End: 2020-08-04

## 2020-08-04 VITALS
WEIGHT: 208 LBS | SYSTOLIC BLOOD PRESSURE: 118 MMHG | HEIGHT: 65 IN | BODY MASS INDEX: 34.66 KG/M2 | HEART RATE: 100 BPM | OXYGEN SATURATION: 96 % | DIASTOLIC BLOOD PRESSURE: 72 MMHG

## 2020-08-04 DIAGNOSIS — I10 ESSENTIAL HYPERTENSION: ICD-10-CM

## 2020-08-04 DIAGNOSIS — E78.5 HYPERLIPIDEMIA LDL GOAL <70: ICD-10-CM

## 2020-08-04 DIAGNOSIS — I25.119 CORONARY ARTERY DISEASE INVOLVING NATIVE CORONARY ARTERY OF NATIVE HEART WITH ANGINA PECTORIS (HCC): ICD-10-CM

## 2020-08-04 DIAGNOSIS — I48.0 PAROXYSMAL ATRIAL FIBRILLATION (HCC): Primary | ICD-10-CM

## 2020-08-04 DIAGNOSIS — I42.8 NONISCHEMIC CARDIOMYOPATHY (HCC): ICD-10-CM

## 2020-08-04 DIAGNOSIS — I50.22 CHRONIC SYSTOLIC CONGESTIVE HEART FAILURE (HCC): ICD-10-CM

## 2020-08-04 PROCEDURE — 99214 OFFICE O/P EST MOD 30 MIN: CPT | Performed by: NURSE PRACTITIONER

## 2020-08-04 RX ORDER — POTASSIUM CHLORIDE 20 MEQ/1
20 TABLET, EXTENDED RELEASE ORAL DAILY
Qty: 30 TABLET | Refills: 3 | Status: ON HOLD | OUTPATIENT
Start: 2020-08-04 | End: 2023-01-30

## 2020-08-04 RX ORDER — ATORVASTATIN CALCIUM 40 MG/1
20 TABLET, FILM COATED ORAL DAILY
Qty: 90 TABLET | Refills: 3 | Status: SHIPPED | OUTPATIENT
Start: 2020-08-04 | End: 2020-08-04

## 2020-08-04 RX ORDER — ATORVASTATIN CALCIUM 20 MG/1
20 TABLET, FILM COATED ORAL DAILY
Qty: 90 TABLET | Refills: 3 | Status: SHIPPED | OUTPATIENT
Start: 2020-08-04 | End: 2023-01-31 | Stop reason: HOSPADM

## 2020-08-04 NOTE — ASSESSMENT & PLAN NOTE
· Maintaining normal sinus rhythm  · Restart Eliquis 5 mg twice daily  · Continue Coreg 25 mg twice daily

## 2020-08-04 NOTE — ASSESSMENT & PLAN NOTE
· No signs or symptoms of angina  · Continue aspirin 81 mg daily  · Continue isosorbide 30 mg daily

## 2020-08-04 NOTE — ASSESSMENT & PLAN NOTE
· Stable NYHA class II symptoms  · Continue Coreg 25 mg twice daily  · Continue Entresto 49/51 mg 1 tablet twice daily  · Continue Lasix 40 mg daily  · Restart potassium 20 mEq and only take when taking Lasix

## 2020-08-23 ENCOUNTER — APPOINTMENT (OUTPATIENT)
Dept: PREADMISSION TESTING | Facility: HOSPITAL | Age: 75
End: 2020-08-23

## 2020-08-23 PROCEDURE — U0002 COVID-19 LAB TEST NON-CDC: HCPCS

## 2020-08-23 PROCEDURE — U0004 COV-19 TEST NON-CDC HGH THRU: HCPCS

## 2020-08-23 PROCEDURE — C9803 HOPD COVID-19 SPEC COLLECT: HCPCS

## 2020-08-24 LAB
REF LAB TEST METHOD: NORMAL
SARS-COV-2 RNA RESP QL NAA+PROBE: NOT DETECTED

## 2020-08-26 ENCOUNTER — AMBULATORY SURGICAL CENTER (OUTPATIENT)
Dept: URBAN - METROPOLITAN AREA SURGERY 10 | Facility: SURGERY | Age: 75
End: 2020-08-26
Payer: MEDICARE

## 2020-08-26 ENCOUNTER — OFFICE (OUTPATIENT)
Dept: URBAN - METROPOLITAN AREA PATHOLOGY 4 | Facility: PATHOLOGY | Age: 75
End: 2020-08-26
Payer: MEDICARE

## 2020-08-26 DIAGNOSIS — K63.5 POLYP OF COLON: ICD-10-CM

## 2020-08-26 DIAGNOSIS — D12.4 BENIGN NEOPLASM OF DESCENDING COLON: ICD-10-CM

## 2020-08-26 DIAGNOSIS — Z85.038 PERSONAL HISTORY OF OTHER MALIGNANT NEOPLASM OF LARGE INTEST: ICD-10-CM

## 2020-08-26 DIAGNOSIS — Z80.9 FAMILY HISTORY OF MALIGNANT NEOPLASM, UNSPECIFIED: ICD-10-CM

## 2020-08-26 DIAGNOSIS — Z86.010 PERSONAL HISTORY OF COLONIC POLYPS: ICD-10-CM

## 2020-08-26 PROCEDURE — 45385 COLONOSCOPY W/LESION REMOVAL: CPT | Mod: PT | Performed by: INTERNAL MEDICINE

## 2020-08-26 PROCEDURE — 88305 TISSUE EXAM BY PATHOLOGIST: CPT | Performed by: INTERNAL MEDICINE

## 2020-08-30 DIAGNOSIS — I50.22 CHRONIC SYSTOLIC CONGESTIVE HEART FAILURE (HCC): ICD-10-CM

## 2020-08-31 RX ORDER — CARVEDILOL 25 MG/1
25 TABLET ORAL 2 TIMES DAILY WITH MEALS
Qty: 180 TABLET | Refills: 1 | Status: ON HOLD | OUTPATIENT
Start: 2020-08-31 | End: 2023-01-31 | Stop reason: SDUPTHER

## 2020-09-21 ENCOUNTER — APPOINTMENT (OUTPATIENT)
Dept: PREADMISSION TESTING | Facility: HOSPITAL | Age: 75
End: 2020-09-21

## 2020-09-21 PROCEDURE — U0004 COV-19 TEST NON-CDC HGH THRU: HCPCS

## 2020-09-21 PROCEDURE — C9803 HOPD COVID-19 SPEC COLLECT: HCPCS

## 2020-09-22 LAB — SARS-COV-2 RNA NOSE QL NAA+PROBE: NOT DETECTED

## 2020-09-24 ENCOUNTER — AMBULATORY SURGICAL CENTER (OUTPATIENT)
Dept: URBAN - METROPOLITAN AREA SURGERY 10 | Facility: SURGERY | Age: 75
End: 2020-09-24
Payer: MEDICARE

## 2020-09-24 DIAGNOSIS — K21.9 GASTRO-ESOPHAGEAL REFLUX DISEASE WITHOUT ESOPHAGITIS: ICD-10-CM

## 2020-09-24 DIAGNOSIS — R13.10 DYSPHAGIA, UNSPECIFIED: ICD-10-CM

## 2020-09-24 DIAGNOSIS — R10.13 EPIGASTRIC PAIN: ICD-10-CM

## 2020-09-24 DIAGNOSIS — K30 FUNCTIONAL DYSPEPSIA: ICD-10-CM

## 2020-09-24 DIAGNOSIS — K29.60 OTHER GASTRITIS WITHOUT BLEEDING: ICD-10-CM

## 2020-09-24 DIAGNOSIS — K22.2 ESOPHAGEAL OBSTRUCTION: ICD-10-CM

## 2020-09-24 PROCEDURE — 43239 EGD BIOPSY SINGLE/MULTIPLE: CPT | Mod: 59 | Performed by: INTERNAL MEDICINE

## 2020-09-24 PROCEDURE — 43249 ESOPH EGD DILATION <30 MM: CPT | Performed by: INTERNAL MEDICINE

## 2020-09-25 ENCOUNTER — OFFICE (OUTPATIENT)
Dept: URBAN - METROPOLITAN AREA PATHOLOGY 4 | Facility: PATHOLOGY | Age: 75
End: 2020-09-25
Payer: MEDICARE

## 2020-09-25 DIAGNOSIS — R13.10 DYSPHAGIA, UNSPECIFIED: ICD-10-CM

## 2020-09-25 DIAGNOSIS — K21.9 GASTRO-ESOPHAGEAL REFLUX DISEASE WITHOUT ESOPHAGITIS: ICD-10-CM

## 2020-09-25 DIAGNOSIS — K22.2 ESOPHAGEAL OBSTRUCTION: ICD-10-CM

## 2020-09-25 DIAGNOSIS — R10.13 EPIGASTRIC PAIN: ICD-10-CM

## 2020-09-25 DIAGNOSIS — K30 FUNCTIONAL DYSPEPSIA: ICD-10-CM

## 2020-09-25 DIAGNOSIS — K29.60 OTHER GASTRITIS WITHOUT BLEEDING: ICD-10-CM

## 2020-09-25 PROCEDURE — 88305 TISSUE EXAM BY PATHOLOGIST: CPT | Performed by: INTERNAL MEDICINE

## 2023-01-29 ENCOUNTER — HOSPITAL ENCOUNTER (OUTPATIENT)
Facility: HOSPITAL | Age: 78
Setting detail: OBSERVATION
Discharge: HOME | End: 2023-01-31
Attending: EMERGENCY MEDICINE | Admitting: STUDENT IN AN ORGANIZED HEALTH CARE EDUCATION/TRAINING PROGRAM
Payer: MEDICARE

## 2023-01-29 ENCOUNTER — APPOINTMENT (OUTPATIENT)
Dept: GENERAL RADIOLOGY | Facility: HOSPITAL | Age: 78
End: 2023-01-29
Payer: MEDICARE

## 2023-01-29 DIAGNOSIS — I50.22 CHRONIC SYSTOLIC CONGESTIVE HEART FAILURE: ICD-10-CM

## 2023-01-29 DIAGNOSIS — N18.2 TYPE 2 DIABETES MELLITUS WITH STAGE 2 CHRONIC KIDNEY DISEASE, WITH LONG-TERM CURRENT USE OF INSULIN: Chronic | ICD-10-CM

## 2023-01-29 DIAGNOSIS — I25.119 CORONARY ARTERY DISEASE INVOLVING NATIVE CORONARY ARTERY OF NATIVE HEART WITH ANGINA PECTORIS: ICD-10-CM

## 2023-01-29 DIAGNOSIS — R07.9 ACUTE CHEST PAIN: ICD-10-CM

## 2023-01-29 DIAGNOSIS — I16.0 HYPERTENSIVE URGENCY: Primary | ICD-10-CM

## 2023-01-29 DIAGNOSIS — G25.81 RLS (RESTLESS LEGS SYNDROME): ICD-10-CM

## 2023-01-29 DIAGNOSIS — Z79.4 TYPE 2 DIABETES MELLITUS WITH STAGE 2 CHRONIC KIDNEY DISEASE, WITH LONG-TERM CURRENT USE OF INSULIN: Chronic | ICD-10-CM

## 2023-01-29 DIAGNOSIS — E66.01 MORBIDLY OBESE: ICD-10-CM

## 2023-01-29 DIAGNOSIS — E66.9 OBESITY (BMI 30-39.9): ICD-10-CM

## 2023-01-29 DIAGNOSIS — E11.22 TYPE 2 DIABETES MELLITUS WITH STAGE 2 CHRONIC KIDNEY DISEASE, WITH LONG-TERM CURRENT USE OF INSULIN: Chronic | ICD-10-CM

## 2023-01-29 DIAGNOSIS — E11.42 DIABETIC POLYNEUROPATHY ASSOCIATED WITH TYPE 2 DIABETES MELLITUS: ICD-10-CM

## 2023-01-29 DIAGNOSIS — I10 ESSENTIAL HYPERTENSION: ICD-10-CM

## 2023-01-29 DIAGNOSIS — E11.9 TYPE 2 DIABETES MELLITUS WITHOUT COMPLICATION, WITHOUT LONG-TERM CURRENT USE OF INSULIN: ICD-10-CM

## 2023-01-29 LAB
ALBUMIN SERPL-MCNC: 4.2 G/DL (ref 3.5–5.2)
ALBUMIN/GLOB SERPL: 1.4 G/DL
ALP SERPL-CCNC: 65 U/L (ref 39–117)
ALT SERPL W P-5'-P-CCNC: 16 U/L (ref 1–33)
ANION GAP SERPL CALCULATED.3IONS-SCNC: 9 MMOL/L (ref 5–15)
AST SERPL-CCNC: 18 U/L (ref 1–32)
BASOPHILS # BLD AUTO: 0.04 10*3/MM3 (ref 0–0.2)
BASOPHILS NFR BLD AUTO: 0.6 % (ref 0–1.5)
BILIRUB SERPL-MCNC: 0.4 MG/DL (ref 0–1.2)
BUN SERPL-MCNC: 20 MG/DL (ref 8–23)
BUN/CREAT SERPL: 20.6 (ref 7–25)
CALCIUM SPEC-SCNC: 9.5 MG/DL (ref 8.6–10.5)
CHLORIDE SERPL-SCNC: 104 MMOL/L (ref 98–107)
CO2 SERPL-SCNC: 28 MMOL/L (ref 22–29)
CREAT SERPL-MCNC: 0.97 MG/DL (ref 0.57–1)
DEPRECATED RDW RBC AUTO: 44.5 FL (ref 37–54)
EGFRCR SERPLBLD CKD-EPI 2021: 59.9 ML/MIN/1.73
EOSINOPHIL # BLD AUTO: 0.22 10*3/MM3 (ref 0–0.4)
EOSINOPHIL NFR BLD AUTO: 3.4 % (ref 0.3–6.2)
ERYTHROCYTE [DISTWIDTH] IN BLOOD BY AUTOMATED COUNT: 12.5 % (ref 12.3–15.4)
GLOBULIN UR ELPH-MCNC: 2.9 GM/DL
GLUCOSE SERPL-MCNC: 144 MG/DL (ref 65–99)
HCT VFR BLD AUTO: 48 % (ref 34–46.6)
HGB BLD-MCNC: 16.4 G/DL (ref 12–15.9)
HOLD SPECIMEN: NORMAL
IMM GRANULOCYTES # BLD AUTO: 0.02 10*3/MM3 (ref 0–0.05)
IMM GRANULOCYTES NFR BLD AUTO: 0.3 % (ref 0–0.5)
LIPASE SERPL-CCNC: 67 U/L (ref 13–60)
LYMPHOCYTES # BLD AUTO: 1.35 10*3/MM3 (ref 0.7–3.1)
LYMPHOCYTES NFR BLD AUTO: 21.1 % (ref 19.6–45.3)
MCH RBC QN AUTO: 32.9 PG (ref 26.6–33)
MCHC RBC AUTO-ENTMCNC: 34.2 G/DL (ref 31.5–35.7)
MCV RBC AUTO: 96.2 FL (ref 79–97)
MONOCYTES # BLD AUTO: 0.26 10*3/MM3 (ref 0.1–0.9)
MONOCYTES NFR BLD AUTO: 4.1 % (ref 5–12)
NEUTROPHILS NFR BLD AUTO: 4.52 10*3/MM3 (ref 1.7–7)
NEUTROPHILS NFR BLD AUTO: 70.5 % (ref 42.7–76)
NRBC BLD AUTO-RTO: 0 /100 WBC (ref 0–0.2)
NT-PROBNP SERPL-MCNC: 268.9 PG/ML (ref 0–1800)
PLATELET # BLD AUTO: 172 10*3/MM3 (ref 140–450)
PMV BLD AUTO: 10.3 FL (ref 6–12)
POTASSIUM SERPL-SCNC: 4 MMOL/L (ref 3.5–5.2)
PROT SERPL-MCNC: 7.1 G/DL (ref 6–8.5)
RBC # BLD AUTO: 4.99 10*6/MM3 (ref 3.77–5.28)
SODIUM SERPL-SCNC: 141 MMOL/L (ref 136–145)
TROPONIN T SERPL-MCNC: <0.01 NG/ML (ref 0–0.03)
TROPONIN T SERPL-MCNC: <0.01 NG/ML (ref 0–0.03)
WBC NRBC COR # BLD: 6.41 10*3/MM3 (ref 3.4–10.8)
WHOLE BLOOD HOLD COAG: NORMAL
WHOLE BLOOD HOLD SPECIMEN: NORMAL

## 2023-01-29 PROCEDURE — 96372 THER/PROPH/DIAG INJ SC/IM: CPT

## 2023-01-29 PROCEDURE — G0378 HOSPITAL OBSERVATION PER HR: HCPCS

## 2023-01-29 PROCEDURE — 71045 X-RAY EXAM CHEST 1 VIEW: CPT

## 2023-01-29 PROCEDURE — 99222 1ST HOSP IP/OBS MODERATE 55: CPT | Performed by: NURSE PRACTITIONER

## 2023-01-29 PROCEDURE — 83690 ASSAY OF LIPASE: CPT | Performed by: EMERGENCY MEDICINE

## 2023-01-29 PROCEDURE — 81001 URINALYSIS AUTO W/SCOPE: CPT | Performed by: NURSE PRACTITIONER

## 2023-01-29 PROCEDURE — 96365 THER/PROPH/DIAG IV INF INIT: CPT

## 2023-01-29 PROCEDURE — 93005 ELECTROCARDIOGRAM TRACING: CPT

## 2023-01-29 PROCEDURE — 36415 COLL VENOUS BLD VENIPUNCTURE: CPT

## 2023-01-29 PROCEDURE — 93005 ELECTROCARDIOGRAM TRACING: CPT | Performed by: NURSE PRACTITIONER

## 2023-01-29 PROCEDURE — 80053 COMPREHEN METABOLIC PANEL: CPT | Performed by: EMERGENCY MEDICINE

## 2023-01-29 PROCEDURE — 93005 ELECTROCARDIOGRAM TRACING: CPT | Performed by: EMERGENCY MEDICINE

## 2023-01-29 PROCEDURE — 25010000002 ENOXAPARIN PER 10 MG: Performed by: NURSE PRACTITIONER

## 2023-01-29 PROCEDURE — 84484 ASSAY OF TROPONIN QUANT: CPT | Performed by: EMERGENCY MEDICINE

## 2023-01-29 PROCEDURE — 99285 EMERGENCY DEPT VISIT HI MDM: CPT

## 2023-01-29 PROCEDURE — 85025 COMPLETE CBC W/AUTO DIFF WBC: CPT | Performed by: EMERGENCY MEDICINE

## 2023-01-29 PROCEDURE — 83880 ASSAY OF NATRIURETIC PEPTIDE: CPT | Performed by: EMERGENCY MEDICINE

## 2023-01-29 RX ORDER — ROPINIROLE 2 MG/1
2 TABLET, FILM COATED ORAL NIGHTLY
Status: DISCONTINUED | OUTPATIENT
Start: 2023-01-29 | End: 2023-01-31 | Stop reason: HOSPADM

## 2023-01-29 RX ORDER — SODIUM CHLORIDE 0.9 % (FLUSH) 0.9 %
10 SYRINGE (ML) INJECTION EVERY 12 HOURS SCHEDULED
Status: DISCONTINUED | OUTPATIENT
Start: 2023-01-29 | End: 2023-01-31 | Stop reason: HOSPADM

## 2023-01-29 RX ORDER — AMLODIPINE BESYLATE 5 MG/1
5 TABLET ORAL DAILY
COMMUNITY

## 2023-01-29 RX ORDER — NICOTINE POLACRILEX 4 MG
15 LOZENGE BUCCAL
Status: DISCONTINUED | OUTPATIENT
Start: 2023-01-29 | End: 2023-01-31 | Stop reason: HOSPADM

## 2023-01-29 RX ORDER — NITROGLYCERIN 0.4 MG/1
0.4 TABLET SUBLINGUAL
Status: DISCONTINUED | OUTPATIENT
Start: 2023-01-29 | End: 2023-01-30

## 2023-01-29 RX ORDER — ASPIRIN 81 MG/1
324 TABLET, CHEWABLE ORAL ONCE
Status: COMPLETED | OUTPATIENT
Start: 2023-01-29 | End: 2023-01-29

## 2023-01-29 RX ORDER — DEXTROSE MONOHYDRATE 25 G/50ML
25 INJECTION, SOLUTION INTRAVENOUS
Status: DISCONTINUED | OUTPATIENT
Start: 2023-01-29 | End: 2023-01-31 | Stop reason: HOSPADM

## 2023-01-29 RX ORDER — ACETAMINOPHEN 650 MG/1
650 SUPPOSITORY RECTAL EVERY 4 HOURS PRN
Status: DISCONTINUED | OUTPATIENT
Start: 2023-01-29 | End: 2023-01-31 | Stop reason: HOSPADM

## 2023-01-29 RX ORDER — ACETAMINOPHEN 160 MG/5ML
650 SOLUTION ORAL EVERY 4 HOURS PRN
Status: DISCONTINUED | OUTPATIENT
Start: 2023-01-29 | End: 2023-01-31 | Stop reason: HOSPADM

## 2023-01-29 RX ORDER — GABAPENTIN 400 MG/1
400 CAPSULE ORAL 4 TIMES DAILY
COMMUNITY

## 2023-01-29 RX ORDER — GABAPENTIN 400 MG/1
400 CAPSULE ORAL NIGHTLY
Status: DISCONTINUED | OUTPATIENT
Start: 2023-01-29 | End: 2023-01-30

## 2023-01-29 RX ORDER — TRAZODONE HYDROCHLORIDE 100 MG/1
100 TABLET ORAL NIGHTLY
Status: DISCONTINUED | OUTPATIENT
Start: 2023-01-29 | End: 2023-01-31 | Stop reason: HOSPADM

## 2023-01-29 RX ORDER — INSULIN LISPRO 100 [IU]/ML
0-7 INJECTION, SOLUTION INTRAVENOUS; SUBCUTANEOUS
Status: DISCONTINUED | OUTPATIENT
Start: 2023-01-30 | End: 2023-01-31 | Stop reason: HOSPADM

## 2023-01-29 RX ORDER — NITROGLYCERIN 20 MG/100ML
5-200 INJECTION INTRAVENOUS
Status: DISCONTINUED | OUTPATIENT
Start: 2023-01-29 | End: 2023-01-30

## 2023-01-29 RX ORDER — CARVEDILOL 12.5 MG/1
25 TABLET ORAL 2 TIMES DAILY WITH MEALS
Status: DISCONTINUED | OUTPATIENT
Start: 2023-01-29 | End: 2023-01-31 | Stop reason: HOSPADM

## 2023-01-29 RX ORDER — AMLODIPINE BESYLATE 5 MG/1
5 TABLET ORAL
Status: DISCONTINUED | OUTPATIENT
Start: 2023-01-30 | End: 2023-01-31 | Stop reason: HOSPADM

## 2023-01-29 RX ORDER — GABAPENTIN 400 MG/1
400 CAPSULE ORAL ONCE
Status: COMPLETED | OUTPATIENT
Start: 2023-01-30 | End: 2023-01-29

## 2023-01-29 RX ORDER — ACETAMINOPHEN 325 MG/1
650 TABLET ORAL EVERY 4 HOURS PRN
Status: DISCONTINUED | OUTPATIENT
Start: 2023-01-29 | End: 2023-01-31 | Stop reason: HOSPADM

## 2023-01-29 RX ORDER — SODIUM CHLORIDE 0.9 % (FLUSH) 0.9 %
10 SYRINGE (ML) INJECTION AS NEEDED
Status: DISCONTINUED | OUTPATIENT
Start: 2023-01-29 | End: 2023-01-31 | Stop reason: HOSPADM

## 2023-01-29 RX ORDER — SODIUM CHLORIDE 0.9 % (FLUSH) 0.9 %
10 SYRINGE (ML) INJECTION AS NEEDED
Status: DISCONTINUED | OUTPATIENT
Start: 2023-01-29 | End: 2023-01-30 | Stop reason: SDUPTHER

## 2023-01-29 RX ORDER — SODIUM CHLORIDE 9 MG/ML
40 INJECTION, SOLUTION INTRAVENOUS AS NEEDED
Status: DISCONTINUED | OUTPATIENT
Start: 2023-01-29 | End: 2023-01-31 | Stop reason: HOSPADM

## 2023-01-29 RX ORDER — ENOXAPARIN SODIUM 100 MG/ML
40 INJECTION SUBCUTANEOUS DAILY
Status: DISCONTINUED | OUTPATIENT
Start: 2023-01-29 | End: 2023-01-30

## 2023-01-29 RX ADMIN — ROPINIROLE HYDROCHLORIDE 2 MG: 2 TABLET, FILM COATED ORAL at 22:49

## 2023-01-29 RX ADMIN — GABAPENTIN 400 MG: 400 CAPSULE ORAL at 23:30

## 2023-01-29 RX ADMIN — CARVEDILOL 25 MG: 12.5 TABLET, FILM COATED ORAL at 22:49

## 2023-01-29 RX ADMIN — NITROGLYCERIN 5 MCG/MIN: 20 INJECTION INTRAVENOUS at 22:46

## 2023-01-29 RX ADMIN — ENOXAPARIN SODIUM 40 MG: 40 INJECTION SUBCUTANEOUS at 23:30

## 2023-01-29 RX ADMIN — ACETAMINOPHEN 325MG 650 MG: 325 TABLET ORAL at 23:40

## 2023-01-29 RX ADMIN — TRAZODONE HYDROCHLORIDE 100 MG: 100 TABLET ORAL at 22:50

## 2023-01-29 RX ADMIN — NITROGLYCERIN 0.4 MG: 0.4 TABLET SUBLINGUAL at 21:47

## 2023-01-29 RX ADMIN — NITROGLYCERIN 0.4 MG: 0.4 TABLET SUBLINGUAL at 21:54

## 2023-01-29 RX ADMIN — ASPIRIN 81 MG CHEWABLE TABLET 324 MG: 81 TABLET CHEWABLE at 16:19

## 2023-01-29 RX ADMIN — GABAPENTIN 400 MG: 400 CAPSULE ORAL at 22:49

## 2023-01-29 RX ADMIN — Medication 10 ML: at 22:47

## 2023-01-30 ENCOUNTER — APPOINTMENT (OUTPATIENT)
Dept: CARDIOLOGY | Facility: HOSPITAL | Age: 78
End: 2023-01-30
Payer: MEDICARE

## 2023-01-30 PROBLEM — R07.9 ACUTE CHEST PAIN: Status: RESOLVED | Noted: 2023-01-29 | Resolved: 2023-01-30

## 2023-01-30 PROBLEM — E66.01 MORBIDLY OBESE (HCC): Status: RESOLVED | Noted: 2019-05-14 | Resolved: 2023-01-30

## 2023-01-30 PROBLEM — I16.0 HYPERTENSIVE URGENCY: Status: ACTIVE | Noted: 2023-01-30

## 2023-01-30 PROBLEM — Z91.199 MEDICAL NON-COMPLIANCE: Status: ACTIVE | Noted: 2023-01-30

## 2023-01-30 PROBLEM — K92.1 HEMATOCHEZIA: Status: RESOLVED | Noted: 2018-07-10 | Resolved: 2023-01-30

## 2023-01-30 PROBLEM — E11.42 DIABETIC POLYNEUROPATHY ASSOCIATED WITH TYPE 2 DIABETES MELLITUS: Status: RESOLVED | Noted: 2018-04-06 | Resolved: 2023-01-30

## 2023-01-30 PROBLEM — I16.0 HYPERTENSIVE URGENCY: Status: RESOLVED | Noted: 2023-01-29 | Resolved: 2023-01-30

## 2023-01-30 PROBLEM — Z86.711 HISTORY OF PULMONARY EMBOLUS (PE): Status: RESOLVED | Noted: 2018-08-04 | Resolved: 2023-01-30

## 2023-01-30 LAB
ANION GAP SERPL CALCULATED.3IONS-SCNC: 10 MMOL/L (ref 5–15)
BACTERIA UR QL AUTO: ABNORMAL /HPF
BASOPHILS # BLD AUTO: 0.04 10*3/MM3 (ref 0–0.2)
BASOPHILS NFR BLD AUTO: 0.6 % (ref 0–1.5)
BH CV REST NUCLEAR ISOTOPE DOSE: 29.9 MCI
BH CV STRESS BP STAGE 1: NORMAL
BH CV STRESS BP STAGE 3: NORMAL
BH CV STRESS COMMENTS STAGE 1: NORMAL
BH CV STRESS DOSE REGADENOSON STAGE 1: 0.4
BH CV STRESS DURATION MIN STAGE 1: 1
BH CV STRESS DURATION MIN STAGE 2: 1
BH CV STRESS DURATION MIN STAGE 3: 1
BH CV STRESS DURATION MIN STAGE 4: 1
BH CV STRESS DURATION SEC STAGE 1: 0
BH CV STRESS DURATION SEC STAGE 2: 0
BH CV STRESS DURATION SEC STAGE 3: 0
BH CV STRESS DURATION SEC STAGE 4: 0
BH CV STRESS HR STAGE 1: 62
BH CV STRESS HR STAGE 2: 88
BH CV STRESS HR STAGE 3: 82
BH CV STRESS HR STAGE 4: 81
BH CV STRESS NUCLEAR ISOTOPE DOSE: 29.9 MCI
BH CV STRESS O2 STAGE 1: 99
BH CV STRESS O2 STAGE 2: 98
BH CV STRESS O2 STAGE 3: 93
BH CV STRESS O2 STAGE 4: 95
BH CV STRESS PROTOCOL 1: NORMAL
BH CV STRESS RECOVERY BP: NORMAL MMHG
BH CV STRESS RECOVERY HR: 81 BPM
BH CV STRESS RECOVERY O2: 94 %
BH CV STRESS STAGE 1: 1
BH CV STRESS STAGE 2: 2
BH CV STRESS STAGE 3: 3
BH CV STRESS STAGE 4: 4
BILIRUB UR QL STRIP: NEGATIVE
BUN SERPL-MCNC: 23 MG/DL (ref 8–23)
BUN/CREAT SERPL: 26.1 (ref 7–25)
CALCIUM SPEC-SCNC: 8.9 MG/DL (ref 8.6–10.5)
CHLORIDE SERPL-SCNC: 104 MMOL/L (ref 98–107)
CHOLEST SERPL-MCNC: 171 MG/DL (ref 0–200)
CLARITY UR: CLEAR
CO2 SERPL-SCNC: 26 MMOL/L (ref 22–29)
COLOR UR: YELLOW
CREAT SERPL-MCNC: 0.88 MG/DL (ref 0.57–1)
DEPRECATED RDW RBC AUTO: 45 FL (ref 37–54)
EGFRCR SERPLBLD CKD-EPI 2021: 67.4 ML/MIN/1.73
EOSINOPHIL # BLD AUTO: 0.29 10*3/MM3 (ref 0–0.4)
EOSINOPHIL NFR BLD AUTO: 4.6 % (ref 0.3–6.2)
ERYTHROCYTE [DISTWIDTH] IN BLOOD BY AUTOMATED COUNT: 12.4 % (ref 12.3–15.4)
GLUCOSE BLDC GLUCOMTR-MCNC: 111 MG/DL (ref 70–130)
GLUCOSE BLDC GLUCOMTR-MCNC: 125 MG/DL (ref 70–130)
GLUCOSE BLDC GLUCOMTR-MCNC: 132 MG/DL (ref 70–130)
GLUCOSE SERPL-MCNC: 109 MG/DL (ref 65–99)
GLUCOSE UR STRIP-MCNC: NEGATIVE MG/DL
HBA1C MFR BLD: 6.2 % (ref 4.8–5.6)
HCT VFR BLD AUTO: 43.9 % (ref 34–46.6)
HDLC SERPL-MCNC: 44 MG/DL (ref 40–60)
HGB BLD-MCNC: 14.3 G/DL (ref 12–15.9)
HGB UR QL STRIP.AUTO: NEGATIVE
HYALINE CASTS UR QL AUTO: ABNORMAL /LPF
IMM GRANULOCYTES # BLD AUTO: 0.02 10*3/MM3 (ref 0–0.05)
IMM GRANULOCYTES NFR BLD AUTO: 0.3 % (ref 0–0.5)
KETONES UR QL STRIP: NEGATIVE
LDLC SERPL CALC-MCNC: 94 MG/DL (ref 0–100)
LDLC/HDLC SERPL: 2.01 {RATIO}
LEUKOCYTE ESTERASE UR QL STRIP.AUTO: ABNORMAL
LV EF NUC BP: 65 %
LYMPHOCYTES # BLD AUTO: 1.7 10*3/MM3 (ref 0.7–3.1)
LYMPHOCYTES NFR BLD AUTO: 26.8 % (ref 19.6–45.3)
MAXIMAL PREDICTED HEART RATE: 142 BPM
MCH RBC QN AUTO: 32 PG (ref 26.6–33)
MCHC RBC AUTO-ENTMCNC: 32.6 G/DL (ref 31.5–35.7)
MCV RBC AUTO: 98.2 FL (ref 79–97)
MONOCYTES # BLD AUTO: 0.55 10*3/MM3 (ref 0.1–0.9)
MONOCYTES NFR BLD AUTO: 8.7 % (ref 5–12)
NEUTROPHILS NFR BLD AUTO: 3.75 10*3/MM3 (ref 1.7–7)
NEUTROPHILS NFR BLD AUTO: 59 % (ref 42.7–76)
NITRITE UR QL STRIP: NEGATIVE
NRBC BLD AUTO-RTO: 0 /100 WBC (ref 0–0.2)
PERCENT MAX PREDICTED HR: 61.97 %
PH UR STRIP.AUTO: 7.5 [PH] (ref 5–8)
PLATELET # BLD AUTO: 151 10*3/MM3 (ref 140–450)
PMV BLD AUTO: 10.5 FL (ref 6–12)
POTASSIUM SERPL-SCNC: 3.9 MMOL/L (ref 3.5–5.2)
PROT UR QL STRIP: ABNORMAL
RBC # BLD AUTO: 4.47 10*6/MM3 (ref 3.77–5.28)
RBC # UR STRIP: ABNORMAL /HPF
REF LAB TEST METHOD: ABNORMAL
SODIUM SERPL-SCNC: 140 MMOL/L (ref 136–145)
SP GR UR STRIP: 1.02 (ref 1–1.03)
SQUAMOUS #/AREA URNS HPF: ABNORMAL /HPF
STRESS BASELINE BP: NORMAL MMHG
STRESS BASELINE HR: 73 BPM
STRESS O2 SAT REST: 93 %
STRESS PERCENT HR: 73 %
STRESS POST ESTIMATED WORKLOAD: 1 METS
STRESS POST EXERCISE DUR MIN: 4 MIN
STRESS POST EXERCISE DUR SEC: 0 SEC
STRESS POST O2 SAT PEAK: 95 %
STRESS POST PEAK BP: NORMAL MMHG
STRESS POST PEAK HR: 88 BPM
STRESS TARGET HR: 121 BPM
TRIGL SERPL-MCNC: 192 MG/DL (ref 0–150)
TROPONIN T SERPL-MCNC: <0.01 NG/ML (ref 0–0.03)
UROBILINOGEN UR QL STRIP: ABNORMAL
VLDLC SERPL-MCNC: 33 MG/DL (ref 5–40)
WBC # UR STRIP: ABNORMAL /HPF
WBC NRBC COR # BLD: 6.35 10*3/MM3 (ref 3.4–10.8)

## 2023-01-30 PROCEDURE — 93010 ELECTROCARDIOGRAM REPORT: CPT | Performed by: INTERNAL MEDICINE

## 2023-01-30 PROCEDURE — 84484 ASSAY OF TROPONIN QUANT: CPT | Performed by: NURSE PRACTITIONER

## 2023-01-30 PROCEDURE — 93018 CV STRESS TEST I&R ONLY: CPT | Performed by: INTERNAL MEDICINE

## 2023-01-30 PROCEDURE — A9555 RB82 RUBIDIUM: HCPCS

## 2023-01-30 PROCEDURE — 99214 OFFICE O/P EST MOD 30 MIN: CPT

## 2023-01-30 PROCEDURE — 93005 ELECTROCARDIOGRAM TRACING: CPT | Performed by: NURSE PRACTITIONER

## 2023-01-30 PROCEDURE — 80061 LIPID PANEL: CPT | Performed by: NURSE PRACTITIONER

## 2023-01-30 PROCEDURE — 96366 THER/PROPH/DIAG IV INF ADDON: CPT

## 2023-01-30 PROCEDURE — 78431 MYOCRD IMG PET RST&STRS CT: CPT

## 2023-01-30 PROCEDURE — 93017 CV STRESS TEST TRACING ONLY: CPT

## 2023-01-30 PROCEDURE — 80048 BASIC METABOLIC PNL TOTAL CA: CPT | Performed by: NURSE PRACTITIONER

## 2023-01-30 PROCEDURE — G0378 HOSPITAL OBSERVATION PER HR: HCPCS

## 2023-01-30 PROCEDURE — 83036 HEMOGLOBIN GLYCOSYLATED A1C: CPT | Performed by: NURSE PRACTITIONER

## 2023-01-30 PROCEDURE — 99232 SBSQ HOSP IP/OBS MODERATE 35: CPT | Performed by: STUDENT IN AN ORGANIZED HEALTH CARE EDUCATION/TRAINING PROGRAM

## 2023-01-30 PROCEDURE — 78431 MYOCRD IMG PET RST&STRS CT: CPT | Performed by: INTERNAL MEDICINE

## 2023-01-30 PROCEDURE — 82962 GLUCOSE BLOOD TEST: CPT

## 2023-01-30 PROCEDURE — 25010000002 REGADENOSON 0.4 MG/5ML SOLUTION

## 2023-01-30 PROCEDURE — 0 RUBIDIUM CHLORIDE

## 2023-01-30 PROCEDURE — 85025 COMPLETE CBC W/AUTO DIFF WBC: CPT | Performed by: NURSE PRACTITIONER

## 2023-01-30 RX ORDER — ISOSORBIDE MONONITRATE 20 MG/1
20 TABLET ORAL DAILY
Status: DISCONTINUED | OUTPATIENT
Start: 2023-01-30 | End: 2023-01-30 | Stop reason: ALTCHOICE

## 2023-01-30 RX ORDER — LOSARTAN POTASSIUM 25 MG/1
25 TABLET ORAL
Status: DISCONTINUED | OUTPATIENT
Start: 2023-01-30 | End: 2023-01-30

## 2023-01-30 RX ORDER — ISOSORBIDE MONONITRATE 30 MG/1
30 TABLET, EXTENDED RELEASE ORAL
Status: DISCONTINUED | OUTPATIENT
Start: 2023-01-31 | End: 2023-01-31

## 2023-01-30 RX ORDER — HYDROCHLOROTHIAZIDE 12.5 MG/1
12.5 CAPSULE, GELATIN COATED ORAL DAILY
Status: DISCONTINUED | OUTPATIENT
Start: 2023-01-30 | End: 2023-01-31 | Stop reason: HOSPADM

## 2023-01-30 RX ORDER — ROSUVASTATIN CALCIUM 20 MG/1
20 TABLET, COATED ORAL NIGHTLY
Status: DISCONTINUED | OUTPATIENT
Start: 2023-01-30 | End: 2023-01-31 | Stop reason: HOSPADM

## 2023-01-30 RX ORDER — ENOXAPARIN SODIUM 100 MG/ML
1 INJECTION SUBCUTANEOUS EVERY 12 HOURS
Status: DISCONTINUED | OUTPATIENT
Start: 2023-01-30 | End: 2023-01-31 | Stop reason: HOSPADM

## 2023-01-30 RX ORDER — FUROSEMIDE 40 MG/1
40 TABLET ORAL DAILY PRN
COMMUNITY
End: 2023-01-31 | Stop reason: HOSPADM

## 2023-01-30 RX ORDER — ROPINIROLE 1 MG/1
1 TABLET, FILM COATED ORAL ONCE
Status: COMPLETED | OUTPATIENT
Start: 2023-01-30 | End: 2023-01-30

## 2023-01-30 RX ORDER — TRAZODONE HYDROCHLORIDE 100 MG/1
100 TABLET ORAL NIGHTLY PRN
COMMUNITY

## 2023-01-30 RX ORDER — LOSARTAN POTASSIUM 50 MG/1
50 TABLET ORAL
Status: DISCONTINUED | OUTPATIENT
Start: 2023-01-31 | End: 2023-01-31 | Stop reason: HOSPADM

## 2023-01-30 RX ORDER — GABAPENTIN 400 MG/1
800 CAPSULE ORAL NIGHTLY
Status: DISCONTINUED | OUTPATIENT
Start: 2023-01-30 | End: 2023-01-31 | Stop reason: HOSPADM

## 2023-01-30 RX ADMIN — HYDROCHLOROTHIAZIDE 12.5 MG: 12.5 CAPSULE ORAL at 20:56

## 2023-01-30 RX ADMIN — LOSARTAN POTASSIUM 25 MG: 25 TABLET, FILM COATED ORAL at 12:22

## 2023-01-30 RX ADMIN — Medication 10 ML: at 21:05

## 2023-01-30 RX ADMIN — ROPINIROLE HYDROCHLORIDE 2 MG: 2 TABLET, FILM COATED ORAL at 20:56

## 2023-01-30 RX ADMIN — RUBIDIUM CHLORIDE RB-82 1 DOSE: 150 INJECTION, SOLUTION INTRAVENOUS at 14:46

## 2023-01-30 RX ADMIN — TRAZODONE HYDROCHLORIDE 100 MG: 100 TABLET ORAL at 20:57

## 2023-01-30 RX ADMIN — CARVEDILOL 25 MG: 12.5 TABLET, FILM COATED ORAL at 18:34

## 2023-01-30 RX ADMIN — RUBIDIUM CHLORIDE RB-82 1 DOSE: 150 INJECTION, SOLUTION INTRAVENOUS at 14:35

## 2023-01-30 RX ADMIN — AMLODIPINE BESYLATE 5 MG: 5 TABLET ORAL at 08:57

## 2023-01-30 RX ADMIN — ACETAMINOPHEN 325MG 650 MG: 325 TABLET ORAL at 21:59

## 2023-01-30 RX ADMIN — ISOSORBIDE MONONITRATE 20 MG: 20 TABLET ORAL at 12:59

## 2023-01-30 RX ADMIN — REGADENOSON 0.4 MG: 0.08 INJECTION, SOLUTION INTRAVENOUS at 14:44

## 2023-01-30 RX ADMIN — Medication 10 ML: at 08:59

## 2023-01-30 RX ADMIN — ACETAMINOPHEN 325MG 650 MG: 325 TABLET ORAL at 15:52

## 2023-01-30 RX ADMIN — ACETAMINOPHEN 325MG 650 MG: 325 TABLET ORAL at 09:07

## 2023-01-30 RX ADMIN — CARVEDILOL 25 MG: 12.5 TABLET, FILM COATED ORAL at 08:58

## 2023-01-30 RX ADMIN — ROPINIROLE HYDROCHLORIDE 1 MG: 1 TABLET, FILM COATED ORAL at 00:22

## 2023-01-30 RX ADMIN — GABAPENTIN 800 MG: 400 CAPSULE ORAL at 21:59

## 2023-01-30 RX ADMIN — ROSUVASTATIN 20 MG: 20 TABLET, FILM COATED ORAL at 20:56

## 2023-01-31 ENCOUNTER — READMISSION MANAGEMENT (OUTPATIENT)
Dept: CALL CENTER | Facility: HOSPITAL | Age: 78
End: 2023-01-31
Payer: MEDICARE

## 2023-01-31 ENCOUNTER — DOCUMENTATION (OUTPATIENT)
Dept: SOCIAL WORK | Facility: HOSPITAL | Age: 78
End: 2023-01-31
Payer: MEDICARE

## 2023-01-31 VITALS
OXYGEN SATURATION: 93 % | RESPIRATION RATE: 18 BRPM | WEIGHT: 202.3 LBS | TEMPERATURE: 97.4 F | DIASTOLIC BLOOD PRESSURE: 91 MMHG | HEART RATE: 74 BPM | BODY MASS INDEX: 33.7 KG/M2 | HEIGHT: 65 IN | SYSTOLIC BLOOD PRESSURE: 180 MMHG

## 2023-01-31 PROBLEM — Z91.198 PATIENT'S NONCOMPLIANCE WITH OTHER MEDICAL TREATMENT AND REGIMEN FOR OTHER REASON: Status: ACTIVE | Noted: 2023-01-31

## 2023-01-31 PROBLEM — Z91.199 MEDICAL NON-COMPLIANCE: Status: RESOLVED | Noted: 2023-01-30 | Resolved: 2023-01-31

## 2023-01-31 PROBLEM — U07.1 COVID-19 VIRUS DETECTED: Status: RESOLVED | Noted: 2023-01-31 | Resolved: 2023-01-31

## 2023-01-31 PROBLEM — U07.1 COVID-19 VIRUS DETECTED: Status: ACTIVE | Noted: 2023-01-31

## 2023-01-31 PROBLEM — R07.89 CHEST PAIN, ATYPICAL: Status: ACTIVE | Noted: 2023-01-31

## 2023-01-31 LAB
GLUCOSE BLDC GLUCOMTR-MCNC: 113 MG/DL (ref 70–130)
QT INTERVAL: 356 MS
QT INTERVAL: 388 MS
QTC INTERVAL: 421 MS
QTC INTERVAL: 440 MS

## 2023-01-31 PROCEDURE — 99214 OFFICE O/P EST MOD 30 MIN: CPT | Performed by: INTERNAL MEDICINE

## 2023-01-31 PROCEDURE — 96372 THER/PROPH/DIAG INJ SC/IM: CPT

## 2023-01-31 PROCEDURE — G0378 HOSPITAL OBSERVATION PER HR: HCPCS

## 2023-01-31 PROCEDURE — 99239 HOSP IP/OBS DSCHRG MGMT >30: CPT | Performed by: STUDENT IN AN ORGANIZED HEALTH CARE EDUCATION/TRAINING PROGRAM

## 2023-01-31 PROCEDURE — 25010000002 ENOXAPARIN PER 10 MG

## 2023-01-31 PROCEDURE — 82962 GLUCOSE BLOOD TEST: CPT

## 2023-01-31 RX ORDER — ROSUVASTATIN CALCIUM 20 MG/1
20 TABLET, COATED ORAL NIGHTLY
Qty: 30 TABLET | Refills: 0 | Status: SHIPPED | OUTPATIENT
Start: 2023-01-31 | End: 2023-02-10 | Stop reason: SDUPTHER

## 2023-01-31 RX ORDER — ISOSORBIDE MONONITRATE 30 MG/1
30 TABLET, EXTENDED RELEASE ORAL
Qty: 30 TABLET | Refills: 0 | Status: SHIPPED | OUTPATIENT
Start: 2023-01-31 | End: 2023-01-31 | Stop reason: HOSPADM

## 2023-01-31 RX ORDER — SACUBITRIL AND VALSARTAN 24; 26 MG/1; MG/1
1 TABLET, FILM COATED ORAL 2 TIMES DAILY
Qty: 60 TABLET | Refills: 0 | Status: SHIPPED | OUTPATIENT
Start: 2023-01-31 | End: 2023-02-10 | Stop reason: SDUPTHER

## 2023-01-31 RX ORDER — CARVEDILOL 25 MG/1
25 TABLET ORAL 2 TIMES DAILY WITH MEALS
Qty: 60 TABLET | Refills: 0 | Status: SHIPPED | OUTPATIENT
Start: 2023-01-31 | End: 2023-02-10 | Stop reason: SDUPTHER

## 2023-01-31 RX ORDER — LOSARTAN POTASSIUM AND HYDROCHLOROTHIAZIDE 12.5; 5 MG/1; MG/1
1 TABLET ORAL DAILY
Qty: 30 TABLET | Refills: 0 | Status: SHIPPED | OUTPATIENT
Start: 2023-01-31 | End: 2023-01-31 | Stop reason: HOSPADM

## 2023-01-31 RX ADMIN — Medication 10 ML: at 08:34

## 2023-01-31 RX ADMIN — ENOXAPARIN SODIUM 90 MG: 100 INJECTION SUBCUTANEOUS at 08:34

## 2023-01-31 RX ADMIN — HYDROCHLOROTHIAZIDE 12.5 MG: 12.5 CAPSULE ORAL at 08:33

## 2023-01-31 RX ADMIN — CARVEDILOL 25 MG: 12.5 TABLET, FILM COATED ORAL at 08:33

## 2023-01-31 RX ADMIN — LOSARTAN POTASSIUM 50 MG: 50 TABLET, FILM COATED ORAL at 08:33

## 2023-01-31 RX ADMIN — AMLODIPINE BESYLATE 5 MG: 5 TABLET ORAL at 08:32

## 2023-01-31 RX ADMIN — ISOSORBIDE MONONITRATE 30 MG: 30 TABLET, EXTENDED RELEASE ORAL at 08:33

## 2023-01-31 RX ADMIN — EMPAGLIFLOZIN 10 MG: 10 TABLET, FILM COATED ORAL at 10:55

## 2023-01-31 NOTE — OUTREACH NOTE
Prep Survey    Flowsheet Row Responses   [facility] patient discharged from? [place]   Is LACE score < 7 ? No   Eligibility [facility]   Date of Admission 01/29/23   Date of Discharge 01/31/23   Discharge Disposition Home or Self Care   Discharge diagnosis HTN urgency   Does the patient have one of the following disease processes/diagnoses(primary or secondary)? Other   Does the patient have Home health ordered? No   Is there a DME ordered? Yes   What DME was ordered? needs a new rollator   Comments regarding appointments new PCP appt   Medication alerts for this patient Eliquis   General alerts for this patient see CM note for eliquis payment info   Prep survey completed? Yes          MARIBEL WANG - Registered Nurse

## 2023-02-01 ENCOUNTER — TRANSITIONAL CARE MANAGEMENT TELEPHONE ENCOUNTER (OUTPATIENT)
Dept: CALL CENTER | Facility: HOSPITAL | Age: 78
End: 2023-02-01
Payer: MEDICARE

## 2023-02-01 NOTE — OUTREACH NOTE
Call Center TCM Note    Flowsheet Row Responses   [facility] patient discharged from? [place]   Does the patient have one of the following disease processes/diagnoses(primary or secondary)? Other   TCM attempt successful? No  [No verbal release]   Unsuccessful attempts Attempt 1   Call Status Left message          Catrachita Vieyra RN    2/1/2023, 11:44 EST

## 2023-02-01 NOTE — OUTREACH NOTE
Call Center TCM Note    Flowsheet Row Responses   [facility] patient discharged from? [place]   Does the patient have one of the following disease processes/diagnoses(primary or secondary)? Other   TCM attempt successful? Yes   Call start time 1321   Call end time 1337   General alerts for this patient see CM note for eliquis payment info   Discharge diagnosis HTN urgency   Meds reviewed with patient/caregiver? Yes   Is the patient having any side effects they believe may be caused by any medication additions or changes? No   Does the patient have all medications ordered at discharge? Yes   Is the patient taking all medications as directed (includes completed medication regime)? Yes   Comments Hospital d/c f/u appt is on 2/7/23 at 11:15 AM,  Appt with Tosha Melvin is on 2/7/23   Does the patient have an appointment with their PCP within 7 days of discharge? Yes   What DME was ordered? needs a new rollator   Has all DME been delivered? Yes   Psychosocial issues? No   Did the patient receive a copy of their discharge instructions? Yes   Nursing interventions Reviewed instructions with patient   What is the patient's perception of their health status since discharge? Same  [BP today was 174/104. Encouraged pt to take BP again since this was prior to BP medications. ]   Is the patient/caregiver able to teach back signs and symptoms related to disease process for when to call PCP? Yes   Is the patient/caregiver able to teach back signs and symptoms related to disease process for when to call 911? Yes   Is the patient/caregiver able to teach back the hierarchy of who to call/visit for symptoms/problems? PCP, Specialist, Home health nurse, Urgent Care, ED, 911 Yes   TCM call completed? Yes   Call end time 1337   Would this patient benefit from a Referral to Amb Social Work? No   Is the patient interested in additional calls from an ambulatory ?  NOTE:  applies to high risk patients requiring additional  follow-up. Lupe Vieyra, RN    2/1/2023, 13:37 EST

## 2023-02-06 LAB
QT INTERVAL: 412 MS
QT INTERVAL: 426 MS
QTC INTERVAL: 451 MS
QTC INTERVAL: 466 MS

## 2023-02-07 ENCOUNTER — OFFICE VISIT (OUTPATIENT)
Dept: CARDIOLOGY | Facility: HOSPITAL | Age: 78
End: 2023-02-07
Payer: MEDICARE

## 2023-02-07 VITALS
OXYGEN SATURATION: 94 % | HEIGHT: 65 IN | DIASTOLIC BLOOD PRESSURE: 66 MMHG | RESPIRATION RATE: 18 BRPM | SYSTOLIC BLOOD PRESSURE: 114 MMHG | BODY MASS INDEX: 34.17 KG/M2 | TEMPERATURE: 97 F | HEART RATE: 98 BPM | WEIGHT: 205.1 LBS

## 2023-02-07 DIAGNOSIS — I50.32 CHRONIC DIASTOLIC (CONGESTIVE) HEART FAILURE: Primary | ICD-10-CM

## 2023-02-07 DIAGNOSIS — N18.2 CKD (CHRONIC KIDNEY DISEASE) STAGE 2, GFR 60-89 ML/MIN: ICD-10-CM

## 2023-02-07 DIAGNOSIS — I10 ESSENTIAL HYPERTENSION: ICD-10-CM

## 2023-02-07 DIAGNOSIS — I48.0 PAROXYSMAL ATRIAL FIBRILLATION: ICD-10-CM

## 2023-02-07 LAB
ANION GAP SERPL CALCULATED.3IONS-SCNC: 11.1 MMOL/L (ref 5–15)
BUN SERPL-MCNC: 19 MG/DL (ref 8–23)
BUN/CREAT SERPL: 16.8 (ref 7–25)
CALCIUM SPEC-SCNC: 9.1 MG/DL (ref 8.6–10.5)
CHLORIDE SERPL-SCNC: 108 MMOL/L (ref 98–107)
CO2 SERPL-SCNC: 21.9 MMOL/L (ref 22–29)
CREAT SERPL-MCNC: 1.13 MG/DL (ref 0.57–1)
EGFRCR SERPLBLD CKD-EPI 2021: 49.9 ML/MIN/1.73
GLUCOSE SERPL-MCNC: 187 MG/DL (ref 65–99)
POTASSIUM SERPL-SCNC: 3.9 MMOL/L (ref 3.5–5.2)
SODIUM SERPL-SCNC: 141 MMOL/L (ref 136–145)

## 2023-02-07 PROCEDURE — 99214 OFFICE O/P EST MOD 30 MIN: CPT | Performed by: NURSE PRACTITIONER

## 2023-02-07 PROCEDURE — 80048 BASIC METABOLIC PNL TOTAL CA: CPT | Performed by: NURSE PRACTITIONER

## 2023-02-07 NOTE — PROGRESS NOTES
"Chief Complaint  Congestive Heart Failure and Follow-up    Subjective    History of Present Illness {CC  Problem List  Visit  Diagnosis   Encounters  Notes  Medications  Labs  Result Review Imaging  Media :23}       History of Present Illness   77-year-old female presents the office today for ongoing evaluation of her hypertension and diastolic heart failure.  Patient recently hospitalized at [facility] with hypertensive urgency secondary to medical noncompliance.  Patient reports she had not taken her medications in the last few years.She underwent a cardiac PET that showed no ischemia, normal EF and coronary calcification noted on CT imaging.  She was reinitiated on guideline directed medical therapy and reports that blood pressures at home have been running 120s over 70s since discharge from the hospital.  Reports she is feeling better than she has in quite some time.  Currently denies chest pain, dyspnea, palpitations, dizziness, presyncope or syncope.  Objective     Vital Signs:   Vitals:    02/07/23 0933   BP: 114/66   BP Location: Left arm   Patient Position: Sitting   Cuff Size: Adult   Pulse: 98   Resp: 18   Temp: 97 °F (36.1 °C)   TempSrc: Temporal   SpO2: 94%   Weight: 93 kg (205 lb 1.6 oz)   Height: 165.1 cm (65\")     Body mass index is 34.13 kg/m².  Physical Exam  Vitals and nursing note reviewed.   Constitutional:       Appearance: Normal appearance.   HENT:      Head: Normocephalic.   Eyes:      Pupils: Pupils are equal, round, and reactive to light.   Cardiovascular:      Rate and Rhythm: Normal rate and regular rhythm.      Pulses: Normal pulses.      Heart sounds: Normal heart sounds. No murmur heard.  Pulmonary:      Effort: Pulmonary effort is normal.      Breath sounds: Normal breath sounds.   Abdominal:      General: Bowel sounds are normal.      Palpations: Abdomen is soft.   Musculoskeletal:         General: Normal range of motion.      Cervical back: Normal range of " motion.      Right lower leg: No edema.      Left lower leg: No edema.   Skin:     General: Skin is warm and dry.      Capillary Refill: Capillary refill takes less than 2 seconds.   Neurological:      Mental Status: She is alert and oriented to person, place, and time.   Psychiatric:         Mood and Affect: Mood normal.         Thought Content: Thought content normal.              Result Review  Data Reviewed:{ Labs  Result Review  Imaging  Med Tab  Media :23}   Stress Test With Pet Myocardial Perfusion (01/30/2023 14:31)  Lab Results   Component Value Date    GLUCOSE 109 (H) 01/30/2023    CALCIUM 8.9 01/30/2023     01/30/2023    K 3.9 01/30/2023    CO2 26.0 01/30/2023     01/30/2023    BUN 23 01/30/2023    CREATININE 0.88 01/30/2023    EGFR 67.4 01/30/2023    BCR 26.1 (H) 01/30/2023    ANIONGAP 10.0 01/30/2023     Lab Results   Component Value Date    WBC 6.35 01/30/2023    HGB 14.3 01/30/2023    HCT 43.9 01/30/2023    MCV 98.2 (H) 01/30/2023     01/30/2023                  Assessment and Plan {CC Problem List  Visit Diagnosis  ROS  Review (Popup)  Health Maintenance  Quality  BestPractice  Medications  SmartSets  SnapShot Encounters  Media :23}   1. Chronic diastolic (congestive) heart failure (HCC)  Euvolemic  Continue carvedilol, Jardiance, Entresto    2. Essential hypertension  Stable on amlodipine, carvedilol, Entresto  - Basic Metabolic Panel; Future  - Basic Metabolic Panel  Continue to monitor closely  3. CKD (chronic kidney disease) stage 2, GFR 60-89 ml/min    - Basic Metabolic Panel; Future  - Basic Metabolic Panel    4. Paroxysmal atrial fibrillation (HCC)  Anticoagulated with Eliquis and denies any signs and symptoms of bleeding  Continue carvedilol  CHADS-VASc Risk Assessment            6 Total Score    1 CHF    1 Hypertension    2 Age >/= 75    1 DM    1 Sex: Female        Criteria that do not apply:    PRIOR STROKE/TIA/THROMBO    Vascular Disease    Age 65-74             Follow Up {Instructions Charge Capture  Follow-up Communications :23}   Return in about 6 weeks (around 3/20/2023), or if symptoms worsen or fail to improve, for Office visit, HTN, AFIB.    Patient was given instructions and counseling regarding her condition or for health maintenance advice. Please see specific information pulled into the AVS if appropriate.  Patient was instructed to call the [facility] with any questions, concerns, or worsening symptoms.

## 2023-02-10 ENCOUNTER — HOSPITAL ENCOUNTER (EMERGENCY)
Facility: HOSPITAL | Age: 78
Discharge: HOME | End: 2023-02-10
Attending: EMERGENCY MEDICINE | Admitting: EMERGENCY MEDICINE
Payer: COMMERCIAL

## 2023-02-10 ENCOUNTER — READMISSION MANAGEMENT (OUTPATIENT)
Dept: CALL CENTER | Facility: HOSPITAL | Age: 78
End: 2023-02-10
Payer: MEDICARE

## 2023-02-10 ENCOUNTER — APPOINTMENT (OUTPATIENT)
Dept: GENERAL RADIOLOGY | Facility: HOSPITAL | Age: 78
End: 2023-02-10
Payer: COMMERCIAL

## 2023-02-10 ENCOUNTER — OFFICE VISIT (OUTPATIENT)
Dept: INTERNAL MEDICINE | Facility: CLINIC | Age: 78
End: 2023-02-10

## 2023-02-10 VITALS
HEIGHT: 65 IN | DIASTOLIC BLOOD PRESSURE: 85 MMHG | HEART RATE: 75 BPM | TEMPERATURE: 98 F | BODY MASS INDEX: 33.49 KG/M2 | RESPIRATION RATE: 18 BRPM | OXYGEN SATURATION: 93 % | WEIGHT: 201 LBS | SYSTOLIC BLOOD PRESSURE: 134 MMHG

## 2023-02-10 VITALS
WEIGHT: 208 LBS | TEMPERATURE: 97.7 F | OXYGEN SATURATION: 97 % | RESPIRATION RATE: 20 BRPM | HEIGHT: 65 IN | BODY MASS INDEX: 34.66 KG/M2 | DIASTOLIC BLOOD PRESSURE: 86 MMHG | SYSTOLIC BLOOD PRESSURE: 148 MMHG | HEART RATE: 89 BPM

## 2023-02-10 DIAGNOSIS — E66.9 DIABETES MELLITUS TYPE 2 IN OBESE: ICD-10-CM

## 2023-02-10 DIAGNOSIS — I42.8 NONISCHEMIC CARDIOMYOPATHY: ICD-10-CM

## 2023-02-10 DIAGNOSIS — E11.9 TYPE 2 DIABETES MELLITUS WITHOUT COMPLICATION, WITHOUT LONG-TERM CURRENT USE OF INSULIN: ICD-10-CM

## 2023-02-10 DIAGNOSIS — I10 ELEVATED BLOOD PRESSURE READING WITH DIAGNOSIS OF HYPERTENSION: ICD-10-CM

## 2023-02-10 DIAGNOSIS — N18.30 STAGE 3 CHRONIC KIDNEY DISEASE, UNSPECIFIED WHETHER STAGE 3A OR 3B CKD: ICD-10-CM

## 2023-02-10 DIAGNOSIS — M10.9 GOUT, UNSPECIFIED CAUSE, UNSPECIFIED CHRONICITY, UNSPECIFIED SITE: ICD-10-CM

## 2023-02-10 DIAGNOSIS — I48.0 PAROXYSMAL ATRIAL FIBRILLATION: ICD-10-CM

## 2023-02-10 DIAGNOSIS — I25.119 CORONARY ARTERY DISEASE INVOLVING NATIVE CORONARY ARTERY OF NATIVE HEART WITH ANGINA PECTORIS: ICD-10-CM

## 2023-02-10 DIAGNOSIS — I50.22 CHRONIC SYSTOLIC CONGESTIVE HEART FAILURE: ICD-10-CM

## 2023-02-10 DIAGNOSIS — G47.33 MODERATE OBSTRUCTIVE SLEEP APNEA: ICD-10-CM

## 2023-02-10 DIAGNOSIS — S39.012A BACK STRAIN, INITIAL ENCOUNTER: ICD-10-CM

## 2023-02-10 DIAGNOSIS — E11.69 DIABETES MELLITUS TYPE 2 IN OBESE: ICD-10-CM

## 2023-02-10 DIAGNOSIS — K21.9 GASTROESOPHAGEAL REFLUX DISEASE, UNSPECIFIED WHETHER ESOPHAGITIS PRESENT: ICD-10-CM

## 2023-02-10 DIAGNOSIS — I10 ESSENTIAL HYPERTENSION: Primary | ICD-10-CM
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
## 2023-02-10 DIAGNOSIS — E78.5 HYPERLIPIDEMIA LDL GOAL <70: ICD-10-CM

## 2023-02-10 PROCEDURE — 99495 TRANSJ CARE MGMT MOD F2F 14D: CPT | Performed by: FAMILY MEDICINE

## 2023-02-10 PROCEDURE — 72100 X-RAY EXAM L-S SPINE 2/3 VWS: CPT

## 2023-02-10 PROCEDURE — 1111F DSCHRG MED/CURRENT MED MERGE: CPT | Performed by: FAMILY MEDICINE

## 2023-02-10 PROCEDURE — 72072 X-RAY EXAM THORAC SPINE 3VWS: CPT

## 2023-02-10 PROCEDURE — 99283 EMERGENCY DEPT VISIT LOW MDM: CPT

## 2023-02-10 RX ORDER — ROSUVASTATIN CALCIUM 20 MG/1
20 TABLET, COATED ORAL NIGHTLY
Qty: 30 TABLET | Refills: 3 | Status: SHIPPED | OUTPATIENT
Start: 2023-02-10

## 2023-02-10 RX ORDER — ACETAMINOPHEN 500 MG
1000 TABLET ORAL EVERY 6 HOURS PRN
Qty: 30 TABLET | Refills: 0 | Status: SHIPPED | OUTPATIENT
Start: 2023-02-10

## 2023-02-10 RX ORDER — ACETAMINOPHEN 500 MG
1000 TABLET ORAL ONCE
Status: COMPLETED | OUTPATIENT
Start: 2023-02-10 | End: 2023-02-10

## 2023-02-10 RX ORDER — CARVEDILOL 25 MG/1
25 TABLET ORAL 2 TIMES DAILY WITH MEALS
Qty: 60 TABLET | Refills: 3 | Status: SHIPPED | OUTPATIENT
Start: 2023-02-10

## 2023-02-10 RX ORDER — SACUBITRIL AND VALSARTAN 24; 26 MG/1; MG/1
1 TABLET, FILM COATED ORAL 2 TIMES DAILY
Qty: 60 TABLET | Refills: 3 | Status: SHIPPED | OUTPATIENT
Start: 2023-02-10 | End: 2023-03-30

## 2023-02-10 RX ORDER — MELOXICAM 15 MG/1
15 TABLET ORAL DAILY
Qty: 10 TABLET | Refills: 0 | Status: SHIPPED | OUTPATIENT
Start: 2023-02-10 | End: 2023-03-30

## 2023-02-10 RX ORDER — IBUPROFEN 400 MG/1
400 TABLET ORAL ONCE
Status: COMPLETED | OUTPATIENT
Start: 2023-02-10 | End: 2023-02-10

## 2023-02-10 RX ADMIN — IBUPROFEN 400 MG: 400 TABLET, FILM COATED ORAL at 12:20

## 2023-02-10 RX ADMIN — ACETAMINOPHEN 1000 MG: 500 TABLET ORAL at 12:20
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
## 2023-02-10 NOTE — OUTREACH NOTE
Medical Week 2 Survey    Flowsheet Row Responses   [facility] patient discharged from? [place]   Does the patient have one of the following disease processes/diagnoses(primary or secondary)? Other   Week 2 attempt successful? No   Unsuccessful attempts Attempt 1          Rivka LEMUS - Registered Nurse

## 2023-02-10 NOTE — PROGRESS NOTES
Transitional Care Follow Up Visit  Subjective     Angelita Shay is a 77 y.o. female who presents for a transitional care management visit.    Within 48 business hours after discharge our office contacted her via telephone to coordinate her care and needs.      I reviewed and discussed the details of that call along with the discharge summary, hospital problems, inpatient lab results, inpatient diagnostic studies, and consultation reports with Angelita.     Current outpatient and discharge medications have been reconciled for the patient.  Reviewed by: Bina Amaro DO      Date of TCM Phone Call 1/31/2023   [facility]   Date of Admission 1/29/2023   Date of Discharge 1/31/2023   Discharge Disposition Home or Self Care     Risk for Readmission (LACE) Score: 8 (1/31/2023  6:00 AM)      History of Present Illness   Course During Hospital Stay: Patient was admitted to the hospital due to hypertensive urgency and she had stopped several of her medicines a few years prior to that and had had cataract surgery a few months before that and she agreed to restart several of her medicines at that point.  She states that her blood pressure had come down and her blood sugar has come down and when she went to follow-up with her cardiologist after her release from the hospital on January 31 of this year she had improved labs with her cholesterol and blood sugar.  She is feeling much better since being home and is taking her blood pressure and its doing much better and she has neuropathy in her feet and that is about the only thing that bothers her and she has a colonoscopy scheduled.     The following portions of the patient's history were reviewed and updated as appropriate: past family history and past surgical history.    Review of Systems   Respiratory: Negative for cough, shortness of breath and wheezing.    Cardiovascular: Negative for chest pain and palpitations.   Gastrointestinal: Negative for  blood in stool, diarrhea and vomiting.   Genitourinary: Negative for dysuria, flank pain and genital sores.       Objective   Physical Exam  Vitals and nursing note reviewed.   HENT:      Head: Normocephalic and atraumatic.   Neck:      Vascular: No carotid bruit.   Cardiovascular:      Rate and Rhythm: Normal rate and regular rhythm.      Heart sounds: Normal heart sounds. No murmur heard.    No gallop.   Pulmonary:      Effort: Pulmonary effort is normal. No respiratory distress.      Breath sounds: No stridor. No wheezing, rhonchi or rales.   Musculoskeletal:      Cervical back: Normal range of motion and neck supple.      Right lower leg: No edema.      Left lower leg: No edema.   Lymphadenopathy:      Cervical: No cervical adenopathy.   Neurological:      Mental Status: She is alert.         Assessment & Plan   Problems Addressed this Visit        Cardiac and Vasculature    Essential hypertension - Primary    Relevant Medications    carvedilol (COREG) 25 MG tablet    Coronary artery disease involving native coronary artery of native heart with angina pectoris (HCC)    Relevant Medications    apixaban (ELIQUIS) 5 MG tablet tablet    carvedilol (COREG) 25 MG tablet    sacubitril-valsartan (Entresto) 24-26 MG tablet    Chronic systolic congestive heart failure (HCC)    Relevant Medications    carvedilol (COREG) 25 MG tablet    sacubitril-valsartan (Entresto) 24-26 MG tablet    Paroxysmal atrial fibrillation (HCC)    Relevant Medications    carvedilol (COREG) 25 MG tablet    sacubitril-valsartan (Entresto) 24-26 MG tablet    Nonischemic cardiomyopathy (HCC)    Relevant Medications    carvedilol (COREG) 25 MG tablet    sacubitril-valsartan (Entresto) 24-26 MG tablet    Hyperlipidemia LDL goal <70    Relevant Medications    rosuvastatin (CRESTOR) 20 MG tablet       Endocrine and Metabolic    Type 2 diabetes mellitus (HCC)       Gastrointestinal Abdominal     GERD (gastroesophageal reflux disease)       Genitourinary  and Reproductive     CKD (chronic kidney disease), stage III (CMS/[facility])       Musculoskeletal and Injuries    Gout       Sleep    Moderate obstructive sleep apnea   Diagnoses       Codes Comments    Essential hypertension    -  Primary ICD-10-CM: I10  ICD-9-CM: 401.9     Gastroesophageal reflux disease, unspecified whether esophagitis present     ICD-10-CM: K21.9  ICD-9-CM: 530.81     Hyperlipidemia LDL goal <70     ICD-10-CM: E78.5  ICD-9-CM: 272.4     Coronary artery disease involving native coronary artery of native heart with angina pectoris (HCC)     ICD-10-CM: I25.119  ICD-9-CM: 414.01, 413.9     Chronic systolic congestive heart failure (HCC)     ICD-10-CM: I50.22  ICD-9-CM: 428.22, 428.0     Stage 3 chronic kidney disease, unspecified whether stage 3a or 3b CKD ([facility])     ICD-10-CM: N18.30  ICD-9-CM: 585.3     Gout, unspecified cause, unspecified chronicity, unspecified site     ICD-10-CM: M10.9  ICD-9-CM: 274.9     Moderate obstructive sleep apnea     ICD-10-CM: G47.33  ICD-9-CM: 327.23     Nonischemic cardiomyopathy (HCC)     ICD-10-CM: I42.8  ICD-9-CM: 425.4     Paroxysmal atrial fibrillation (HCC)     ICD-10-CM: I48.0  ICD-9-CM: 427.31     Type 2 diabetes mellitus without complication, without long-term current use of insulin (HCC)     ICD-10-CM: E11.9  ICD-9-CM: 250.00

## 2023-02-13 ENCOUNTER — READMISSION MANAGEMENT (OUTPATIENT)
Dept: CALL CENTER | Facility: HOSPITAL | Age: 78
End: 2023-02-13
Payer: MEDICARE

## 2023-02-13 ENCOUNTER — NURSE TRIAGE (OUTPATIENT)
Dept: CALL CENTER | Facility: HOSPITAL | Age: 78
End: 2023-02-13
Payer: MEDICARE

## 2023-02-13 NOTE — OUTREACH NOTE
Medical Week 2 Survey    Flowsheet Row Responses   [facility] patient discharged from? [facility]   Does the patient have one of the following disease processes/diagnoses(primary or secondary)? Other   Week 2 attempt successful? Yes   Call start time 1200   Discharge diagnosis HTN urgency   Call end time 1205   Person spoke with today (if not patient) and relationship Patient   Medication alerts for this patient Jesu   Meds reviewed with patient/caregiver? Yes   Is the patient taking all medications as directed (includes completed medication regime)? Yes   Medication comments takes tylenol for pain- does not help per patient   Does the patient have a primary care provider?  Yes   Comments regarding PCP Patient seen specialist today- and I advised patient to call her pcp and schedule a fu soon. since not improving   Has the patient kept scheduled appointments due by today? N/A   Has home health visited the patient within 72 hours of discharge? N/A   Psychosocial issues? No   Did the patient receive a copy of their discharge instructions? Yes   Nursing interventions Reviewed instructions with patient   What is the patient's perception of their health status since discharge? Worsening   Is the patient/caregiver able to teach back signs and symptoms related to disease process for when to call PCP? Yes   Is the patient/caregiver able to teach back signs and symptoms related to disease process for when to call 911? Yes   Is the patient/caregiver able to teach back the hierarchy of who to call/visit for symptoms/problems? PCP, Specialist, Home health nurse, Urgent Care, ED, 911 Yes   Additional teach back comments Patient states she has alot of neck and shoulder pain stomach pain and vomitng- seen Dr today and getting ready to leave states MD was aware of how she felt states the xrays look normal and to go home. Advised patient to call pcp- for fu since patient feels like she is not improving. Patient understands-  Discussed s/s of when to go back to the ER.   Week 2 Call Completed? Yes   Is the patient interested in additional calls from an ambulatory ?  NOTE:  applies to high risk patients requiring additional follow-up. No   Wrap up additional comments Patient states she has alot of neck and shoulder pain stomach pain and vomitng- seen Dr today and getting ready to leave states MD was aware of how she felt states the xrays look normal and to go home. Advised patient to call pcp- for fu since patient feels like she is not improving. Patient understands- Discussed s/s of when to go back to the ER.          Natasha BAKER - Registered Nurse

## 2023-02-21 ENCOUNTER — READMISSION MANAGEMENT (OUTPATIENT)
Dept: CALL CENTER | Facility: HOSPITAL | Age: 78
End: 2023-02-21
Payer: MEDICARE

## 2023-03-01 ENCOUNTER — READMISSION MANAGEMENT (OUTPATIENT)
Dept: CALL CENTER | Facility: HOSPITAL | Age: 78
End: 2023-03-01
Payer: MEDICARE

## 2023-03-09 NOTE — TELEPHONE ENCOUNTER
Caller: Angelita Shay    Relationship: Self    Best call back number: [phone]    Requested Prescriptions:   Requested Prescriptions     Pending Prescriptions Disp Refills   • Semaglutide,0.25 or 0.5MG/DOS, (OZEMPIC) 2 MG/1.5ML solution pen-injector       Sig: Inject 0.25 mg under the skin into the appropriate area as directed 1 (One) Time Per Week.      Pharmacy where request should be sent: [facility] [id number] - [place] [id number] \[facility] - [phone] [facility] [phone]      Additional details provided by patient: PATIENT IS ASKING FOR DOSAGE INCREASE TO 2MG.     Does the patient have less than a 3 day supply:  [] Yes  [x] No    Would you like a call back once the refill request has been completed: [x] Yes [] No    If the office needs to give you a call back, can they leave a voicemail: [x] Yes [] No    Martha Palafox Rep   03/09/23 14:54 EST

## 2023-03-20 ENCOUNTER — TELEPHONE (OUTPATIENT)
Dept: INTERNAL MEDICINE | Facility: CLINIC | Age: 78
End: 2023-03-20
Payer: MEDICARE

## 2023-03-20 NOTE — TELEPHONE ENCOUNTER
Caller: Angelita Shay    Relationship: Self    Best call back number: [phone]    What medication are you requesting: DR HENRIQUEZ    What are your current symptoms: GOUT FLARE UP    How long have you been experiencing symptoms: 3 DAYS    Have you had these symptoms before:  [x] Yes  [] No    Have you been treated for these symptoms before:  [x] Yes  [] No    If a prescription is needed, what is your preferred pharmacy and phone number: [facility] [id number] - [address] [facility] [phone] [facility] [phone]      Additional notes:  PLEASE CALL AND ADVISE

## 2023-03-23 NOTE — TELEPHONE ENCOUNTER
Spoke with pt and offered her an appt to see another provider but she wanted to call back later to make that appt

## 2023-03-27 NOTE — PROGRESS NOTES
"    Cardiology Outpatient Visit      Identification: Angelita Shay is a 77 y.o. female who resides in [place], [place]    Reason for visit:  Paroxysmal atrial fibrillation       Subjective      Patient is a 78 y/o female who returns today for follow up of coronary artery disease, paroxysmal atrial fibrillation, no ischemic cardiomyopathy/chronic systolic heart failure and cardiac risk factors. The patient was hospitalized in January of this year when she presented with chest pain,  She ruled out for ACS with negative troponin's and normal EKG.  She was hypertensive on arrival.  She underwent stress test that did not show any ischemia. Losartan was changed to Entresto and she was started on Jardiance. Her last echocardiogram in 2021 showed normalization of LVEF 60% and grade I diastolic dysfunction.  The patient reports since discharge she has had a few chest twinges but overall is doing okay with the exception of left foot pain from what she thinks is \"gout\".  Her PCP cannot get her in for a few more weeks.  She thinks her breathing is stable.  Her blood pressures are elevated today at 150/92.  She does not monitor blood pressures at home.  She does not weigh herself daily nor does she limit her salt intake.  She is in normal sinus rhythm today.  She is on Eliquis and tolerating without reports of active or overt bleeding.    Review of Systems   Constitutional: Negative for malaise/fatigue.   Eyes: Negative for vision loss in left eye and vision loss in right eye.   Cardiovascular: Negative for chest pain, dyspnea on exertion, near-syncope, orthopnea, palpitations, paroxysmal nocturnal dyspnea and syncope.   Musculoskeletal: Negative for myalgias.        Left foot pain   Neurological: Negative for brief paralysis, excessive daytime sleepiness, focal weakness, numbness, paresthesias and weakness.   All other systems reviewed and are negative.      Allergies   Allergen Reactions   • Oxycodone Shortness Of Breath   • " "Zolpidem Other (See Comments)     nightmares         Current Outpatient Medications   Medication Instructions   • acetaminophen (TYLENOL) 1,000 mg, Oral, Every 6 Hours PRN   • amLODIPine (NORVASC) 5 mg, Oral, Daily   • apixaban (ELIQUIS) 5 mg, Oral, 2 Times Daily   • aspirin 81 mg, Oral, Daily   • carvedilol (COREG) 25 mg, Oral, 2 Times Daily With Meals   • furosemide (LASIX) 40 mg, Oral, Daily   • gabapentin (NEURONTIN) 400 mg, Oral, 4 Times Daily   • Jardiance 10 mg, Oral, Daily   • metFORMIN ER (GLUCOPHAGE-XR) 750 mg, Oral, 2 Times Daily   • rOPINIRole (REQUIP) 1-3 mg, Oral, Nightly, Take 1 hour before bedtime.   • rosuvastatin (CRESTOR) 20 mg, Oral, Nightly   • sacubitril-valsartan (ENTRESTO) 49-51 MG tablet 1 tablet, Oral, 2 Times Daily   • Semaglutide(0.25 or 0.5MG/DOS) (OZEMPIC) 0.25 mg, Subcutaneous, Weekly   • traZODone (DESYREL) 100 mg, Oral, Nightly PRN         Objective     /92 (BP Location: Right arm, Patient Position: Sitting)   Pulse 87   Ht 165.1 cm (65\")   Wt 93.9 kg (207 lb)   SpO2 95%   BMI 34.45 kg/m²       Constitutional:       Appearance: Healthy appearance. Well-developed.   Eyes:      General: Lids are normal. No scleral icterus.     Conjunctiva/sclera: Conjunctivae normal.   HENT:      Head: Normocephalic and atraumatic.   Neck:      Thyroid: No thyromegaly.      Vascular: No carotid bruit or JVD.   Pulmonary:      Effort: Pulmonary effort is normal.      Breath sounds: Normal breath sounds. No wheezing. No rhonchi. No rales.   Cardiovascular:      Normal rate. Regular rhythm.      Murmurs: There is no murmur.      No gallop. No rub.   Pulses:     Intact distal pulses.   Edema:     Peripheral edema absent.   Abdominal:      General: There is no distension.      Palpations: Abdomen is soft. There is no abdominal mass.   Musculoskeletal:      Cervical back: Normal range of motion. Skin:     General: Skin is warm and dry.      Findings: No rash.   Neurological:      General: No focal " deficit present.      Mental Status: Alert and oriented to person, place, and time.      Gait: Gait is intact.   Psychiatric:         Attention and Perception: Attention normal.         Mood and Affect: Mood normal.         Behavior: Behavior normal.         Result Review  (reviewed with patient):            Lab Results   Component Value Date    GLUCOSE 187 (H) 02/07/2023    BUN 19 02/07/2023    CREATININE 1.13 (H) 02/07/2023    EGFR 49.9 (L) 02/07/2023    BCR 16.8 02/07/2023    K 3.9 02/07/2023    CO2 21.9 (L) 02/07/2023    CALCIUM 9.1 02/07/2023    PROTENTOTREF 7.8 07/23/2018    ALBUMIN 4.2 01/29/2023    BILITOT 0.4 01/29/2023    AST 18 01/29/2023    ALT 16 01/29/2023     Lab Results   Component Value Date    WBC 6.35 01/30/2023    HGB 14.3 01/30/2023    HCT 43.9 01/30/2023    MCV 98.2 (H) 01/30/2023     01/30/2023     Lab Results   Component Value Date    CHOL 171 01/30/2023    CHLPL 200 07/23/2018    TRIG 192 (H) 01/30/2023    HDL 44 01/30/2023    LDL 94 01/30/2023     Lab Results   Component Value Date    HGBA1C 6.20 (H) 01/30/2023           Assessment     Diagnoses and all orders for this visit:    1. Paroxysmal atrial fibrillation (HCC) (Primary)  Overview:  · Chads VASC = 6 (age, female, CHF, CAD, HTN, DM)    Assessment & Plan:  · Continue Eliquis 5 mg twice daily for stroke prophylaxis      2. Nonischemic cardiomyopathy (HCC)  Overview:  · Cardiac catheterization (3/13/18): Mild to moderate nonobstructive CAD. Previously placed LAD stent widely patent.  · Echocardiogram (3/11/18):  LVEF 36%, mild MR  · Echocardiogram (8/6/2018): EF 55%. Mild concentric LVH.   · Echo 2020: EF = 64%, Left ventricular diastolic dysfunction (grade I a) consistent with impaired relaxation, Left ventricular systolic function is normal. Mild tricuspid valve regurgitation is present.    Assessment & Plan:  · Increase Entresto 49/51 mg 1 tablet twice daily  · Continue Coreg 25 mg twice daily    Orders:  -     Basic Metabolic  Panel; Future    3. Chronic systolic congestive heart failure (HCC)  Overview:  · Echocardiogram (3/11/2018): EF 36%, mild MR  · Cardiac catheterization (3/13/18): Mild to moderate nonobstructive CAD. Previously placed LAD stent widely patent.  · Echocardiogram (8/6/2018): EF 55%. Mild concentric LVH.   · Echocardiogram (2020): LVEF 64%.  No significant valvular abnormality      Assessment & Plan:  · Increase Entresto 49/51 mg 1 tablet twice daily  · BMP in 1 week  · Continue Coreg 25 mg twice daily  · Continue Jardiance 10 mg daily      4. Hyperlipidemia LDL goal <70  Overview:  · High-intensity statin therapy indicated given presence coronary artery disease    Assessment & Plan:  · Continue Crestor 20 mg daily      5. Essential hypertension  Assessment & Plan:  · Hypertension is not well controlled  · Increase Entresto 49/51 mg 1 tablet twice daily  · BMP in 1 week  · Continue carvedilol 25 mg twice daily  · Continue amlodipine 5 mg daily      Other orders  -     sacubitril-valsartan (ENTRESTO) 49-51 MG tablet; Take 1 tablet by mouth 2 (Two) Times a Day.  Dispense: 180 tablet; Refill: 1  -     furosemide (LASIX) 40 MG tablet; Take 1 tablet by mouth Daily.  Dispense: 30 tablet; Refill: 11        Plan   • Blood pressures not well controlled  • Increase Entresto 49/51 mg 1 tablet twice daily  • BMP in 1 week  • Patient to follow-up with PCP for possible gout  • Patient to follow-up in 4 weeks here in uptitrate heart failure medications and get better blood pressure control  • Recommend daily weights and no more than 2000 mg sodium daily.      Follow-up   Return in about 4 weeks (around 4/27/2023), or if symptoms worsen or fail to improve.        Arlet Chávez APRN  3/30/2023

## 2023-03-30 ENCOUNTER — OFFICE VISIT (OUTPATIENT)
Dept: CARDIOLOGY | Facility: CLINIC | Age: 78
End: 2023-03-30
Payer: MEDICARE

## 2023-03-30 VITALS
DIASTOLIC BLOOD PRESSURE: 92 MMHG | BODY MASS INDEX: 34.49 KG/M2 | HEIGHT: 65 IN | WEIGHT: 207 LBS | SYSTOLIC BLOOD PRESSURE: 150 MMHG | OXYGEN SATURATION: 95 % | HEART RATE: 87 BPM

## 2023-03-30 DIAGNOSIS — I48.0 PAROXYSMAL ATRIAL FIBRILLATION: Primary | ICD-10-CM

## 2023-03-30 DIAGNOSIS — I10 ESSENTIAL HYPERTENSION: ICD-10-CM

## 2023-03-30 DIAGNOSIS — E78.5 HYPERLIPIDEMIA LDL GOAL <70: ICD-10-CM

## 2023-03-30 DIAGNOSIS — I50.22 CHRONIC SYSTOLIC CONGESTIVE HEART FAILURE: ICD-10-CM

## 2023-03-30 DIAGNOSIS — I42.8 NONISCHEMIC CARDIOMYOPATHY: ICD-10-CM

## 2023-03-30 RX ORDER — FUROSEMIDE 40 MG/1
40 TABLET ORAL DAILY
Qty: 30 TABLET | Refills: 11
Start: 2023-03-30

## 2023-03-30 NOTE — ASSESSMENT & PLAN NOTE
· Increase Entresto 49/51 mg 1 tablet twice daily  · BMP in 1 week  · Continue Coreg 25 mg twice daily  · Continue Jardiance 10 mg daily

## 2023-03-30 NOTE — ASSESSMENT & PLAN NOTE
· Hypertension is not well controlled  · Increase Entresto 49/51 mg 1 tablet twice daily  · BMP in 1 week  · Continue carvedilol 25 mg twice daily  · Continue amlodipine 5 mg daily

## 2023-03-30 NOTE — ASSESSMENT & PLAN NOTE
· No signs or symptoms of angina  · Continue aspirin 81 mg daily  · Continue carvedilol 25 mg twice daily

## 2023-05-10 ENCOUNTER — OFFICE VISIT (OUTPATIENT)
Dept: INTERNAL MEDICINE | Facility: CLINIC | Age: 78
End: 2023-05-10
Payer: MEDICARE

## 2023-05-10 VITALS
RESPIRATION RATE: 20 BRPM | BODY MASS INDEX: 34.45 KG/M2 | DIASTOLIC BLOOD PRESSURE: 84 MMHG | HEART RATE: 83 BPM | SYSTOLIC BLOOD PRESSURE: 122 MMHG | TEMPERATURE: 97.2 F | HEIGHT: 65 IN | OXYGEN SATURATION: 94 %

## 2023-05-10 DIAGNOSIS — E11.9 TYPE 2 DIABETES MELLITUS WITHOUT COMPLICATION, WITHOUT LONG-TERM CURRENT USE OF INSULIN: Primary | ICD-10-CM

## 2023-05-10 DIAGNOSIS — I10 ESSENTIAL HYPERTENSION: ICD-10-CM

## 2023-05-10 DIAGNOSIS — M10.9 ACUTE GOUT OF LEFT FOOT, UNSPECIFIED CAUSE: ICD-10-CM

## 2023-05-10 DIAGNOSIS — I48.0 PAROXYSMAL ATRIAL FIBRILLATION: ICD-10-CM

## 2023-05-10 DIAGNOSIS — I25.119 CORONARY ARTERY DISEASE INVOLVING NATIVE CORONARY ARTERY OF NATIVE HEART WITH ANGINA PECTORIS: ICD-10-CM

## 2023-05-10 DIAGNOSIS — E78.2 MIXED HYPERLIPIDEMIA: ICD-10-CM

## 2023-05-10 LAB
EXPIRATION DATE: ABNORMAL
HBA1C MFR BLD: 6.9 %
Lab: ABNORMAL

## 2023-05-10 PROCEDURE — 99214 OFFICE O/P EST MOD 30 MIN: CPT | Performed by: FAMILY MEDICINE

## 2023-05-10 PROCEDURE — 96372 THER/PROPH/DIAG INJ SC/IM: CPT | Performed by: FAMILY MEDICINE

## 2023-05-10 PROCEDURE — 83036 HEMOGLOBIN GLYCOSYLATED A1C: CPT | Performed by: FAMILY MEDICINE

## 2023-05-10 PROCEDURE — 1160F RVW MEDS BY RX/DR IN RCRD: CPT | Performed by: FAMILY MEDICINE

## 2023-05-10 PROCEDURE — 1159F MED LIST DOCD IN RCRD: CPT | Performed by: FAMILY MEDICINE

## 2023-05-10 PROCEDURE — 3074F SYST BP LT 130 MM HG: CPT | Performed by: FAMILY MEDICINE

## 2023-05-10 PROCEDURE — 3079F DIAST BP 80-89 MM HG: CPT | Performed by: FAMILY MEDICINE

## 2023-05-10 PROCEDURE — 3044F HG A1C LEVEL LT 7.0%: CPT | Performed by: FAMILY MEDICINE

## 2023-05-10 RX ORDER — METHYLPREDNISOLONE ACETATE 40 MG/ML
40 INJECTION, SUSPENSION INTRA-ARTICULAR; INTRALESIONAL; INTRAMUSCULAR; SOFT TISSUE ONCE
Status: COMPLETED | OUTPATIENT
Start: 2023-05-10 | End: 2023-05-10

## 2023-05-10 RX ORDER — METHYLPREDNISOLONE ACETATE 80 MG/ML
80 INJECTION, SUSPENSION INTRA-ARTICULAR; INTRALESIONAL; INTRAMUSCULAR; SOFT TISSUE ONCE
Status: CANCELLED | OUTPATIENT
Start: 2023-05-10 | End: 2023-05-10

## 2023-05-10 RX ORDER — METHYLPREDNISOLONE 4 MG/1
TABLET ORAL
Qty: 1 EACH | Refills: 0 | Status: SHIPPED | OUTPATIENT
Start: 2023-05-10

## 2023-05-10 RX ADMIN — METHYLPREDNISOLONE ACETATE 80 MG: 40 INJECTION, SUSPENSION INTRA-ARTICULAR; INTRALESIONAL; INTRAMUSCULAR; SOFT TISSUE at 15:21

## 2023-05-10 NOTE — PROGRESS NOTES
"    Office Note     Name: Angelita Shay    : 1945     MRN: [id number]     Chief Complaint  Coronary Artery Disease (3 month f/u), Diabetes (3 month f/u), Gout (Having a flare), and Nausea (1 month)    Subjective     History of Present Illness:  Angelita Shay is a 77 y.o. female who presents today for recheck of several chronic issues.  Her hemoglobin A1c has gone up just a bit.  She states she has not been as careful with her diet recently.  Her chronic issues are stable.  She needs refills on medicine.  She has had a flareup of gout in her left foot over the last few days.  This is her second or third flareup in the last few months.  She has had to go to urgent clinic treatment centers for this.  She will be moving to [place] into a house and living with her daughter soon she has been living in a townhouse in [place].  She has a history of colon cancer and has been getting colonoscopies frequently and she has been having some abdominal pain and her next colonoscopy is not for 4 months and she is going to call and see if she can get it moved up.    Review of Systems   Respiratory: Negative for cough, shortness of breath and wheezing.    Cardiovascular: Negative for chest pain and palpitations.   Gastrointestinal: Negative for blood in stool, diarrhea and vomiting.   Genitourinary: Negative for dysuria, flank pain and genital sores.       Objective     Vital Signs  /84 (Cuff Size: Adult)   Pulse 83   Temp 97.2 °F (36.2 °C) (Infrared)   Resp 20   Ht 165.1 cm (65\")   SpO2 94%   BMI 34.45 kg/m²   Estimated body mass index is 34.45 kg/m² as calculated from the following:    Height as of this encounter: 165.1 cm (65\").    Weight as of 3/30/23: 93.9 kg (207 lb).          Physical Exam  Vitals and nursing note reviewed.   HENT:      Head: Normocephalic and atraumatic.   Neck:      Vascular: No carotid bruit.   Cardiovascular:      Rate and Rhythm: Normal rate and regular rhythm.      Heart " sounds: Normal heart sounds. No murmur heard.    No gallop.   Pulmonary:      Effort: Pulmonary effort is normal. No respiratory distress.      Breath sounds: No stridor. No wheezing, rhonchi or rales.   Musculoskeletal:      Cervical back: Normal range of motion and neck supple.      Right lower leg: No edema.      Left lower leg: No edema.   Lymphadenopathy:      Cervical: No cervical adenopathy.   Neurological:      Mental Status: She is alert.                 Assessment and Plan     Diagnoses and all orders for this visit:    1. Type 2 diabetes mellitus without complication, without long-term current use of insulin (Primary) her Ozempic dose will be slightly increased.-     POC Glycosylated Hemoglobin (Hb A1C)  -     Semaglutide,0.25 or 0.5MG/DOS, (OZEMPIC) 2 MG/1.5ML solution pen-injector; Inject 0.5 mg under the skin into the appropriate area as directed 1 (One) Time Per Week.  Dispense: 3 mL; Refill: 1    2. Essential hypertension stable with her current medication and she will continue this    3. Coronary artery disease involving native coronary artery of native heart with angina pectoris (HCC) stable with her current medication and she will continue this    4. Paroxysmal atrial fibrillation (HCC) stable with current medication and she will continue this    5. Mixed hyperlipidemia stable with her current dose of statin and she will continue this    6. Acute gout of left foot, unspecified cause  -     methylPREDNISolone (MEDROL) 4 MG dose pack; Take as directed on package instructions.  Dispense: 1 each; Refill: 0  -     methylPREDNISolone acetate (DEPO-medrol) injection 40 mg    7.  Abdominal pain she will call her GI doctor's office and see if her colonoscopy can be moved up a bit.      Follow Up  Return in about 3 months (around 8/10/2023) for Recheck.    Bina Amaro DO

## 2023-05-12 ENCOUNTER — LAB (OUTPATIENT)
Dept: LAB | Facility: HOSPITAL | Age: 78
End: 2023-05-12
Payer: MEDICARE

## 2023-05-12 ENCOUNTER — OFFICE VISIT (OUTPATIENT)
Dept: CARDIOLOGY | Facility: HOSPITAL | Age: 78
End: 2023-05-12
Payer: MEDICARE

## 2023-05-12 VITALS
HEART RATE: 89 BPM | TEMPERATURE: 97.5 F | RESPIRATION RATE: 20 BRPM | DIASTOLIC BLOOD PRESSURE: 83 MMHG | SYSTOLIC BLOOD PRESSURE: 163 MMHG | HEIGHT: 65 IN | OXYGEN SATURATION: 95 % | BODY MASS INDEX: 34.72 KG/M2 | WEIGHT: 208.4 LBS

## 2023-05-12 DIAGNOSIS — N18.2 CKD (CHRONIC KIDNEY DISEASE) STAGE 2, GFR 60-89 ML/MIN: ICD-10-CM

## 2023-05-12 DIAGNOSIS — I50.32 CHRONIC DIASTOLIC (CONGESTIVE) HEART FAILURE: Primary | ICD-10-CM

## 2023-05-12 DIAGNOSIS — I48.0 PAROXYSMAL ATRIAL FIBRILLATION: ICD-10-CM

## 2023-05-12 DIAGNOSIS — I42.8 NONISCHEMIC CARDIOMYOPATHY: ICD-10-CM

## 2023-05-12 DIAGNOSIS — I10 ESSENTIAL HYPERTENSION: ICD-10-CM

## 2023-05-12 LAB
ANION GAP SERPL CALCULATED.3IONS-SCNC: 14.3 MMOL/L (ref 5–15)
BUN SERPL-MCNC: 14 MG/DL (ref 8–23)
BUN/CREAT SERPL: 13.1 (ref 7–25)
CALCIUM SPEC-SCNC: 10.3 MG/DL (ref 8.6–10.5)
CHLORIDE SERPL-SCNC: 102 MMOL/L (ref 98–107)
CO2 SERPL-SCNC: 25.7 MMOL/L (ref 22–29)
CREAT SERPL-MCNC: 1.07 MG/DL (ref 0.57–1)
EGFRCR SERPLBLD CKD-EPI 2021: 53.3 ML/MIN/1.73
GLUCOSE SERPL-MCNC: 164 MG/DL (ref 65–99)
POTASSIUM SERPL-SCNC: 3.5 MMOL/L (ref 3.5–5.2)
SODIUM SERPL-SCNC: 142 MMOL/L (ref 136–145)

## 2023-05-12 PROCEDURE — 80048 BASIC METABOLIC PNL TOTAL CA: CPT

## 2023-05-12 PROCEDURE — 36415 COLL VENOUS BLD VENIPUNCTURE: CPT

## 2023-05-12 RX ORDER — SACUBITRIL AND VALSARTAN 24; 26 MG/1; MG/1
1 TABLET, FILM COATED ORAL 2 TIMES DAILY
COMMUNITY

## 2023-05-12 NOTE — PROGRESS NOTES
"Chief Complaint  Atrial Fibrillation and Hypertension (/)    Subjective    History of Present Illness {CC  Problem List  Visit  Diagnosis   Encounters  Notes  Medications  Labs  Result Review Imaging  Media :23}       History of Present Illness   77-year-old female presents the office today for ongoing evaluation of her hypertension and diastolic heart failure.  Patient  hospitalized at [facility] with hypertensive urgency secondary to medical noncompliance.  Patient reports she had not taken her medications in the last few years.She underwent a cardiac PET that showed no ischemia, normal EF and coronary calcification noted on CT imaging.  She was reinitiated on guideline directed medical therapy and reports that blood pressures at home have been running 120s over 70s since discharge from the hospital.  Reports she is feeling better than she has in quite some time.  Currently denies chest pain, dyspnea, palpitations, dizziness, presyncope or syncope.  Patient notes that Entresto was increased to 4951 twice daily by cardiology APRN however she decreased it back to 24/26 mg twice daily because she notes it made her \"feel like a zombie\".  She reports \"the zombie feeling\" has left now that she is back on lower dose of Entresto.  Objective     Vital Signs:   Vitals:    05/12/23 1457   BP: 163/83   BP Location: Left arm   Patient Position: Sitting   Cuff Size: Adult   Pulse: 89   Resp: 20   Temp: 97.5 °F (36.4 °C)   TempSrc: Temporal   SpO2: 95%   Weight: 94.5 kg (208 lb 6.4 oz)   Height: 165.1 cm (65\")     Body mass index is 34.68 kg/m².  Physical Exam  Vitals and nursing note reviewed.   Constitutional:       Appearance: Normal appearance.   HENT:      Head: Normocephalic.   Eyes:      Pupils: Pupils are equal, round, and reactive to light.   Cardiovascular:      Rate and Rhythm: Normal rate and regular rhythm.      Pulses: Normal pulses.      Heart sounds: Normal heart sounds. No murmur " heard.  Pulmonary:      Effort: Pulmonary effort is normal.      Breath sounds: Normal breath sounds.   Abdominal:      General: Bowel sounds are normal.      Palpations: Abdomen is soft.   Musculoskeletal:         General: Normal range of motion.      Cervical back: Normal range of motion.      Right lower leg: No edema.      Left lower leg: No edema.   Skin:     General: Skin is warm and dry.      Capillary Refill: Capillary refill takes less than 2 seconds.   Neurological:      Mental Status: She is alert and oriented to person, place, and time.   Psychiatric:         Mood and Affect: Mood normal.         Thought Content: Thought content normal.              Result Review  Data Reviewed:{ Labs  Result Review  Imaging  Med Tab  Media :23}   Stress Test With Pet Myocardial Perfusion (01/30/2023 14:31)  Lab Results   Component Value Date    GLUCOSE 187 (H) 02/07/2023    CALCIUM 9.1 02/07/2023     02/07/2023    K 3.9 02/07/2023    CO2 21.9 (L) 02/07/2023     (H) 02/07/2023    BUN 19 02/07/2023    CREATININE 1.13 (H) 02/07/2023    EGFR 49.9 (L) 02/07/2023    BCR 16.8 02/07/2023    ANIONGAP 11.1 02/07/2023     Lab Results   Component Value Date    WBC 6.35 01/30/2023    HGB 14.3 01/30/2023    HCT 43.9 01/30/2023    MCV 98.2 (H) 01/30/2023     01/30/2023                  Assessment and Plan {CC Problem List  Visit Diagnosis  ROS  Review (Popup)  Health Maintenance  Quality  BestPractice  Medications  SmartSets  SnapShot Encounters  Media :23}   1. Chronic diastolic (congestive) heart failure (HCC)  Euvolemic  Continue carvedilol, Jardiance, Entresto    2. Essential hypertension  Stable on amlodipine, carvedilol, Entresto    Continue to monitor closely  3. CKD (chronic kidney disease) stage 2, GFR 60-89 ml/min    stable    4. Paroxysmal atrial fibrillation (HCC)  Anticoagulated with Eliquis and denies any signs and symptoms of bleeding  Continue carvedilol  CHADS-VASc Risk Assessment             6 Total Score    1 CHF    1 Hypertension    2 Age >/= 75    1 DM    1 Sex: Female        Criteria that do not apply:    PRIOR STROKE/TIA/THROMBO    Vascular Disease    Age 65-74            Follow Up {Instructions Charge Capture  Follow-up Communications :23}   Return if symptoms worsen or fail to improve.    Patient was given instructions and counseling regarding her condition or for health maintenance advice. Please see specific information pulled into the AVS if appropriate.  Patient was instructed to call the [facility] with any questions, concerns, or worsening symptoms.

## 2023-06-03 ENCOUNTER — HOSPITAL ENCOUNTER (EMERGENCY)
Facility: HOSPITAL | Age: 78
Discharge: HOME | End: 2023-06-03
Attending: EMERGENCY MEDICINE
Payer: MEDICARE

## 2023-06-03 ENCOUNTER — APPOINTMENT (OUTPATIENT)
Dept: GENERAL RADIOLOGY | Facility: HOSPITAL | Age: 78
End: 2023-06-03
Payer: MEDICARE

## 2023-06-03 VITALS
OXYGEN SATURATION: 95 % | TEMPERATURE: 98.3 F | HEIGHT: 65 IN | HEART RATE: 81 BPM | RESPIRATION RATE: 18 BRPM | BODY MASS INDEX: 33.15 KG/M2 | SYSTOLIC BLOOD PRESSURE: 160 MMHG | WEIGHT: 199 LBS | DIASTOLIC BLOOD PRESSURE: 100 MMHG

## 2023-06-03 DIAGNOSIS — S80.12XA HEMATOMA OF LEFT LOWER LEG: Primary | ICD-10-CM

## 2023-06-03 PROCEDURE — 73590 X-RAY EXAM OF LOWER LEG: CPT

## 2023-06-03 PROCEDURE — 99283 EMERGENCY DEPT VISIT LOW MDM: CPT

## 2023-06-03 RX ORDER — HYDROCODONE BITARTRATE AND ACETAMINOPHEN 5; 325 MG/1; MG/1
1 TABLET ORAL ONCE
Status: COMPLETED | OUTPATIENT
Start: 2023-06-03 | End: 2023-06-03

## 2023-06-03 RX ADMIN — HYDROCODONE BITARTRATE AND ACETAMINOPHEN 1 TABLET: 5; 325 TABLET ORAL at 16:22

## 2023-06-03 NOTE — ED PROVIDER NOTES
Subjective   History of Present Illness  Pleasant patient presents the ER for pain and swelling to the anterior left shin.  She tells me someone dropped something on her leg recently and she sustained a bruise.  She is anticoagulated.  She denies any numbness or tingling.  Tells me the pain is worse with walking.  She denies any fevers or chills.      Review of Systems    Past Medical History:   Diagnosis Date    Anxiety     Arthritis     AV block, 1st degree     Breast injury     recent fall in early June, bruising left breast    Cataract     left    Cellulitis of both lower extremities     CKD (chronic kidney disease), stage III     Colon cancer 2000    Colon polyp 2015    x 3 adenomatous    Colon polyp 04/18/2018    x 5 Dr Cash    Coronary artery disease     1 stent    Depression     Diabetes mellitus     dx 2 years ago- checks fsbs bid    Gallstone     GI bleed 07/2018    Gout 03/13/2023    H/O echocardiogram 08/05/2018    Dr Greene, EF 55%, mild concentric hypertrophy, impaired relaxation    Hearing loss     History of atrial fibrillation     History of chemotherapy     11/2000    History of transfusion     approx 1999 - [facility]    Hyperlipidemia     Hypertension     Knee pain     Pap smear for cervical cancer screening 2013    Rectal bleeding     RLS (restless legs syndrome)     Sleep apnea     Wears eyeglasses        Allergies   Allergen Reactions    Oxycodone Shortness Of Breath    Zolpidem Other (See Comments)     nightmares       Past Surgical History:   Procedure Laterality Date    APPENDECTOMY  05/1963    CARDIAC CATHETERIZATION  08/2015    no stents    CARDIAC CATHETERIZATION  03/13/2018    CARDIAC CATHETERIZATION N/A 3/13/2018    Procedure: Left Heart Cath;  Surgeon: Pierre Jones III, MD;  Location:  [facility] INVASIVE LOCATION;  Service: Cardiovascular    CARDIAC CATHETERIZATION N/A 7/14/2020    Procedure: LEFT HEART CATH;  Surgeon: Galileo See MD;  Location:  [facility]  INVASIVE LOCATION;  Service: Cardiovascular;  Laterality: N/A;    CHOLECYSTECTOMY N/A 2018    Procedure: CHOLECYSTECTOMY LAPAROSCOPIC , LYSIS OF ADHESIONS;  Surgeon: Santos Pantoja MD;  Location:  [facility];  Service: General    COLON RESECTION  2000    colon cancer    COLONOSCOPY      COLONOSCOPY  2018    with 5 polyps removed 1yr f/u. Dr Cash- REPEAT YEARLY    CORONARY ANGIOPLASTY WITH STENT PLACEMENT  2015    ENDOSCOPY      JOINT REPLACEMENT      MN ARTHRP KNE CONDYLE&PLATU MEDIAL&LAT COMPARTMENTS Left 11/3/2016    Procedure: LEFT TOTAL KNEE REPLACEMENT;  Surgeon: Laurent Zuniga MD;  Location:  [facility];  Service: Orthopedics    TONSILLECTOMY  1961    TOTAL KNEE ARTHROPLASTY Right 2008    revision 2010    TOTAL SHOULDER ARTHROPLASTY Bilateral     right , left        Family History   Problem Relation Age of Onset    Colon cancer Other     Diabetes Other     Heart disease Other     Hypertension Other     Stroke Other     Tuberculosis Other     Hypertension Mother     Colon cancer Father     Stroke Father     Diabetes Father     Colon cancer Sister     Diabetes Sister     Diabetes Maternal Grandmother     Coronary artery disease Maternal Grandfather     No Known Problems Paternal Grandmother     No Known Problems Paternal Grandfather     Breast cancer Neg Hx     Ovarian cancer Neg Hx        Social History     Socioeconomic History    Marital status:     Number of children: 5   Tobacco Use    Smoking status: Former     Packs/day: 1.00     Years: 22.00     Pack years: 22.00     Types: Cigarettes     Quit date:      Years since quittin.4    Smokeless tobacco: Never    Tobacco comments:     quit    Vaping Use    Vaping Use: Never used   Substance and Sexual Activity    Alcohol use: Yes     Alcohol/week: 1.0 standard drink     Types: 1 Glasses of wine per week     Comment: occas    Drug use: No    Sexual activity: Defer           Objective   Physical  Exam  Constitutional:       Appearance: She is well-developed.   HENT:      Head: Normocephalic and atraumatic.   Cardiovascular:      Rate and Rhythm: Normal rate and regular rhythm.      Heart sounds: Normal heart sounds.   Pulmonary:      Effort: Pulmonary effort is normal.      Breath sounds: Normal breath sounds.   Abdominal:      General: Bowel sounds are normal.      Palpations: Abdomen is soft.   Musculoskeletal:         General: Normal range of motion.      Cervical back: Normal range of motion and neck supple.      Comments: Hematoma noted to left lower shin.  She does have ecchymosis.  No appreciable cellulitis.  I do not appreciate any leg swelling.  Pulses intact.  Good range of motion in her toes as well as push pulls.  Herman test is normal.  Not consistent with cellulitis or an abscess.   Skin:     General: Skin is warm and dry.   Neurological:      Mental Status: She is alert and oriented to person, place, and time.   Psychiatric:         Behavior: Behavior normal.         Thought Content: Thought content normal.         Judgment: Judgment normal.       Procedures           ED Course  ED Course as of 06/03/23 1610   Sat Jun 03, 2023   1524 Differential includes hematoma.  Fracture.  Contusion.  We need to get x-ray for further evaluation.  No benefit to drainage or I&D.  No evidence of infection. [JM]   1604 Hematoma noted to the leg.  Not consistent with an abscess.  Patient well aware the signs of worsening's.  We will dress her injury.  All thankful and agreeable. [JM]      ED Course User Index  [JM] Pierre Irwin APRN           XR Tibia Fibula 2 View Left   Final Result   Impression:      1. Intact total left knee arthroplasty.   2. No acute fracture or dislocation.   3. Focal area of soft tissue swelling overlying the anterior aspect of the distal shaft of the left tibia.         Electronically Signed: Ben Craig     6/3/2023 3:43 PM EDT     Workstation ID: [id number]                                         Medical Decision Making  Amount and/or Complexity of Data Reviewed  External Data Reviewed: radiology.  Radiology: ordered. Decision-making details documented in ED Course.    Risk  OTC drugs.        Final diagnoses:   Hematoma of left lower leg       ED Disposition  ED Disposition       ED Disposition   Discharge    Condition   Stable    Comment   --               Bina Amaro, DO  [address]  [address]  [place] [address]  [phone]    Schedule an appointment as soon as possible for a visit            Medication List      No changes were made to your prescriptions during this visit.            Pierre Irwin, APRN  06/03/23 [id number]

## 2023-06-05 NOTE — TELEPHONE ENCOUNTER
Caller: Angelita Shay    Relationship: Self    Best call back number: [phone]     Requested Prescriptions:   Requested Prescriptions     Pending Prescriptions Disp Refills    gabapentin (NEURONTIN) 400 MG capsule       Sig: Take 1 capsule by mouth 4 (Four) Times a Day.        Pharmacy where request should be sent: [facility] [id number] [address] [facility] [phone] [facility] [phone]      Last office visit with prescribing clinician: 5/10/2023   Last telemedicine visit with prescribing clinician: Visit date not found   Next office visit with prescribing clinician: 6/9/2023       Does the patient have less than a 3 day supply:  [] Yes  [x] No    Would you like a call back once the refill request has been completed: [] Yes [x] No    If the office needs to give you a call back, can they leave a voicemail: [] Yes [x] No    Martha Mitchell   06/05/23 14:24 EDT

## 2023-06-06 RX ORDER — GABAPENTIN 400 MG/1
400 CAPSULE ORAL 4 TIMES DAILY
OUTPATIENT
Start: 2023-06-06

## 2023-06-06 NOTE — TELEPHONE ENCOUNTER
Advised patient that she has an appointment on 6/9/23 and we would send in the refill then. She verbalized understanding.

## 2023-06-09 ENCOUNTER — OFFICE VISIT (OUTPATIENT)
Dept: INTERNAL MEDICINE | Facility: CLINIC | Age: 78
End: 2023-06-09
Payer: MEDICARE

## 2023-06-09 DIAGNOSIS — E11.9 TYPE 2 DIABETES MELLITUS WITHOUT COMPLICATION, WITHOUT LONG-TERM CURRENT USE OF INSULIN: ICD-10-CM

## 2023-06-09 DIAGNOSIS — I25.119 CORONARY ARTERY DISEASE INVOLVING NATIVE CORONARY ARTERY OF NATIVE HEART WITH ANGINA PECTORIS: Primary | ICD-10-CM

## 2023-06-09 DIAGNOSIS — G25.81 RLS (RESTLESS LEGS SYNDROME): ICD-10-CM

## 2023-06-09 DIAGNOSIS — E78.5 HYPERLIPIDEMIA LDL GOAL <70: ICD-10-CM

## 2023-06-09 DIAGNOSIS — I42.8 NONISCHEMIC CARDIOMYOPATHY: ICD-10-CM

## 2023-06-09 DIAGNOSIS — F41.9 ANXIETY: Chronic | ICD-10-CM

## 2023-06-09 DIAGNOSIS — T14.8XXA HEMATOMA: ICD-10-CM

## 2023-06-09 DIAGNOSIS — G62.9 NEUROPATHY: ICD-10-CM

## 2023-06-09 DIAGNOSIS — F51.01 PRIMARY INSOMNIA: ICD-10-CM

## 2023-06-09 RX ORDER — ROSUVASTATIN CALCIUM 20 MG/1
20 TABLET, COATED ORAL NIGHTLY
Qty: 30 TABLET | Refills: 3 | Status: SHIPPED | OUTPATIENT
Start: 2023-06-09

## 2023-06-09 RX ORDER — TRAZODONE HYDROCHLORIDE 100 MG/1
100 TABLET ORAL NIGHTLY PRN
Qty: 30 TABLET | Refills: 3 | Status: SHIPPED | OUTPATIENT
Start: 2023-06-09

## 2023-06-09 RX ORDER — GABAPENTIN 400 MG/1
400 CAPSULE ORAL 4 TIMES DAILY
Qty: 120 CAPSULE | Refills: 2 | Status: SHIPPED | OUTPATIENT
Start: 2023-06-09

## 2023-06-09 NOTE — PROGRESS NOTES
"    Office Note     Name: Angelita Shay    : 1945     MRN: [id number]     Chief Complaint  Transitional Care Management (Left leg hematoma )    Subjective     History of Present Illness:  Angelita Shay is a 77 y.o. female who presents today for recheck of several issues.  She states she is in the process of moving and her daughter had a box of cleaning supplies sitting in front of her and she was sitting on the couch and her daughter accidentally dropped a bottle that was heavy on her left lower leg and it made a large hematoma and they went to the emergency department and had x-rays and no fracture and then yesterday she was moving from the commode to the sink and she slipped and fell backwards onto the commode and bruised behind her knee and no other injury.  She needs refills on several medicines and her Caspers are appropriate and her chronic issues are stable and her blood sugar this morning was 98 and she needs a referral to see the cardiologist that she is seeing now.  Her neuropathy and anxiety are stable as well as her diabetes and blood pressure.  Her chronic issues are stable.    Review of Systems   Respiratory:  Negative for cough, shortness of breath and wheezing.    Cardiovascular:  Negative for chest pain and palpitations.   Gastrointestinal:  Negative for blood in stool, diarrhea and vomiting.   Genitourinary:  Negative for dysuria, flank pain and genital sores.     Objective     Vital Signs  There were no vitals taken for this visit.  Estimated body mass index is 33.12 kg/m² as calculated from the following:    Height as of 6/3/23: 165.1 cm (65\").    Weight as of 6/3/23: 90.3 kg (199 lb).          Physical Exam  Vitals and nursing note reviewed.   HENT:      Head: Normocephalic and atraumatic.   Neck:      Vascular: No carotid bruit.   Cardiovascular:      Rate and Rhythm: Normal rate and regular rhythm.      Heart sounds: Normal heart sounds. No murmur heard.    No gallop. "   Pulmonary:      Effort: Pulmonary effort is normal. No respiratory distress.      Breath sounds: No stridor. No wheezing, rhonchi or rales.   Musculoskeletal:      Cervical back: Normal range of motion and neck supple.      Right lower leg: No edema.      Left lower leg: No edema.   Lymphadenopathy:      Cervical: No cervical adenopathy.   Neurological:      Mental Status: She is alert.               Assessment and Plan     Diagnoses and all orders for this visit:    1. Coronary artery disease involving native coronary artery of native heart with angina pectoris (Primary) stable with her current medication and will continue this  -     Ambulatory Referral to Cardiology  -     apixaban (ELIQUIS) 5 MG tablet tablet; Take 1 tablet by mouth 2 (Two) Times a Day.  Dispense: 60 tablet; Refill: 3    2. Anxiety stable    3. Nonischemic cardiomyopathy stable  -     Ambulatory Referral to Cardiology    4. RLS (restless legs syndrome) stable with her current medication and will continue this    5. Type 2 diabetes mellitus without complication, without long-term current use of insulin stable with her current medication and will continue this    6. Primary insomnia stable with her current medication and will continue this  -     traZODone (DESYREL) 100 MG tablet; Take 1 tablet by mouth At Night As Needed for Sleep.  Dispense: 30 tablet; Refill: 3    7. Hyperlipidemia LDL goal <70 stable with her current dose of statin and will continue this  -     rosuvastatin (CRESTOR) 20 MG tablet; Take 1 tablet by mouth Every Night.  Dispense: 30 tablet; Refill: 3    8. Neuropathy Vu's are appropriate-stable and will continue current medication  -     gabapentin (NEURONTIN) 400 MG capsule; Take 1 capsule by mouth 4 (Four) Times a Day.  Dispense: 120 capsule; Refill: 2    9. Hematoma left lower leg-improving          Follow Up  Return in about 3 months (around 9/9/2023) for Recheck.    Bina Amaro DO

## 2023-06-13 RX ORDER — SACUBITRIL AND VALSARTAN 24; 26 MG/1; MG/1
TABLET, FILM COATED ORAL
Qty: 180 TABLET | OUTPATIENT
Start: 2023-06-13

## 2023-06-13 NOTE — TELEPHONE ENCOUNTER
Spoke with pt and states that cardiology prescribes this medication and does not need any refills.

## 2023-07-25 ENCOUNTER — TELEPHONE (OUTPATIENT)
Dept: CARDIOLOGY | Facility: CLINIC | Age: 78
End: 2023-07-25
Payer: MEDICARE

## 2023-07-25 NOTE — TELEPHONE ENCOUNTER
Caller: Angelita Shay    Relationship to patient: Self    Best call back number: [phone]    Chief complaint:     Type of visit: FOLLOW UP    Requested date:      If rescheduling, when is the original appointment: PATIENT CANCELED 7.17.2023 APPOINTMENT SHE NOW WANTS TO RESCHEDULE-NO TIME FRAME PLEASE CALL HER WITH APPOINTMENT.     Additional notes:ALSO SHOWING AS FOLLOW UP NOT SURE OF STATUS-NEW PATIENT?

## 2023-08-09 DIAGNOSIS — E78.5 HYPERLIPIDEMIA LDL GOAL <70: ICD-10-CM

## 2023-08-09 DIAGNOSIS — I50.22 CHRONIC SYSTOLIC CONGESTIVE HEART FAILURE: ICD-10-CM

## 2023-08-09 DIAGNOSIS — I10 ESSENTIAL HYPERTENSION: ICD-10-CM

## 2023-08-09 RX ORDER — ROSUVASTATIN CALCIUM 20 MG/1
20 TABLET, COATED ORAL NIGHTLY
Qty: 30 TABLET | Refills: 3 | Status: CANCELLED | OUTPATIENT
Start: 2023-08-09

## 2023-08-09 RX ORDER — CARVEDILOL 25 MG/1
25 TABLET ORAL 2 TIMES DAILY WITH MEALS
Qty: 60 TABLET | Refills: 0 | Status: SHIPPED | OUTPATIENT
Start: 2023-08-09

## 2023-08-09 RX ORDER — AMLODIPINE BESYLATE 5 MG/1
5 TABLET ORAL DAILY
Status: CANCELLED | OUTPATIENT
Start: 2023-08-09

## 2023-08-09 NOTE — TELEPHONE ENCOUNTER
Caller: Angelita Shay    Relationship: Self    Best call back number: [phone]     Requested Prescriptions:   Requested Prescriptions     Pending Prescriptions Disp Refills    rosuvastatin (CRESTOR) 20 MG tablet 30 tablet 3     Sig: Take 1 tablet by mouth Every Night.    amLODIPine (NORVASC) 5 MG tablet       Sig: Take 1 tablet by mouth Daily.    carvedilol (COREG) 25 MG tablet 60 tablet 3     Sig: Take 1 tablet by mouth 2 (Two) Times a Day With Meals.      Pharmacy where request should be sent: [facility] [id number] [address] & [address] [facility] [phone] [facility] [phone] FX     Last office visit with prescribing clinician: 6/9/2023   Last telemedicine visit with prescribing clinician: Visit date not found   Next office visit with prescribing clinician: 9/11/2023     Additional details provided by patient: PATIENT IS OUT OF THESE MEDICATIONS. SHE HAS 3 LEFT OF THE CARVEDILOL.     Does the patient have less than a 3 day supply:  [x] Yes  [] No    Would you like a call back once the refill request has been completed: [] Yes [x] No    If the office needs to give you a call back, can they leave a voicemail: [] Yes [x] No    Martha Decker   08/09/23 14:20 EDT

## 2023-08-09 NOTE — TELEPHONE ENCOUNTER
Patient has been notified that her rosuvastatin has refills and she stated the pharmacy said they have .  I called the pharmacy and she is okay with the refills.  I sent in her carvedilol for one month because she has a follow up with Dr. Amaro.  Patient states her cardiologist fills the amlodipine and I advised her to call their office for the refill. Patient verbalized understanding.

## 2023-08-12 DIAGNOSIS — E11.9 TYPE 2 DIABETES MELLITUS WITHOUT COMPLICATION, WITHOUT LONG-TERM CURRENT USE OF INSULIN: ICD-10-CM

## 2023-08-14 RX ORDER — EMPAGLIFLOZIN 10 MG/1
TABLET, FILM COATED ORAL
Qty: 90 TABLET | Refills: 1 | Status: SHIPPED | OUTPATIENT
Start: 2023-08-14

## 2023-09-07 DIAGNOSIS — I10 ESSENTIAL HYPERTENSION: ICD-10-CM

## 2023-09-07 DIAGNOSIS — I50.22 CHRONIC SYSTOLIC CONGESTIVE HEART FAILURE: ICD-10-CM

## 2023-09-08 RX ORDER — CARVEDILOL 25 MG/1
TABLET ORAL
Qty: 60 TABLET | Refills: 0 | Status: SHIPPED | OUTPATIENT
Start: 2023-09-08

## 2023-09-08 NOTE — TELEPHONE ENCOUNTER
Caller: Jaspal Angelita Jigna    Relationship: Self    Best call back number: [phone]    Requested Prescriptions:   Requested Prescriptions     Pending Prescriptions Disp Refills    metFORMIN ER (GLUCOPHAGE-XR) 750 MG 24 hr tablet       Sig: Take 1 tablet by mouth 2 (Two) Times a Day.        Pharmacy where request should be sent: [facility] [id number] [address] & [address] [facility] [phone] [facility] [phone]      Last office visit with prescribing clinician: 6/9/2023   Last telemedicine visit with prescribing clinician: Visit date not found   Next office visit with prescribing clinician: 9/11/2023     Additional details provided by patient: PATIENT IS OUT OF MEDICATION    Does the patient have less than a 3 day supply:  [x] Yes  [] No    Would you like a call back once the refill request has been completed: [] Yes [x] No    If the office needs to give you a call back, can they leave a voicemail: [] Yes [x] No    Martha Geller Rep   09/08/23 15:44 EDT

## 2023-09-11 ENCOUNTER — OFFICE VISIT (OUTPATIENT)
Dept: INTERNAL MEDICINE | Facility: CLINIC | Age: 78
End: 2023-09-11
Payer: MEDICARE

## 2023-09-11 VITALS
HEIGHT: 64 IN | HEART RATE: 50 BPM | SYSTOLIC BLOOD PRESSURE: 116 MMHG | BODY MASS INDEX: 33.7 KG/M2 | RESPIRATION RATE: 14 BRPM | OXYGEN SATURATION: 94 % | WEIGHT: 197.4 LBS | TEMPERATURE: 97.7 F | DIASTOLIC BLOOD PRESSURE: 62 MMHG

## 2023-09-11 DIAGNOSIS — G62.9 NEUROPATHY: ICD-10-CM

## 2023-09-11 DIAGNOSIS — R80.9 MICROALBUMINURIA: ICD-10-CM

## 2023-09-11 DIAGNOSIS — F41.9 ANXIETY: Chronic | ICD-10-CM

## 2023-09-11 DIAGNOSIS — F51.01 PRIMARY INSOMNIA: ICD-10-CM

## 2023-09-11 DIAGNOSIS — Z23 INFLUENZA VACCINE ADMINISTERED: Primary | ICD-10-CM

## 2023-09-11 DIAGNOSIS — E11.9 TYPE 2 DIABETES MELLITUS WITHOUT COMPLICATION, WITHOUT LONG-TERM CURRENT USE OF INSULIN: ICD-10-CM

## 2023-09-11 DIAGNOSIS — K21.9 GASTROESOPHAGEAL REFLUX DISEASE, UNSPECIFIED WHETHER ESOPHAGITIS PRESENT: ICD-10-CM

## 2023-09-11 DIAGNOSIS — I10 ESSENTIAL HYPERTENSION: ICD-10-CM

## 2023-09-11 DIAGNOSIS — G25.81 RLS (RESTLESS LEGS SYNDROME): ICD-10-CM

## 2023-09-11 PROCEDURE — 99214 OFFICE O/P EST MOD 30 MIN: CPT | Performed by: FAMILY MEDICINE

## 2023-09-11 PROCEDURE — 3078F DIAST BP <80 MM HG: CPT | Performed by: FAMILY MEDICINE

## 2023-09-11 PROCEDURE — 1170F FXNL STATUS ASSESSED: CPT | Performed by: FAMILY MEDICINE

## 2023-09-11 PROCEDURE — G0008 ADMIN INFLUENZA VIRUS VAC: HCPCS | Performed by: FAMILY MEDICINE

## 2023-09-11 PROCEDURE — 1160F RVW MEDS BY RX/DR IN RCRD: CPT | Performed by: FAMILY MEDICINE

## 2023-09-11 PROCEDURE — G0439 PPPS, SUBSEQ VISIT: HCPCS | Performed by: FAMILY MEDICINE

## 2023-09-11 PROCEDURE — 3074F SYST BP LT 130 MM HG: CPT | Performed by: FAMILY MEDICINE

## 2023-09-11 PROCEDURE — 96160 PT-FOCUSED HLTH RISK ASSMT: CPT | Performed by: FAMILY MEDICINE

## 2023-09-11 PROCEDURE — 1159F MED LIST DOCD IN RCRD: CPT | Performed by: FAMILY MEDICINE

## 2023-09-11 PROCEDURE — 90662 IIV NO PRSV INCREASED AG IM: CPT | Performed by: FAMILY MEDICINE

## 2023-09-11 RX ORDER — METFORMIN HYDROCHLORIDE 750 MG/1
750 TABLET, EXTENDED RELEASE ORAL 2 TIMES DAILY
Qty: 60 TABLET | Refills: 3 | Status: SHIPPED | OUTPATIENT
Start: 2023-09-11

## 2023-09-11 RX ORDER — LANOLIN ALCOHOL/MO/W.PET/CERES
1000 CREAM (GRAM) TOPICAL DAILY
COMMUNITY

## 2023-09-11 RX ORDER — GABAPENTIN 400 MG/1
400 CAPSULE ORAL 4 TIMES DAILY
Qty: 120 CAPSULE | Refills: 2 | Status: SHIPPED | OUTPATIENT
Start: 2023-09-11

## 2023-09-11 RX ORDER — BISACODYL 5 MG/1
5 TABLET, DELAYED RELEASE ORAL DAILY PRN
COMMUNITY

## 2023-09-11 RX ORDER — SEMAGLUTIDE 1.34 MG/ML
0.5 INJECTION, SOLUTION SUBCUTANEOUS WEEKLY
Qty: 3 ML | Refills: 1 | Status: SHIPPED | OUTPATIENT
Start: 2023-09-11

## 2023-09-11 NOTE — PROGRESS NOTES
The ABCs of the Annual Wellness Visit  Subsequent Medicare Wellness Visit    Subjective    Angelita Shay is a 77 y.o. female who presents for a Subsequent Medicare Wellness Visit.    The following portions of the patient's history were reviewed and   updated as appropriate: current medications, past family history, past medical history, past social history, past surgical history, and problem list.    Compared to one year ago, the patient feels her physical   health is the same.    Compared to one year ago, the patient feels her mental   health is the same.    Recent Hospitalizations:  This patient has had a [facility] admission record on file within the last 365 days.    Current Medical Providers:  Patient Care Team:  Bina Amaro DO as PCP - General (Family Medicine)  Festus Bowie IV, MD as Cardiologist (Interventional Cardiology)  Tosha Melvin APRN as Nurse Practitioner (Cardiology)  Brandon Cash MD as Consulting Physician (Gastroenterology)  Arlet Chávez APRN as Nurse Practitioner (Interventional Cardiology)    Outpatient Medications Prior to Visit   Medication Sig Dispense Refill    acetaminophen (TYLENOL) 500 MG tablet Take 2 tablets by mouth Every 6 (Six) Hours As Needed for Mild Pain or Moderate Pain. 30 tablet 0    amLODIPine (NORVASC) 2.5 MG tablet Take 1 tablet by mouth Daily.      aspirin 81 MG chewable tablet Chew 1 tablet Daily. 30 tablet 0    bisacodyl (DULCOLAX) 5 MG EC tablet Take 1 tablet by mouth Daily As Needed for Constipation.      carvedilol (COREG) 25 MG tablet TAKE 1 TABLET BY MOUTH TWICE A DAY WITH A MEAL 60 tablet 0    furosemide (LASIX) 40 MG tablet Take 1 tablet by mouth Daily. 30 tablet 11    Jardiance 10 MG tablet tablet TAKE ONE TABLET BY MOUTH DAILY 90 tablet 1    metFORMIN ER (GLUCOPHAGE-XR) 750 MG 24 hr tablet Take 1 tablet by mouth 2 (Two) Times a Day. 60 tablet 3    rOPINIRole (REQUIP) 1 MG tablet Take 1-3 tablets by mouth Every Night.  Take 1 hour before bedtime. 90 tablet 2    rosuvastatin (CRESTOR) 20 MG tablet Take 1 tablet by mouth Every Night. 30 tablet 3    sacubitril-valsartan (Entresto) 24-26 MG tablet Take 1 tablet by mouth 2 (Two) Times a Day.      Semaglutide,0.25 or 0.5MG/DOS, (Ozempic, 0.25 or 0.5 MG/DOSE,) 2 MG/3ML solution pen-injector DIAL AND INJECT UNDER THE SKIN 0.5 MG WEEKLY 3 mL 1    Tart Cherry 1200 MG capsule Take  by mouth.      traZODone (DESYREL) 100 MG tablet Take 1 tablet by mouth At Night As Needed for Sleep. 30 tablet 3    vitamin B-12 (CYANOCOBALAMIN) 1000 MCG tablet Take 1 tablet by mouth Daily.      gabapentin (NEURONTIN) 400 MG capsule Take 1 capsule by mouth 4 (Four) Times a Day. 120 capsule 2    apixaban (ELIQUIS) 5 MG tablet tablet Take 1 tablet by mouth 2 (Two) Times a Day. (Patient not taking: Reported on 9/11/2023) 60 tablet 3    methylPREDNISolone (MEDROL) 4 MG dose pack Take as directed on package instructions. (Patient not taking: Reported on 9/11/2023) 1 each 0     No facility-administered medications prior to visit.       No opioid medication identified on active medication list. I have reviewed chart for other potential  high risk medication/s and harmful drug interactions in the elderly.        Aspirin is on active medication list. Aspirin use is not indicated based on review of current medical condition/s. Risk of harm outweighs potential benefits. Patient instructed to discontinue this medication.  .      Patient Active Problem List   Diagnosis    Essential hypertension    Anxiety    GERD (gastroesophageal reflux disease)    Coronary artery disease involving native coronary artery of native heart with angina pectoris    Moderate obstructive sleep apnea    RLS (restless legs syndrome)    Acute gout of left foot    Chronic systolic congestive heart failure    Paroxysmal atrial fibrillation    Nonischemic cardiomyopathy    Biliary colic    Cholelithiasis    CKD (chronic kidney disease), stage III     "Hyperlipidemia LDL goal <70    Obesity (BMI 30-39.9)    Vitamin D deficiency    Moderate episode of recurrent major depressive disorder    Type 2 diabetes mellitus    Hypertensive urgency    Chest pain, atypical    Patient's noncompliance with other medical treatment and regimen for other reason    Primary insomnia    Neuropathy    Hematoma     Advance Care Planning   Advance Care Planning     Advance Directive is not on file.  ACP discussion was held with the patient during this visit. Patient does not have an advance directive, information provided.     Objective    Vitals:    23 1453   BP: 116/62   Pulse: 50   Resp: 14   Temp: 97.7 °F (36.5 °C)   TempSrc: Infrared   SpO2: 94%   Weight: 89.5 kg (197 lb 6.4 oz)   Height: 161.3 cm (63.5\")   PainSc:   8   PainLoc: Foot     Estimated body mass index is 34.42 kg/m² as calculated from the following:    Height as of this encounter: 161.3 cm (63.5\").    Weight as of this encounter: 89.5 kg (197 lb 6.4 oz).    BMI is >= 30 and <35. (Class 1 Obesity). The following options were offered after discussion;: weight loss educational material (shared in after visit summary)      Does the patient have evidence of cognitive impairment? No          HEALTH RISK ASSESSMENT    Smoking Status:  Social History     Tobacco Use   Smoking Status Former    Packs/day: 1.00    Years: 22.00    Pack years: 22.00    Types: Cigarettes    Start date: 1955    Quit date: 1979    Years since quittin.7   Smokeless Tobacco Never   Tobacco Comments    Started age 10 quit age 32 on 1979     Alcohol Consumption:  Social History     Substance and Sexual Activity   Alcohol Use Yes    Alcohol/week: 1.0 standard drink    Types: 1 Glasses of wine per week    Comment: occas     Fall Risk Screen:    RAULADI Fall Risk Assessment was completed, and patient is at LOW risk for falls.Assessment completed on:2023    Depression Screenin/11/2023     2:48 PM   PHQ-2/PHQ-9 Depression " Screening   Little Interest or Pleasure in Doing Things 1-->several days   Feeling Down, Depressed or Hopeless 1-->several days   Trouble Falling or Staying Asleep, or Sleeping Too Much 0-->not at all   Feeling Tired or Having Little Energy 3-->nearly every day   Poor Appetite or Overeating 2-->more than half the days   Feeling Bad about Yourself - or that You are a Failure or Have Let Yourself or Your Family Down 2-->more than half the days   Trouble Concentrating on Things, Such as Reading the Newspaper or Watching Television 0-->not at all   Moving or Speaking So Slowly that Other People Could Have Noticed? Or the Opposite - Being So Fidgety 0-->not at all   Thoughts that You Would be Better Off Dead or of Hurting Yourself in Some Way 1-->several days   PHQ-9: Brief Depression Severity Measure Score 10   If You Checked Off Any Problems, How Difficult Have These Problems Made It For You to Do Your Work, Take Care of Things at Home, or Get Along with Other People? extremely difficult       Health Habits and Functional and Cognitive Screenin/11/2023     2:52 PM   Functional & Cognitive Status   Do you have difficulty preparing food and eating? No   Do you have difficulty bathing yourself, getting dressed or grooming yourself? No   Do you have difficulty using the toilet? No   Do you have difficulty moving around from place to place? No   Do you have trouble with steps or getting out of a bed or a chair? No   Current Diet Well Balanced Diet   Dental Exam Not up to date   Eye Exam Up to date   Exercise (times per week) 0 times per week   Current Exercises Include No Regular Exercise   Do you need help using the phone?  No   Are you deaf or do you have serious difficulty hearing?  No   Do you need help to go to places out of walking distance? No   Do you need help shopping? No   Do you need help preparing meals?  No   Do you need help with housework?  No   Do you need help with laundry? No   Do you need help  taking your medications? No   Do you need help managing money? No   Do you ever drive or ride in a car without wearing a seat belt? No   Have you felt unusual stress, anger or loneliness in the last month? Yes   Who do you live with? Child   If you need help, do you have trouble finding someone available to you? No   Have you been bothered in the last four weeks by sexual problems? No   Do you have difficulty concentrating, remembering or making decisions? No       Age-appropriate Screening Schedule:  Refer to the list below for future screening recommendations based on patient's age, sex and/or medical conditions. Orders for these recommended tests are listed in the plan section. The patient has been provided with a written plan.    Health Maintenance   Topic Date Due    TDAP/TD VACCINES (1 - Tdap) Never done    ZOSTER VACCINE (1 of 2) Never done    HEPATITIS C SCREENING  Never done    Pneumococcal Vaccine 65+ (2 - PCV) 11/24/2016    URINE MICROALBUMIN  04/23/2019    COLORECTAL CANCER SCREENING  04/18/2020    DXA SCAN  06/20/2020    COVID-19 Vaccine (3 - Moderna series) 07/30/2021    INFLUENZA VACCINE  10/01/2023    HEMOGLOBIN A1C  11/10/2023    LIPID PANEL  01/30/2024    BMI FOLLOWUP  02/10/2024    DIABETIC EYE EXAM  07/26/2024    ANNUAL WELLNESS VISIT  09/11/2024                  CMS Preventative Services Quick Reference  Risk Factors Identified During Encounter  None Identified  The above risks/problems have been discussed with the patient.  Pertinent information has been shared with the patient in the After Visit Summary.  An After Visit Summary and PPPS were made available to the patient.    Follow Up:   Next Medicare Wellness visit to be scheduled in 1 year.       Additional E&M Note during same encounter follows:  Patient has multiple medical problems which are significant and separately identifiable that require additional work above and beyond the Medicare Wellness Visit.      Chief Complaint  Medicare  "Wellness-subsequent    Subjective        HPI  Angelita Shay is also being seen today for an annual wellness visit as well as several other issues and refills on her medication and she states she has been having some depression recently and it all surrounds her living situation.  She was living in a townhome that was supplied to her by her son and without warning her he sold it and told her she had 30 days to move out and she had lived there for 4 years and had made no other living arrangements and she quickly had to move into a loft bedroom in her daughter's home and it was not a smooth transition and she has to pay her daughter rent for the bedroom.  Her other son is looking for a rental home for her in [place].  She is somewhat dissatisfied with her current living arrangements.  She states she is depressed specifically about the situation.  She specifically denies any suicidal or homicidal ideations or intents or thoughts or plans.  She states that she is just frustrated temporarily because she has still live in the current living situation but it will be straightened out in the next couple of months she states.  Her neuropathy and blood sugar and blood pressure are stable with her current medications as well as her other chronic issues.  Her lipids are stable as well as her insomnia.  Her Caspers have been appropriate.    Review of Systems   Respiratory:  Negative for cough, choking and chest tightness.    Cardiovascular:  Negative for chest pain, palpitations and leg swelling.   Gastrointestinal:  Negative for abdominal pain, constipation and nausea.     Objective   Vital Signs:  /62   Pulse 50   Temp 97.7 °F (36.5 °C) (Infrared)   Resp 14   Ht 161.3 cm (63.5\")   Wt 89.5 kg (197 lb 6.4 oz)   SpO2 94%   BMI 34.42 kg/m²     Physical Exam  Constitutional:       Appearance: Normal appearance.   HENT:      Head: Normocephalic and atraumatic.      Nose: Nose normal.      Mouth/Throat:      Mouth: " Mucous membranes are moist.   Cardiovascular:      Rate and Rhythm: Normal rate and regular rhythm.   Pulmonary:      Effort: Pulmonary effort is normal.   Musculoskeletal:         General: Normal range of motion.      Cervical back: Normal range of motion and neck supple.   Neurological:      Mental Status: She is alert.                       Assessment and Plan   Diagnoses and all orders for this visit:    1. Influenza vaccine administered (Primary)  -     Fluzone High-Dose 65+yrs ([id number])    2. Essential hypertension stable with her current prescription medication and she will continue this    3. Gastroesophageal reflux disease, unspecified whether esophagitis present stable with her current prescription medication and she will continue this    4. Neuropathy stable with her current prescription medication and she will continue this and her Caspers are appropriate and a refill of her gabapentin is sent in today    5. Anxiety stable with her current prescription medication and she will continue this    6. RLS (restless legs syndrome) stable with her current prescription medication and she will continue this    7. Primary insomnia stable with her current prescription medication and she will continue this    8.  Obesity-her Ozempic dose will be increased slightly today to the next dose up and this prescription is sent in for her today           Follow Up   She will return in 2 months  Patient was given instructions and counseling regarding her condition or for health maintenance advice. Please see specific information pulled into the AVS if appropriate.

## 2023-09-13 LAB
ALBUMIN/CREAT UR: 365 MG/G CREAT (ref 0–29)
CREAT UR-MCNC: 47.5 MG/DL
MICROALBUMIN UR-MCNC: 173.6 UG/ML

## 2023-09-15 ENCOUNTER — TELEPHONE (OUTPATIENT)
Dept: INTERNAL MEDICINE | Facility: CLINIC | Age: 78
End: 2023-09-15
Payer: MEDICARE

## 2023-09-15 NOTE — TELEPHONE ENCOUNTER
----- Message from Bina Amaro DO sent at 9/15/2023 11:55 AM EDT -----  It appears she may be leaking a bit of protein in her urine and she should be set up with a kidney doctor.  Please let her know we are going to put a nephrology referral in for her and someone will call her with that appointment just so they can see if she needs any further testing.

## 2023-09-15 NOTE — TELEPHONE ENCOUNTER
Called and spoke with patient, informed her of Dr. Amaro's recommendations. She verbalized understanding and had no further questions.

## 2023-09-15 NOTE — TELEPHONE ENCOUNTER
Called and spoke with patient, informed her of results and recommendations from Dr. Amaro. She verbalized understanding and had no further questions.     She states that she started 2 days ago with Green phlegm, coughing all through the night, no fever but is having chest congestion. She states that she is not taking anything OTC for symptoms. She would like to know what Dr. Amaro recommends for these symptoms.

## 2023-09-18 PROBLEM — I16.0 HYPERTENSIVE URGENCY: Status: RESOLVED | Noted: 2023-01-30 | Resolved: 2023-09-18

## 2023-09-20 ENCOUNTER — TELEPHONE (OUTPATIENT)
Dept: INTERNAL MEDICINE | Facility: CLINIC | Age: 78
End: 2023-09-20
Payer: MEDICARE

## 2023-09-20 PROBLEM — Z85.038 PERSONAL HISTORY OF COLON CANCER: Status: ACTIVE | Noted: 2020-08-27

## 2023-09-20 NOTE — TELEPHONE ENCOUNTER
Hub staff attempted to follow warm transfer process and was unsuccessful     Caller: Angelita Shay    Relationship to patient: Self    Best call back number: [phone]    Patient is needing: AN UPDATE ON REFERRAL FOR KIDNEY SPECIALIST

## 2023-09-20 NOTE — TELEPHONE ENCOUNTER
Spoke with patient and informed her the referral was sent to [facility] on 09/18 and to give them a few days to get scheduled

## 2023-10-05 DIAGNOSIS — E11.9 TYPE 2 DIABETES MELLITUS WITHOUT COMPLICATION, WITHOUT LONG-TERM CURRENT USE OF INSULIN: ICD-10-CM

## 2023-10-05 NOTE — TELEPHONE ENCOUNTER
Caller: Angelita Shay    Relationship: Self    Best call back number: [phone]     Requested Prescriptions:   Requested Prescriptions     Pending Prescriptions Disp Refills    Semaglutide,0.25 or 0.5MG/DOS, (Ozempic, 0.25 or 0.5 MG/DOSE,) 2 MG/1.5ML solution pen-injector 3 mL 1     Sig: Inject 0.5 mg under the skin into the appropriate area as directed 1 (One) Time Per Week.        Pharmacy where request should be sent: [facility] [id number] [address] & [address] [facility] [phone] [facility] [phone] FX     Last office visit with prescribing clinician: 9/11/2023   Last telemedicine visit with prescribing clinician: Visit date not found   Next office visit with prescribing clinician: 12/11/2023     Additional details provided by patient: INCREASE DOSAGE TO 2.5    Does the patient have less than a 3 day supply:  [x] Yes  [] No    Would you like a call back once the refill request has been completed: [x] Yes [] No    If the office needs to give you a call back, can they leave a voicemail: [x] Yes [] No    Martha Garces Rep   10/05/23 14:52 EDT

## 2023-10-06 RX ORDER — SEMAGLUTIDE 1.34 MG/ML
0.5 INJECTION, SOLUTION SUBCUTANEOUS WEEKLY
Qty: 3 ML | Refills: 1 | Status: SHIPPED | OUTPATIENT
Start: 2023-10-06

## 2023-10-09 RX ORDER — SACUBITRIL AND VALSARTAN 24; 26 MG/1; MG/1
1 TABLET, FILM COATED ORAL 2 TIMES DAILY
Qty: 180 TABLET | Refills: 1 | Status: SHIPPED | OUTPATIENT
Start: 2023-10-09

## 2023-10-09 RX ORDER — SACUBITRIL AND VALSARTAN 49; 51 MG/1; MG/1
1 TABLET, FILM COATED ORAL 2 TIMES DAILY
Qty: 180 TABLET | OUTPATIENT
Start: 2023-10-09

## 2023-10-09 NOTE — TELEPHONE ENCOUNTER
Lab Results   Component Value Date    GLUCOSE 164 (H) 05/12/2023    CALCIUM 10.3 05/12/2023     05/12/2023    K 3.5 05/12/2023    CO2 25.7 05/12/2023     05/12/2023    BUN 14 05/12/2023    CREATININE 1.07 (H) 05/12/2023    EGFR 53.3 (L) 05/12/2023    BCR 13.1 05/12/2023    ANIONGAP 14.3 05/12/2023

## 2023-10-12 ENCOUNTER — AMBULATORY SURGICAL CENTER (OUTPATIENT)
Dept: URBAN - METROPOLITAN AREA SURGERY 10 | Facility: SURGERY | Age: 78
End: 2023-10-12
Payer: MEDICARE

## 2023-10-12 DIAGNOSIS — Z08 ENCOUNTER FOR FOLLOW-UP EXAMINATION AFTER COMPLETED TREATMEN: ICD-10-CM

## 2023-10-12 DIAGNOSIS — Z85.038 PERSONAL HISTORY OF OTHER MALIGNANT NEOPLASM OF LARGE INTEST: ICD-10-CM

## 2023-10-12 DIAGNOSIS — K64.1 SECOND DEGREE HEMORRHOIDS: ICD-10-CM

## 2023-10-12 DIAGNOSIS — Z80.0 FAMILY HISTORY OF MALIGNANT NEOPLASM OF DIGESTIVE ORGANS: ICD-10-CM

## 2023-10-12 PROCEDURE — G0105 COLORECTAL SCRN; HI RISK IND: HCPCS | Performed by: INTERNAL MEDICINE

## 2023-10-17 ENCOUNTER — TELEPHONE (OUTPATIENT)
Dept: INTERNAL MEDICINE | Facility: CLINIC | Age: 78
End: 2023-10-17

## 2023-10-17 DIAGNOSIS — E11.9 TYPE 2 DIABETES MELLITUS WITHOUT COMPLICATION, WITHOUT LONG-TERM CURRENT USE OF INSULIN: Primary | ICD-10-CM

## 2023-10-17 RX ORDER — SEMAGLUTIDE 1.34 MG/ML
1 INJECTION, SOLUTION SUBCUTANEOUS WEEKLY
Qty: 3 ML | Refills: 2 | Status: SHIPPED | OUTPATIENT
Start: 2023-10-17

## 2023-10-17 NOTE — TELEPHONE ENCOUNTER
Patient is asking for a higher dose on her   Semaglutide,0.25 or 0.5MG/DOS, (Ozempic, 0.25 or 0.5 MG/DOSE,) 2 MG/1.5ML solution pen-injector  0.5 mg, Weekly    She feels it needs to be increased for her blood sugars

## 2023-10-17 NOTE — TELEPHONE ENCOUNTER
Pt requested higher dose on 10/5/2023 but same dose was sent to the pharmacy. She was started on medication 5/10/2023

## 2023-10-17 NOTE — TELEPHONE ENCOUNTER
My name is Mariam Maki and I am a patient experience representative for [facility] on [facility].    I would like to officially welcome you to our practice.  If you do not mind I would like to ask you a few questions about your recent visit with us.  If you do not have the time to reply that is perfectly fine.      First, could you tell me what went well with your recent visit?     Secondly, we are always looking for ways to make our patients' experiences even better. Do you have any recommendations on ways we may improve?     Third, safety is a top priority therefore do you have any concerns with that while in our office?    Finally, overall were you satisfied with your first visit to our practice?     Thank you for taking the time to answer a few questions today.  In the coming days you will receive a survey.  We encourage you to complete the survey to let us know how we did!        aMriam

## 2023-10-18 NOTE — TELEPHONE ENCOUNTER
Called and lvm for patient to return call, office number given.     HUB TO RELAY:    Updated prescription for Ozempic has been sent to the pharmacy.    100% of the time

## 2023-10-18 NOTE — TELEPHONE ENCOUNTER
Name: Jaspal Angelita Ann  Relationship: Self  Best Callback Number: [phone]  HUB PROVIDED THE RELAY MESSAGE FROM THE OFFICE  PATIENT VOICED UNDERSTANDING AND HAS NO FURTHER QUESTIONS AT THIS TIME  ADDITIONAL INFORMATION:

## 2023-11-09 DIAGNOSIS — G25.81 RLS (RESTLESS LEGS SYNDROME): ICD-10-CM

## 2023-11-09 RX ORDER — ROPINIROLE 1 MG/1
1-3 TABLET, FILM COATED ORAL NIGHTLY
Qty: 90 TABLET | Refills: 5 | Status: SHIPPED | OUTPATIENT
Start: 2023-11-09

## 2023-11-09 NOTE — TELEPHONE ENCOUNTER
Caller: Jaspal Angelita Jigna    Relationship: Self    Best call back number: [phone]     Requested Prescriptions:   Requested Prescriptions     Pending Prescriptions Disp Refills    rOPINIRole (REQUIP) 1 MG tablet 90 tablet 2     Sig: Take 1-3 tablets by mouth Every Night. Take 1 hour before bedtime.        Pharmacy where request should be sent: [facility] [id number] [address] & MAN O [facility] [phone] [facility] [phone] FX     Last office visit with prescribing clinician: 9/11/2023   Last telemedicine visit with prescribing clinician: Visit date not found   Next office visit with prescribing clinician: 12/11/2023     Additional details provided by patient: PATIENT HAS 2 DAYS LEFT.     Does the patient have less than a 3 day supply:  [x] Yes  [] No    Would you like a call back once the refill request has been completed: [] Yes [x] No    If the office needs to give you a call back, can they leave a voicemail: [] Yes [x] No    Cadance Dunaway, RegSched Rep   11/09/23 11:59 EST

## 2023-11-10 DIAGNOSIS — E78.5 HYPERLIPIDEMIA LDL GOAL <70: ICD-10-CM

## 2023-11-10 NOTE — TELEPHONE ENCOUNTER
Caller: Angelita Shay    Relationship: Self    Best call back number: [phone]     Requested Prescriptions:   [facility]    Pharmacy where request should be sent: [facility] [id number] [address] & [address] [place] - [phone] [facility] [phone] FX     Last office visit with prescribing clinician: 9/11/2023   Last telemedicine visit with prescribing clinician: Visit date not found   Next office visit with prescribing clinician: 12/11/2023     Additional details provided by patient:     Does the patient have less than a 3 day supply:  [x] Yes  [] No    Would you like a call back once the refill request has been completed: [] Yes [x] No    If the office needs to give you a call back, can they leave a voicemail: [] Yes [x] No    Martha Chavarria Rep   11/10/23 15:27 EST

## 2023-11-13 RX ORDER — ROSUVASTATIN CALCIUM 20 MG/1
20 TABLET, COATED ORAL NIGHTLY
Qty: 90 TABLET | Refills: 1 | Status: SHIPPED | OUTPATIENT
Start: 2023-11-13

## 2023-11-13 NOTE — TELEPHONE ENCOUNTER
Called and spoke with patient, she states that her prescription bottle has Dr. Amaro's name on it. She states that she is taking 5 mg once daily. She also is requesting a refill on rosuvastatin.

## 2023-11-28 DIAGNOSIS — F51.01 PRIMARY INSOMNIA: ICD-10-CM

## 2023-11-28 DIAGNOSIS — G62.9 NEUROPATHY: ICD-10-CM

## 2023-11-28 RX ORDER — TRAZODONE HYDROCHLORIDE 100 MG/1
100 TABLET ORAL NIGHTLY PRN
Qty: 30 TABLET | Refills: 3 | Status: SHIPPED | OUTPATIENT
Start: 2023-11-28

## 2023-11-28 RX ORDER — GABAPENTIN 400 MG/1
400 CAPSULE ORAL 4 TIMES DAILY
Qty: 120 CAPSULE | Refills: 0 | Status: SHIPPED | OUTPATIENT
Start: 2023-11-28

## 2023-11-28 NOTE — TELEPHONE ENCOUNTER
Last office visit with prescribing clinician: 9/11/2023   Last telemedicine visit with prescribing clinician: Visit date not found   Next office visit with prescribing clinician: 12/11/2023

## 2023-11-28 NOTE — TELEPHONE ENCOUNTER
Caller: Angelita Shay    Relationship: Self    Best call back number: [phone]    Requested Prescriptions:   Requested Prescriptions     Pending Prescriptions Disp Refills    traZODone (DESYREL) 100 MG tablet 30 tablet 3     Sig: Take 1 tablet by mouth At Night As Needed for Sleep.    gabapentin (NEURONTIN) 400 MG capsule 120 capsule 2     Sig: Take 1 capsule by mouth 4 (Four) Times a Day.        Pharmacy where request should be sent: [facility] [id number] [address] & [address] O [facility] [phone] [facility] [phone] FX     Last office visit with prescribing clinician: 9/11/2023   Last telemedicine visit with prescribing clinician: Visit date not found   Next office visit with prescribing clinician: 12/11/2023     Additional details provided by patient: PATIENT HAS ONE DAY SUPPLY LEFT    Does the patient have less than a 3 day supply:  [x] Yes  [] No    Would you like a call back once the refill request has been completed: [] Yes [x] No    If the office needs to give you a call back, can they leave a voicemail: [] Yes [x] No    Martha Cardona Rep   11/28/23 12:37 EST

## 2023-12-26 NOTE — PROGRESS NOTES
"    Cardiology Outpatient Visit      Identification: Angelita Shay is a 78 y.o. female who resides in [place], [place]    Reason for visit:  Atrial Fibrillation      Subjective      Patient is a 78-year-old female who returns today for follow-up of her coronary artery disease, nonischemic cardiomyopathy/chronic systolic heart failure, paroxysmal atrial fibrillation and cardiac risk factors.  At her last visit Entresto was increased due to uncontrolled hypertension.    Since last visit, she states she has lost her home. She has since had to move in with her daughter and only has a \"loft\" as her space. She states that this has limited her and what she is able to do. She has stopped taking her amlodipine, entresto, and Eliquis. She states that she has had \"too much bruising\" with her Eliquis. Will not take entresto because it \"makes her feel like a zombie.\" BP has been in the 130s off of both her amlodipine nad her esntresto.     She does go for walks and has a goal to start doing stationary exercises. She denies chest pain or pressure, shortness of breath, palpitations, orthopnea, edema, pre-syncope or syncope. Some dizziness with changes in position but no dizziness with exertion.       Allergies   Allergen Reactions    Oxycodone Shortness Of Breath    Zolpidem Other (See Comments)     nightmares         Current Outpatient Medications   Medication Instructions    apixaban (ELIQUIS) 5 MG tablet tablet 1 po qd    aspirin 81 mg, Oral, Daily    carvedilol (COREG) 25 MG tablet TAKE 1 TABLET BY MOUTH TWICE A DAY WITH A MEAL    furosemide (LASIX) 40 mg, Oral, Daily    gabapentin (NEURONTIN) 400 mg, Oral, 4 Times Daily    Jardiance 10 MG tablet tablet TAKE ONE TABLET BY MOUTH DAILY    metFORMIN ER (GLUCOPHAGE-XR) 750 mg, Oral, 2 Times Daily    Ozempic (1 MG/DOSE) 1 mg, Subcutaneous, Weekly    rOPINIRole (REQUIP) 1-3 mg, Oral, Nightly, Take 1 hour before bedtime.    rosuvastatin (CRESTOR) 20 mg, Oral, Nightly    Tart " "Grande 1200 MG capsule Oral    traZODone (DESYREL) 100 mg, Oral, Nightly PRN    vitamin B-12 (CYANOCOBALAMIN) 1,000 mcg, Oral, Daily         Objective     /68 (BP Location: Left arm, Patient Position: Sitting)   Pulse 51   Ht 161.3 cm (63.5\")   Wt 86.5 kg (190 lb 9.6 oz)   SpO2 97%   BMI 33.23 kg/m²       Physical Exam    Result Review  (reviewed with patient):        ECG 12 Lead    Date/Time: 1/2/2024 2:17 PM  Performed by: Shannan Oquendo PA-C    Authorized by: Shannan Oquendo PA-C  Comparison: compared with previous ECG from 1/30/2023  Comparison to previous ECG: Now with PVCs.  Rhythm: sinus rhythm  Ectopy: unifocal PVCs  Rate: normal  BPM: 93  Conduction: 1st degree AV block    Clinical impression: abnormal EKG           Lab Results   Component Value Date    GLUCOSE 164 (H) 05/12/2023    BUN 14 05/12/2023    CREATININE 1.07 (H) 05/12/2023    EGFR 53.3 (L) 05/12/2023    BCR 13.1 05/12/2023    K 3.5 05/12/2023    CO2 25.7 05/12/2023    CALCIUM 10.3 05/12/2023    PROTENTOTREF 7.8 07/23/2018    ALBUMIN 4.2 01/29/2023    BILITOT 0.4 01/29/2023    AST 18 01/29/2023    ALT 16 01/29/2023     Lab Results   Component Value Date    WBC 6.35 01/30/2023    HGB 14.3 01/30/2023    HCT 43.9 01/30/2023    MCV 98.2 (H) 01/30/2023     01/30/2023     Lab Results   Component Value Date    CHOL 171 01/30/2023    CHLPL 200 07/23/2018    TRIG 192 (H) 01/30/2023    HDL 44 01/30/2023    LDL 94 01/30/2023     Lab Results   Component Value Date    HGBA1C 6.9 05/10/2023           Assessment     Diagnoses and all orders for this visit:    1. Coronary artery disease involving native coronary artery of native heart with angina pectoris (Primary)  Overview:  Previous PCI to the LAD 2012  Cardiac catheterization (8/2/2015): Mild nonobstructive CAD. Widely patent LAD stent. Normal LVEF.  Cardiac catheterization (3/13/18): Mild to moderate nonobstructive CAD. Previously placed LAD stent widely patent.  Cardiac " catheterization (7/14/2020): Essentially unchanged coronary anatomy from 2008 cath, IFR carried out of the RCA negative at 0.94, left circumflex 1.0, LAD 0.98.  Widely patent proximal LAD stent.  LVEDP 8 mmHg.  LVEF 60-65% with normal wall motion.  MPS (1/30/2023): No ischemia.  Low risk study      2. Chronic systolic congestive heart failure  Overview:  Echocardiogram (3/11/2018): EF 36%, mild MR  Cardiac catheterization (3/13/18): Mild to moderate nonobstructive CAD. Previously placed LAD stent widely patent.  Echocardiogram (8/6/2018): EF 55%. Mild concentric LVH.   Echocardiogram (2020): LVEF 64%.  No significant valvular abnormality        3. Essential hypertension    4. Paroxysmal atrial fibrillation  Overview:  Chads VASC = 6 (age, female, CHF, CAD, HTN, DM)      5. Nonischemic cardiomyopathy  Overview:  Cardiac catheterization (3/13/18): Mild to moderate nonobstructive CAD. Previously placed LAD stent widely patent.  Echocardiogram (3/11/18):  LVEF 36%, mild MR  Echocardiogram (8/6/2018): EF 55%. Mild concentric LVH.   Echo 2020: EF = 64%, Left ventricular diastolic dysfunction (grade I a) consistent with impaired relaxation, Left ventricular systolic function is normal. Mild tricuspid valve regurgitation is present.      6. Hyperlipidemia LDL goal <70  Overview:  High-intensity statin therapy indicated given presence coronary artery disease      Other orders  -     apixaban (ELIQUIS) 5 MG tablet tablet; 1 po qd  Dispense: 90 tablet; Refill: 1          Plan   Advised patient to begin taking her Eliquis twice daily for stroke prophylaxis.   Continue to monitor BP at home and call our office for SBP > 140.   Echocardiogram ordered to assess LV and valvular function in the setting of frequent PVCs.  Consider holter monitor pending echocardiogram results.       Follow-up   Return in about 1 year (around 1/2/2025).      Shannan Oquendo PA-C  1/2/2024

## 2024-01-02 ENCOUNTER — OFFICE VISIT (OUTPATIENT)
Dept: CARDIOLOGY | Facility: CLINIC | Age: 79
End: 2024-01-02
Payer: MEDICARE

## 2024-01-02 VITALS
BODY MASS INDEX: 32.54 KG/M2 | OXYGEN SATURATION: 97 % | SYSTOLIC BLOOD PRESSURE: 140 MMHG | WEIGHT: 190.6 LBS | HEART RATE: 51 BPM | DIASTOLIC BLOOD PRESSURE: 68 MMHG | HEIGHT: 64 IN

## 2024-01-02 DIAGNOSIS — I50.22 CHRONIC SYSTOLIC CONGESTIVE HEART FAILURE: ICD-10-CM

## 2024-01-02 DIAGNOSIS — I25.119 CORONARY ARTERY DISEASE INVOLVING NATIVE CORONARY ARTERY OF NATIVE HEART WITH ANGINA PECTORIS: ICD-10-CM

## 2024-01-02 DIAGNOSIS — I49.3 FREQUENT PVCS: Primary | ICD-10-CM

## 2024-01-02 DIAGNOSIS — E78.5 HYPERLIPIDEMIA LDL GOAL <70: ICD-10-CM

## 2024-01-02 DIAGNOSIS — I48.0 PAROXYSMAL ATRIAL FIBRILLATION: ICD-10-CM

## 2024-01-02 DIAGNOSIS — I42.8 NONISCHEMIC CARDIOMYOPATHY: ICD-10-CM

## 2024-01-02 DIAGNOSIS — I10 ESSENTIAL HYPERTENSION: ICD-10-CM

## 2024-01-09 DIAGNOSIS — G62.9 NEUROPATHY: ICD-10-CM

## 2024-01-09 NOTE — TELEPHONE ENCOUNTER
Caller: Jaspal Angelita Jigna    Relationship: Self    Best call back number: [phone]     Requested Prescriptions:   Requested Prescriptions     Pending Prescriptions Disp Refills    gabapentin (NEURONTIN) 400 MG capsule 120 capsule 0     Sig: Take 1 capsule by mouth 4 (Four) Times a Day.    SHE SAYS THAT SHE TAKES 200 MG 4 TIMES A DAY    Pharmacy where request should be sent: [facility] [id number] [address] & [address] O [facility] [phone] [facility] [phone]      Last office visit with prescribing clinician: 9/11/2023   Last telemedicine visit with prescribing clinician: Visit date not found   Next office visit with prescribing clinician: 1/10/2024     Additional details provided by patient:     Does the patient have less than a 3 day supply:  [x] Yes  [] No    Would you like a call back once the refill request has been completed: [x] Yes [] No    If the office needs to give you a call back, can they leave a voicemail: [x] Yes [] No    Martha Lam Rep   01/09/24 09:43 EST

## 2024-01-10 ENCOUNTER — OFFICE VISIT (OUTPATIENT)
Dept: INTERNAL MEDICINE | Facility: CLINIC | Age: 79
End: 2024-01-10
Payer: MEDICARE

## 2024-01-10 VITALS
HEIGHT: 64 IN | HEART RATE: 61 BPM | OXYGEN SATURATION: 95 % | RESPIRATION RATE: 18 BRPM | BODY MASS INDEX: 32.74 KG/M2 | WEIGHT: 191.8 LBS | SYSTOLIC BLOOD PRESSURE: 142 MMHG | TEMPERATURE: 97.5 F | DIASTOLIC BLOOD PRESSURE: 82 MMHG

## 2024-01-10 DIAGNOSIS — F51.01 PRIMARY INSOMNIA: ICD-10-CM

## 2024-01-10 DIAGNOSIS — I48.0 PAROXYSMAL ATRIAL FIBRILLATION: ICD-10-CM

## 2024-01-10 DIAGNOSIS — F33.1 MODERATE EPISODE OF RECURRENT MAJOR DEPRESSIVE DISORDER: ICD-10-CM

## 2024-01-10 DIAGNOSIS — G62.9 NEUROPATHY: Primary | ICD-10-CM

## 2024-01-10 DIAGNOSIS — Z12.31 ENCOUNTER FOR SCREENING MAMMOGRAM FOR MALIGNANT NEOPLASM OF BREAST: ICD-10-CM

## 2024-01-10 DIAGNOSIS — K21.9 GASTROESOPHAGEAL REFLUX DISEASE, UNSPECIFIED WHETHER ESOPHAGITIS PRESENT: ICD-10-CM

## 2024-01-10 DIAGNOSIS — E78.5 HYPERLIPIDEMIA LDL GOAL <70: ICD-10-CM

## 2024-01-10 RX ORDER — GABAPENTIN 400 MG/1
400 CAPSULE ORAL 4 TIMES DAILY
Qty: 120 CAPSULE | Refills: 2 | Status: SHIPPED | OUTPATIENT
Start: 2024-01-10 | End: 2024-01-10 | Stop reason: SDUPTHER

## 2024-01-10 RX ORDER — GABAPENTIN 400 MG/1
400 CAPSULE ORAL 4 TIMES DAILY
Qty: 120 CAPSULE | Refills: 2 | Status: SHIPPED | OUTPATIENT
Start: 2024-01-10

## 2024-01-10 NOTE — PROGRESS NOTES
"    Office Note     Name: Angelita Shay    : 1945     MRN: [id number]     Chief Complaint  Follow-up (Follow up on HTN and med refill. )    Subjective     History of Present Illness:  Angelita Shay is a 78 y.o. female who presents today for several concerns.  She states she has been doing fairly well and her chronic issues are stable and she recently saw her cardiologist and things are going well she needs refills on her medicine.    Review of Systems   Respiratory:  Negative for cough, shortness of breath and wheezing.    Cardiovascular:  Negative for chest pain and palpitations.   Gastrointestinal:  Negative for blood in stool, diarrhea and vomiting.   Genitourinary:  Negative for dysuria, flank pain and genital sores.       Objective     Vital Signs  /82   Pulse 61   Temp 97.5 °F (36.4 °C) (Temporal)   Resp 18   Ht 161.3 cm (63.5\")   Wt 87 kg (191 lb 12.8 oz)   SpO2 95%   BMI 33.44 kg/m²   Estimated body mass index is 33.44 kg/m² as calculated from the following:    Height as of this encounter: 161.3 cm (63.5\").    Weight as of this encounter: 87 kg (191 lb 12.8 oz).            Physical Exam  Vitals and nursing note reviewed.   HENT:      Head: Normocephalic and atraumatic.   Neck:      Vascular: No carotid bruit.   Cardiovascular:      Rate and Rhythm: Normal rate and regular rhythm.      Heart sounds: Normal heart sounds. No murmur heard.     No gallop.   Pulmonary:      Effort: Pulmonary effort is normal. No respiratory distress.      Breath sounds: No stridor. No wheezing, rhonchi or rales.   Musculoskeletal:      Cervical back: Normal range of motion and neck supple.      Right lower leg: No edema.      Left lower leg: No edema.   Lymphadenopathy:      Cervical: No cervical adenopathy.   Neurological:      Mental Status: She is alert.                 Assessment and Plan     Diagnoses and all orders for this visit:    1. Neuropathy (Primary) stable and her Caspers are appropriate  - "     gabapentin (NEURONTIN) 400 MG capsule; Take 1 capsule by mouth 4 (Four) Times a Day.  Dispense: 120 capsule; Refill: 2    2. Encounter for screening mammogram for malignant neoplasm of breast  -     Mammo Screening Digital Tomosynthesis Bilateral With CAD; Future    3. Gastroesophageal reflux disease, unspecified whether esophagitis present stable with current medication    4. Hyperlipidemia LDL goal <70 stable    5. Moderate episode of recurrent major depressive disorder stable    6. Paroxysmal atrial fibrillation stable    7. Primary insomnia stable          Follow Up  3 months    Bina Amaro DO

## 2024-01-12 ENCOUNTER — TELEPHONE (OUTPATIENT)
Dept: INTERNAL MEDICINE | Facility: CLINIC | Age: 79
End: 2024-01-12

## 2024-01-12 DIAGNOSIS — E11.9 TYPE 2 DIABETES MELLITUS WITHOUT COMPLICATION, WITHOUT LONG-TERM CURRENT USE OF INSULIN: ICD-10-CM

## 2024-01-13 DIAGNOSIS — E11.9 TYPE 2 DIABETES MELLITUS WITHOUT COMPLICATION, WITHOUT LONG-TERM CURRENT USE OF INSULIN: ICD-10-CM

## 2024-01-15 DIAGNOSIS — E11.9 TYPE 2 DIABETES MELLITUS WITHOUT COMPLICATION, WITHOUT LONG-TERM CURRENT USE OF INSULIN: ICD-10-CM

## 2024-01-15 RX ORDER — SEMAGLUTIDE 1.34 MG/ML
1 INJECTION, SOLUTION SUBCUTANEOUS WEEKLY
Qty: 3 ML | Refills: 2 | Status: SHIPPED | OUTPATIENT
Start: 2024-01-15

## 2024-01-15 RX ORDER — SEMAGLUTIDE 1.34 MG/ML
INJECTION, SOLUTION SUBCUTANEOUS
Qty: 3 ML | OUTPATIENT
Start: 2024-01-15

## 2024-01-16 ENCOUNTER — TELEPHONE (OUTPATIENT)
Dept: INTERNAL MEDICINE | Facility: CLINIC | Age: 79
End: 2024-01-16
Payer: MEDICARE

## 2024-01-16 ENCOUNTER — PRIOR AUTHORIZATION (OUTPATIENT)
Dept: INTERNAL MEDICINE | Facility: CLINIC | Age: 79
End: 2024-01-16
Payer: MEDICARE

## 2024-01-16 NOTE — TELEPHONE ENCOUNTER
Caller: Angelita Shay    Relationship: Self    Best call back number: [phone]     What test/procedure requested: SEMAGLUTIDE (OZEMPIC)     When is it needed: PATIENT IS DUE TO TAKE HER NEXT DOSE THURSDAY 1/18/24 BUT HAS NO MEDICATION REMAINING    Additional information or concerns: PATIENT WAS ADVISED THAT A PRIOR AUTHORIZATION IS REQUIRED FOR THIS MEDICATION SINCE SHE CHANGED INSURANCE PLANS RECENTLY.    PLEASE ADVISE PATIENT WHEN AN UPDATE IS AVAILABLE.

## 2024-01-16 NOTE — TELEPHONE ENCOUNTER
SPOKE WITH PATIENT AND LET HER KNOW THE PA HAS BEEN SUBMITTED AND I WILL LET HER KNOW AS SOON AS I HEAR BACK. SHE VERBALIZED UNDERSTANDING WITH NO FURTHER QUESTIONS

## 2024-01-24 RX ORDER — METFORMIN HYDROCHLORIDE 750 MG/1
750 TABLET, EXTENDED RELEASE ORAL 2 TIMES DAILY
Qty: 60 TABLET | Refills: 3 | Status: SHIPPED | OUTPATIENT
Start: 2024-01-24

## 2024-01-24 NOTE — TELEPHONE ENCOUNTER
Caller: Jaspal Angelita Jigna    Relationship: Self    Best call back number: [phone]     Requested Prescriptions:   Requested Prescriptions     Pending Prescriptions Disp Refills    metFORMIN ER (GLUCOPHAGE-XR) 750 MG 24 hr tablet 60 tablet 3     Sig: Take 1 tablet by mouth 2 (Two) Times a Day.      Pharmacy where request should be sent: [facility] [id number] [address] & [address] O [facility] [phone] [facility] [phone] FX     Last office visit with prescribing clinician: 1/10/2024   Last telemedicine visit with prescribing clinician: Visit date not found   Next office visit with prescribing clinician: 4/10/2024     Additional details provided by patient: PATIENT IS COMPLETELY OUT     Does the patient have less than a 3 day supply:  [x] Yes  [] No    Would you like a call back once the refill request has been completed: [] Yes [x] No    If the office needs to give you a call back, can they leave a voicemail: [] Yes [x] No    Martha Clarke Rep   01/24/24 14:21 EST

## 2024-01-24 NOTE — TELEPHONE ENCOUNTER
Last office visit with prescribing clinician: 1/10/2024   Last telemedicine visit with prescribing clinician: Visit date not found   Next office visit with prescribing clinician: 4/10/2024

## 2024-01-31 DIAGNOSIS — I50.22 CHRONIC SYSTOLIC CONGESTIVE HEART FAILURE: ICD-10-CM

## 2024-01-31 DIAGNOSIS — I10 ESSENTIAL HYPERTENSION: ICD-10-CM

## 2024-01-31 RX ORDER — CARVEDILOL 25 MG/1
25 TABLET ORAL 2 TIMES DAILY WITH MEALS
Qty: 180 TABLET | Refills: 1 | Status: SHIPPED | OUTPATIENT
Start: 2024-01-31

## 2024-02-01 NOTE — OUTREACH NOTE
Medical Week 3 Survey    Flowsheet Row Responses   [facility] patient discharged from? [place]   Does the patient have one of the following disease processes/diagnoses(primary or secondary)? Other   Week 3 attempt successful? Yes   Call start time 1635   Call end time 1637   Discharge diagnosis HTN urgency   Meds reviewed with patient/caregiver? Yes   Is the patient having any side effects they believe may be caused by any medication additions or changes? No   Does the patient have all medications ordered at discharge? Yes   Is the patient taking all medications as directed (includes completed medication regime)? Yes   Does the patient have a primary care provider?  Yes   Does the patient have an appointment with their PCP within 7 days of discharge? Yes   Has the patient kept scheduled appointments due by today? Yes   Has home health visited the patient within 72 hours of discharge? N/A   Psychosocial issues? No   Did the patient receive a copy of their discharge instructions? Yes   Nursing interventions Reviewed instructions with patient   What is the patient's perception of their health status since discharge? Improving   Is the patient/caregiver able to teach back signs and symptoms related to disease process for when to call PCP? Yes   Is the patient/caregiver able to teach back signs and symptoms related to disease process for when to call 911? Yes   Is the patient/caregiver able to teach back the hierarchy of who to call/visit for symptoms/problems? PCP, Specialist, Home health nurse, Urgent Care, ED, 911 Yes   Additional teach back comments pain has resolved   Week 3 Call Completed? Yes          Perla RICHARDSON - Registered Nurse  
PAST SURGICAL HISTORY:  H/O left inguinal hernia repair     History of  section

## 2024-02-02 DIAGNOSIS — E11.9 TYPE 2 DIABETES MELLITUS WITHOUT COMPLICATION, WITHOUT LONG-TERM CURRENT USE OF INSULIN: Primary | ICD-10-CM

## 2024-02-02 RX ORDER — GLUCOSAMINE HCL/CHONDROITIN SU 500-400 MG
CAPSULE ORAL
Qty: 100 EACH | Refills: 11 | Status: SHIPPED | OUTPATIENT
Start: 2024-02-02

## 2024-02-08 ENCOUNTER — HOSPITAL ENCOUNTER (OUTPATIENT)
Dept: CARDIOLOGY | Facility: HOSPITAL | Age: 79
Discharge: HOME | End: 2024-02-08
Payer: MEDICARE

## 2024-02-08 VITALS
DIASTOLIC BLOOD PRESSURE: 103 MMHG | BODY MASS INDEX: 33.84 KG/M2 | WEIGHT: 191 LBS | SYSTOLIC BLOOD PRESSURE: 153 MMHG | HEIGHT: 63 IN

## 2024-02-08 DIAGNOSIS — I49.3 FREQUENT PVCS: ICD-10-CM

## 2024-02-08 DIAGNOSIS — I48.0 PAROXYSMAL ATRIAL FIBRILLATION: ICD-10-CM

## 2024-02-08 DIAGNOSIS — I25.119 CORONARY ARTERY DISEASE INVOLVING NATIVE CORONARY ARTERY OF NATIVE HEART WITH ANGINA PECTORIS: ICD-10-CM

## 2024-02-08 PROCEDURE — 93306 TTE W/DOPPLER COMPLETE: CPT

## 2024-02-09 LAB
BH CV ECHO MEAS - AO MAX PG: 17 MMHG
BH CV ECHO MEAS - AO MEAN PG: 11 MMHG
BH CV ECHO MEAS - AO ROOT DIAM: 3.4 CM
BH CV ECHO MEAS - AO V2 MAX: 221 CM/SEC
BH CV ECHO MEAS - AO V2 VTI: 37.9 CM
BH CV ECHO MEAS - AVA(I,D): 0.96 CM2
BH CV ECHO MEAS - EDV(CUBED): 103.8 ML
BH CV ECHO MEAS - EDV(MOD-SP2): 101 ML
BH CV ECHO MEAS - EDV(MOD-SP4): 106 ML
BH CV ECHO MEAS - EF(MOD-BP): 53.4 %
BH CV ECHO MEAS - EF(MOD-SP2): 52.2 %
BH CV ECHO MEAS - EF(MOD-SP4): 55 %
BH CV ECHO MEAS - ESV(CUBED): 31.2 ML
BH CV ECHO MEAS - ESV(MOD-SP2): 48.3 ML
BH CV ECHO MEAS - ESV(MOD-SP4): 47.7 ML
BH CV ECHO MEAS - FS: 33 %
BH CV ECHO MEAS - IVS/LVPW: 0.9 CM
BH CV ECHO MEAS - IVSD: 0.9 CM
BH CV ECHO MEAS - LA DIMENSION: 4.1 CM
BH CV ECHO MEAS - LAT PEAK E' VEL: 10.4 CM/SEC
BH CV ECHO MEAS - LV DIASTOLIC VOL/BSA (35-75): 55.9 CM2
BH CV ECHO MEAS - LV MASS(C)D: 153.4 GRAMS
BH CV ECHO MEAS - LV MAX PG: 2.07 MMHG
BH CV ECHO MEAS - LV MEAN PG: 1 MMHG
BH CV ECHO MEAS - LV SYSTOLIC VOL/BSA (12-30): 25.2 CM2
BH CV ECHO MEAS - LV V1 MAX: 72 CM/SEC
BH CV ECHO MEAS - LV V1 VTI: 12.4 CM
BH CV ECHO MEAS - LVIDD: 4.7 CM
BH CV ECHO MEAS - LVIDS: 3.1 CM
BH CV ECHO MEAS - LVOT AREA: 2.9 CM2
BH CV ECHO MEAS - LVOT DIAM: 1.94 CM
BH CV ECHO MEAS - LVPWD: 1 CM
BH CV ECHO MEAS - MED PEAK E' VEL: 5.4 CM/SEC
BH CV ECHO MEAS - MV A MAX VEL: 137.4 CM/SEC
BH CV ECHO MEAS - MV DEC SLOPE: 321.6 CM/SEC2
BH CV ECHO MEAS - MV DEC TIME: 0.32 SEC
BH CV ECHO MEAS - MV E MAX VEL: 101.2 CM/SEC
BH CV ECHO MEAS - MV E/A: 0.74
BH CV ECHO MEAS - MV MAX PG: 6.8 MMHG
BH CV ECHO MEAS - MV MEAN PG: 3.3 MMHG
BH CV ECHO MEAS - MV P1/2T: 97.4 MSEC
BH CV ECHO MEAS - MV V2 VTI: 43.9 CM
BH CV ECHO MEAS - MVA(P1/2T): 2.26 CM2
BH CV ECHO MEAS - MVA(VTI): 0.83 CM2
BH CV ECHO MEAS - SI(MOD-SP2): 27.8 ML/M2
BH CV ECHO MEAS - SI(MOD-SP4): 30.7 ML/M2
BH CV ECHO MEAS - SV(LVOT): 36.5 ML
BH CV ECHO MEAS - SV(MOD-SP2): 52.7 ML
BH CV ECHO MEAS - SV(MOD-SP4): 58.3 ML
BH CV ECHO MEASUREMENTS AVERAGE E/E' RATIO: 12.81
BH CV VAS BP LEFT ARM: NORMAL MMHG
BH CV XLRA - RV BASE: 3.5 CM
BH CV XLRA - RV LENGTH: 6.2 CM
BH CV XLRA - RV MID: 2.5 CM
BH CV XLRA - TDI S': 10.1 CM/SEC
LV EF 2D ECHO EST: 60 %

## 2024-02-15 DIAGNOSIS — E11.9 TYPE 2 DIABETES MELLITUS WITHOUT COMPLICATION, WITHOUT LONG-TERM CURRENT USE OF INSULIN: ICD-10-CM

## 2024-02-15 RX ORDER — EMPAGLIFLOZIN 10 MG/1
10 TABLET, FILM COATED ORAL DAILY
Qty: 90 TABLET | Refills: 1 | Status: SHIPPED | OUTPATIENT
Start: 2024-02-15

## 2024-02-19 ENCOUNTER — DOCUMENTATION (OUTPATIENT)
Dept: FAMILY MEDICINE CLINIC | Facility: CLINIC | Age: 79
End: 2024-02-19
Payer: MEDICARE

## 2024-02-19 ENCOUNTER — READMISSION MANAGEMENT (OUTPATIENT)
Dept: CALL CENTER | Facility: HOSPITAL | Age: 79
End: 2024-02-19
Payer: MEDICARE

## 2024-02-19 RX ORDER — BENZONATATE 100 MG/1
100 CAPSULE ORAL 3 TIMES DAILY PRN
COMMUNITY

## 2024-02-19 RX ORDER — DOXYCYCLINE HYCLATE 100 MG
100 TABLET ORAL 2 TIMES DAILY
COMMUNITY

## 2024-02-19 RX ORDER — CEFDINIR 300 MG/1
300 CAPSULE ORAL 2 TIMES DAILY
COMMUNITY

## 2024-02-19 RX ORDER — PREDNISONE 20 MG/1
20 TABLET ORAL DAILY
COMMUNITY

## 2024-02-19 RX ORDER — ROPINIROLE 2 MG/1
2 TABLET, FILM COATED ORAL NIGHTLY PRN
COMMUNITY

## 2024-02-19 RX ORDER — ACETAMINOPHEN 500 MG
500 TABLET ORAL EVERY 6 HOURS PRN
COMMUNITY

## 2024-02-19 RX ORDER — IPRATROPIUM BROMIDE AND ALBUTEROL SULFATE 2.5; .5 MG/3ML; MG/3ML
3 SOLUTION RESPIRATORY (INHALATION)
COMMUNITY

## 2024-02-20 ENCOUNTER — TRANSITIONAL CARE MANAGEMENT TELEPHONE ENCOUNTER (OUTPATIENT)
Dept: CALL CENTER | Facility: HOSPITAL | Age: 79
End: 2024-02-20
Payer: MEDICARE

## 2024-02-20 NOTE — OUTREACH NOTE
Call Center TCM Note      Flowsheet Row Responses   [facility] patient discharged from? Non-   Does the patient have one of the following disease processes/diagnoses(primary or secondary)? Other   TCM attempt successful? No  [verbal release on file for Wes Haney and Pat]   Unsuccessful attempts Attempt 1   Change in Health Status Moved to LTC/SNF/Hospice  [[facility] call deferred]            Cony Prakash RN    2/20/2024, 14:43 EST

## 2024-02-20 NOTE — OUTREACH NOTE
Prep Survey      Flowsheet Row Responses   [facility] patient discharged from? Non-[facility]   Is LACE score < 7 ? Non- Discharge   Eligibility [facility]   Date of Discharge 02/19/24   Discharge diagnosis Acute respiratory failure with hypoxia   Does the patient have one of the following disease processes/diagnoses(primary or secondary)? Other   Prep survey completed? Yes            Natasha PRIEST - Registered Nurse

## 2024-02-21 ENCOUNTER — HOSPITAL ENCOUNTER (EMERGENCY)
Facility: HOSPITAL | Age: 79
Discharge: HOME | End: 2024-02-21
Attending: EMERGENCY MEDICINE | Admitting: EMERGENCY MEDICINE
Payer: MEDICARE

## 2024-02-21 VITALS
HEART RATE: 68 BPM | HEIGHT: 65 IN | WEIGHT: 182 LBS | TEMPERATURE: 98.3 F | OXYGEN SATURATION: 92 % | SYSTOLIC BLOOD PRESSURE: 144 MMHG | RESPIRATION RATE: 16 BRPM | DIASTOLIC BLOOD PRESSURE: 130 MMHG | BODY MASS INDEX: 30.32 KG/M2

## 2024-02-21 DIAGNOSIS — I10 UNCONTROLLED HYPERTENSION: ICD-10-CM

## 2024-02-21 DIAGNOSIS — R04.0 ACUTE ANTERIOR EPISTAXIS: Primary | ICD-10-CM

## 2024-02-21 PROCEDURE — 25010000002 COCAINE HCL 40 MG/ML SOLUTION: Performed by: EMERGENCY MEDICINE

## 2024-02-21 PROCEDURE — C9046 COCAINE HCL NASAL SOLUTION: HCPCS | Performed by: EMERGENCY MEDICINE

## 2024-02-21 PROCEDURE — 99282 EMERGENCY DEPT VISIT SF MDM: CPT

## 2024-02-21 RX ORDER — TRANEXAMIC ACID 100 MG/ML
500 INJECTION, SOLUTION INTRAVENOUS ONCE
Status: COMPLETED | OUTPATIENT
Start: 2024-02-21 | End: 2024-02-21

## 2024-02-21 RX ORDER — COCAINE HYDROCHLORIDE 40 MG/ML
SOLUTION NASAL ONCE
Status: COMPLETED | OUTPATIENT
Start: 2024-02-21 | End: 2024-02-21

## 2024-02-21 RX ADMIN — COCAINE HYDROCHLORIDE NASAL 160 MG: 40 SOLUTION TOPICAL at 21:38

## 2024-02-21 RX ADMIN — TRANEXAMIC ACID 500 MG: 100 INJECTION, SOLUTION INTRAVENOUS at 21:03

## 2024-02-22 NOTE — DISCHARGE INSTRUCTIONS
Your blood pressure is higher than usual today.  This is often due to pain and stress over being in the emergency department, and will correct itself.  It is important to check your blood pressure over the next few days to make sure it is improving.  If it is staying high, keep a log of blood pressures and take it to your primary doctor to discuss medication adjustments.  If you develop chest pain, trouble breathing, trouble speaking, weakness on one side, or a severe headache, call 911 or come to the ED right away.

## 2024-02-22 NOTE — ED PROVIDER NOTES
Subjective   History of Present Illness    Pt presents with nosebleed.  Left sided, onset about 10a.  She has been having issues with nosebleeds for several days.  Started while inpatient for pneumonia.  She was recently discharged to rehab.  She checked herself out of rehab today due to unhappiness with the conditions there.  Complains of ongoing generalized weakness since falling ill.    History provided by:  Patient      Review of Systems   Constitutional:  Negative for fever.   HENT:  Positive for nosebleeds.    Neurological:  Positive for weakness.   All other systems reviewed and are negative.      Past Medical History:   Diagnosis Date    Acute bronchopneumonia     Acute respiratory failure with hypoxia     Anxiety     Arthritis     AV block, 1st degree     Breast injury     recent fall in early June, bruising left breast    Cataract     left    Cellulitis of both lower extremities     CHF (congestive heart failure)     CKD (chronic kidney disease), stage III     CKD stage 3 secondary to diabetes     Colon cancer 2000    Colon polyp 2015    x 3 adenomatous    Colon polyp 04/18/2018    x 5 Dr Cash    COPD (chronic obstructive pulmonary disease)     COPD with acute exacerbation     Coronary artery disease     1 stent    Depression     Diabetes mellitus     dx 2 years ago- checks fsbs bid    Epistaxis     Gallstone     GI bleed 07/2018    Gout 03/13/2023    H/O echocardiogram 08/05/2018    Dr Greene, EF 55%, mild concentric hypertrophy, impaired relaxation    Hearing loss     History of atrial fibrillation     History of chemotherapy     11/2000    History of congestive heart failure     History of transfusion     approx 1999 - [facility]    Hyperlipidemia     Hypertension     Knee pain     Lactic acidosis     Muscle weakness (generalized)     Other chest pain     Pap smear for cervical cancer screening 2013    Pneumonia     Rectal bleeding     RLS (restless legs syndrome)     Sepsis,  unspecified organism     Sleep apnea     Type 2 diabetes mellitus with diabetic neuropathy, unspecified     Ventricular premature depolarization     Wears eyeglasses        Allergies   Allergen Reactions    Oxycodone Shortness Of Breath    Zolpidem Other (See Comments)     nightmares       Past Surgical History:   Procedure Laterality Date    APPENDECTOMY  1963    CARDIAC CATHETERIZATION  2015    no stents    CARDIAC CATHETERIZATION  2018    CARDIAC CATHETERIZATION N/A 2018    Procedure: Left Heart Cath;  Surgeon: Pierre Jones III, MD;  Location:  [facility];  Service: Cardiovascular    CARDIAC CATHETERIZATION N/A 2020    Procedure: LEFT HEART CATH;  Surgeon: Galileo See MD;  Location:  [facility];  Service: Cardiovascular;  Laterality: N/A;    CHOLECYSTECTOMY N/A 2018    Procedure: CHOLECYSTECTOMY LAPAROSCOPIC , LYSIS OF ADHESIONS;  Surgeon: Santos Pantoja MD;  [facility];  Service: General    COLON RESECTION  2000    colon cancer    COLONOSCOPY      COLONOSCOPY  2018    with 5 polyps removed 1yr f/u. Dr Cash- REPEAT YEARLY    CORONARY ANGIOPLASTY WITH STENT PLACEMENT  2015    CORONARY ARTERY BYPASS GRAFT      ENDOSCOPY      EYE SURGERY  2022    JOINT REPLACEMENT      MT ARTHRP KNE CONDYLE&PLATU MEDIAL&LAT COMPARTMENTS Left 2016    Procedure: LEFT TOTAL KNEE REPLACEMENT;  Surgeon: Laurent Zuniga MD;  [facility];  Service: Orthopedics    TONSILLECTOMY  1961    TOTAL KNEE ARTHROPLASTY Right 2008    revision 2010    TOTAL SHOULDER ARTHROPLASTY Bilateral     right , left        Family History   Problem Relation Age of Onset    Hypertension Mother          at the age 95 on her birthday. All her test were normal at that time    Colon cancer Father     Stroke Father     Diabetes Father     Cancer Father     Hypertension Father     Colon cancer Sister     Diabetes Sister      Cancer Sister     Diabetes Maternal Grandmother     Coronary artery disease Maternal Grandfather     Hearing loss Paternal Grandmother         Totally deaf from youth due to accident    No Known Problems Paternal Grandfather     Colon cancer Other     Diabetes Other     Heart disease Other     Hypertension Other     Stroke Other     Tuberculosis Other     Breast cancer Neg Hx     Ovarian cancer Neg Hx        Social History     Socioeconomic History    Marital status:     Number of children: 5   Tobacco Use    Smoking status: Former     Packs/day: 1.00     Years: 22.00     Additional pack years: 0.00     Total pack years: 22.00     Types: Cigarettes     Start date: 1955     Quit date: 1979     Years since quittin.1    Smokeless tobacco: Never    Tobacco comments:     Started age 10 quit age 32 on 1979   Vaping Use    Vaping Use: Never used   Substance and Sexual Activity    Alcohol use: Yes     Alcohol/week: 1.0 standard drink of alcohol     Types: 1 Glasses of wine per week     Comment: occas    Drug use: Never    Sexual activity: Not Currently     Partners: Male     Birth control/protection: Post-menopausal           Objective   Physical Exam  Vitals and nursing note reviewed.   Constitutional:       General: She is not in acute distress.     Appearance: Normal appearance. She is not ill-appearing.   HENT:      Head: Normocephalic and atraumatic.   Eyes:      General: No scleral icterus.        Right eye: No discharge.         Left eye: No discharge.      Conjunctiva/sclera: Conjunctivae normal.   Cardiovascular:      Rate and Rhythm: Normal rate.   Pulmonary:      Effort: Pulmonary effort is normal. No respiratory distress.   Musculoskeletal:         General: No swelling or deformity.      Cervical back: Normal range of motion and neck supple.   Skin:     General: Skin is dry.      Findings: No rash.   Neurological:      General: No focal deficit present.      Mental Status: She is alert.  Mental status is at baseline.   Psychiatric:         Mood and Affect: Mood normal.         Behavior: Behavior normal.         Thought Content: Thought content normal.         Epistaxis Management    Date/Time: 2/22/2024 12:27 AM    Performed by: Dwayne Alonzo MD  Authorized by: Dwayne Alonzo MD    Consent:     Consent obtained:  Verbal    Consent given by:  Patient    Risks discussed:  Pain  Universal protocol:     Procedure explained and questions answered to patient or proxy's satisfaction: yes      Patient identity confirmed:  Verbally with patient  Anesthesia:     Anesthesia method:  Topical application    Topical anesthetic:  Cocaine  Procedure details:     Treatment site:  L anterior    Treatment method:  Silver nitrate    Treatment complexity:  Limited    Treatment episode: initial    Post-procedure details:     Assessment:  Bleeding stopped    Procedure completion:  Tolerated well, no immediate complications             ED Course    BP higher than usual. Recommend BID checking and talk to PCP.    Epistaxis mgmt as above, successful.    Patient stable on serial rechecks.  Discussed findings, concerns, plan of care, expected course, reasons to return and followup.  Provided the opportunity to ask questions.                                           Medical Decision Making  Problems Addressed:  Acute anterior epistaxis: complicated acute illness or injury  Uncontrolled hypertension: complicated acute illness or injury    Risk  Prescription drug management.        Final diagnoses:   Acute anterior epistaxis   Uncontrolled hypertension       ED Disposition  ED Disposition       ED Disposition   Discharge    Condition   Stable    Comment   --               Bina Amaro, DO  [address] [facility]  [address] [place] [address]  [phone]    In 1 week           Medication List      No changes were made to your prescriptions during this visit.            Dwayne Alonzo MD  02/22/24  0028

## 2024-02-23 ENCOUNTER — PATIENT OUTREACH (OUTPATIENT)
Dept: CASE MANAGEMENT | Facility: OTHER | Age: 79
End: 2024-02-23
Payer: MEDICARE

## 2024-02-23 NOTE — OUTREACH NOTE
Patient Outreach    AMBULATORY CASE MANAGEMENT NOTE    Name and Relationship of Patient/Support Person: Angelita Shay Jigna - Self    ACM has attempted calling Mrs Shay 3 times today with no answer. She eventually returnscall and leaves a voice mail. We keep missing each others calls. Per her voice mail she says she is very sick, She reports being short of air, weak and having a nose bleed and she needs help right away. On my return call, no answer so I left a voice mail, I did advise she return to the ED for urgent care. I did inform her that as an ACM I could assist her in finding a rehab but the process would take at least a week or more due to need for home health eval and finding facility that will take her and initiate precert. PCP notified of above via inbasket.         Anita EVERETT  Ambulatory Case Management    2/23/2024, 13:53 EST

## 2024-02-26 NOTE — TELEPHONE ENCOUNTER
Caller: Angelita Shay    Relationship: Self    Best call back number: [phone]     Requested Prescriptions:   Requested Prescriptions     Pending Prescriptions Disp Refills    furosemide (LASIX) 40 MG tablet       Sig: Take 1 tablet by mouth Daily.        Pharmacy where request should be sent: [facility] [id number] [address] [facility] [phone] [facility] [phone] FX     Last office visit with prescribing clinician: 1/10/2024   Last telemedicine visit with prescribing clinician: Visit date not found   Next office visit with prescribing clinician: 4/10/2024     Additional details provided by patient: 1 DAYS LEFT    Does the patient have less than a 3 day supply:  [x] Yes  [] No    Would you like a call back once the refill request has been completed: [] Yes [x] No    If the office needs to give you a call back, can they leave a voicemail: [] Yes [x] No    Martha Flood Rep   02/26/24 15:51 EST

## 2024-02-27 RX ORDER — FUROSEMIDE 40 MG/1
40 TABLET ORAL DAILY
Start: 2024-02-27 | End: 2024-02-27 | Stop reason: SDUPTHER

## 2024-02-27 RX ORDER — FUROSEMIDE 40 MG/1
40 TABLET ORAL DAILY
Qty: 30 TABLET | Refills: 0 | Status: SHIPPED | OUTPATIENT
Start: 2024-02-27

## 2024-02-27 NOTE — TELEPHONE ENCOUNTER
Caller: Angelita Shay    Relationship: Self    Best call back number:     Requested Prescriptions:   Requested Prescriptions     Signed Prescriptions Disp Refills    furosemide (LASIX) 40 MG tablet       Sig: Take 1 tablet by mouth Daily.     Authorizing Provider: BILL PRASAD        Pharmacy where request should be sent: [facility] [id number] [address] [facility] [phone] [facility] [phone] FX     Last office visit with prescribing clinician: 1/10/2024   Last telemedicine visit with prescribing clinician: Visit date not found   Next office visit with prescribing clinician: 4/10/2024     Additional details provided by patient: PATIENT HAS ONE LEFT     Does the patient have less than a 3 day supply:  [x] Yes  [] No      Martha Sullivan Rep   02/27/24 15:12 EST

## 2024-02-28 ENCOUNTER — PATIENT OUTREACH (OUTPATIENT)
Dept: CASE MANAGEMENT | Facility: OTHER | Age: 79
End: 2024-02-28
Payer: MEDICARE

## 2024-02-28 NOTE — OUTREACH NOTE
Patient Outreach    AMBULATORY CASE MANAGEMENT NOTE    Name and Relationship of Patient/Support Person: Angelita Shay - Self    Fourth attempt call today to offer assistance to Mrs Shay. ACM will make no further attempts. PCP notified        Anita EVERETT  Ambulatory Case Management    2/28/2024, 11:18 EST

## 2024-03-05 ENCOUNTER — OFFICE VISIT (OUTPATIENT)
Dept: INTERNAL MEDICINE | Facility: CLINIC | Age: 79
End: 2024-03-05
Payer: MEDICARE

## 2024-03-05 VITALS
BODY MASS INDEX: 31.45 KG/M2 | TEMPERATURE: 97.1 F | RESPIRATION RATE: 18 BRPM | SYSTOLIC BLOOD PRESSURE: 164 MMHG | DIASTOLIC BLOOD PRESSURE: 106 MMHG | WEIGHT: 184.2 LBS | HEART RATE: 64 BPM | OXYGEN SATURATION: 90 % | HEIGHT: 64 IN

## 2024-03-05 DIAGNOSIS — I25.119 CORONARY ARTERY DISEASE INVOLVING NATIVE CORONARY ARTERY OF NATIVE HEART WITH ANGINA PECTORIS: ICD-10-CM

## 2024-03-05 DIAGNOSIS — I50.22 CHRONIC SYSTOLIC CONGESTIVE HEART FAILURE: ICD-10-CM

## 2024-03-05 DIAGNOSIS — E78.5 HYPERLIPIDEMIA LDL GOAL <70: ICD-10-CM

## 2024-03-05 DIAGNOSIS — F51.01 PRIMARY INSOMNIA: ICD-10-CM

## 2024-03-05 DIAGNOSIS — J18.0 BRONCHOPNEUMONIA: ICD-10-CM

## 2024-03-05 DIAGNOSIS — I48.0 PAROXYSMAL ATRIAL FIBRILLATION: ICD-10-CM

## 2024-03-05 DIAGNOSIS — I10 ESSENTIAL HYPERTENSION: ICD-10-CM

## 2024-03-05 DIAGNOSIS — E11.40 TYPE 2 DIABETES MELLITUS WITH DIABETIC NEUROPATHY, WITHOUT LONG-TERM CURRENT USE OF INSULIN: Primary | ICD-10-CM

## 2024-03-05 LAB
ALBUMIN SERPL-MCNC: 4.4 G/DL (ref 3.5–5.2)
ALBUMIN/GLOB SERPL: 1.3 G/DL
ALP SERPL-CCNC: 57 U/L (ref 39–117)
ALT SERPL W P-5'-P-CCNC: 16 U/L (ref 1–33)
ANION GAP SERPL CALCULATED.3IONS-SCNC: 14 MMOL/L (ref 5–15)
AST SERPL-CCNC: 21 U/L (ref 1–32)
BILIRUB SERPL-MCNC: 0.6 MG/DL (ref 0–1.2)
BUN SERPL-MCNC: 11 MG/DL (ref 8–23)
BUN/CREAT SERPL: 12.5 (ref 7–25)
CALCIUM SPEC-SCNC: 9.7 MG/DL (ref 8.6–10.5)
CHLORIDE SERPL-SCNC: 100 MMOL/L (ref 98–107)
CHOLEST SERPL-MCNC: 115 MG/DL (ref 0–200)
CO2 SERPL-SCNC: 26 MMOL/L (ref 22–29)
CREAT SERPL-MCNC: 0.88 MG/DL (ref 0.57–1)
EGFRCR SERPLBLD CKD-EPI 2021: 66.9 ML/MIN/1.73
EXPIRATION DATE: ABNORMAL
GLOBULIN UR ELPH-MCNC: 3.3 GM/DL
GLUCOSE SERPL-MCNC: 97 MG/DL (ref 65–99)
HBA1C MFR BLD: 6.1 % (ref 4.5–5.7)
HDLC SERPL-MCNC: 52 MG/DL (ref 40–60)
LDLC SERPL CALC-MCNC: 44 MG/DL (ref 0–100)
LDLC/HDLC SERPL: 0.82 {RATIO}
Lab: ABNORMAL
POTASSIUM SERPL-SCNC: 4.1 MMOL/L (ref 3.5–5.2)
PROT SERPL-MCNC: 7.7 G/DL (ref 6–8.5)
SODIUM SERPL-SCNC: 140 MMOL/L (ref 136–145)
TRIGL SERPL-MCNC: 101 MG/DL (ref 0–150)
VLDLC SERPL-MCNC: 19 MG/DL (ref 5–40)

## 2024-03-05 PROCEDURE — 80061 LIPID PANEL: CPT | Performed by: NURSE PRACTITIONER

## 2024-03-05 PROCEDURE — 80053 COMPREHEN METABOLIC PANEL: CPT | Performed by: NURSE PRACTITIONER

## 2024-03-05 PROCEDURE — 85025 COMPLETE CBC W/AUTO DIFF WBC: CPT | Performed by: NURSE PRACTITIONER

## 2024-03-05 RX ORDER — TRAZODONE HYDROCHLORIDE 100 MG/1
100 TABLET ORAL NIGHTLY PRN
Qty: 90 TABLET | Refills: 0 | Status: SHIPPED | OUTPATIENT
Start: 2024-03-05

## 2024-03-05 RX ORDER — FUROSEMIDE 40 MG/1
40 TABLET ORAL DAILY
Qty: 90 TABLET | Refills: 0 | Status: SHIPPED | OUTPATIENT
Start: 2024-03-05

## 2024-03-05 NOTE — PROGRESS NOTES
New Patient Office Visit      Patient Name: Angelita Shay  : 1945   MRN: [id number]     Chief Complaint:    Chief Complaint   Patient presents with    Nose Bleed     New patient, ER follow up [facility]    Hypertension    Pneumonia     Hospital follow up, [facility] 24-24       History of Present Illness: Angelita Shay is a 78 y.o. female presents to clinic today to establish care.      Angelita Shay is a 78-year-old female who presents as a new patient with past medical history of chronic kidney disease stage 3, gout, hypertension, coronary artery disease, congestive heart failure, and hyperlipidemia.    She was recently admitted for bronchopneumonia.     Hypertension  Blood pressure has been uncontrolled at home. It has been ranging between 160 to 170s systolic over 108 to 110 diastolic. Prior to the hospitalization, her blood pressure was controlled on carvedilol 25 mg twice a day. In the past, she has taken Entresto, losartan, hydrochlorothiazide, and amlodipine. She did not tolerate the Entresto. It made her feel like a zombie, so she discontinued it on her own. She does not remember taking amlodipine if she tolerated that before. She does not have any angina, dyspnea, edema, syncope, blurred vision, or palpitations.    Hyperlipidemia  She does take rosuvastatin 20 mg daily. She has tolerated that well. Her last LDL was 94 mg/dL.    Diabetic neuropathy  She has diabetic neuropathy. She utilizes gabapentin 400 mg 4 times a day. It is tolerated well.     Diabetes  She is a diabetic. Her last A1c showed her diabetes was controlled. She does not take an ARB. She takes Ozempic 1 mg weekly in addition to metformin 750 mg twice a day as well as Jardiance 10 mg daily.    Chronic kidney disease stage 3  EGFR 50s in the hospitalization, creatinine 1.13 mg/dL.    History of bronchopneumonia  The patient was admitted to [facility] on 2024 through 2024 for  bronchopneumonia and was discharged to a rehab facility. The patient was unhappy with the conditions in the rehab and checked herself out. She is an ex-smoker, but no breathing problems recently prior to her pneumonia other than a few episodes of bronchitis. She does live with smokers. She has been monitoring her oxygen saturations and they have been ranging between 92 to 93 percent. No sputum or wheezing. She does not utilize any nebulizer or oxygen. She was given antibiotics and steroids in the hospital. Since the discharge, she has had 2 episodes of nosebleeds. She was treated in the emergency room with successful hemostasis.  She did have 1 more episode 3 or 4 days later, but she was able to stop the bleeding on her own. She is not taking her Eliquis or aspirin since the discharge from the hospital due to nosebleeds. Since the hospital discharge, she is feeling like she is improving and getting back to her baseline. CT in the hospital revealed no evidence of pulmonary embolism and findings were consistent with an acute bronchopneumonia. She was told she had COPD.   CT chest for pulmonary embolus (02/12/2024 18:34)   Impression    1.  No evidence of pulmonary embolism.  2.  Findings are most consistent with a multifocal acute  bronchopneumonia.  3.  Follow-up chest CT with IV contrast in 4-6 weeks is recommended.      Stomach virus  Over the last couple of days, she has experienced a stomach virus which has improved. She has had some slight fatigue. No fevers, chills, abdominal pain, angina, dyspnea, or palpitations. No nausea, vomiting, or diarrhea since yesterday, 03/04/2024.    History of heart failure  She has a history of heart failure. Last ejection fraction was 60 percent on 02/08/2024. She is on Lasix 40 mg daily, and Coreg twice a day. Patient has a history of atrial fibrillation, stoke and coronary artery disease. She has had a stent. No cardiac surgery.    Insomnia  She is taking trazodone for insomnia  and it is effective.     Are you currently seeing any other doctors or specialists?   Jamir-cardiology  [facility]-nephrology ([facility])       She has been homeless is living with her daughter; she has plans to obtain a home in [place].       Lab Results   Component Value Date    CHOL 115 03/05/2024    CHOL 171 01/30/2023    CHOL 175 07/14/2020    CHLPL 200 07/23/2018    CHLPL 211 (H) 06/04/2015    CHLPL 164 01/29/2015     Lab Results   Component Value Date    TRIG 101 03/05/2024    TRIG 192 (H) 01/30/2023    TRIG 309 (H) 07/14/2020     Lab Results   Component Value Date    HDL 52 03/05/2024    HDL 44 01/30/2023    HDL 36 (L) 07/14/2020     Lab Results   Component Value Date    LDL 44 03/05/2024    LDL 94 01/30/2023    LDL 77 07/14/2020      Subjective     Review of System: Review of Systems   Constitutional:  Positive for fatigue. Negative for chills and fever.   Respiratory:  Negative for shortness of breath and wheezing.    Cardiovascular:  Negative for chest pain, palpitations and leg swelling.   Gastrointestinal:  Negative for abdominal pain, diarrhea, nausea and vomiting.   Neurological:  Negative for syncope.          Past Medical History:   Past Medical History:   Diagnosis Date    Acute bronchopneumonia     Acute respiratory failure with hypoxia     Anxiety     Arthritis     AV block, 1st degree     Breast injury     recent fall in early June, bruising left breast    Cataract     left    Cellulitis of both lower extremities     CHF (congestive heart failure)     CKD (chronic kidney disease), stage III     CKD stage 3 secondary to diabetes     Colon cancer 2000    Colon polyp 2015    x 3 adenomatous    Colon polyp 04/18/2018    x 5 Dr Cash    COPD (chronic obstructive pulmonary disease)     COPD with acute exacerbation     Coronary artery disease     1 stent    Depression     Diabetes mellitus     dx 2 years ago- checks fsbs bid    Epistaxis     Gallstone     GI bleed 07/2018    Gout 03/13/2023     H/O echocardiogram 08/05/2018    Dr Greene, EF 55%, mild concentric hypertrophy, impaired relaxation    Hearing loss     History of atrial fibrillation     History of chemotherapy     11/2000    History of congestive heart failure     History of transfusion     approx 1999 - [facility]    Hyperlipidemia     Hypertension     Knee pain     Lactic acidosis     Muscle weakness (generalized)     Other chest pain     Pap smear for cervical cancer screening 2013    Pneumonia     Rectal bleeding     RLS (restless legs syndrome)     Sepsis, unspecified organism     Sleep apnea     Type 2 diabetes mellitus with diabetic neuropathy, unspecified     Ventricular premature depolarization     Wears eyeglasses        Past Surgical History:   Past Surgical History:   Procedure Laterality Date    APPENDECTOMY  05/1963    CARDIAC CATHETERIZATION  08/2015    no stents    CARDIAC CATHETERIZATION  03/13/2018    CARDIAC CATHETERIZATION N/A 03/13/2018    Procedure: Left Heart Cath;  Surgeon: Pierre Jones III, MD;  Location:  [facility];  Service: Cardiovascular    CARDIAC CATHETERIZATION N/A 07/14/2020    Procedure: LEFT HEART CATH;  Surgeon: Galileo See MD;  Location:  [facility];  Service: Cardiovascular;  Laterality: N/A;    CHOLECYSTECTOMY N/A 08/01/2018    Procedure: CHOLECYSTECTOMY LAPAROSCOPIC , LYSIS OF ADHESIONS;  Surgeon: Santos Pantoja MD;  Location:  [facility] OR;  Service: General    COLON RESECTION  04/08/2000    colon cancer    COLONOSCOPY  2015    COLONOSCOPY  04/18/2018    with 5 polyps removed 1yr f/u. Dr Cash- REPEAT YEARLY    CORONARY ANGIOPLASTY WITH STENT PLACEMENT  12/2015    CORONARY ARTERY BYPASS GRAFT      ENDOSCOPY      EYE SURGERY  09/2022    JOINT REPLACEMENT      [place] ARTHRP KNE CONDYLE&PLATU MEDIAL&LAT COMPARTMENTS Left 11/03/2016    Procedure: LEFT TOTAL KNEE REPLACEMENT;  Surgeon: Laurent Zuniga MD;  Location:  [facility];  Service:  Orthopedics    TONSILLECTOMY  1961    TOTAL KNEE ARTHROPLASTY Right 2008    revision 2010    TOTAL SHOULDER ARTHROPLASTY Bilateral     right , left        Family History:   Family History   Problem Relation Age of Onset    Hypertension Mother          at the age 95 on her birthday. All her test were normal at that time    Colon cancer Father     Stroke Father     Diabetes Father     Cancer Father     Hypertension Father     Colon cancer Sister     Diabetes Sister     Cancer Sister     Diabetes Maternal Grandmother     Coronary artery disease Maternal Grandfather     Hearing loss Paternal Grandmother         Totally deaf from youth due to accident    No Known Problems Paternal Grandfather     Colon cancer Other     Diabetes Other     Heart disease Other     Hypertension Other     Stroke Other     Tuberculosis Other     Breast cancer Neg Hx     Ovarian cancer Neg Hx        Social History:   Social History     Socioeconomic History    Marital status:     Number of children: 5   Tobacco Use    Smoking status: Former     Current packs/day: 0.00     Average packs/day: 1 pack/day for 23.3 years (23.3 ttl pk-yrs)     Types: Cigarettes     Start date: 1955     Quit date: 1979     Years since quittin.2    Smokeless tobacco: Never    Tobacco comments:     Started age 10 quit age 32 on 1979   Vaping Use    Vaping status: Never Used   Substance and Sexual Activity    Alcohol use: Yes     Alcohol/week: 1.0 standard drink of alcohol     Types: 1 Glasses of wine per week     Comment: occas    Drug use: Never    Sexual activity: Not Currently     Partners: Male     Birth control/protection: Post-menopausal       Medications:     Current Outpatient Medications:     acetaminophen (TYLENOL) 500 MG tablet, Take 1 tablet by mouth Every 6 (Six) Hours As Needed for Mild Pain., Disp: , Rfl:     carvedilol (COREG) 25 MG tablet, TAKE ONE TABLET BY MOUTH TWICE A DAY WITH MEALS, Disp: 180 tablet, Rfl: 1    " furosemide (LASIX) 40 MG tablet, Take 1 tablet by mouth Daily., Disp: 90 tablet, Rfl: 0    gabapentin (NEURONTIN) 400 MG capsule, Take 1 capsule by mouth 4 (Four) Times a Day., Disp: 120 capsule, Rfl: 2    Glucose Blood (Blood Glucose Test) strip, Use once daily--pt requests Accucheck test strips--dx e11.9, Disp: 100 each, Rfl: 11    Jardiance 10 MG tablet tablet, TAKE 1 TABLET BY MOUTH DAILY, Disp: 90 tablet, Rfl: 1    metFORMIN ER (GLUCOPHAGE-XR) 750 MG 24 hr tablet, Take 1 tablet by mouth 2 (Two) Times a Day., Disp: 60 tablet, Rfl: 3    rOPINIRole (REQUIP) 2 MG tablet, Take 1 tablet by mouth At Night As Needed., Disp: , Rfl:     rosuvastatin (CRESTOR) 20 MG tablet, Take 1 tablet by mouth Every Night., Disp: 90 tablet, Rfl: 1    traZODone (DESYREL) 100 MG tablet, Take 1 tablet by mouth At Night As Needed for Sleep., Disp: 90 tablet, Rfl: 0    vitamin B-12 (CYANOCOBALAMIN) 1000 MCG tablet, Take 1 tablet by mouth Daily., Disp: , Rfl:     losartan (Cozaar) 50 MG tablet, Take 1 tablet by mouth Daily., Disp: 90 tablet, Rfl: 0    Allergies:   Allergies   Allergen Reactions    Oxycodone Shortness Of Breath    Zolpidem Other (See Comments)     nightmares       Objective     Physical Exam:   Vital Signs:   Vitals:    03/05/24 1344   BP: (!) 164/106   BP Location: Right arm   Patient Position: Sitting   Cuff Size: Adult   Pulse: 64   Resp: 18   Temp: 97.1 °F (36.2 °C)   TempSrc: Infrared   SpO2: 90%   Weight: 83.6 kg (184 lb 3.2 oz)   Height: 161.3 cm (63.5\")   PainSc:   7     Body mass index is 32.12 kg/m².  Information provided on after visit summary.        Physical Exam  Constitutional:       General: She is not in acute distress.     Appearance: She is obese. She is not ill-appearing.   HENT:      Head: Normocephalic.   Cardiovascular:      Rate and Rhythm: Normal rate and regular rhythm.      Heart sounds: Normal heart sounds. No murmur heard.  Pulmonary:      Breath sounds: Normal breath sounds.   Abdominal:      " General: Bowel sounds are normal.      Tenderness: There is no abdominal tenderness.   Neurological:      General: No focal deficit present.      Mental Status: She is oriented to person, place, and time.   Psychiatric:         Mood and Affect: Mood normal.       Assessment / Plan      Assessment/Plan:   Diagnoses and all orders for this visit:    1. Type 2 diabetes mellitus with diabetic neuropathy, without long-term current use of insulin (Primary)  -     POC Glycosylated Hemoglobin (Hb A1C); Future  -     Comprehensive Metabolic Panel; Future  -     CBC & Differential; Future  -     Comprehensive Metabolic Panel  -     CBC & Differential  -     POC Glycosylated Hemoglobin (Hb A1C)    2. Primary insomnia  -     traZODone (DESYREL) 100 MG tablet; Take 1 tablet by mouth At Night As Needed for Sleep.  Dispense: 90 tablet; Refill: 0    3. Chronic systolic congestive heart failure  -     furosemide (LASIX) 40 MG tablet; Take 1 tablet by mouth Daily.  Dispense: 90 tablet; Refill: 0    4. Hyperlipidemia LDL goal <70  -     Lipid panel; Future  -     Lipid panel    5. Bronchopneumonia  -     CT Chest With Contrast; Future    6. Coronary artery disease involving native coronary artery of native heart with angina pectoris    7. Paroxysmal atrial fibrillation    8. Essential hypertension  -     losartan (Cozaar) 50 MG tablet; Take 1 tablet by mouth Daily.  Dispense: 90 tablet; Refill: 0       1. Type 2 diabetes mellitus with diabetic neuropathy, without long-term current use of insulin (Primary)  -     She will continue her diabetic regimen of Jardiance 10 mg daily, metformin 750 mg twice a day, and Ozempic 1 mg weekly.    2. Chronic systolic congestive heart failure  -     I restart Eliquis and aspirin one week after no nose bleeds;   -     Regular rhythm today.  Results for orders placed during the hospital encounter of 02/08/24    Adult Transthoracic Echo Complete w/ Color, Spectral and Contrast if necessary per  protocol    Interpretation Summary    Left ventricular systolic function is normal. Estimated left ventricular EF = 60%    Left ventricular diastolic function is consistent with (grade I) impaired relaxation.    There is calcification of the aortic valve.  No significant aortic valve stenosis or regurgitation present   Continue lasix.     3. Acute bronchopneumonia  -     A follow-up chest CT with IV contrast in 4 to 6 weeks is recommended.    4. Hypertension  -     Creatinine wnl; start losartan 50 mg daily.     5. Diaebetic neuropathy  -     Symptoms are improved on gabapentin 400 mg 4 times a day.  The patient is taking the following controlled substance(s) on a long term (greater than three months) basis: gabapentin.  Patient  is taking controlled substances for diabetic neuropathy.  A history was obtained from the patient and the following were reviewed: family history, social history, psychiatric history, and substance abuse history.  A baseline assessment was performed and includes a controlled substance agreement, urine drug screen, PRINCE (within 12 months prior to today's encounter), and a physical exam, if appropriate, was performed within the past year.  It is medically appropriate to prescribe her the medication(s) above.  If applicable, prior treatment records were obtained and reviewed to justify long term prescribing of controlled substances.  The patient was educated on the following: Limited use of the medication, need to discontinue the medication when her condition resolves, proper storage and disposal of medication(s), and risks and dangers associated with controlled substances including tolerance, dependence, and addiction.  A written informed consent was obtained and includes objectives of treatment, further diagnostic testing if appropriate, and an exit strategy for discontinuing the medication on the condition resolves, if appropriate.  In addition, if appropriate, the patient will be  screened for other medical conditions that may impact the prescribing of controlled substances.  Previous treatments have been tried including trials of noncontrolled substances or lower doses of controlled substances.  The controlled medication(s) have been titrated to the level appropriate and necessary and the medication(s) are not causing unacceptable side effects.    6. Afib   -    Eliquis stopped due to nose bleeds  -     Restart eliquis and aspirin one week after no nose bleeds;   -     Regular rhythm today.  [id number] SCORE    7    I explained and discussed patient's condition and plan of care.  Discussed when to follow-up.  Discussed possible red flags and how to follow-up with those.  Viewed patient's medications and discussed common side effects. Patient to continue current medications as advised.  Be compliant with medications. Patient to let me know if she has any changes in health, does not tolerate medication, or any future concerns about treatment. Patient verbalized understanding and agreement with plan of care.     Follow Up:   Return in about 6 weeks (around 4/16/2024) for Recheck.    STONEY Dobbins  [facility]    Transcribed from ambient dictation for STONEY Dobbins by Pepe Kat.  03/05/24   15:49 EST    Patient or patient representative verbalized consent to the visit recording.  I have personally performed the services described in this document as transcribed by the above individual, and it is both accurate and complete.

## 2024-03-06 LAB
BASOPHILS # BLD AUTO: 0.03 10*3/MM3 (ref 0–0.2)
BASOPHILS NFR BLD AUTO: 0.5 % (ref 0–1.5)
DEPRECATED RDW RBC AUTO: 45 FL (ref 37–54)
EOSINOPHIL # BLD AUTO: 0.3 10*3/MM3 (ref 0–0.4)
EOSINOPHIL NFR BLD AUTO: 4.6 % (ref 0.3–6.2)
ERYTHROCYTE [DISTWIDTH] IN BLOOD BY AUTOMATED COUNT: 13.4 % (ref 12.3–15.4)
HCT VFR BLD AUTO: 44.9 % (ref 34–46.6)
HGB BLD-MCNC: 15.1 G/DL (ref 12–15.9)
IMM GRANULOCYTES # BLD AUTO: 0.02 10*3/MM3 (ref 0–0.05)
IMM GRANULOCYTES NFR BLD AUTO: 0.3 % (ref 0–0.5)
LYMPHOCYTES # BLD AUTO: 1.36 10*3/MM3 (ref 0.7–3.1)
LYMPHOCYTES NFR BLD AUTO: 20.9 % (ref 19.6–45.3)
MCH RBC QN AUTO: 30.8 PG (ref 26.6–33)
MCHC RBC AUTO-ENTMCNC: 33.6 G/DL (ref 31.5–35.7)
MCV RBC AUTO: 91.4 FL (ref 79–97)
MONOCYTES # BLD AUTO: 0.48 10*3/MM3 (ref 0.1–0.9)
MONOCYTES NFR BLD AUTO: 7.4 % (ref 5–12)
NEUTROPHILS NFR BLD AUTO: 4.31 10*3/MM3 (ref 1.7–7)
NEUTROPHILS NFR BLD AUTO: 66.3 % (ref 42.7–76)
NRBC BLD AUTO-RTO: 0 /100 WBC (ref 0–0.2)
PLATELET # BLD AUTO: 222 10*3/MM3 (ref 140–450)
PMV BLD AUTO: 12 FL (ref 6–12)
RBC # BLD AUTO: 4.91 10*6/MM3 (ref 3.77–5.28)
WBC NRBC COR # BLD AUTO: 6.5 10*3/MM3 (ref 3.4–10.8)

## 2024-03-06 RX ORDER — LOSARTAN POTASSIUM 50 MG/1
50 TABLET ORAL DAILY
Qty: 90 TABLET | Refills: 0 | Status: SHIPPED | OUTPATIENT
Start: 2024-03-06

## 2024-03-07 NOTE — PATIENT INSTRUCTIONS
MyPlate from USDA  MyPlate is an outline of a general healthy diet based on the Dietary Guidelines for Americans, 8024-6998, from the U.S. Department of Agriculture (USDA). It sets guidelines for how much food you should eat from each food group based on your age, sex, and level of physical activity.  What are tips for following MyPlate?  To follow MyPlate recommendations:  Eat a wide variety of fruits and vegetables, grains, and protein foods.  Serve smaller portions and eat less food throughout the day.  Limit portion sizes to avoid overeating.  Enjoy your food.  Get at least 150 minutes of exercise every week. This is about 30 minutes each day, 5 or more days per week.  It can be difficult to have every meal look like MyPlate. Think about MyPlate as eating guidelines for an entire day, rather than each individual meal.  Fruits and vegetables  Make one half of your plate fruits and vegetables.  Eat many different colors of fruits and vegetables each day.  For a 2,000-calorie daily food plan, eat:  2½ cups of vegetables every day.  2 cups of fruit every day.  1 cup is equal to:  1 cup raw or cooked vegetables.  1 cup raw fruit.  1 medium-sized orange, apple, or banana.  1 cup 100% fruit or vegetable juice.  2 cups raw leafy greens, such as lettuce, spinach, or kale.  ½ cup dried fruit.  Grains  One fourth of your plate should be grains.  Make at least half of the grains you eat each day whole grains.  For a 2,000-calorie daily food plan, eat 6 oz of grains every day.  1 oz is equal to:  1 slice bread.  1 cup cereal.  ½ cup cooked rice, cereal, or pasta.  Protein  One fourth of your plate should be protein.  Eat a wide variety of protein foods, including meat, poultry, fish, eggs, beans, nuts, and tofu.  For a 2,000-calorie daily food plan, eat 5½ oz of protein every day.  1 oz is equal to:  1 oz meat, poultry, or fish.  ¼ cup cooked beans.  1 egg.  ½ oz nuts or seeds.  1 Tbsp peanut butter.  Dairy  Drink fat-free  or low-fat (1%) milk.  Eat or drink dairy as a side to meals.  For a 2,000-calorie daily food plan, eat or drink 3 cups of dairy every day.  1 cup is equal to:  1 cup milk, yogurt, cottage cheese, or soy milk (soy beverage).  2 oz processed cheese.  1½ oz natural cheese.  Fats, oils, salt, and sugars  Only small amounts of oils are recommended.  Avoid foods that are high in calories and low in nutritional value (empty calories), like foods high in fat or added sugars.  Choose foods that are low in salt (sodium). Choose foods that have less than 140 milligrams (mg) of sodium per serving.  Drink water instead of sugary drinks. Drink enough fluid to keep your urine pale yellow.  Where to find support  Work with your health care provider or a dietitian to develop a customized eating plan that is right for you.  Download an trever (mobile application) to help you track your daily food intake.  Where to find more information  USDA: ChooseMyPlate.gov  Summary  MyPlate is a general guideline for healthy eating from the USDA. It is based on the Dietary Guidelines for Americans, 1547-2243.  In general, fruits and vegetables should take up one half of your plate, grains should take up one fourth of your plate, and protein should take up one fourth of your plate.  This information is not intended to replace advice given to you by your health care provider. Make sure you discuss any questions you have with your health care provider.  Document Revised: 11/08/2021 Document Reviewed: 11/08/2021  [employer] Patient Education © 2022 [employer]

## 2024-03-14 ENCOUNTER — APPOINTMENT (OUTPATIENT)
Dept: CT IMAGING | Facility: HOSPITAL | Age: 79
End: 2024-03-14
Payer: MEDICARE

## 2024-03-14 ENCOUNTER — TELEPHONE (OUTPATIENT)
Dept: INTERNAL MEDICINE | Facility: CLINIC | Age: 79
End: 2024-03-14
Payer: MEDICARE

## 2024-03-14 ENCOUNTER — HOSPITAL ENCOUNTER (EMERGENCY)
Facility: HOSPITAL | Age: 79
Discharge: HOME | End: 2024-03-15
Attending: EMERGENCY MEDICINE
Payer: MEDICARE

## 2024-03-14 ENCOUNTER — APPOINTMENT (OUTPATIENT)
Dept: GENERAL RADIOLOGY | Facility: HOSPITAL | Age: 79
End: 2024-03-14
Payer: MEDICARE

## 2024-03-14 DIAGNOSIS — R51.9 GENERALIZED HEADACHE: ICD-10-CM

## 2024-03-14 DIAGNOSIS — R91.1 PULMONARY NODULE: ICD-10-CM

## 2024-03-14 DIAGNOSIS — J18.9 PNEUMONIA OF RIGHT MIDDLE LOBE DUE TO INFECTIOUS ORGANISM: ICD-10-CM

## 2024-03-14 DIAGNOSIS — I10 HYPERTENSION, UNSPECIFIED TYPE: Primary | ICD-10-CM

## 2024-03-14 LAB
ALBUMIN SERPL-MCNC: 4.6 G/DL (ref 3.5–5.2)
ALBUMIN/GLOB SERPL: 1.4 G/DL
ALP SERPL-CCNC: 58 U/L (ref 39–117)
ALT SERPL W P-5'-P-CCNC: 15 U/L (ref 1–33)
ANION GAP SERPL CALCULATED.3IONS-SCNC: 15 MMOL/L (ref 5–15)
AST SERPL-CCNC: 21 U/L (ref 1–32)
BASOPHILS # BLD AUTO: 0.07 10*3/MM3 (ref 0–0.2)
BASOPHILS NFR BLD AUTO: 0.9 % (ref 0–1.5)
BILIRUB SERPL-MCNC: 0.6 MG/DL (ref 0–1.2)
BUN SERPL-MCNC: 19 MG/DL (ref 8–23)
BUN/CREAT SERPL: 18.1 (ref 7–25)
CALCIUM SPEC-SCNC: 9.7 MG/DL (ref 8.6–10.5)
CHLORIDE SERPL-SCNC: 96 MMOL/L (ref 98–107)
CO2 SERPL-SCNC: 26 MMOL/L (ref 22–29)
CREAT SERPL-MCNC: 1.05 MG/DL (ref 0.57–1)
DEPRECATED RDW RBC AUTO: 47 FL (ref 37–54)
EGFRCR SERPLBLD CKD-EPI 2021: 54.2 ML/MIN/1.73
EOSINOPHIL # BLD AUTO: 0.44 10*3/MM3 (ref 0–0.4)
EOSINOPHIL NFR BLD AUTO: 5.5 % (ref 0.3–6.2)
ERYTHROCYTE [DISTWIDTH] IN BLOOD BY AUTOMATED COUNT: 13.8 % (ref 12.3–15.4)
GEN 5 2HR TROPONIN T REFLEX: 15 NG/L
GLOBULIN UR ELPH-MCNC: 3.3 GM/DL
GLUCOSE SERPL-MCNC: 136 MG/DL (ref 65–99)
HCT VFR BLD AUTO: 47 % (ref 34–46.6)
HGB BLD-MCNC: 15.5 G/DL (ref 12–15.9)
HOLD SPECIMEN: NORMAL
IMM GRANULOCYTES # BLD AUTO: 0.01 10*3/MM3 (ref 0–0.05)
IMM GRANULOCYTES NFR BLD AUTO: 0.1 % (ref 0–0.5)
LIPASE SERPL-CCNC: 70 U/L (ref 13–60)
LYMPHOCYTES # BLD AUTO: 1.97 10*3/MM3 (ref 0.7–3.1)
LYMPHOCYTES NFR BLD AUTO: 24.4 % (ref 19.6–45.3)
MCH RBC QN AUTO: 30.5 PG (ref 26.6–33)
MCHC RBC AUTO-ENTMCNC: 33 G/DL (ref 31.5–35.7)
MCV RBC AUTO: 92.5 FL (ref 79–97)
MONOCYTES # BLD AUTO: 0.64 10*3/MM3 (ref 0.1–0.9)
MONOCYTES NFR BLD AUTO: 7.9 % (ref 5–12)
NEUTROPHILS NFR BLD AUTO: 4.94 10*3/MM3 (ref 1.7–7)
NEUTROPHILS NFR BLD AUTO: 61.2 % (ref 42.7–76)
NRBC BLD AUTO-RTO: 0 /100 WBC (ref 0–0.2)
NT-PROBNP SERPL-MCNC: 632.7 PG/ML (ref 0–1800)
PLATELET # BLD AUTO: 185 10*3/MM3 (ref 140–450)
PMV BLD AUTO: 11.3 FL (ref 6–12)
POTASSIUM SERPL-SCNC: 3.6 MMOL/L (ref 3.5–5.2)
PROT SERPL-MCNC: 7.9 G/DL (ref 6–8.5)
QT INTERVAL: 350 MS
QTC INTERVAL: 442 MS
RBC # BLD AUTO: 5.08 10*6/MM3 (ref 3.77–5.28)
SODIUM SERPL-SCNC: 137 MMOL/L (ref 136–145)
TROPONIN T DELTA: -1 NG/L
TROPONIN T SERPL HS-MCNC: 16 NG/L
WBC NRBC COR # BLD AUTO: 8.07 10*3/MM3 (ref 3.4–10.8)
WHOLE BLOOD HOLD COAG: NORMAL
WHOLE BLOOD HOLD SPECIMEN: NORMAL

## 2024-03-14 PROCEDURE — 25510000001 IOPAMIDOL PER 1 ML: Performed by: EMERGENCY MEDICINE

## 2024-03-14 PROCEDURE — 80053 COMPREHEN METABOLIC PANEL: CPT | Performed by: EMERGENCY MEDICINE

## 2024-03-14 PROCEDURE — 71275 CT ANGIOGRAPHY CHEST: CPT

## 2024-03-14 PROCEDURE — 93005 ELECTROCARDIOGRAM TRACING: CPT | Performed by: EMERGENCY MEDICINE

## 2024-03-14 PROCEDURE — 83690 ASSAY OF LIPASE: CPT | Performed by: EMERGENCY MEDICINE

## 2024-03-14 PROCEDURE — 83880 ASSAY OF NATRIURETIC PEPTIDE: CPT | Performed by: EMERGENCY MEDICINE

## 2024-03-14 PROCEDURE — 36415 COLL VENOUS BLD VENIPUNCTURE: CPT

## 2024-03-14 PROCEDURE — 71045 X-RAY EXAM CHEST 1 VIEW: CPT

## 2024-03-14 PROCEDURE — 85025 COMPLETE CBC W/AUTO DIFF WBC: CPT | Performed by: EMERGENCY MEDICINE

## 2024-03-14 PROCEDURE — 84484 ASSAY OF TROPONIN QUANT: CPT | Performed by: EMERGENCY MEDICINE

## 2024-03-14 PROCEDURE — 93005 ELECTROCARDIOGRAM TRACING: CPT

## 2024-03-14 PROCEDURE — 70450 CT HEAD/BRAIN W/O DYE: CPT

## 2024-03-14 PROCEDURE — 99285 EMERGENCY DEPT VISIT HI MDM: CPT

## 2024-03-14 RX ORDER — DOXYCYCLINE 100 MG/1
100 CAPSULE ORAL 2 TIMES DAILY
Qty: 20 CAPSULE | Refills: 0 | Status: SHIPPED | OUTPATIENT
Start: 2024-03-14 | End: 2024-03-24

## 2024-03-14 RX ORDER — SODIUM CHLORIDE 0.9 % (FLUSH) 0.9 %
10 SYRINGE (ML) INJECTION AS NEEDED
Status: DISCONTINUED | OUTPATIENT
Start: 2024-03-14 | End: 2024-03-15 | Stop reason: HOSPADM

## 2024-03-14 RX ORDER — ASPIRIN 81 MG/1
324 TABLET, CHEWABLE ORAL ONCE
Status: COMPLETED | OUTPATIENT
Start: 2024-03-14 | End: 2024-03-14

## 2024-03-14 RX ADMIN — ASPIRIN 243 MG: 81 TABLET, CHEWABLE ORAL at 18:28

## 2024-03-14 RX ADMIN — IOPAMIDOL 80 ML: 755 INJECTION, SOLUTION INTRAVENOUS at 22:06

## 2024-03-14 NOTE — TELEPHONE ENCOUNTER
Patient called to say that her Blood Pressure is 179/128. She has headache and dizziness. I spoke with STONEY Dobbins, and she stated that patient needs to go to ER. I gave this message to the patient. She was in [place] and said that she would go to the ER as soon as she gets back to [place], around 5:00 PM. She was advised that the sooner she went to the ER, the better. Call: [phone]

## 2024-03-14 NOTE — ED PROVIDER NOTES
[place]    EMERGENCY DEPARTMENT ENCOUNTER      Pt Name: Angelita Shay  MRN: [id number]  YOB: 1945  Date of evaluation: 3/14/2024  Provider: Pedrito Perez DO    CHIEF COMPLAINT       Chief Complaint   Patient presents with    Hypertension         HISTORY OF PRESENT ILLNESS  (Location/Symptom, Timing/Onset, Context/Setting, Quality, Duration, Modifying Factors, Severity.)   Angelita Shay is a 78 y.o. female who presents to the emergency department for evaluation of elevated blood pressures, mainly her diastolic numbers which have been elevated over the last week or so.  Blood pressures as far as diastolics between 100-114.  She saw her PCP last week with these numbers was recently started on losartan, has been taking her carvedilol and Lasix.  Was a [facility] couple week ago with severe pneumonia, recurrent nosebleeds, finished upper antibiotics.  She does have a generalized headache, no unilateral weakness.  She is a history of CHF, lives with her daughter.  She does follow cardiologist, Dr. Bowie.  Denies any chest pain, no fall, injury or head trauma.  Denies any other acute systemic complaints at this time.    Nursing notes were reviewed.      PAST MEDICAL HISTORY     Past Medical History:   Diagnosis Date    Acute bronchopneumonia     Acute respiratory failure with hypoxia     Anxiety     Arthritis     AV block, 1st degree     Breast injury     recent fall in early June, bruising left breast    Cataract     left    Cellulitis of both lower extremities     CHF (congestive heart failure)     CKD (chronic kidney disease), stage III     CKD stage 3 secondary to diabetes     Colon cancer 2000    Colon polyp 2015    x 3 adenomatous    Colon polyp 04/18/2018    x 5 Dr Cash    COPD (chronic obstructive pulmonary disease)     COPD with acute exacerbation     Coronary artery disease     1 stent    Depression     Diabetes mellitus     dx 2 years ago- checks fsbs bid     Epistaxis     Gallstone     GI bleed 07/2018    Gout 03/13/2023    H/O echocardiogram 08/05/2018    Dr Greene, EF 55%, mild concentric hypertrophy, impaired relaxation    Hearing loss     History of atrial fibrillation     History of chemotherapy     11/2000    History of congestive heart failure     History of transfusion     approx 1999 - [facility]    Hyperlipidemia     Hypertension     Knee pain     Lactic acidosis     Muscle weakness (generalized)     Other chest pain     Pap smear for cervical cancer screening 2013    Pneumonia     Rectal bleeding     RLS (restless legs syndrome)     Sepsis, unspecified organism     Sleep apnea     Type 2 diabetes mellitus with diabetic neuropathy, unspecified     Ventricular premature depolarization     Wears eyeglasses          SURGICAL HISTORY       Past Surgical History:   Procedure Laterality Date    APPENDECTOMY  05/1963    CARDIAC CATHETERIZATION  08/2015    no stents    CARDIAC CATHETERIZATION  03/13/2018    CARDIAC CATHETERIZATION N/A 03/13/2018    Procedure: Left Heart Cath;  Surgeon: Pierre Jones III, MD;  Location:  [facility];  Service: Cardiovascular    CARDIAC CATHETERIZATION N/A 07/14/2020    Procedure: LEFT HEART CATH;  Surgeon: Galileo See MD;  Location:  [facility];  Service: Cardiovascular;  Laterality: N/A;    CHOLECYSTECTOMY N/A 08/01/2018    Procedure: CHOLECYSTECTOMY LAPAROSCOPIC , LYSIS OF ADHESIONS;  Surgeon: Santos Pantoja MD;  Location:  [facility] OR;  Service: General    COLON RESECTION  04/08/2000    colon cancer    COLONOSCOPY  2015    COLONOSCOPY  04/18/2018    with 5 polyps removed 1yr f/u. Dr Cash- REPEAT YEARLY    CORONARY ANGIOPLASTY WITH STENT PLACEMENT  12/2015    CORONARY ARTERY BYPASS GRAFT      ENDOSCOPY      EYE SURGERY  09/2022    JOINT REPLACEMENT      CA ARTHRP KNE CONDYLE&PLATU MEDIAL&LAT COMPARTMENTS Left 11/03/2016    Procedure: LEFT TOTAL KNEE REPLACEMENT;  Surgeon:  Laurent Zuniga MD;  Location: [facility];  Service: Orthopedics    TONSILLECTOMY  12/1961    TOTAL KNEE ARTHROPLASTY Right 2008    revision 2010    TOTAL SHOULDER ARTHROPLASTY Bilateral     right 2014, left 2015         CURRENT MEDICATIONS       Current Facility-Administered Medications:     sodium chloride 0.9 % flush 10 mL, 10 mL, Intravenous, PRN, Pacjudith, Pedrito Downey,     Current Outpatient Medications:     acetaminophen (TYLENOL) 500 MG tablet, Take 1 tablet by mouth Every 6 (Six) Hours As Needed for Mild Pain., Disp: , Rfl:     carvedilol (COREG) 25 MG tablet, TAKE ONE TABLET BY MOUTH TWICE A DAY WITH MEALS, Disp: 180 tablet, Rfl: 1    doxycycline (MONODOX) 100 MG capsule, Take 1 capsule by mouth 2 (Two) Times a Day for 10 days., Disp: 20 capsule, Rfl: 0    furosemide (LASIX) 40 MG tablet, Take 1 tablet by mouth Daily., Disp: 90 tablet, Rfl: 0    gabapentin (NEURONTIN) 400 MG capsule, Take 1 capsule by mouth 4 (Four) Times a Day., Disp: 120 capsule, Rfl: 2    Glucose Blood (Blood Glucose Test) strip, Use once daily--pt requests Accucheck test strips--dx e11.9, Disp: 100 each, Rfl: 11    Jardiance 10 MG tablet tablet, TAKE 1 TABLET BY MOUTH DAILY, Disp: 90 tablet, Rfl: 1    losartan (Cozaar) 50 MG tablet, Take 1 tablet by mouth Daily., Disp: 90 tablet, Rfl: 0    metFORMIN ER (GLUCOPHAGE-XR) 750 MG 24 hr tablet, Take 1 tablet by mouth 2 (Two) Times a Day., Disp: 60 tablet, Rfl: 3    rOPINIRole (REQUIP) 2 MG tablet, Take 1 tablet by mouth At Night As Needed., Disp: , Rfl:     rosuvastatin (CRESTOR) 20 MG tablet, Take 1 tablet by mouth Every Night., Disp: 90 tablet, Rfl: 1    traZODone (DESYREL) 100 MG tablet, Take 1 tablet by mouth At Night As Needed for Sleep., Disp: 90 tablet, Rfl: 0    vitamin B-12 (CYANOCOBALAMIN) 1000 MCG tablet, Take 1 tablet by mouth Daily., Disp: , Rfl:     ALLERGIES     Oxycodone and Zolpidem    FAMILY HISTORY       Family History   Problem Relation Age of Onset     "Hypertension Mother          at the age 95 on her birthday. All her test were normal at that time    Colon cancer Father     Stroke Father     Diabetes Father     Cancer Father     Hypertension Father     Colon cancer Sister     Diabetes Sister     Cancer Sister     Diabetes Maternal Grandmother     Coronary artery disease Maternal Grandfather     Hearing loss Paternal Grandmother         Totally deaf from youth due to accident    No Known Problems Paternal Grandfather     Colon cancer Other     Diabetes Other     Heart disease Other     Hypertension Other     Stroke Other     Tuberculosis Other     Breast cancer Neg Hx     Ovarian cancer Neg Hx           SOCIAL HISTORY       Social History     Socioeconomic History    Marital status:     Number of children: 5   Tobacco Use    Smoking status: Former     Current packs/day: 0.00     Average packs/day: 1 pack/day for 23.3 years (23.3 ttl pk-yrs)     Types: Cigarettes     Start date: 1955     Quit date: 1979     Years since quittin.2    Smokeless tobacco: Never    Tobacco comments:     Started age 10 quit age 32 on 1979   Vaping Use    Vaping status: Never Used   Substance and Sexual Activity    Alcohol use: Yes     Alcohol/week: 1.0 standard drink of alcohol     Types: 1 Glasses of wine per week     Comment: occas    Drug use: Never    Sexual activity: Not Currently     Partners: Male     Birth control/protection: Post-menopausal         PHYSICAL EXAM    (up to 7 for level 4, 8 or more for level 5)     Vitals:    24 1728 248   BP: (!) 173/107 133/66    BP Location: Left arm     Patient Position: Sitting     Pulse: 97 85 55   Resp: 20     Temp: 98.4 °F (36.9 °C)     TempSrc: Oral     SpO2: 94% 90% 90%   Weight: 80.7 kg (178 lb)     Height: 160 cm (63\")         Physical Exam  General : Patient is awake, alert, oriented, in no acute distress, nontoxic appearing  HEENT: Pupils are equally round, EOMI, conjunctivae " clear  Neck: Neck is supple, full range of motion, trachea midline  Cardiac: Heart regular rate, rhythm, + 2 out of 6 systolic ejection murmur  Lungs: Lungs are clear to auscultation, there is no wheezing, rhonchi, or rales. There is no use of accessory muscles  Abdomen: Abdomen is soft, nontender, nondistended. There are no firm or pulsatile masses, no rebound rigidity or guarding  Musculoskeletal: No focal muscle deficits are appreciated  Neuro: Motor intact, sensory intact, level of consciousness is normal, no focal neurological deficit on examination, GCS 15 patient is ambulatory  Dermatology: Skin is warm and dry  Psych: Mentation is grossly normal, cognition is grossly normal. Affect is appropriate      DIAGNOSTIC RESULTS     EKG:  All EKGs are interpreted by the Emergency Department Physician who either signs or Co-signs this chart in the absence of a cardiologist.    ECG 12 Lead ED Triage Standing Order; Chest Pain   Final Result   Test Reason : ED Triage Standing Order~   Blood Pressure :   */*   mmHG   Vent. Rate :  96 BPM     Atrial Rate :  96 BPM      P-R Int : 238 ms          QRS Dur :  84 ms       QT Int : 350 ms       P-R-T Axes :  30 -12  53 degrees      QTc Int : 442 ms      Sinus rhythm with marked sinus arrhythmia with 1st degree AV block with    fusion complexes   Low voltage QRS   Abnormal ECG   When compared with ECG of 30-JAN-2023 04:57,   fusion complexes are now present   Confirmed by ALESSANDRO BUTT MD ([id number]) on 3/14/2024 5:41:37 PM      Referred By:            Confirmed By: ALESSANDRO BUTT MD      ECG 12 Lead ED Triage Standing Order; Chest Pain    (Results Pending)       RADIOLOGY:     [x] Radiologist's Report Reviewed:  CT Angiogram Chest   Final Result   Impression:   1. No evidence of acute aortic pathology. No evidence of dissection, penetrating ulcer, or intramural hematoma.   2. No evidence of pulmonary embolism.   3. Mild patchy interstitial opacities within the posterior right  upper lobe and superior segment of the right lower lobe likely representing mild infectious or inflammatory process.   4. Small 7 mm pulmonary nodule within the left lower lobe. Recommend follow-up chest CT in 6 to 12 months per Fleischner Society guidelines.            Electronically Signed: Kosta Gagnon     3/14/2024 10:36 PM EDT     Workstation ID: [id number]      CT Head Without Contrast   Final Result   Impression:      No evidence of acute intracranial abnormality.      Similar chronic findings, as above.         Electronically Signed: Jourdan Marshall MD     3/14/2024 9:30 PM EDT     Workstation ID: [id number]      XR Chest 1 View   Final Result   Impression:   Mildly widened mediastinum, which appears increased compared to prior exam. While findings could be projectional, recommend CTA of the chest for evaluation to evaluate for acute vascular process, particularly in the setting of chest pain.          Electronically Signed: Jourdan Marshall MD     3/14/2024 7:44 PM EDT     Workstation ID: [id number]          I ordered and independently reviewed the above noted radiographic studies.      I viewed images of chest x-ray which showed widened mediastinum per my independent interpretation.    See radiologist's dictation for official interpretation.      ED BEDSIDE ULTRASOUND:   Performed by ED Physician - none    LABS:    I have reviewed and interpreted all of the currently available lab results from this visit (if applicable):  Results for orders placed or performed during the hospital encounter of 03/14/24   High Sensitivity Troponin T    Specimen: Blood   Result Value Ref Range    HS Troponin T 16 (H) <14 ng/L   Comprehensive Metabolic Panel    Specimen: Blood   Result Value Ref Range    Glucose 136 (H) 65 - 99 mg/dL    BUN 19 8 - 23 mg/dL    Creatinine 1.05 (H) 0.57 - 1.00 mg/dL    Sodium 137 136 - 145 mmol/L    Potassium 3.6 3.5 - 5.2 mmol/L    Chloride 96 (L) 98 - 107 mmol/L    CO2 26.0 22.0 - 29.0 mmol/L     Calcium 9.7 8.6 - 10.5 mg/dL    Total Protein 7.9 6.0 - 8.5 g/dL    Albumin 4.6 3.5 - 5.2 g/dL    ALT (SGPT) 15 1 - 33 U/L    AST (SGOT) 21 1 - 32 U/L    Alkaline Phosphatase 58 39 - 117 U/L    Total Bilirubin 0.6 0.0 - 1.2 mg/dL    Globulin 3.3 gm/dL    A/G Ratio 1.4 g/dL    BUN/Creatinine Ratio 18.1 7.0 - 25.0    Anion Gap 15.0 5.0 - 15.0 mmol/L    eGFR 54.2 (L) >60.0 mL/min/1.73   Lipase    Specimen: Blood   Result Value Ref Range    Lipase 70 (H) 13 - 60 U/L   BNP    Specimen: Blood   Result Value Ref Range    proBNP 632.7 0.0 - 1,800.0 pg/mL   CBC Auto Differential    Specimen: Blood   Result Value Ref Range    WBC 8.07 3.40 - 10.80 10*3/mm3    RBC 5.08 3.77 - 5.28 10*6/mm3    Hemoglobin 15.5 12.0 - 15.9 g/dL    Hematocrit 47.0 (H) 34.0 - 46.6 %    MCV 92.5 79.0 - 97.0 fL    MCH 30.5 26.6 - 33.0 pg    MCHC 33.0 31.5 - 35.7 g/dL    RDW 13.8 12.3 - 15.4 %    RDW-SD 47.0 37.0 - 54.0 fl    MPV 11.3 6.0 - 12.0 fL    Platelets 185 140 - 450 10*3/mm3    Neutrophil % 61.2 42.7 - 76.0 %    Lymphocyte % 24.4 19.6 - 45.3 %    Monocyte % 7.9 5.0 - 12.0 %    Eosinophil % 5.5 0.3 - 6.2 %    Basophil % 0.9 0.0 - 1.5 %    Immature Grans % 0.1 0.0 - 0.5 %    Neutrophils, Absolute 4.94 1.70 - 7.00 10*3/mm3    Lymphocytes, Absolute 1.97 0.70 - 3.10 10*3/mm3    Monocytes, Absolute 0.64 0.10 - 0.90 10*3/mm3    Eosinophils, Absolute 0.44 (H) 0.00 - 0.40 10*3/mm3    Basophils, Absolute 0.07 0.00 - 0.20 10*3/mm3    Immature Grans, Absolute 0.01 0.00 - 0.05 10*3/mm3    nRBC 0.0 0.0 - 0.2 /100 WBC   High Sensitivity Troponin T 2Hr    Specimen: Blood   Result Value Ref Range    HS Troponin T 15 (H) <14 ng/L    Troponin T Delta -1 >=-4 - <+4 ng/L   ECG 12 Lead ED Triage Standing Order; Chest Pain   Result Value Ref Range    QT Interval 350 ms    QTC Interval 442 ms   Green Top (Gel)   Result Value Ref Range    Extra Tube Hold for add-ons.    Lavender Top   Result Value Ref Range    Extra Tube hold for add-on    Gold Top - SST   Result  Value Ref Range    Extra Tube Hold for add-ons.    Gray Top   Result Value Ref Range    Extra Tube Hold for add-ons.    Light Blue Top   Result Value Ref Range    Extra Tube Hold for add-ons.         If labs were ordered, I independently reviewed the results and considered them in treating the patient.      EMERGENCY DEPARTMENT COURSE and DIFFERENTIAL DIAGNOSIS/MDM:   Vitals:  AS OF 23:41 EDT    BP - 133/66  HR - 55  TEMP - 98.4 °F (36.9 °C) (Oral)  O2 SATS - 90%      Orders placed during this visit:  Orders Placed This Encounter   Procedures    XR Chest 1 View    CT Head Without Contrast    CT Angiogram Chest    [facility] Draw    High Sensitivity Troponin T    Comprehensive Metabolic Panel    Lipase    BNP    CBC Auto Differential    High Sensitivity Troponin T 2Hr    Ambulatory Referral to [facility] [place] - [place]    NPO Diet NPO Type: Strict NPO    Undress & Gown    Continuous Pulse Oximetry    Oxygen Therapy- Nasal Cannula; Titrate 1-6 LPM Per SpO2; 90 - 95%    ECG 12 Lead ED Triage Standing Order; Chest Pain    ECG 12 Lead ED Triage Standing Order; Chest Pain    Insert Peripheral IV    CBC & Differential    Green Top (Gel)    Lavender Top    Gold Top - SST    Gray Top    Light Blue Top       All labs have been independently reviewed by me.  All radiology studies have been reviewed by me and the radiologist dictating the report.  All EKG's have been independently viewed and interpreted by me.      Discussion below represents my analysis of pertinent findings related to patient's condition, differential diagnosis, treatment plan and final disposition.    Differential diagnosis:  The differential diagnosis associated with the patient's presentation includes: Diastolic dysfunction, hypertension, hypertensive headache, vascular disease    Additional sources  Discussed/ obtained information from independent historians:   [] Spouse  [] Parent  [] Family member  [] Friend  [] EMS   [] Other:    External  "(non-ED) record review:   [] Inpatient record:   [] Office record:   [] Outpatient record:   [] Prior Outpatient labs:   [] Prior Outpatient radiology:   [] Primary Care record:   [] Outside ED record:   [] Other:     Patient's care impacted by:   [] Diabetes  [x] Hypertension  [] CHF  [] Hyperlipidemia  [x] Coronary Artery Disease   [] COPD   [] Cancer   [] Tobacco Abuse   [] Substance Abuse    [] Other:     Care significantly affected by Social Determinants of Health (housing and economic circumstances, unemployment)    [] Yes     [x] No   If yes, Patient's care significantly limited by Social Determinants of Health including:   [] Inadequate housing   [] Low income   [] Alcoholism and drug addiction in family   [] Problems related to primary support group   [] Unemployment   [] Problems related to employment   [] Other Social Determinants of Health:   [facility] NONINVASIVE LAB  [address]  3rd FLOOR  [facility] [id number] [phone]            Patient Information    Patient Name  Angelita Shay MRN  [id number] Legal Sex  Female  (Age)  1945 (78 y.o.)       Complete Transthoracic Echocardiogram with Complete Doppler and Color Flow    Accession Number: [id number]   Date of Study: 24   Ordering Provider: Shannan Oquendo PA-C   Clinical Indications: Dyspnea        [facility] Physicians  Performing Staff   Cardiology: Festus Bowie IV, MD    Tech: Lin Conde RDCS            Patient Hx Of Height, Weight, and Vitals    Height Weight BSA (Calculated - sq m) BMI (Calculated) Retired BMI (kg/m2) Pulse BP   160 cm (63\") 86.6 kg (191 lb) 1.9 sq meters 33.8   153/103       Blood Pressure at Time of Exam     Left Arm   Blood Pressure 153/103 mmHg              [facility] PACS Images     Show images for Adult Transthoracic Echo Complete w/ Color, Spectral and Contrast if necessary per protocol         Clinical Indication    Dyspnea   Dx: Coronary artery disease involving " native coronary artery of native heart with angina pectoris [I25.119 (ICD-10-CM)]; Paroxysmal atrial fibrillation [I48.0 (ICD-10-CM)]; Frequent PVCs [I49.3 (ICD-10-CM)]       Interpretation Summary         Left ventricular systolic function is normal. Estimated left ventricular EF = 60%    Left ventricular diastolic function is consistent with (grade I) impaired relaxation.    There is calcification of the aortic valve.  No significant aortic valve stenosis or regurgitation present        MEDICATIONS ADMINISTERED IN ED:  Medications   sodium chloride 0.9 % flush 10 mL (has no administration in time range)   aspirin chewable tablet 324 mg (243 mg Oral Given 3/14/24 1828)   iopamidol (ISOVUE-370) 76 % injection 80 mL (80 mL Intravenous Given 3/14/24 2206)              This is a pleasant 78-year-old female who has a known history of hypertension, chronic kidney disease, multiple chronic comorbidities who presents with a concern for elevated blood pressures, generalized headache.  This has been a persistent issue for the patient with a diastolic dysfunction.  Blood pressure on arrival is 173/107, she does not have any focal neurological deficit on examination.  We discussed pending IV, labs and imaging for further workup.  Results as above.  She follows with cardiologist, Dr. Bowie.  Left renal stable white count with a normal H&H.  Her kidney function liver function and electrolytes are normal.  High sensitive troponin of 16, repeat of 15.  Blood pressure has improved without any need for significant antihypertensives while in the ED.  Chest x-ray had widened mediastinum, recommend CT chest for further assessment.  The angiography of the chest does not reveal any pulmonary embolism, dissection, slight residual right-sided pneumonia as well as a small pulmonary nodule on the left.  I did update the patient on both the residual pneumonia which she was was in the hospital at [facility] for but is not currently on any  antibiotics.  Also the pulmonary nodule to follow-up with her PCP for repeat imaging in the future.  At this point we will plan on continuing with her antihypertensives, continue follow-up with her cardiology team, referral over to our heart valve blood pressure clinic, give her course of doxycycline for continued treatment underlying pneumonia.  She feels comfortable with this plan of care, follow-up with her PCP, return to the ED with any worsening symptoms or further concerns in the meantime.    I had a discussion with the patient/family regarding diagnosis, diagnostic results, treatment plan, and medications.  The patient/family indicated understanding of these instructions.  I spent adequate time at the bedside preceding discharge necessary to personally discuss the aftercare instructions, giving patient education, providing explanations of the results of our evaluations/findings, and my decision making to assure that the patient/family understand the plan of care.  Time was allotted to answer questions at that time and throughout the ED course.  Emphasis was placed on timely follow-up after discharge.  I also discussed the potential for the development of an acute emergent condition requiring further evaluation, admission, or even surgical intervention. I discussed that we found nothing during the visit today indicating the need for further workup, admission, or the presence of an unstable medical condition.  I encouraged the patient to return to the emergency department immediately for ANY concerns, worsening, new complaints, or if symptoms persist and unable to seek follow-up in a timely fashion.  The patient/family expressed understanding and agreement with this plan.  The patient will follow-up with their PCP in 1-2 days for reevaluation.     PROCEDURES:  Procedures    CRITICAL CARE TIME    Total Critical Care time was 0 minutes, excluding separately reportable procedures.   There was a high probability of  clinically significant/life threatening deterioration in the patient's condition which required my urgent intervention.      FINAL IMPRESSION      1. Hypertension, unspecified type    2. Pneumonia of right middle lobe due to infectious organism    3. Pulmonary nodule    4. Generalized headache          DISPOSITION/PLAN     ED Disposition       ED Disposition   Discharge    Condition   Stable    Comment   --               PATIENT REFERRED TO:  [facility] CARDIOLOGY  [address]  [address]  [facility] [id number] [phone]  Schedule an appointment as soon as possible for a visit       Festus Bowie IV, MD  [address] [facility]  [address]  [place] [address]  [phone]    Schedule an appointment as soon as possible for a visit   Your cardiologist    [facility] EMERGENCY DEPARTMENT  [address] [place] [id number] [phone]    If symptoms worsen      DISCHARGE MEDICATIONS:     Medication List        START taking these medications      doxycycline 100 MG capsule  Commonly known as: MONODOX  Take 1 capsule by mouth 2 (Two) Times a Day for 10 days.            CONTINUE taking these medications      acetaminophen 500 MG tablet  Commonly known as: TYLENOL     Blood Glucose Test strip  Use once daily--pt requests Accucheck test strips--dx e11.9     carvedilol 25 MG tablet  Commonly known as: COREG  TAKE ONE TABLET BY MOUTH TWICE A DAY WITH MEALS     furosemide 40 MG tablet  Commonly known as: LASIX  Take 1 tablet by mouth Daily.     gabapentin 400 MG capsule  Commonly known as: NEURONTIN  Take 1 capsule by mouth 4 (Four) Times a Day.     Jardiance 10 MG tablet tablet  Generic drug: empagliflozin  TAKE 1 TABLET BY MOUTH DAILY     losartan 50 MG tablet  Commonly known as: Cozaar  Take 1 tablet by mouth Daily.     metFORMIN  MG 24 hr tablet  Commonly known as: GLUCOPHAGE-XR  Take 1 tablet by mouth 2 (Two) Times a Day.      rOPINIRole 2 MG tablet  Commonly known as: REQUIP     rosuvastatin 20 MG tablet  Commonly known as: CRESTOR  Take 1 tablet by mouth Every Night.     traZODone 100 MG tablet  Commonly known as: DESYREL  Take 1 tablet by mouth At Night As Needed for Sleep.     vitamin B-12 1000 MCG tablet  Commonly known as: CYANOCOBALAMIN               Where to Get Your Medications        These medications were sent to [facility] [id number] - [place], [place] - [address] [place] [address] [address] - [phone]  - [phone] [place] [address]      Phone: [phone]   doxycycline 100 MG capsule             Comment: Please note this report has been produced using speech recognition software.      Pedrito Perez DO  Attending Emergency Physician         Pedrito Perez DO  03/14/24 [id number]

## 2024-03-15 VITALS
BODY MASS INDEX: 31.54 KG/M2 | HEIGHT: 63 IN | WEIGHT: 178 LBS | DIASTOLIC BLOOD PRESSURE: 89 MMHG | RESPIRATION RATE: 20 BRPM | OXYGEN SATURATION: 90 % | SYSTOLIC BLOOD PRESSURE: 182 MMHG | HEART RATE: 86 BPM | TEMPERATURE: 98.4 F

## 2024-03-15 DIAGNOSIS — R91.8 ABNORMAL CT SCAN OF LUNG: Primary | ICD-10-CM

## 2024-03-18 ENCOUNTER — OFFICE VISIT (OUTPATIENT)
Dept: CARDIOLOGY | Facility: HOSPITAL | Age: 79
End: 2024-03-18
Payer: MEDICARE

## 2024-03-18 VITALS
BODY MASS INDEX: 31.27 KG/M2 | RESPIRATION RATE: 20 BRPM | SYSTOLIC BLOOD PRESSURE: 151 MMHG | TEMPERATURE: 97.6 F | OXYGEN SATURATION: 95 % | HEART RATE: 93 BPM | WEIGHT: 183.19 LBS | DIASTOLIC BLOOD PRESSURE: 80 MMHG | HEIGHT: 64 IN

## 2024-03-18 DIAGNOSIS — I10 ESSENTIAL HYPERTENSION: ICD-10-CM

## 2024-03-18 DIAGNOSIS — I48.0 PAF (PAROXYSMAL ATRIAL FIBRILLATION): ICD-10-CM

## 2024-03-18 DIAGNOSIS — N18.2 CKD (CHRONIC KIDNEY DISEASE) STAGE 2, GFR 60-89 ML/MIN: ICD-10-CM

## 2024-03-18 DIAGNOSIS — I42.8 NICM (NONISCHEMIC CARDIOMYOPATHY): Primary | ICD-10-CM

## 2024-03-18 RX ORDER — LOSARTAN POTASSIUM 100 MG/1
100 TABLET ORAL DAILY
Qty: 30 TABLET | Refills: 3 | Status: SHIPPED | OUTPATIENT
Start: 2024-03-18

## 2024-03-19 NOTE — PROGRESS NOTES
"Chief Complaint  Follow-up and Hypertension    Subjective    History of Present Illness {  Problem List  Visit  Diagnosis   Encounters  Notes  Medications  Labs  Result Review Imaging  Media :23}          78-year-old female presents the office today for ongoing evaluation of her hypertension.  Patient presented to [facility] ED on 3/14/24 with hypertension.  Patient reports of blood pressures mainly her diastolic numbers have been elevated between 100-114.  She was recently initiated on losartan last week.  Patient reports she has been compliant with her carvedilol and her Lasix.  Patient was hospitalized at [facility] few weeks ago with severe pneumonia, recurrent nosebleeds.  She presents today with home blood pressure log showing blood pressures over the last 10 days 137/76 to 167/116 heart rates 70s to 80s. Notes that she stopped her entresto because she notes that entresto made her \"feel like a zombie\". Currently denies chest pain, dyspnea, dizziness. Does report fatigue.   cardiac PET January 2023 that showed no ischemia, normal EF and coronary calcification noted on CT imaging.    Objective     Vital Signs:   Vitals:    03/18/24 1520   BP: 151/80   BP Location: Left arm   Patient Position: Sitting   Cuff Size: Adult   Pulse: 93   Resp: 20   Temp: 97.6 °F (36.4 °C)   TempSrc: Temporal   SpO2: 95%   Weight: 83.1 kg (183 lb 3 oz)   Height: 161.3 cm (63.5\")     Body mass index is 31.94 kg/m².  Physical Exam  Vitals and nursing note reviewed.   Constitutional:       Appearance: Normal appearance.   HENT:      Head: Normocephalic.   Eyes:      Pupils: Pupils are equal, round, and reactive to light.   Cardiovascular:      Rate and Rhythm: Normal rate and regular rhythm.      Pulses: Normal pulses.      Heart sounds: Normal heart sounds. No murmur heard.  Pulmonary:      Effort: Pulmonary effort is normal.      Breath sounds: Normal breath sounds.   Abdominal:      General: Bowel sounds are normal.    "   Palpations: Abdomen is soft.   Musculoskeletal:         General: Normal range of motion.      Cervical back: Normal range of motion.      Right lower leg: No edema.      Left lower leg: No edema.   Skin:     General: Skin is warm and dry.      Capillary Refill: Capillary refill takes less than 2 seconds.   Neurological:      Mental Status: She is alert and oriented to person, place, and time.   Psychiatric:         Mood and Affect: Mood normal.         Thought Content: Thought content normal.              Result Review  Data Reviewed:{ Labs  Result Review  Imaging  Med Tab  Media :23}   Stress Test With Pet Myocardial Perfusion (01/30/2023 14:31)  Lab Results   Component Value Date    GLUCOSE 136 (H) 03/14/2024    CALCIUM 9.7 03/14/2024     03/14/2024    K 3.6 03/14/2024    CO2 26.0 03/14/2024    CL 96 (L) 03/14/2024    BUN 19 03/14/2024    CREATININE 1.05 (H) 03/14/2024    EGFR 54.2 (L) 03/14/2024    BCR 18.1 03/14/2024    ANIONGAP 15.0 03/14/2024     Lab Results   Component Value Date    WBC 8.07 03/14/2024    HGB 15.5 03/14/2024    HCT 47.0 (H) 03/14/2024    MCV 92.5 03/14/2024     03/14/2024   Adult Transthoracic Echo Complete w/ Color, Spectral and Contrast if necessary per protocol (02/08/2024 16:24)  ECG 12 Lead ED Triage Standing Order; Chest Pain (03/14/2024 17:36)  Stress Test With Pet Myocardial Perfusion (01/30/2023 14:31)              Assessment and Plan {CC Problem List  Visit Diagnosis  ROS  Review (Popup)  Health Maintenance  Quality  BestPractice  Medications  SmartSets  SnapShot Encounters  Media :23}   1. NICM   Euvolemic  Stable on coreg, lasix,  Increase losartan to 100 mg daily    2. Essential hypertension  Stable on coreg and increase losartan to 100 mg daily   Monitor bp closely     Continue to monitor closely  3. CKD (chronic kidney disease) stage 2, GFR 60-89 ml/min    stable    4. Paroxysmal atrial fibrillation (HCC)  Patient stopped her eliquis on her own  due to excessive bleeding   Continue carvedilol  CHADS-VASc Risk Assessment              6 Total Score    1 CHF    1 Hypertension    2 Age >/= 75    1 DM    1 Sex: Female        Criteria that do not apply:    PRIOR STROKE/TIA/THROMBO    Vascular Disease    Age 65-74              Follow Up {Instructions Charge Capture  Follow-up Communications :23}   Return in about 10 days (around 3/28/2024) for Telemedicine visit, HTN.    Patient was given instructions and counseling regarding her condition or for health maintenance advice. Please see specific information pulled into the AVS if appropriate.  Patient was instructed to call the [facility] with any questions, concerns, or worsening symptoms.

## 2024-03-28 ENCOUNTER — TELEMEDICINE (OUTPATIENT)
Dept: CARDIOLOGY | Facility: HOSPITAL | Age: 79
End: 2024-03-28
Payer: MEDICARE

## 2024-03-28 VITALS
BODY MASS INDEX: 31.24 KG/M2 | WEIGHT: 183 LBS | HEIGHT: 64 IN | SYSTOLIC BLOOD PRESSURE: 138 MMHG | OXYGEN SATURATION: 94 % | DIASTOLIC BLOOD PRESSURE: 78 MMHG

## 2024-03-28 DIAGNOSIS — N18.2 CKD (CHRONIC KIDNEY DISEASE) STAGE 2, GFR 60-89 ML/MIN: ICD-10-CM

## 2024-03-28 DIAGNOSIS — I48.0 PAF (PAROXYSMAL ATRIAL FIBRILLATION): ICD-10-CM

## 2024-03-28 DIAGNOSIS — I42.8 NICM (NONISCHEMIC CARDIOMYOPATHY): Primary | ICD-10-CM

## 2024-03-28 DIAGNOSIS — I10 ESSENTIAL HYPERTENSION: ICD-10-CM

## 2024-03-28 NOTE — PROGRESS NOTES
"Chief Complaint  Follow-up (htn)    Subjective    History of Present Illness {CC  Problem List  Visit  Diagnosis   Encounters  Notes  Medications  Labs  Result Review Imaging  Media :23}     You have chosen to receive care through the use of telemedicine. Telemedicine enables health care providers at different locations to provide safe, effective, and convenient care through the use of technology. As with any health care service, there are risks associated with the use of telemedicine, including equipment failure, poor connections, and  issues.    Do you understand the risks and benefits of telemedicine as I have explained them to you? Yes  Have your questions regarding telemedicine been answered? Yes  Do you consent to the use of telemedicine in your medical care today? Yes      78-year-old female presents for telemed visit today for ongoing evaluation of her hypertension.  Patient presented to [facility] ED on 3/14/24 with hypertension.  Patient reports of blood pressures mainly her diastolic numbers have been elevated between 100-114.  She was recently initiated on losartan last week.  Patient reports she has been compliant with her carvedilol and her Lasix.  Patient was hospitalized at [facility] few weeks ago with severe pneumonia, recurrent nosebleeds.  She presents today with home blood pressure log showing blood pressures over the last 10 days 137/76 to 167/116 heart rates 70s to 80s. Notes that she stopped her entresto because she notes that entresto made her \"feel like a zombie\". Currently denies chest pain, dyspnea, dizziness. Does report fatigue.   cardiac PET January 2023 that showed no ischemia, normal EF and coronary calcification noted on CT imaging.  Notes that since losartan has been increased, blood pressures have improved, now running 130s-140s  Objective     Vital Signs:   Vitals:    03/28/24 1427   BP: 138/78   BP Location: Left arm   Patient Position: Sitting " "  SpO2: 94%   Weight: 83 kg (183 lb)   Height: 161.3 cm (63.5\")     Body mass index is 31.91 kg/m².  Physical Exam  Vitals and nursing note reviewed.   Constitutional:       Appearance: Normal appearance.   HENT:      Head: Normocephalic.   Pulmonary:      Effort: Pulmonary effort is normal.   Neurological:      Mental Status: She is alert and oriented to person, place, and time.   Psychiatric:         Mood and Affect: Mood normal.         Behavior: Behavior normal.         Thought Content: Thought content normal.              Result Review  Data Reviewed:{ Labs  Result Review  Imaging  Med Tab  Media :23}   Stress Test With Pet Myocardial Perfusion (01/30/2023 14:31)  Lab Results   Component Value Date    GLUCOSE 136 (H) 03/14/2024    CALCIUM 9.7 03/14/2024     03/14/2024    K 3.6 03/14/2024    CO2 26.0 03/14/2024    CL 96 (L) 03/14/2024    BUN 19 03/14/2024    CREATININE 1.05 (H) 03/14/2024    EGFR 54.2 (L) 03/14/2024    BCR 18.1 03/14/2024    ANIONGAP 15.0 03/14/2024     Lab Results   Component Value Date    WBC 8.07 03/14/2024    HGB 15.5 03/14/2024    HCT 47.0 (H) 03/14/2024    MCV 92.5 03/14/2024     03/14/2024   Adult Transthoracic Echo Complete w/ Color, Spectral and Contrast if necessary per protocol (02/08/2024 16:24)  ECG 12 Lead ED Triage Standing Order; Chest Pain (03/14/2024 17:36)  Stress Test With Pet Myocardial Perfusion (01/30/2023 14:31)              Assessment and Plan {CC Problem List  Visit Diagnosis  ROS  Review (Popup)  Health Maintenance  Quality  BestPractice  Medications  SmartSets  SnapShot Encounters  Media :23}   1. NICM   Euvolemic  Stable on coreg, lasix,losartan     2. Essential hypertension  Stable on coreg and increase losartan to 100 mg daily   Monitor bp closely     Continue to monitor closely  3. CKD (chronic kidney disease) stage 2, GFR 60-89 ml/min    stable    4. Paroxysmal atrial fibrillation (HCC)  Patient stopped her eliquis on her own due to " excessive bleeding   Continue carvedilol  CHADS-VASc Risk Assessment              6 Total Score    1 CHF    1 Hypertension    2 Age >/= 75    1 DM    1 Sex: Female        Criteria that do not apply:    PRIOR STROKE/TIA/THROMBO    Vascular Disease    Age 65-74              Follow Up {Instructions Charge Capture  Follow-up Communications :23}   Return in about 6 weeks (around 5/9/2024) for Telemedicine visit, HTN, HF.    Patient was given instructions and counseling regarding her condition or for health maintenance advice. Please see specific information pulled into the AVS if appropriate.  Patient was instructed to call the [facility] with any questions, concerns, or worsening symptoms.     69

## 2024-04-11 DIAGNOSIS — E11.9 TYPE 2 DIABETES MELLITUS WITHOUT COMPLICATION, WITHOUT LONG-TERM CURRENT USE OF INSULIN: ICD-10-CM

## 2024-04-11 RX ORDER — SEMAGLUTIDE 1.34 MG/ML
1 INJECTION, SOLUTION SUBCUTANEOUS WEEKLY
Qty: 3 ML | OUTPATIENT
Start: 2024-04-11

## 2024-04-12 ENCOUNTER — HOSPITAL ENCOUNTER (OUTPATIENT)
Dept: CT IMAGING | Facility: HOSPITAL | Age: 79
Discharge: HOME | End: 2024-04-12
Payer: MEDICARE

## 2024-04-12 DIAGNOSIS — R91.8 ABNORMAL CT SCAN OF LUNG: ICD-10-CM

## 2024-04-12 PROCEDURE — 71250 CT THORAX DX C-: CPT

## 2024-04-17 ENCOUNTER — TELEPHONE (OUTPATIENT)
Dept: INTERNAL MEDICINE | Facility: CLINIC | Age: 79
End: 2024-04-17
Payer: MEDICARE

## 2024-04-17 DIAGNOSIS — E11.40 TYPE 2 DIABETES MELLITUS WITH DIABETIC NEUROPATHY, WITHOUT LONG-TERM CURRENT USE OF INSULIN: Primary | ICD-10-CM

## 2024-04-17 NOTE — TELEPHONE ENCOUNTER
Patient stated that she would like a refill on her ozempic but would like for it to be increased to the 2mg dose. Patient would like this sent into [address]

## 2024-04-25 ENCOUNTER — OFFICE VISIT (OUTPATIENT)
Dept: INTERNAL MEDICINE | Facility: CLINIC | Age: 79
End: 2024-04-25
Payer: MEDICARE

## 2024-04-25 VITALS
WEIGHT: 185.6 LBS | BODY MASS INDEX: 31.69 KG/M2 | RESPIRATION RATE: 16 BRPM | HEIGHT: 64 IN | TEMPERATURE: 97.7 F | SYSTOLIC BLOOD PRESSURE: 138 MMHG | HEART RATE: 84 BPM | DIASTOLIC BLOOD PRESSURE: 80 MMHG

## 2024-04-25 DIAGNOSIS — R93.89 ABNORMAL CT OF THE CHEST: ICD-10-CM

## 2024-04-25 DIAGNOSIS — E11.40 TYPE 2 DIABETES MELLITUS WITH DIABETIC NEUROPATHY, WITHOUT LONG-TERM CURRENT USE OF INSULIN: ICD-10-CM

## 2024-04-25 DIAGNOSIS — R91.8 ABNORMAL CT SCAN OF LUNG: ICD-10-CM

## 2024-04-25 DIAGNOSIS — G62.9 NEUROPATHY: ICD-10-CM

## 2024-04-25 DIAGNOSIS — L65.9 HAIR LOSS: ICD-10-CM

## 2024-04-25 DIAGNOSIS — I10 ESSENTIAL HYPERTENSION: Primary | ICD-10-CM

## 2024-04-25 PROCEDURE — 99214 OFFICE O/P EST MOD 30 MIN: CPT | Performed by: NURSE PRACTITIONER

## 2024-04-25 PROCEDURE — 1160F RVW MEDS BY RX/DR IN RCRD: CPT | Performed by: NURSE PRACTITIONER

## 2024-04-25 PROCEDURE — 3075F SYST BP GE 130 - 139MM HG: CPT | Performed by: NURSE PRACTITIONER

## 2024-04-25 PROCEDURE — 3079F DIAST BP 80-89 MM HG: CPT | Performed by: NURSE PRACTITIONER

## 2024-04-25 PROCEDURE — 1159F MED LIST DOCD IN RCRD: CPT | Performed by: NURSE PRACTITIONER

## 2024-04-25 RX ORDER — BIOTIN 10000 MCG
1 CAPSULE ORAL DAILY
Qty: 90 CAPSULE | Refills: 1 | Status: SHIPPED | OUTPATIENT
Start: 2024-04-25

## 2024-04-25 RX ORDER — GABAPENTIN 400 MG/1
400 CAPSULE ORAL 4 TIMES DAILY
Qty: 120 CAPSULE | Refills: 2 | Status: SHIPPED | OUTPATIENT
Start: 2024-04-25

## 2024-04-25 NOTE — PROGRESS NOTES
Patient Name: Angelita Shay  : 1945   MRN: [id number]     Chief Complaint:    Chief Complaint   Patient presents with    Diabetes     Follow up    Back Pain    Alopecia       History of Present Illness: Angelita Shay is a 78 y.o. female.  History of Present Illness  The patient was admitted to [facility] on 2025 for bronchopneumonia and PE.     The patient reports an improvement in her respiratory function, with no cough. She discontinued Eliquis due to nosebleeds, but has not resumed its use and does not wish to resume it at this juncture.    The patient has a documented history of heart failure, with her last ejection fraction recorded at 60 percent on 2025. She is currently on a regimen of Lasix 40 mg daily and Coreg 25 mg twice daily. She denies experiencing chest pain, shortness of breath, edema, syncope, blurred vision, palpitations, or headaches. In the past, she has taken Entresto, but discontinued its use due to adverse effects.    The patient suffers from diabetic neuropathy, primarily affecting her feet. She takes gabapentin 400 mg four times daily, which she tolerates well. This regimen aids her sleep and pain levels.    The patient's diabetes has been well-managed. Her current medication regimen includes Ozempic 2 mg weekly, metformin 750 mg twice daily, and Jardiance 10 mg daily.    The patient's blood pressure has been well-managed with losartan 100 mg daily. She has been monitoring her blood pressure at home, which typically ranges in the 130s over 70s to 80s.    The patient reports experiencing hair loss, which she attributes to stress. She is currently residing with her daughter in a 2-bedroom home with a total of 8 individuals, and is seeking a larger home.     CT chest for pulmonary embolus (2024 18:34)   Impression     1.  No evidence of pulmonary embolism.  2.  Findings are most consistent with a multifocal acute  bronchopneumonia.  3.  Follow-up chest CT  with IV contrast in 4-6 weeks is recommended.        Past Medical History:   Diagnosis Date    Acute bronchopneumonia     Acute respiratory failure with hypoxia     Anxiety     Arthritis     AV block, 1st degree     Breast injury     recent fall in early June, bruising left breast    Cataract     left    Cellulitis of both lower extremities     CHF (congestive heart failure)     CKD (chronic kidney disease), stage III     CKD stage 3 secondary to diabetes     Colon cancer 2000    Colon polyp 2015    x 3 adenomatous    Colon polyp 04/18/2018    x 5 Dr Cash    COPD (chronic obstructive pulmonary disease)     COPD with acute exacerbation     Coronary artery disease     1 stent    Depression     Diabetes mellitus     dx 2 years ago- checks fsbs bid    Epistaxis     Gallstone     GI bleed 07/2018    Gout 03/13/2023    H/O echocardiogram 08/05/2018    Dr Greene, EF 55%, mild concentric hypertrophy, impaired relaxation    Hearing loss     History of atrial fibrillation     History of chemotherapy     11/2000    History of congestive heart failure     History of transfusion     approx 1999 - [facility]    Hyperlipidemia     Hypertension     Knee pain     Lactic acidosis     Low back pain     Believe my stomach is pulling on my back    Muscle weakness (generalized)     Other chest pain     Pap smear for cervical cancer screening 2013    Pneumonia     Rectal bleeding     RLS (restless legs syndrome)     Sepsis, unspecified organism     Sleep apnea     Type 2 diabetes mellitus with diabetic neuropathy, unspecified     Ventricular premature depolarization     Wears eyeglasses        Subjective     Review of System: Review of Systems     Medications:     Current Outpatient Medications:     acetaminophen (TYLENOL) 500 MG tablet, Take 1 tablet by mouth Every 6 (Six) Hours As Needed for Mild Pain., Disp: , Rfl:     carvedilol (COREG) 25 MG tablet, TAKE ONE TABLET BY MOUTH TWICE A DAY WITH MEALS, Disp: 180 tablet,  "Rfl: 1    furosemide (LASIX) 40 MG tablet, Take 1 tablet by mouth Daily., Disp: 90 tablet, Rfl: 0    gabapentin (NEURONTIN) 400 MG capsule, Take 1 capsule by mouth 4 (Four) Times a Day., Disp: 120 capsule, Rfl: 2    Glucose Blood (Blood Glucose Test) strip, Use once daily--pt requests Accucheck test strips--dx e11.9, Disp: 100 each, Rfl: 11    Jardiance 10 MG tablet tablet, TAKE 1 TABLET BY MOUTH DAILY, Disp: 90 tablet, Rfl: 1    losartan (Cozaar) 100 MG tablet, Take 1 tablet by mouth Daily., Disp: 30 tablet, Rfl: 3    metFORMIN ER (GLUCOPHAGE-XR) 750 MG 24 hr tablet, Take 1 tablet by mouth 2 (Two) Times a Day., Disp: 60 tablet, Rfl: 3    rOPINIRole (REQUIP) 2 MG tablet, Take 1 tablet by mouth At Night As Needed., Disp: , Rfl:     rosuvastatin (CRESTOR) 20 MG tablet, Take 1 tablet by mouth Every Night., Disp: 90 tablet, Rfl: 1    Semaglutide, 2 MG/DOSE, (OZEMPIC) 8 MG/3ML solution pen-injector, Inject 2 mg under the skin into the appropriate area as directed 1 (One) Time Per Week., Disp: 9 mL, Rfl: 0    traZODone (DESYREL) 100 MG tablet, Take 1 tablet by mouth At Night As Needed for Sleep., Disp: 90 tablet, Rfl: 0    vitamin B-12 (CYANOCOBALAMIN) 1000 MCG tablet, Take 1 tablet by mouth Daily., Disp: , Rfl:     Biotin 10 MG capsule, Take 1 capsule by mouth Daily., Disp: 90 capsule, Rfl: 1    Allergies:   Allergies   Allergen Reactions    Oxycodone Shortness Of Breath    Zolpidem Other (See Comments)     nightmares       Objective     Physical Exam:   Vital Signs:   Vitals:    04/25/24 1524 04/25/24 1556   BP: (!) 142/102 138/80   BP Location: Right arm    Patient Position: Sitting    Cuff Size: Adult    Pulse: 84    Resp: 16    Temp: 97.7 °F (36.5 °C)    TempSrc: Infrared    Weight: 84.2 kg (185 lb 9.6 oz)    Height: 161.3 cm (63.5\")    PainSc: 0-No pain           Physical Exam  Constitutional:       General: She is not in acute distress.     Appearance: She is not ill-appearing.   HENT:      Head: Normocephalic. "   Cardiovascular:      Rate and Rhythm: Normal rate and regular rhythm.      Heart sounds: Normal heart sounds. No murmur heard.  Pulmonary:      Breath sounds: Normal breath sounds.   Skin:     Findings: Bruising present.   Neurological:      General: No focal deficit present.      Mental Status: She is oriented to person, place, and time.   Psychiatric:         Mood and Affect: Mood normal.   Physical Exam         Results  Imaging  CT of chest on 4/12/2025 showed resolution of previous left lower lobe inflammatory nodules, decreasing conspicuity of previous ground glass opacities consistent with resolving inflammatory process. No acute process identified.    Testing  Last ejection fraction was 60% on 2/8/2025.     Assessment / Plan      Assessment/Plan:   Diagnoses and all orders for this visit:    1. Abnormal CT of the chest (Primary)  -     CT Chest Without Contrast; Future    2. Neuropathy  -     gabapentin (NEURONTIN) 400 MG capsule; Take 1 capsule by mouth 4 (Four) Times a Day.  Dispense: 120 capsule; Refill: 2    3. Hair loss  -     Biotin 10 MG capsule; Take 1 capsule by mouth Daily.  Dispense: 90 capsule; Refill: 1    4. Type 2 diabetes mellitus with diabetic neuropathy, without long-term current use of insulin    5. Essential hypertension    6. Abnormal CT scan of lung       Assessment & Plan  1. Hypertension.  The patient is advised to persist with her current regimen of losartan 100 mg daily, carvedilol 25 mg twice daily, and Lasix 40 mg daily. She is also advised to continue monitoring her blood pressure.    2. Diabetes.  The patient is advised to continue her current regimen of metformin 750 mg twice daily, Jardiance 10 mg daily, and Ozempic 2 mg daily.    3. Hair loss.  The patient will commence a regimen of biotin to address her hair loss.    4. Bronchopneumonia.  The bronchopneumonia has been successfully resolved. A repeat CT scan will be conducted in 1 year to monitor the stability.       We  discussed restarting eliquis risks vs benefits; she will consider after her grand-daughter's wedding. She does not like the bruising.     Follow Up:   Return in about 3 months (around 7/25/2024) for Medicare Wellness.      STONEY Dobbins  [facility]

## 2024-04-26 RX ORDER — METFORMIN HYDROCHLORIDE 750 MG/1
750 TABLET, EXTENDED RELEASE ORAL 2 TIMES DAILY
Qty: 180 TABLET | Refills: 0 | Status: SHIPPED | OUTPATIENT
Start: 2024-04-26

## 2024-05-08 DIAGNOSIS — G25.81 RLS (RESTLESS LEGS SYNDROME): ICD-10-CM

## 2024-05-08 RX ORDER — ROPINIROLE 1 MG/1
TABLET, FILM COATED ORAL
Qty: 90 TABLET | Refills: 5 | OUTPATIENT
Start: 2024-05-08

## 2024-05-09 DIAGNOSIS — E78.5 HYPERLIPIDEMIA LDL GOAL <70: ICD-10-CM

## 2024-05-09 RX ORDER — ROSUVASTATIN CALCIUM 20 MG/1
20 TABLET, COATED ORAL NIGHTLY
Qty: 90 TABLET | Refills: 1 | Status: SHIPPED | OUTPATIENT
Start: 2024-05-09

## 2024-05-16 ENCOUNTER — TELEMEDICINE (OUTPATIENT)
Dept: CARDIOLOGY | Facility: HOSPITAL | Age: 79
End: 2024-05-16

## 2024-05-16 DIAGNOSIS — I42.8 NICM (NONISCHEMIC CARDIOMYOPATHY): Primary | ICD-10-CM

## 2024-05-16 DIAGNOSIS — I10 ESSENTIAL HYPERTENSION: ICD-10-CM

## 2024-05-16 DIAGNOSIS — N18.2 CKD (CHRONIC KIDNEY DISEASE) STAGE 2, GFR 60-89 ML/MIN: ICD-10-CM

## 2024-05-16 DIAGNOSIS — I48.0 PAF (PAROXYSMAL ATRIAL FIBRILLATION): ICD-10-CM

## 2024-05-16 RX ORDER — AMLODIPINE BESYLATE 5 MG/1
5 TABLET ORAL DAILY
Qty: 30 TABLET | Refills: 11 | Status: SHIPPED | OUTPATIENT
Start: 2024-05-16

## 2024-05-16 NOTE — PROGRESS NOTES
"Chief Complaint  Follow-up (NICM, htn )    Subjective    History of Present Illness {CC  Problem List  Visit  Diagnosis   Encounters  Notes  Medications  Labs  Result Review Imaging  Media :23}     You have chosen to receive care through the use of telemedicine. Telemedicine enables health care providers at different locations to provide safe, effective, and convenient care through the use of technology. As with any health care service, there are risks associated with the use of telemedicine, including equipment failure, poor connections, and  issues.    Do you understand the risks and benefits of telemedicine as I have explained them to you? Yes  Have your questions regarding telemedicine been answered? Yes  Do you consent to the use of telemedicine in your medical care today? Yes      78-year-old female presents for telemed visit today for ongoing evaluation of her hypertension.  Patient presented to [facility] ED on 3/14/24 with hypertension.  Patient reports of blood pressures mainly her diastolic numbers have been elevated between 100-114.  She was recently initiated on losartan last week.  Patient reports she has been compliant with her carvedilol and her Lasix.  Patient was hospitalized at [facility] few weeks ago with severe pneumonia, recurrent nosebleeds.  She presents today with home blood pressure log showing blood pressures over the last 10 days 137/76 to 167/116 heart rates 70s to 80s. Notes that she stopped her entresto because she notes that entresto made her \"feel like a zombie\". Currently denies chest pain, dyspnea, dizziness. Does report fatigue.   cardiac PET January 2023 that showed no ischemia, normal EF and coronary calcification noted on CT imaging.  Notes that since losartan has been increased, blood pressures are still running high in the 160s-170s.  Objective     Vital Signs:   Vitals:    05/16/24 1256   BP: 146/98   BP Location: Left arm   Patient " "Position: Sitting   Weight: 83.9 kg (185 lb)   Height: 161.3 cm (63.5\")       Body mass index is 32.26 kg/m².  Physical Exam  Vitals and nursing note reviewed.   Constitutional:       Appearance: Normal appearance.   HENT:      Head: Normocephalic.   Pulmonary:      Effort: Pulmonary effort is normal.   Neurological:      Mental Status: She is alert and oriented to person, place, and time.   Psychiatric:         Mood and Affect: Mood normal.         Behavior: Behavior normal.         Thought Content: Thought content normal.              Result Review  Data Reviewed:{ Labs  Result Review  Imaging  Med Tab  Media :23}   Stress Test With Pet Myocardial Perfusion (01/30/2023 14:31)  Lab Results   Component Value Date    GLUCOSE 136 (H) 03/14/2024    CALCIUM 9.7 03/14/2024     03/14/2024    K 3.6 03/14/2024    CO2 26.0 03/14/2024    CL 96 (L) 03/14/2024    BUN 19 03/14/2024    CREATININE 1.05 (H) 03/14/2024    EGFR 54.2 (L) 03/14/2024    BCR 18.1 03/14/2024    ANIONGAP 15.0 03/14/2024     Lab Results   Component Value Date    WBC 8.07 03/14/2024    HGB 15.5 03/14/2024    HCT 47.0 (H) 03/14/2024    MCV 92.5 03/14/2024     03/14/2024   Adult Transthoracic Echo Complete w/ Color, Spectral and Contrast if necessary per protocol (02/08/2024 16:24)  ECG 12 Lead ED Triage Standing Order; Chest Pain (03/14/2024 17:36)  Stress Test With Pet Myocardial Perfusion (01/30/2023 14:31)              Assessment and Plan {CC Problem List  Visit Diagnosis  ROS  Review (Popup)  Health Maintenance  Quality  BestPractice  Medications  SmartSets  SnapShot Encounters  Media :23}   1. NICM   Euvolemic  Stable on coreg, lasix,losartan     2. Essential hypertension  Stable on coreg ,losartan to 100 mg daily   Monitor bp closely   Begin norvasc 5 mg daily  Continue to monitor closely  3. CKD (chronic kidney disease) stage 2, GFR 60-89 ml/min    stable    4. Paroxysmal atrial fibrillation (HCC)  Patient stopped her " eliquis on her own due to excessive bleeding   Continue carvedilol  CHADS-VASc Risk Assessment              6 Total Score    1 CHF    1 Hypertension    2 Age >/= 75    1 DM    1 Sex: Female        Criteria that do not apply:    PRIOR STROKE/TIA/THROMBO    Vascular Disease    Age 65-74              Follow Up {Instructions Charge Capture  Follow-up Communications :23}   Return in about 4 weeks (around 6/13/2024) for Office visit, HF, HTN.    Patient was given instructions and counseling regarding her condition or for health maintenance advice. Please see specific information pulled into the AVS if appropriate.  Patient was instructed to call the [facility] with any questions, concerns, or worsening symptoms.

## 2024-05-19 VITALS
BODY MASS INDEX: 31.58 KG/M2 | HEIGHT: 64 IN | WEIGHT: 185 LBS | SYSTOLIC BLOOD PRESSURE: 146 MMHG | DIASTOLIC BLOOD PRESSURE: 98 MMHG

## 2024-06-01 DIAGNOSIS — I10 ESSENTIAL HYPERTENSION: ICD-10-CM

## 2024-06-03 RX ORDER — LOSARTAN POTASSIUM 50 MG/1
50 TABLET ORAL DAILY
Qty: 90 TABLET | Refills: 0 | Status: SHIPPED | OUTPATIENT
Start: 2024-06-03

## 2024-06-11 DIAGNOSIS — E11.40 TYPE 2 DIABETES MELLITUS WITH DIABETIC NEUROPATHY, WITHOUT LONG-TERM CURRENT USE OF INSULIN: ICD-10-CM

## 2024-06-11 RX ORDER — SEMAGLUTIDE 2.68 MG/ML
INJECTION, SOLUTION SUBCUTANEOUS
Qty: 9 ML | Refills: 0 | Status: SHIPPED | OUTPATIENT
Start: 2024-06-11

## 2024-06-19 RX ORDER — LOSARTAN POTASSIUM 100 MG/1
100 TABLET ORAL DAILY
Qty: 30 TABLET | Refills: 0 | Status: SHIPPED | OUTPATIENT
Start: 2024-06-19

## 2024-06-19 NOTE — TELEPHONE ENCOUNTER
Last seen 5/16/24. No follow up on file. 30 day supply sent to [facility] on [address].   Radha Rodgesr MA

## 2024-06-20 ENCOUNTER — TELEPHONE (OUTPATIENT)
Dept: CARDIOLOGY | Facility: HOSPITAL | Age: 79
End: 2024-06-20

## 2024-06-20 RX ORDER — HYDRALAZINE HYDROCHLORIDE 25 MG/1
50 TABLET, FILM COATED ORAL 3 TIMES DAILY
Qty: 60 TABLET | Refills: 3 | Status: SHIPPED | OUTPATIENT
Start: 2024-06-20

## 2024-06-20 NOTE — TELEPHONE ENCOUNTER
Bp yesterday ; 146/89  Stop norvasc due to significant swelling. Begin hydralazine 25 mg bid   F/u in c in a few weeks

## 2024-06-20 NOTE — TELEPHONE ENCOUNTER
----- Message from Radha RICHARDSON sent at 6/19/2024  2:44 PM EDT -----  Regarding: Amlodipine reaction  Patient reports she had shortness of breath and swelling in her legs, feet and hands when she started taking Amlodipine last month. She only took it for 3 days and then stopped it. She doubled up on her Furosemide for a few days to relieve the swelling. She did not seek treatment during this time. She cancelled her follow up visit with you because she has lost her insurance. She will reschedule once she is able to get another insurance policy. She states she has been out of town with family events and has been too busy until now to notify you as to what happened. She is now asking for your advice on what to do.   2/2 arthritis in digits/mild impairment

## 2024-06-22 DIAGNOSIS — F51.01 PRIMARY INSOMNIA: ICD-10-CM

## 2024-06-22 DIAGNOSIS — I50.22 CHRONIC SYSTOLIC CONGESTIVE HEART FAILURE: ICD-10-CM

## 2024-06-24 DIAGNOSIS — F51.01 PRIMARY INSOMNIA: ICD-10-CM

## 2024-06-24 DIAGNOSIS — I50.22 CHRONIC SYSTOLIC CONGESTIVE HEART FAILURE: ICD-10-CM

## 2024-06-24 RX ORDER — TRAZODONE HYDROCHLORIDE 100 MG/1
100 TABLET ORAL NIGHTLY PRN
Qty: 90 TABLET | Refills: 0 | Status: SHIPPED | OUTPATIENT
Start: 2024-06-24

## 2024-06-24 RX ORDER — FUROSEMIDE 40 MG/1
40 TABLET ORAL DAILY
Qty: 90 TABLET | Refills: 0 | Status: SHIPPED | OUTPATIENT
Start: 2024-06-24

## 2024-06-24 RX ORDER — TRAZODONE HYDROCHLORIDE 100 MG/1
100 TABLET ORAL NIGHTLY PRN
Qty: 90 TABLET | Refills: 0 | OUTPATIENT
Start: 2024-06-24

## 2024-06-24 RX ORDER — FUROSEMIDE 40 MG/1
40 TABLET ORAL DAILY
Qty: 90 TABLET | Refills: 0 | OUTPATIENT
Start: 2024-06-24

## 2024-06-24 NOTE — TELEPHONE ENCOUNTER
Caller: Angelita Shay    Relationship: Self    Best call back number: [phone]     Requested Prescriptions:   Requested Prescriptions     Pending Prescriptions Disp Refills    furosemide (LASIX) 40 MG tablet 90 tablet 0     Sig: Take 1 tablet by mouth Daily.    traZODone (DESYREL) 100 MG tablet 90 tablet 0     Sig: Take 1 tablet by mouth At Night As Needed for Sleep.        Pharmacy where request should be sent: [facility] [id number] [address] & [address] [facility] [phone] [facility] [phone] FX     Last office visit with prescribing clinician: 4/25/2024   Last telemedicine visit with prescribing clinician: Visit date not found   Next office visit with prescribing clinician: 7/29/2024     Additional details provided by patient: PATIENT IS OUT OF TOWN TOMORROW    Does the patient have less than a 3 day supply:  [x] Yes  [] No    Would you like a call back once the refill request has been completed: [x] Yes [] No    If the office needs to give you a call back, can they leave a voicemail: [x] Yes [] No    Martha Lam   06/24/24 11:37 EDT

## 2024-07-11 ENCOUNTER — APPOINTMENT (OUTPATIENT)
Dept: GENERAL RADIOLOGY | Facility: HOSPITAL | Age: 79
End: 2024-07-11
Payer: MEDICARE

## 2024-07-11 ENCOUNTER — HOSPITAL ENCOUNTER (EMERGENCY)
Facility: HOSPITAL | Age: 79
Discharge: HOME | End: 2024-07-12
Attending: EMERGENCY MEDICINE
Payer: MEDICARE

## 2024-07-11 DIAGNOSIS — R07.9 NONSPECIFIC CHEST PAIN: ICD-10-CM

## 2024-07-11 DIAGNOSIS — N17.9 ACUTE KIDNEY INJURY: ICD-10-CM

## 2024-07-11 DIAGNOSIS — S83.92XA SPRAIN OF LEFT KNEE, UNSPECIFIED LIGAMENT, INITIAL ENCOUNTER: Primary | ICD-10-CM

## 2024-07-11 DIAGNOSIS — B37.0 ORAL THRUSH: ICD-10-CM

## 2024-07-11 PROCEDURE — 71045 X-RAY EXAM CHEST 1 VIEW: CPT

## 2024-07-11 PROCEDURE — 99284 EMERGENCY DEPT VISIT MOD MDM: CPT

## 2024-07-11 PROCEDURE — 93005 ELECTROCARDIOGRAM TRACING: CPT

## 2024-07-11 RX ORDER — SODIUM CHLORIDE 0.9 % (FLUSH) 0.9 %
10 SYRINGE (ML) INJECTION AS NEEDED
Status: DISCONTINUED | OUTPATIENT
Start: 2024-07-11 | End: 2024-07-12 | Stop reason: HOSPADM

## 2024-07-11 RX ORDER — ASPIRIN 81 MG/1
324 TABLET, CHEWABLE ORAL ONCE
Status: COMPLETED | OUTPATIENT
Start: 2024-07-11 | End: 2024-07-11

## 2024-07-11 RX ADMIN — ASPIRIN 324 MG: 81 TABLET, CHEWABLE ORAL at 23:57

## 2024-07-12 ENCOUNTER — APPOINTMENT (OUTPATIENT)
Dept: GENERAL RADIOLOGY | Facility: HOSPITAL | Age: 79
End: 2024-07-12
Payer: MEDICARE

## 2024-07-12 VITALS
TEMPERATURE: 97.8 F | HEART RATE: 82 BPM | BODY MASS INDEX: 29.66 KG/M2 | HEIGHT: 65 IN | DIASTOLIC BLOOD PRESSURE: 99 MMHG | WEIGHT: 178 LBS | RESPIRATION RATE: 16 BRPM | SYSTOLIC BLOOD PRESSURE: 166 MMHG | OXYGEN SATURATION: 92 %

## 2024-07-12 LAB
ALBUMIN SERPL-MCNC: 4 G/DL (ref 3.5–5.2)
ALBUMIN/GLOB SERPL: 1.4 G/DL
ALP SERPL-CCNC: 45 U/L (ref 39–117)
ALT SERPL W P-5'-P-CCNC: 18 U/L (ref 1–33)
ANION GAP SERPL CALCULATED.3IONS-SCNC: 13 MMOL/L (ref 5–15)
AST SERPL-CCNC: 23 U/L (ref 1–32)
BASOPHILS # BLD AUTO: 0.06 10*3/MM3 (ref 0–0.2)
BASOPHILS NFR BLD AUTO: 0.7 % (ref 0–1.5)
BILIRUB SERPL-MCNC: 0.4 MG/DL (ref 0–1.2)
BUN SERPL-MCNC: 28 MG/DL (ref 8–23)
BUN/CREAT SERPL: 24.1 (ref 7–25)
CALCIUM SPEC-SCNC: 9.5 MG/DL (ref 8.6–10.5)
CHLORIDE SERPL-SCNC: 98 MMOL/L (ref 98–107)
CO2 SERPL-SCNC: 26 MMOL/L (ref 22–29)
CREAT SERPL-MCNC: 1.16 MG/DL (ref 0.57–1)
DEPRECATED RDW RBC AUTO: 53.4 FL (ref 37–54)
EGFRCR SERPLBLD CKD-EPI 2021: 48.1 ML/MIN/1.73
EOSINOPHIL # BLD AUTO: 0.43 10*3/MM3 (ref 0–0.4)
EOSINOPHIL NFR BLD AUTO: 5 % (ref 0.3–6.2)
ERYTHROCYTE [DISTWIDTH] IN BLOOD BY AUTOMATED COUNT: 14.6 % (ref 12.3–15.4)
GEN 5 2HR TROPONIN T REFLEX: 18 NG/L
GLOBULIN UR ELPH-MCNC: 2.9 GM/DL
GLUCOSE SERPL-MCNC: 107 MG/DL (ref 65–99)
HCT VFR BLD AUTO: 47.2 % (ref 34–46.6)
HGB BLD-MCNC: 14.7 G/DL (ref 12–15.9)
HOLD SPECIMEN: NORMAL
IMM GRANULOCYTES # BLD AUTO: 0.02 10*3/MM3 (ref 0–0.05)
IMM GRANULOCYTES NFR BLD AUTO: 0.2 % (ref 0–0.5)
LIPASE SERPL-CCNC: 80 U/L (ref 13–60)
LYMPHOCYTES # BLD AUTO: 0.99 10*3/MM3 (ref 0.7–3.1)
LYMPHOCYTES NFR BLD AUTO: 11.5 % (ref 19.6–45.3)
MCH RBC QN AUTO: 30.9 PG (ref 26.6–33)
MCHC RBC AUTO-ENTMCNC: 31.1 G/DL (ref 31.5–35.7)
MCV RBC AUTO: 99.2 FL (ref 79–97)
MONOCYTES # BLD AUTO: 0.69 10*3/MM3 (ref 0.1–0.9)
MONOCYTES NFR BLD AUTO: 8 % (ref 5–12)
NEUTROPHILS NFR BLD AUTO: 6.41 10*3/MM3 (ref 1.7–7)
NEUTROPHILS NFR BLD AUTO: 74.6 % (ref 42.7–76)
NRBC BLD AUTO-RTO: 0 /100 WBC (ref 0–0.2)
NT-PROBNP SERPL-MCNC: 189.3 PG/ML (ref 0–1800)
PLATELET # BLD AUTO: 142 10*3/MM3 (ref 140–450)
PMV BLD AUTO: 11.2 FL (ref 6–12)
POTASSIUM SERPL-SCNC: 3.6 MMOL/L (ref 3.5–5.2)
PROT SERPL-MCNC: 6.9 G/DL (ref 6–8.5)
QT INTERVAL: 356 MS
QT INTERVAL: 380 MS
QTC INTERVAL: 442 MS
QTC INTERVAL: 449 MS
RBC # BLD AUTO: 4.76 10*6/MM3 (ref 3.77–5.28)
SODIUM SERPL-SCNC: 137 MMOL/L (ref 136–145)
TROPONIN T DELTA: 2 NG/L
TROPONIN T SERPL HS-MCNC: 16 NG/L
WBC NRBC COR # BLD AUTO: 8.6 10*3/MM3 (ref 3.4–10.8)
WHOLE BLOOD HOLD COAG: NORMAL
WHOLE BLOOD HOLD SPECIMEN: NORMAL

## 2024-07-12 PROCEDURE — 25010000002 MORPHINE PER 10 MG: Performed by: EMERGENCY MEDICINE

## 2024-07-12 PROCEDURE — 73560 X-RAY EXAM OF KNEE 1 OR 2: CPT

## 2024-07-12 PROCEDURE — 96374 THER/PROPH/DIAG INJ IV PUSH: CPT

## 2024-07-12 PROCEDURE — 36415 COLL VENOUS BLD VENIPUNCTURE: CPT

## 2024-07-12 PROCEDURE — 80053 COMPREHEN METABOLIC PANEL: CPT

## 2024-07-12 PROCEDURE — 93005 ELECTROCARDIOGRAM TRACING: CPT

## 2024-07-12 PROCEDURE — 83880 ASSAY OF NATRIURETIC PEPTIDE: CPT

## 2024-07-12 PROCEDURE — 85025 COMPLETE CBC W/AUTO DIFF WBC: CPT

## 2024-07-12 PROCEDURE — 84484 ASSAY OF TROPONIN QUANT: CPT

## 2024-07-12 PROCEDURE — 83690 ASSAY OF LIPASE: CPT

## 2024-07-12 RX ORDER — METHOCARBAMOL 750 MG/1
750 TABLET, FILM COATED ORAL 3 TIMES DAILY
Qty: 21 TABLET | Refills: 0 | Status: SHIPPED | OUTPATIENT
Start: 2024-07-12 | End: 2024-07-19

## 2024-07-12 RX ORDER — MORPHINE SULFATE 4 MG/ML
4 INJECTION, SOLUTION INTRAMUSCULAR; INTRAVENOUS ONCE
Status: COMPLETED | OUTPATIENT
Start: 2024-07-12 | End: 2024-07-12

## 2024-07-12 RX ORDER — ACETAMINOPHEN 500 MG
1000 TABLET ORAL ONCE
Status: COMPLETED | OUTPATIENT
Start: 2024-07-12 | End: 2024-07-12

## 2024-07-12 RX ADMIN — ACETAMINOPHEN 1000 MG: 500 TABLET ORAL at 01:52

## 2024-07-12 RX ADMIN — MORPHINE SULFATE 4 MG: 4 INJECTION, SOLUTION INTRAMUSCULAR; INTRAVENOUS at 03:00

## 2024-07-12 NOTE — ED PROVIDER NOTES
Subjective   History of Present Illness  This is a 78-year-old female with a history of CHF CKD presented emergency department with some chest pain and a fall.  Patient had a chest pain for last 48 hours.  She complains of dull pain in the middle of her chest.  Is nonradiating in nature.  She denies any associate shortness of breath.  Patient dates that she tripped and slipped over some close her on the ground today.  She twisted her left knee and is complaining some pain in the area since then.  She did not sustain any other trauma.  She denies any headache or change in vision.  No focal weakness.  No abdominal pain or vomiting.    History provided by:  Patient   used: No        Review of Systems   Constitutional:  Negative for chills and fever.   HENT:  Negative for congestion, ear pain and sore throat.    Eyes:  Negative for visual disturbance.   Respiratory:  Negative for shortness of breath.    Cardiovascular:  Positive for chest pain.   Gastrointestinal:  Negative for abdominal pain.   Genitourinary:  Negative for difficulty urinating.   Musculoskeletal:  Positive for arthralgias.   Skin:  Negative for rash.   Neurological:  Negative for dizziness, weakness and numbness.   Psychiatric/Behavioral:  Negative for agitation.        Past Medical History:   Diagnosis Date    Acute bronchopneumonia     Acute respiratory failure with hypoxia     Anxiety     Arthritis     AV block, 1st degree     Breast injury     recent fall in early June, bruising left breast    Cataract     left    Cellulitis of both lower extremities     CHF (congestive heart failure)     CKD (chronic kidney disease), stage III     CKD stage 3 secondary to diabetes     Colon cancer 2000    Colon polyp 2015    x 3 adenomatous    Colon polyp 04/18/2018    x 5 Dr Cash    COPD (chronic obstructive pulmonary disease)     COPD with acute exacerbation     Coronary artery disease     1 stent    Depression     Diabetes mellitus     dx  2 years ago- checks fsbs bid    Epistaxis     Gallstone     GI bleed 07/2018    Gout 03/13/2023    H/O echocardiogram 08/05/2018    Dr Greene, EF 55%, mild concentric hypertrophy, impaired relaxation    Hearing loss     History of atrial fibrillation     History of chemotherapy     11/2000    History of congestive heart failure     History of transfusion     approx 1999 - [facility]    Hyperlipidemia     Hypertension     Knee pain     Lactic acidosis     Low back pain     Believe my stomach is pulling on my back    Muscle weakness (generalized)     Other chest pain     Pap smear for cervical cancer screening 2013    Pneumonia     Rectal bleeding     RLS (restless legs syndrome)     Sepsis, unspecified organism     Sleep apnea     Type 2 diabetes mellitus with diabetic neuropathy, unspecified     Ventricular premature depolarization     Wears eyeglasses        Allergies   Allergen Reactions    Oxycodone Shortness Of Breath    Zolpidem Other (See Comments)     nightmares    Norvasc [Amlodipine Besylate] Swelling       Past Surgical History:   Procedure Laterality Date    APPENDECTOMY  05/1963    CARDIAC CATHETERIZATION  08/2015    no stents    CARDIAC CATHETERIZATION  03/13/2018    CARDIAC CATHETERIZATION N/A 03/13/2018    Procedure: Left Heart Cath;  Surgeon: Pierre Jones III, MD;  Location:  [facility];  Service: Cardiovascular    CARDIAC CATHETERIZATION N/A 07/14/2020    Procedure: LEFT HEART CATH;  Surgeon: Galileo See MD;  Location:  [facility];  Service: Cardiovascular;  Laterality: N/A;    CHOLECYSTECTOMY N/A 08/01/2018    Procedure: CHOLECYSTECTOMY LAPAROSCOPIC , LYSIS OF ADHESIONS;  Surgeon: Santos Pantoja MD;  Location:  [facility];  Service: General    COLON RESECTION  04/08/2000    colon cancer    COLONOSCOPY  2015    COLONOSCOPY  04/18/2018    with 5 polyps removed 1yr f/u. Dr Cash- REPEAT YEARLY    CORONARY ANGIOPLASTY WITH STENT PLACEMENT   2015    CORONARY ARTERY BYPASS GRAFT      ENDOSCOPY      EYE SURGERY  2022    JOINT REPLACEMENT      TN ARTHRP KNE CONDYLE&PLATU MEDIAL&LAT COMPARTMENTS Left 2016    Procedure: LEFT TOTAL KNEE REPLACEMENT;  Surgeon: Laurent Zuniga MD;  Location: [facility];  Service: Orthopedics    TONSILLECTOMY  1961    TOTAL KNEE ARTHROPLASTY Right 2008    revision 2010    TOTAL SHOULDER ARTHROPLASTY Bilateral     right , left        Family History   Problem Relation Age of Onset    Hypertension Mother          at the age 95 on her birthday. All her test were normal at that time    Colon cancer Father     Stroke Father     Diabetes Father     Cancer Father          age 99 years    Hypertension Father          when she was 89 years old    Colon cancer Sister     Diabetes Sister     Cancer Sister         Cancer free    Cancer Brother         Did not wake up after tonsillectomy surgery  he was 56 years okd    Diabetes Maternal Grandmother          from pneumonia    Coronary artery disease Maternal Grandfather     Hearing loss Paternal Grandmother         Totally deaf from youth due to accident    No Known Problems Paternal Grandfather     Colon cancer Other     Diabetes Other     Heart disease Other     Hypertension Other     Stroke Other     Tuberculosis Other     Breast cancer Neg Hx     Ovarian cancer Neg Hx        Social History     Socioeconomic History    Marital status:     Number of children: 5   Tobacco Use    Smoking status: Former     Current packs/day: 0.00     Average packs/day: 1 pack/day for 23.3 years (23.3 ttl pk-yrs)     Types: Cigarettes     Start date: 1955     Quit date: 1979     Years since quittin.2    Smokeless tobacco: Never    Tobacco comments:     Started age 10 quit age 32 on 1979   Vaping Use    Vaping status: Never Used   Substance and Sexual Activity    Alcohol use: Yes     Alcohol/week: 1.0 standard drink of alcohol     Types:  1 Glasses of wine per week     Comment: occas    Drug use: Never    Sexual activity: Not Currently     Partners: Male     Birth control/protection: Post-menopausal, Same-sex partner           Objective   Physical Exam  Vitals and nursing note reviewed.   Constitutional:       General: She is not in acute distress.     Appearance: She is not ill-appearing or toxic-appearing.   HENT:      Mouth/Throat:      Pharynx: No posterior oropharyngeal erythema.   Eyes:      Conjunctiva/sclera: Conjunctivae normal.      Pupils: Pupils are equal, round, and reactive to light.   Cardiovascular:      Rate and Rhythm: Normal rate and regular rhythm.   Pulmonary:      Effort: Pulmonary effort is normal. No respiratory distress.   Abdominal:      General: Abdomen is flat. There is no distension.      Palpations: There is no mass.      Tenderness: There is no abdominal tenderness. There is no guarding or rebound.   Musculoskeletal:         General: No deformity.      Comments: Patient is some tenderness palpation of the left knee.  There are some minimal swelling.  There is no obvious deformity.  Range of motion intact, however elicits pain.  Compartment soft.  Neurovascular intact   Skin:     General: Skin is warm.      Findings: No rash.   Neurological:      General: No focal deficit present.      Mental Status: She is alert and oriented to person, place, and time.      Motor: No weakness.         ECG 12 Lead      Date/Time: 7/12/2024 12:30 AM    Performed by: Nate Finn MD  Authorized by: Nate Finn MD  Interpreted by ED physician  Comparison: compared with previous ECG   Similar to previous ECG  Rhythm: sinus rhythm  Ectopy: PVCs  Rate: normal  BPM: 93  QRS axis: normal  Conduction: 1st degree  Other findings comments: Nonspecific ST changes  Clinical impression: non-specific ECG  Comments: Interpretation:  EKG was directly visualized by myself, interpretations as documented in hospital course.    ECG 12  Lead      Date/Time: 7/12/2024 3:02 AM    Performed by: Nate Finn MD  Authorized by: Nate Finn MD  Interpreted by ED physician  Comparison: compared with previous ECG   Similar to previous ECG  Rhythm: sinus rhythm  Ectopy: PVCs  Rate: normal  BPM: 84  QRS axis: normal  Conduction: 1st degree  Other findings comments: Nonspecific ST changes  Clinical impression: non-specific ECG  Comments: Interpretation:  EKG was directly visualized by myself, interpretations as documented in hospital course.               ED Course  ED Course as of 07/12/24 0408 Fri Jul 12, 2024 0032 BP(!): 169/125 [JK]   0032 Temp: 97.8 °F (36.6 °C) [JK]   0032 Temp src: Oral [JK]   0032 Heart Rate: 93 [JK]   0032 Resp: 16 [JK]   0032 SpO2: 93 %  Interpretation:  Patient's repeat vitals, telemetry tracing, and pulse oximetry tracing were directly viewed and interpreted by myself.  Normal sinus rhythm [JK]   0405 CBC & Differential(!) [JK]   0405 High Sensitivity Troponin T(!) [JK]   0405 Lipase(!) [JK]   0405 Comprehensive Metabolic Panel(!) [JK]   0405 High Sensitivity Troponin T 2Hr(!) [JK]   0405 BNP  Interpretation:  Laboratory studies were reviewed and interpreted directly by myself.  CBC is unremarkable, initial troponin was 16, repeat of 18 with a delta Of 2, lipase was elevated at 80, however baseline for the patient, CMP showed some minor acute kidney injury with a BUN of 20 and creatinine 1.16, BNP normal [JK]   0405 XR Knee 1 or 2 View Left [JK]   0405 XR Chest 1 View  Interpretation:  Imaging was directly visualized by myself, per my interpretations, chest x-ray was unremarkable.  X-ray of the left knee showed previous hardware, no acute process. [JK]   0405 On reevaluation, the patient is feeling better.  She did request that I look at her tongue.  It looks like she does have some minor thrush, likely from her diabetes.  Patient placed on short course of medications.  Follow-up with PCP in 48 hours.  Given  strict return precautions.  Verbalized understanding. [JK]   6754 I had a discussion with the patient/family regarding diagnosis, diagnostic results, treatment plan, and medications. The patient/family indicated understanding of these instructions. I spent adequate time at the bedside prior to discharge necessary to discuss the aftercare instructions, giving patient education, providing explanations of the results of our evaluations/findings, and my decision making to assure that the patient/family understand the plan of care. Time was allotted to answer questions at that time and throughout the ED course. Patient is required to maintain timely follow up, as discussed. I also discussed the potential for the development of an acute emergent condition requiring further evaluation, return to the ER, admission, or even surgical intervention.  I encouraged the patient to return to the emergency department immediately for any concerns, worsening symptoms, new complaints, or if symptoms persist and they are unable to seek follow-up in a timely fashion. The patient/family expressed understanding and agreement with this plan    Shared decision making:   After full review of the patient's clinical presentation, review of any work-up including but not limited to laboratory studies and radiology obtained, I had a discussion with the patient.  Treatment options were discussed as well as the risks, benefits and consequences.  I discussed all findings with the patient and family members if available.  During the discussion, treatment goals were understood by all as well as any misconceptions which were addressed with the patient.  Ample time was given for any questions they may have had.  They are in agreement with the treatment plan as well as final disposition. [JK]      ED Course User Index  [JK] Nate Finn MD                HEART Score: 3                              Medical Decision Making  This is a 78-year-old  female with a history of CAD presenting with some chest discomfort.  Patient's pain seems atypical in nature.  Her EKG appears to be at baseline.  With regards to her knee pain, likely secondary to sprain versus strain.  Occurred during her fall.  There is no evidence of compartment syndrome.  Overall, the patient is nontoxic.  Afebrile.  IV access was established and the patient.  Placed on continuous telemetry monitoring.  Given the patient's presentation, differential is broad and will require further evaluation.  Workup initiated.      Differential diagnosis: CAD, ACS, peptic ulcer disease, dyspepsia, pneumonia, bronchitis, atypical chest pain, angina, musculoskeletal pain, left knee sprain, strain, contusion, fracture      Amount and/or Complexity of Data Reviewed  External Data Reviewed: labs, radiology, ECG and notes.     Details: External laboratories, imaging as well as notes were reviewed personally by myself.  All relevant studies were used to guide decision making.     Date of previous record: 4/5/2024    Source of note: Primary physician    Summary: Patient was seen evaluate for routine visit.  Did review basic laboratory studies on file as well as previous EKG and chest x-ray.  Records reviewed    Labs: ordered. Decision-making details documented in ED Course.  Radiology: ordered and independent interpretation performed. Decision-making details documented in ED Course.  ECG/medicine tests: ordered and independent interpretation performed. Decision-making details documented in ED Course.    Risk  OTC drugs.  Prescription drug management.        Final diagnoses:   Sprain of left knee, unspecified ligament, initial encounter   Nonspecific chest pain   Acute kidney injury   Oral thrush       ED Disposition  ED Disposition       ED Disposition   Discharge    Condition   Stable    Comment   --               Deidra Engle APRN  [address] [id number]  [phone]    Call in  1 day           Medication List        New Prescriptions      methocarbamol 750 MG tablet  Commonly known as: ROBAXIN  Take 1 tablet by mouth 3 (Three) Times a Day for 7 days.     nystatin 100,000 unit/mL suspension  Commonly known as: MYCOSTATIN  Take 5 mL by mouth 4 (Four) Times a Day for 5 days.               Where to Get Your Medications        These medications were sent to [facility] [id number] - [place], [place] - [address] [place] [address] [address] & [address] [place] - [phone]  - [phone] [place] [address]      Phone: [phone]   methocarbamol 750 MG tablet  nystatin 100,000 unit/mL suspension            Nate Finn MD  07/12/24 0404

## 2024-07-16 LAB
QT INTERVAL: 356 MS
QT INTERVAL: 380 MS
QTC INTERVAL: 442 MS
QTC INTERVAL: 449 MS

## 2024-07-18 RX ORDER — LOSARTAN POTASSIUM 100 MG/1
100 TABLET ORAL DAILY
Qty: 30 TABLET | Refills: 2 | Status: SHIPPED | OUTPATIENT
Start: 2024-07-18

## 2024-07-19 DIAGNOSIS — E11.40 TYPE 2 DIABETES MELLITUS WITH DIABETIC NEUROPATHY, WITHOUT LONG-TERM CURRENT USE OF INSULIN: ICD-10-CM

## 2024-07-21 RX ORDER — SEMAGLUTIDE 2.68 MG/ML
2 INJECTION, SOLUTION SUBCUTANEOUS WEEKLY
Qty: 9 ML | Refills: 0 | Status: SHIPPED | OUTPATIENT
Start: 2024-07-21

## 2024-07-26 ENCOUNTER — TELEPHONE (OUTPATIENT)
Dept: INTERNAL MEDICINE | Facility: CLINIC | Age: 79
End: 2024-07-26
Payer: MEDICARE

## 2024-07-26 DIAGNOSIS — E11.40 TYPE 2 DIABETES MELLITUS WITH DIABETIC NEUROPATHY, WITHOUT LONG-TERM CURRENT USE OF INSULIN: ICD-10-CM

## 2024-07-26 NOTE — TELEPHONE ENCOUNTER
Caller: Angelita Shay Jigna    Relationship: Self    Best call back number: [phone]     Requested Prescriptions:   Requested Prescriptions     Pending Prescriptions Disp Refills    Semaglutide, 2 MG/DOSE, (Ozempic, 2 MG/DOSE,) 8 MG/3ML solution pen-injector 9 mL 0     Sig: Inject 2 mg under the skin into the appropriate area as directed 1 (One) Time Per Week.        Pharmacy where request should be sent: [facility] [id number] [address] & [address] [facility] [phone] [facility] [phone] FX     Last office visit with prescribing clinician: 4/25/2024   Last telemedicine visit with prescribing clinician: Visit date not found   Next office visit with prescribing clinician: Visit date not found     Additional details provided by patient: PATIENT IS OVERDUE FOR A DOSE    Does the patient have less than a 3 day supply:  [x] Yes  [] No    Would you like a call back once the refill request has been completed: [] Yes [x] No    If the office needs to give you a call back, can they leave a voicemail: [] Yes [x] No    Martha Kaba Rep   07/26/24 12:43 EDT

## 2024-07-26 NOTE — TELEPHONE ENCOUNTER
Caller: Angelita Shay    Relationship: Self    Best call back number: [phone]     What was the call regarding: PATIENT WANTS TO LET KATELIN WILLSON TO KNOW THAT THE HYDRALAZINE IS WORKING GREAT FOR HER

## 2024-07-29 RX ORDER — SEMAGLUTIDE 2.68 MG/ML
2 INJECTION, SOLUTION SUBCUTANEOUS WEEKLY
Qty: 9 ML | Refills: 0 | OUTPATIENT
Start: 2024-07-29

## 2024-07-30 NOTE — TELEPHONE ENCOUNTER
I see the last dosage in Epic as being 25 mg bid. Patient has also not made another appointment with you. I called today and left her a voicemail to make an appointment. I am still showing no insurance coverage for office visits in Epic. Please review and refill, deny or re-route.   Radha Rodgers MA

## 2024-07-31 RX ORDER — HYDRALAZINE HYDROCHLORIDE 25 MG/1
50 TABLET, FILM COATED ORAL 3 TIMES DAILY
Qty: 60 TABLET | Refills: 3 | Status: SHIPPED | OUTPATIENT
Start: 2024-07-31

## 2024-08-02 ENCOUNTER — HOSPITAL ENCOUNTER (EMERGENCY)
Facility: HOSPITAL | Age: 79
Discharge: HOME | End: 2024-08-02
Attending: EMERGENCY MEDICINE
Payer: MEDICARE

## 2024-08-02 ENCOUNTER — APPOINTMENT (OUTPATIENT)
Facility: HOSPITAL | Age: 79
End: 2024-08-02
Payer: MEDICARE

## 2024-08-02 VITALS
SYSTOLIC BLOOD PRESSURE: 154 MMHG | HEART RATE: 78 BPM | DIASTOLIC BLOOD PRESSURE: 75 MMHG | TEMPERATURE: 99 F | HEIGHT: 65 IN | BODY MASS INDEX: 29.66 KG/M2 | WEIGHT: 178 LBS | RESPIRATION RATE: 16 BRPM | OXYGEN SATURATION: 93 %
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
## 2024-08-02 LAB
ALBUMIN SERPL-MCNC: 3.6 G/DL (ref 3.5–5.2)
ALBUMIN/GLOB SERPL: 1.1 G/DL
ALP SERPL-CCNC: 46 U/L (ref 39–117)
ALT SERPL W P-5'-P-CCNC: 13 U/L (ref 1–33)
ANION GAP SERPL CALCULATED.3IONS-SCNC: 12.8 MMOL/L (ref 5–15)
APTT PPP: 29.5 SECONDS (ref 60–90)
AST SERPL-CCNC: 22 U/L (ref 1–32)
BASOPHILS # BLD AUTO: 0.01 10*3/MM3 (ref 0–0.2)
BASOPHILS NFR BLD AUTO: 0.1 % (ref 0–1.5)
BILIRUB SERPL-MCNC: 0.3 MG/DL (ref 0–1.2)
BUN BLDA-MCNC: 43 MG/DL (ref 8–26)
BUN SERPL-MCNC: 35 MG/DL (ref 8–23)
BUN/CREAT SERPL: 21.9 (ref 7–25)
CA-I BLDA-SCNC: 1.14 MMOL/L (ref 4.5–5.3)
CALCIUM SPEC-SCNC: 9.5 MG/DL (ref 8.6–10.5)
CHLORIDE BLDA-SCNC: 100 MMOL/L (ref 98–109)
CHLORIDE SERPL-SCNC: 98 MMOL/L (ref 98–107)
CO2 BLDA-SCNC: 26 MMOL/L (ref 23–27)
CO2 SERPL-SCNC: 23.2 MMOL/L (ref 22–29)
CREAT BLDA-MCNC: 1.7 MG/DL (ref 0.6–1.3)
CREAT SERPL-MCNC: 1.6 MG/DL (ref 0.57–1)
DEPRECATED RDW RBC AUTO: 49.8 FL (ref 37–54)
EGFRCR SERPLBLD CKD-EPI 2021: 30.4 ML/MIN/1.73
EGFRCR SERPLBLD CKD-EPI 2021: 32.7 ML/MIN/1.73
EOSINOPHIL # BLD AUTO: 0.46 10*3/MM3 (ref 0–0.4)
EOSINOPHIL NFR BLD AUTO: 5.1 % (ref 0.3–6.2)
ERYTHROCYTE [DISTWIDTH] IN BLOOD BY AUTOMATED COUNT: 13.8 % (ref 12.3–15.4)
GLOBULIN UR ELPH-MCNC: 3.2 GM/DL
GLUCOSE BLDC GLUCOMTR-MCNC: 201 MG/DL (ref 70–130)
GLUCOSE SERPL-MCNC: 200 MG/DL (ref 65–99)
HCT VFR BLD AUTO: 35.7 % (ref 34–46.6)
HCT VFR BLDA CALC: 35 % (ref 38–51)
HGB BLD-MCNC: 11.8 G/DL (ref 12–15.9)
HGB BLDA-MCNC: 11.9 G/DL (ref 12–17)
IMM GRANULOCYTES # BLD AUTO: 0.01 10*3/MM3 (ref 0–0.05)
IMM GRANULOCYTES NFR BLD AUTO: 0.1 % (ref 0–0.5)
INR PPP: 1.38 (ref 0.89–1.12)
LYMPHOCYTES # BLD AUTO: 0.93 10*3/MM3 (ref 0.7–3.1)
LYMPHOCYTES NFR BLD AUTO: 10.3 % (ref 19.6–45.3)
MCH RBC QN AUTO: 31.6 PG (ref 26.6–33)
MCHC RBC AUTO-ENTMCNC: 33.1 G/DL (ref 31.5–35.7)
MCV RBC AUTO: 95.7 FL (ref 79–97)
MONOCYTES # BLD AUTO: 0.71 10*3/MM3 (ref 0.1–0.9)
MONOCYTES NFR BLD AUTO: 7.9 % (ref 5–12)
NEUTROPHILS NFR BLD AUTO: 6.87 10*3/MM3 (ref 1.7–7)
NEUTROPHILS NFR BLD AUTO: 76.5 % (ref 42.7–76)
PLATELET # BLD AUTO: 192 10*3/MM3 (ref 140–450)
PMV BLD AUTO: 10.7 FL (ref 6–12)
POTASSIUM BLDA-SCNC: 4 MMOL/L (ref 3.5–4.9)
POTASSIUM SERPL-SCNC: 3.9 MMOL/L (ref 3.5–5.2)
PROT SERPL-MCNC: 6.8 G/DL (ref 6–8.5)
PROTHROMBIN TIME: 17.6 SECONDS (ref 12.2–14.5)
RBC # BLD AUTO: 3.73 10*6/MM3 (ref 3.77–5.28)
SODIUM BLD-SCNC: 135 MMOL/L (ref 138–146)
SODIUM SERPL-SCNC: 134 MMOL/L (ref 136–145)
WBC NRBC COR # BLD AUTO: 8.99 10*3/MM3 (ref 3.4–10.8)

## 2024-08-02 PROCEDURE — 85730 THROMBOPLASTIN TIME PARTIAL: CPT

## 2024-08-02 PROCEDURE — 25010000002 FENTANYL CITRATE (PF) 50 MCG/ML SOLUTION: Performed by: EMERGENCY MEDICINE

## 2024-08-02 PROCEDURE — 80047 BASIC METABLC PNL IONIZED CA: CPT

## 2024-08-02 PROCEDURE — 85025 COMPLETE CBC W/AUTO DIFF WBC: CPT

## 2024-08-02 PROCEDURE — 72128 CT CHEST SPINE W/O DYE: CPT

## 2024-08-02 PROCEDURE — 70450 CT HEAD/BRAIN W/O DYE: CPT

## 2024-08-02 PROCEDURE — 25810000003 SODIUM CHLORIDE 0.9 % SOLUTION: Performed by: EMERGENCY MEDICINE

## 2024-08-02 PROCEDURE — 85610 PROTHROMBIN TIME: CPT

## 2024-08-02 PROCEDURE — 72131 CT LUMBAR SPINE W/O DYE: CPT

## 2024-08-02 PROCEDURE — 85014 HEMATOCRIT: CPT

## 2024-08-02 PROCEDURE — 36415 COLL VENOUS BLD VENIPUNCTURE: CPT

## 2024-08-02 PROCEDURE — 72125 CT NECK SPINE W/O DYE: CPT

## 2024-08-02 PROCEDURE — 74177 CT ABD & PELVIS W/CONTRAST: CPT

## 2024-08-02 PROCEDURE — 71260 CT THORAX DX C+: CPT

## 2024-08-02 PROCEDURE — 96374 THER/PROPH/DIAG INJ IV PUSH: CPT

## 2024-08-02 PROCEDURE — 99285 EMERGENCY DEPT VISIT HI MDM: CPT

## 2024-08-02 PROCEDURE — 80053 COMPREHEN METABOLIC PANEL: CPT

## 2024-08-02 PROCEDURE — 96361 HYDRATE IV INFUSION ADD-ON: CPT

## 2024-08-02 PROCEDURE — 25510000001 IOPAMIDOL 61 % SOLUTION: Performed by: EMERGENCY MEDICINE

## 2024-08-02 RX ORDER — FENTANYL CITRATE 50 UG/ML
25 INJECTION, SOLUTION INTRAMUSCULAR; INTRAVENOUS ONCE
Status: COMPLETED | OUTPATIENT
Start: 2024-08-02 | End: 2024-08-02

## 2024-08-02 RX ORDER — LOSARTAN POTASSIUM 100 MG/1
100 TABLET ORAL DAILY
Qty: 90 TABLET | Refills: 0 | Status: SHIPPED | OUTPATIENT
Start: 2024-08-02

## 2024-08-02 RX ADMIN — IOPAMIDOL 100 ML: 612 INJECTION, SOLUTION INTRAVENOUS at 15:36

## 2024-08-02 RX ADMIN — FENTANYL CITRATE 25 MCG: 50 INJECTION INTRAMUSCULAR; INTRAVENOUS at 15:20

## 2024-08-02 RX ADMIN — SODIUM CHLORIDE 500 ML: 9 INJECTION, SOLUTION INTRAVENOUS at 15:17

## 2024-08-02 NOTE — [facility] PROVIDER NOTE
Subjective  History of Present Illness:    Patient is a 78-year-old female who presents with back pain, bilateral hip pain, headache since she fell 3 days ago.  Patient states that she fell off her son's back porch.  Patient states she does take a blood thinner daily, Eliquis.  Patient denies loss of consciousness.  Patient states she has been ambulating but is very painful.  Patient denies bowel bladder changes/incontinence/retention, saddle anesthesia.  Patient denies chest pain or shortness of breath.  Patient hypoxic upon arrival and placed on O2 nasal cannula.      Nurses Notes reviewed and agree, including vitals, allergies, social history and prior medical history.     REVIEW OF SYSTEMS: All systems reviewed and not pertinent unless noted.  Review of Systems    Past Medical History:   Diagnosis Date    Acute bronchopneumonia     Acute respiratory failure with hypoxia     Anxiety     Arthritis     AV block, 1st degree     Breast injury     recent fall in early June, bruising left breast    Cataract     left    Cellulitis of both lower extremities     CHF (congestive heart failure)     CKD (chronic kidney disease), stage III     CKD stage 3 secondary to diabetes     Colon cancer 2000    Colon polyp 2015    x 3 adenomatous    Colon polyp 04/18/2018    x 5 Dr Cash    COPD (chronic obstructive pulmonary disease)     COPD with acute exacerbation     Coronary artery disease     1 stent    Depression     Diabetes mellitus     dx 2 years ago- checks fsbs bid    Epistaxis     Gallstone     GI bleed 07/2018    Gout 03/13/2023    H/O echocardiogram 08/05/2018    Dr Greene, EF 55%, mild concentric hypertrophy, impaired relaxation    Hearing loss     History of atrial fibrillation     History of chemotherapy     11/2000    History of congestive heart failure     History of transfusion     approx 1999 - [facility]    Hyperlipidemia     Hypertension     Knee pain     Lactic acidosis     Low back pain      Believe my stomach is pulling on my back    Muscle weakness (generalized)     Other chest pain     Pap smear for cervical cancer screening 2013    Pneumonia     Rectal bleeding     RLS (restless legs syndrome)     Sepsis, unspecified organism     Sleep apnea     Type 2 diabetes mellitus with diabetic neuropathy, unspecified     Ventricular premature depolarization     Wears eyeglasses        Allergies:    Oxycodone, Zolpidem, and Norvasc [amlodipine besylate]      Past Surgical History:   Procedure Laterality Date    APPENDECTOMY  05/1963    CARDIAC CATHETERIZATION  08/2015    no stents    CARDIAC CATHETERIZATION  03/13/2018    CARDIAC CATHETERIZATION N/A 03/13/2018    Procedure: Left Heart Cath;  Surgeon: Pierre Jones III, MD;  Location:  [facility];  Service: Cardiovascular    CARDIAC CATHETERIZATION N/A 07/14/2020    Procedure: LEFT HEART CATH;  Surgeon: Galileo See MD;  Location:  [facility];  Service: Cardiovascular;  Laterality: N/A;    CHOLECYSTECTOMY N/A 08/01/2018    Procedure: CHOLECYSTECTOMY LAPAROSCOPIC , LYSIS OF ADHESIONS;  Surgeon: Santos Pantoja MD;  Location:  [facility];  Service: General    COLON RESECTION  04/08/2000    colon cancer    COLONOSCOPY  2015    COLONOSCOPY  04/18/2018    with 5 polyps removed 1yr f/u. Dr Cash- REPEAT YEARLY    CORONARY ANGIOPLASTY WITH STENT PLACEMENT  12/2015    CORONARY ARTERY BYPASS GRAFT      ENDOSCOPY      EYE SURGERY  09/2022    JOINT REPLACEMENT      AK ARTHRP KNE CONDYLE&PLATU MEDIAL&LAT COMPARTMENTS Left 11/03/2016    Procedure: LEFT TOTAL KNEE REPLACEMENT;  Surgeon: Laurent Zuniga MD;  Location:  [facility];  Service: Orthopedics    TONSILLECTOMY  12/1961    TOTAL KNEE ARTHROPLASTY Right 2008    revision 2010    TOTAL SHOULDER ARTHROPLASTY Bilateral     right 2014, left 2015         Social History     Socioeconomic History    Marital status:     Number of children: 5   Tobacco Use    Smoking status:  "Former     Current packs/day: 0.00     Average packs/day: 1 pack/day for 23.3 years (23.3 ttl pk-yrs)     Types: Cigarettes     Start date: 1955     Quit date: 1979     Years since quittin.2    Smokeless tobacco: Never    Tobacco comments:     Started age 10 quit age 32 on 1979   Vaping Use    Vaping status: Never Used   Substance and Sexual Activity    Alcohol use: Yes     Alcohol/week: 1.0 standard drink of alcohol     Types: 1 Glasses of wine per week     Comment: occas    Drug use: Never    Sexual activity: Not Currently     Partners: Male     Birth control/protection: Post-menopausal, Same-sex partner         Family History   Problem Relation Age of Onset    Hypertension Mother          at the age 95 on her birthday. All her test were normal at that time    Colon cancer Father     Stroke Father     Diabetes Father     Cancer Father          age 99 years    Hypertension Father          when she was 89 years old    Colon cancer Sister     Diabetes Sister     Cancer Sister         Cancer free    Cancer Brother         Did not wake up after tonsillectomy surgery  he was 56 years okd    Diabetes Maternal Grandmother          from pneumonia    Coronary artery disease Maternal Grandfather     Hearing loss Paternal Grandmother         Totally deaf from youth due to accident    No Known Problems Paternal Grandfather     Colon cancer Other     Diabetes Other     Heart disease Other     Hypertension Other     Stroke Other     Tuberculosis Other     Breast cancer Neg Hx     Ovarian cancer Neg Hx        Objective  Physical Exam:  /82   Pulse 92   Temp 99 °F (37.2 °C)   Resp 18   Ht 165.1 cm (65\")   Wt 80.7 kg (178 lb)   SpO2 97%   BMI 29.62 kg/m²      Physical Exam  Constitutional:       Appearance: Well-developed. No acute distress  HENT:      Head: Normocephalic and atraumatic.  No pain to palpation of cervical spine.     Mouth/Throat:      Mouth: Mucous membranes are " moist.      Eyes:      Extraocular Movements: Extraocular movements intact. PERRLA  Cardiovascular:      Rate and Rhythm: Normal rate and regular rhythm.      Heart sounds: Normal heart sounds.   Pulmonary:      Effort: Pulmonary effort is normal. No respiratory distress.      Breath sounds: Normal breath sounds.   Abdominal:      General: There is no distension.      Palpations: Abdomen is soft and nontender. No rebound tenderness or guarding noted  Musculoskeletal:         General: Ecchymosis noted to right forearm, no tenderness to palpation, no edema noted.  Full active range of motion of all 4 extremities..  2+ pulses in all 4 extremities.  Brisk capillary refill noted in all extremities.  BACK: Significant tenderness to palpation thoracic and lumbar spine.  Tenderness palpation bilateral hips.      Extremities: Moves all 4s   Skin:     General: Skin is warm and dry.      Capillary Refill: Capillary refill takes less than 2 seconds.   Neurological:      Mental Status:Alert and oriented to person, place, and time.   Mentation is normal   Psychiatric:         Mood and Affect: Mood normal.         Behavior: Behavior normal.     Procedures    ED Course:    ED Course as of 08/02/24 1750   Fri Aug 02, 2024   1637 Creatinine 3 weeks ago 1.16, today is 1.6 and a BUN of 35.  Previously has been as elevated as 2 and 1.8.  Discussed with patient at length to follow-up with her primary care to repeat this. [OM]   1643 Currently attempting to wean patient off of oxygen. Doing trial of No O2 nasal cannula   [OM]   1707 Patient currently saturating 96% on room air. [OM]      ED Course User Index  [OM] Anamaria Lovell PA-C       Lab Results (last 24 hours)       Procedure Component Value Units Date/Time    CBC Auto Differential [[id number]]  (Abnormal) Collected: 08/02/24 1507    Specimen: Blood Updated: 08/02/24 1518     WBC 8.99 10*3/mm3      RBC 3.73 10*6/mm3      Hemoglobin 11.8 g/dL      Hematocrit 35.7 %      MCV 95.7 fL       MCH 31.6 pg      MCHC 33.1 g/dL      RDW 13.8 %      RDW-SD 49.8 fl      MPV 10.7 fL      Platelets 192 10*3/mm3      Neutrophil % 76.5 %      Lymphocyte % 10.3 %      Monocyte % 7.9 %      Eosinophil % 5.1 %      Basophil % 0.1 %      Immature Grans % 0.1 %      Neutrophils, Absolute 6.87 10*3/mm3      Lymphocytes, Absolute 0.93 10*3/mm3      Monocytes, Absolute 0.71 10*3/mm3      Eosinophils, Absolute 0.46 10*3/mm3      Basophils, Absolute 0.01 10*3/mm3      Immature Grans, Absolute 0.01 10*3/mm3     Comprehensive Metabolic Panel [[id number]]  (Abnormal) Collected: 08/02/24 1507    Specimen: Blood Updated: 08/02/24 1538     Glucose 200 mg/dL      BUN 35 mg/dL      Creatinine 1.60 mg/dL      Sodium 134 mmol/L      Potassium 3.9 mmol/L      Comment: Specimen hemolyzed.  Result may be falsely elevated.        Chloride 98 mmol/L      CO2 23.2 mmol/L      Calcium 9.5 mg/dL      Total Protein 6.8 g/dL      Albumin 3.6 g/dL      ALT (SGPT) 13 U/L      AST (SGOT) 22 U/L      Comment: Specimen hemolyzed.  Result may be falsely elevated.        Alkaline Phosphatase 46 U/L      Total Bilirubin 0.3 mg/dL      Globulin 3.2 gm/dL      A/G Ratio 1.1 g/dL      BUN/Creatinine Ratio 21.9     Anion Gap 12.8 mmol/L      eGFR 32.7 mL/min/1.73     Narrative:      GFR Normal >60  Chronic Kidney Disease <60  Kidney Failure <15    The GFR formula is only valid for adults with stable renal function between ages 18 and 70.    Protime-INR [[id number]]  (Abnormal) Collected: 08/02/24 1507    Specimen: Blood Updated: 08/02/24 1527     Protime 17.6 Seconds      INR 1.38    aPTT [[id number]]  (Abnormal) Collected: 08/02/24 1507    Specimen: Blood Updated: 08/02/24 1527     PTT 29.5 seconds     Narrative:      PTT = The equivalent PTT values for the therapeutic range of heparin levels at 0.3 to 0.5 U/ml are 60 to 70 seconds.    POC CHEM 8 [[id number]]  (Abnormal) Collected: 08/02/24 1509    Specimen: Blood Updated: 08/02/24 1512     Glucose 201  mg/dL      BUN 43 mg/dL      Creatinine 1.70 mg/dL      Sodium 135 mmol/L      POC Potassium 4.0 mmol/L      Chloride 100 mmol/L      Total CO2 26 mmol/L      Hemoglobin 11.9 g/dL      Comment: Serial Number: [id number]:  [id number]        Hematocrit 35 %      Ionized Calcium 1.14 mmol/L      eGFR 30.4 mL/min/1.73              CT Thoracic Spine Without Contrast    Result Date: 8/2/2024  CT THORACIC SPINE WO CONTRAST, CT LUMBAR SPINE WO CONTRAST Date of Exam: 8/2/2024 3:20 PM EDT Indication: paini. Comparison: 9/1/2018 CT and 2/10/2023 radiographs Technique: Axial CT images were obtained of the thoracic and lumbar spine without contrast administration.  Reconstructed coronal and sagittal images were also obtained. Automated exposure control and iterative construction methods were used. Findings: There is unchanged mild superior endplate height loss at T3. There is no evidence of acute thoracic or lumbar spine fracture. There is no significant listhesis. Mild straightening of normal lumbar lordosis. There are scattered endplate osteophyte formations with multilevel facet arthropathy. There is no evidence of significant canal or significant bony neural foraminal narrowing of the thoracic spine. There is degenerative disc disease of the lumbar spine with severe disc height loss at L2-3 and L5-S1. Posterior disc osteophyte complex at L2-3 results in at least moderate canal narrowing. There is also mild canal narrowing at L3-4 and L5-S1. Varying multilevel bony neural foraminal narrowing, severe on the left at L4-5. Please refer to same day CT of the chest, abdomen, and pelvis for paraspinal soft tissue findings.     Impression: Impression: Degenerative changes of the thoracic and lumbar spine as above. No evidence of acute abnormality. Electronically Signed: Niko Putnam MD  8/2/2024 4:07 PM EDT  Workstation ID: [id number]    CT Lumbar Spine Without Contrast    Result Date: 8/2/2024  CT THORACIC SPINE WO CONTRAST, CT  LUMBAR SPINE WO CONTRAST Date of Exam: 8/2/2024 3:20 PM EDT Indication: paini. Comparison: 9/1/2018 CT and 2/10/2023 radiographs Technique: Axial CT images were obtained of the thoracic and lumbar spine without contrast administration.  Reconstructed coronal and sagittal images were also obtained. Automated exposure control and iterative construction methods were used. Findings: There is unchanged mild superior endplate height loss at T3. There is no evidence of acute thoracic or lumbar spine fracture. There is no significant listhesis. Mild straightening of normal lumbar lordosis. There are scattered endplate osteophyte formations with multilevel facet arthropathy. There is no evidence of significant canal or significant bony neural foraminal narrowing of the thoracic spine. There is degenerative disc disease of the lumbar spine with severe disc height loss at L2-3 and L5-S1. Posterior disc osteophyte complex at L2-3 results in at least moderate canal narrowing. There is also mild canal narrowing at L3-4 and L5-S1. Varying multilevel bony neural foraminal narrowing, severe on the left at L4-5. Please refer to same day CT of the chest, abdomen, and pelvis for paraspinal soft tissue findings.     Impression: Impression: Degenerative changes of the thoracic and lumbar spine as above. No evidence of acute abnormality. Electronically Signed: Niko Putnam MD  8/2/2024 4:07 PM EDT  Workstation ID: [id number]    CT Head Without Contrast    Result Date: 8/2/2024  CT HEAD WO CONTRAST, CT CERVICAL SPINE WO CONTRAST Date of Exam: 8/2/2024 3:20 PM EDT Indication: FAll. Comparison: CT head March 14, 2024, CT cervical spine September 1, 2018 Technique: Axial CT images were obtained of the head and cervical spine without contrast administration.  Automated exposure control and iterative construction methods were used. Findings: CT head: There is an old insult in the right occipital lobe. There are periventricular and deep white  matter changes which could reflect more chronic small vessel ischemic change. There is global atrophy. The paranasal sinuses and mastoids seem relatively clear. No definite orbital abnormality is appreciated. CT cervical spine:: The relationship of the lateral pillars of C1 respect C2 does not appear unusual. There are multilevel degenerative changes involving the cervical spine which have been suggested. No definite fracture is confirmed. There is advanced facet arthropathy on the left at C2-3 and C3-4 with narrowing of the foramina in these areas. There is more of a broad-based osseous bar and uncovertebral arthropathy at C4-5.     Impression: Impression: 1.Old insult right occipital lobe 2.White matter changes involving the cerebral hemispheres which could reflect more chronic small vessel ischemic change. 3.Global atrophy 4.Multilevel degenerative change cervical spine. Electronically Signed: Jere Gordillo MD  8/2/2024 4:05 PM EDT  Workstation ID: [id number]    CT Cervical Spine Without Contrast    Result Date: 8/2/2024  CT HEAD WO CONTRAST, CT CERVICAL SPINE WO CONTRAST Date of Exam: 8/2/2024 3:20 PM EDT Indication: FAll. Comparison: CT head March 14, 2024, CT cervical spine September 1, 2018 Technique: Axial CT images were obtained of the head and cervical spine without contrast administration.  Automated exposure control and iterative construction methods were used. Findings: CT head: There is an old insult in the right occipital lobe. There are periventricular and deep white matter changes which could reflect more chronic small vessel ischemic change. There is global atrophy. The paranasal sinuses and mastoids seem relatively clear. No definite orbital abnormality is appreciated. CT cervical spine:: The relationship of the lateral pillars of C1 respect C2 does not appear unusual. There are multilevel degenerative changes involving the cervical spine which have been suggested. No definite fracture is confirmed.  There is advanced facet arthropathy on the left at C2-3 and C3-4 with narrowing of the foramina in these areas. There is more of a broad-based osseous bar and uncovertebral arthropathy at C4-5.     Impression: Impression: 1.Old insult right occipital lobe 2.White matter changes involving the cerebral hemispheres which could reflect more chronic small vessel ischemic change. 3.Global atrophy 4.Multilevel degenerative change cervical spine. Electronically Signed: Jere Gordillo MD  8/2/2024 4:05 PM EDT  Workstation ID: [id number]    CT Chest With Contrast Diagnostic    Result Date: 8/2/2024  CT CHEST W CONTRAST DIAGNOSTIC, CT ABDOMEN PELVIS W CONTRAST Date of Exam: 8/2/2024 3:20 PM EDT Indication: Fall. Comparison: Prior CT abdomen and pelvis of 7/14/2020. Technique: Axial CT images were obtained of the chest, abdomen, and pelvis after the uneventful intravenous administration of 100 mL Isovue 300.  Reconstructed coronal and sagittal images were also obtained. Automated exposure control and iterative construction methods were used. Findings: CT chest: There is artifact from lateral shoulder hardware. Small mediastinal nodes likely are reactive in nature. There are no hilar masses. There are coronary calcifications. There is mild cardiomegaly. There is a small hiatal hernia. There are minimal  pleural effusions. There is slight atelectasis of the lung bases, greatest on the left. There is no pneumothorax. There is no acute process of visualized bony structures. CT abdomen/pelvis: There are cholecystectomy clips. There is no significant bile duct dilatation. There is a small hiatal hernia. There is no evidence of solid abdominal organ injury. There are no liver lesions. The pancreas, adrenal glands, and spleen appear normal. There is no free air or free fluid. There are 2 small left renal cysts. There is no hydronephrosis. Partially filled bladder appears normal. There is no pelvic mass. There is no bowel dilatation. There  are no focal inflammatory changes. There are severe degenerative changes in the lumbar spine. There are no acute abnormalities of the visualized bony structures.     Impression: Impression: Minimal pleural effusions. Slight bibasilar atelectasis, greatest on the left. Chronic findings of the abdomen and pelvis. No acute process of the abdomen or pelvis. Electronically Signed: Catrachita Martinez MD  8/2/2024 4:00 PM EDT  Workstation ID: [id number]    CT Abdomen Pelvis With Contrast    Result Date: 8/2/2024  CT CHEST W CONTRAST DIAGNOSTIC, CT ABDOMEN PELVIS W CONTRAST Date of Exam: 8/2/2024 3:20 PM EDT Indication: Fall. Comparison: Prior CT abdomen and pelvis of 7/14/2020. Technique: Axial CT images were obtained of the chest, abdomen, and pelvis after the uneventful intravenous administration of 100 mL Isovue 300.  Reconstructed coronal and sagittal images were also obtained. Automated exposure control and iterative construction methods were used. Findings: CT chest: There is artifact from lateral shoulder hardware. Small mediastinal nodes likely are reactive in nature. There are no hilar masses. There are coronary calcifications. There is mild cardiomegaly. There is a small hiatal hernia. There are minimal  pleural effusions. There is slight atelectasis of the lung bases, greatest on the left. There is no pneumothorax. There is no acute process of visualized bony structures. CT abdomen/pelvis: There are cholecystectomy clips. There is no significant bile duct dilatation. There is a small hiatal hernia. There is no evidence of solid abdominal organ injury. There are no liver lesions. The pancreas, adrenal glands, and spleen appear normal. There is no free air or free fluid. There are 2 small left renal cysts. There is no hydronephrosis. Partially filled bladder appears normal. There is no pelvic mass. There is no bowel dilatation. There are no focal inflammatory changes. There are severe degenerative changes in the lumbar  spine. There are no acute abnormalities of the visualized bony structures.     Impression: Impression: Minimal pleural effusions. Slight bibasilar atelectasis, greatest on the left. Chronic findings of the abdomen and pelvis. No acute process of the abdomen or pelvis. Electronically Signed: Catrachita Martinez MD  8/2/2024 4:00 PM EDT  Workstation ID: [id number]        MDM     Amount and/or Complexity of Data Reviewed  Clinical lab tests: reviewed  Decide to obtain previous medical records or to obtain history from someone other than the patient: yes        Initial impression of presenting illness: Patient presents 3 days after falling backwards off a porch.    DDX: includes but is not limited to: Hemorrhage, fracture, sprain, strain, contusion    Patient arrives hypoxic, placed on 3 L O2 nasal cannula, no history of hypoxia with vitals interpreted by myself.     Pertinent results: As noted above.    Diagnostic information from other sources: Previous lab work and previous medical charts.    Interventions / Re-evaluation: Patient given fentanyl, patient's initial blood pressure low, has improved.  Patient's blood pressure has been stable throughout emergency department visit after the initial low blood pressure reading.  Patient initially required oxygenation via nasal cannula, this was titrated down and patient has been on room air for the past hour and is saturating in the mid 90s.  Patient denies shortness of breath or chest pain.    Medications   fentaNYL citrate (PF) (SUBLIMAZE) injection 25 mcg (25 mcg Intravenous Given 8/2/24 1520)   sodium chloride 0.9 % bolus 500 mL (0 mL Intravenous Stopped 8/2/24 1717)   iopamidol (ISOVUE-300) 61 % injection 100 mL (100 mL Intravenous Given 8/2/24 1536)       Results/clinical rationale were discussed with patient    Consultations/Discussion of results with other physicians: Discussed with attending who recommends  patient can follow-up outpatient and have repeat lab  values.    Data interpreted: Nursing notes reviewed, vital signs reviewed.  Labs independently interpreted by me     Counseling: Discussed the results above with the patient regarding need for admission or discharge.  Patient understands and agrees plan of care.  Patient has been resting comfortably in the emergency department visit. Discussed with patients the results from today's visit. Discussed with patient strict return precautions and they verbalize understanding. Recommend to them following up with primary care as soon as possible. Patient is discharged hemodynamically stable and comfortable.       -----  ED Disposition       ED Disposition   Discharge    Condition   Stable    Comment   --             Final diagnoses:   Fall from high place, initial encounter      Your Follow-Up Providers       Schedule an appointment as soon as possible for a visit  with Deidra Engle APRN.    Specialty: Nurse Practitioner  Follow up details: re check kidney function  [address]  [facility] [address] [phone]                       Contact information for after-discharge care    Follow-up information has not been specified.                    Your medication list        CONTINUE taking these medications        Instructions Last Dose Given Next Dose Due   acetaminophen 500 MG tablet  Commonly known as: TYLENOL      Take 1 tablet by mouth Every 6 (Six) Hours As Needed for Mild Pain.       Biotin 10 MG capsule      Take 1 capsule by mouth Daily.       Blood Glucose Test strip      Use once daily--pt requests Accucheck test strips--dx e11.9       carvedilol 25 MG tablet  Commonly known as: COREG      TAKE ONE TABLET BY MOUTH TWICE A DAY WITH MEALS       furosemide 40 MG tablet  Commonly known as: LASIX      TAKE 1 TABLET BY MOUTH DAILY       gabapentin 400 MG capsule  Commonly known as: NEURONTIN      Take 1 capsule by mouth 4 (Four) Times a Day.       hydrALAZINE 25 MG tablet  Commonly known as:  APRESOLINE      TAKE TWO TABLETS BY MOUTH THREE TIMES A DAY       Jardiance 10 MG tablet tablet  Generic drug: empagliflozin      TAKE 1 TABLET BY MOUTH DAILY       losartan 100 MG tablet  Commonly known as: COZAAR      Take 1 tablet by mouth Daily.       metFORMIN  MG 24 hr tablet  Commonly known as: GLUCOPHAGE-XR      TAKE 1 TABLET BY MOUTH TWICE A DAY       Ozempic (2 MG/DOSE) 8 MG/3ML solution pen-injector  Generic drug: Semaglutide (2 MG/DOSE)      Inject 2 mg under the skin into the appropriate area as directed 1 (One) Time Per Week.       rOPINIRole 2 MG tablet  Commonly known as: REQUIP      Take 1 tablet by mouth At Night As Needed.       rosuvastatin 20 MG tablet  Commonly known as: CRESTOR      TAKE ONE TABLET BY MOUTH ONCE NIGHTLY       traZODone 100 MG tablet  Commonly known as: DESYREL      TAKE ONE TABLET BY MOUTH ONCE NIGHTLY AS NEEDED FOR SLEEP       vitamin B-12 1000 MCG tablet  Commonly known as: CYANOCOBALAMIN      Take 1 tablet by mouth Daily.

## 2024-08-09 DIAGNOSIS — G62.9 NEUROPATHY: ICD-10-CM

## 2024-08-09 RX ORDER — GABAPENTIN 400 MG/1
400 CAPSULE ORAL 3 TIMES DAILY
Qty: 120 CAPSULE | Refills: 2 | Status: SHIPPED | OUTPATIENT
Start: 2024-08-09

## 2024-08-09 RX ORDER — GABAPENTIN 400 MG/1
CAPSULE ORAL
Qty: 120 CAPSULE | OUTPATIENT
Start: 2024-08-09

## 2024-08-09 NOTE — TELEPHONE ENCOUNTER
Caller: Angelita Shay    Relationship: Self    Best call back number: [phone]     Requested Prescriptions:   Requested Prescriptions     Pending Prescriptions Disp Refills    gabapentin (NEURONTIN) 400 MG capsule 120 capsule 2     Sig: Take 1 capsule by mouth 4 (Four) Times a Day.        Pharmacy where request should be sent: [facility] [id number] [address] & MAN O [facility] [phone] [facility] [phone]      Last office visit with prescribing clinician: 4/25/2024   Last telemedicine visit with prescribing clinician: Visit date not found   Next office visit with prescribing clinician: Visit date not found     Additional details provided by patient:     Does the patient have less than a 3 day supply:  [x] Yes  [] No    Would you like a call back once the refill request has been completed: [] Yes [x] No    If the office needs to give you a call back, can they leave a voicemail: [] Yes [x] No    Martha Villalpando   08/09/24 14:13 EDT

## 2024-08-28 DIAGNOSIS — I50.22 CHRONIC SYSTOLIC CONGESTIVE HEART FAILURE: ICD-10-CM

## 2024-08-28 DIAGNOSIS — I10 ESSENTIAL HYPERTENSION: ICD-10-CM

## 2024-08-28 DIAGNOSIS — I10 ESSENTIAL HYPERTENSION: Primary | ICD-10-CM

## 2024-08-28 RX ORDER — METFORMIN HYDROCHLORIDE 750 MG/1
750 TABLET, EXTENDED RELEASE ORAL 2 TIMES DAILY
Qty: 180 TABLET | Refills: 0 | Status: CANCELLED | OUTPATIENT
Start: 2024-08-28

## 2024-08-28 RX ORDER — CARVEDILOL 25 MG/1
25 TABLET ORAL 2 TIMES DAILY WITH MEALS
Qty: 180 TABLET | Refills: 1 | Status: SHIPPED | OUTPATIENT
Start: 2024-08-28

## 2024-08-28 NOTE — TELEPHONE ENCOUNTER
Have patient come by the clinic for a BMP as soon as possible.  Her kidney function was elevated and needs to be rechecked to see if we can continue her metformin.  Ask her to hold metformin until the end.  I did send it to the pharmacy however we need to check her labs before she restarts it.

## 2024-08-28 NOTE — TELEPHONE ENCOUNTER
Caller: Angelita Shay    Relationship: Self    Best call back number: [phone]     Requested Prescriptions:   Requested Prescriptions     Pending Prescriptions Disp Refills    carvedilol (COREG) 25 MG tablet 180 tablet 1     Sig: Take 1 tablet by mouth 2 (Two) Times a Day With Meals.    metFORMIN ER (GLUCOPHAGE-XR) 750 MG 24 hr tablet 180 tablet 0     Sig: Take 1 tablet by mouth 2 (Two) Times a Day.        Pharmacy where request should be sent: [facility] [id number] [address] & [address] O [facility] [phone] [facility] [phone]     Last office visit with prescribing clinician: 4/25/2024   Last telemedicine visit with prescribing clinician: Visit date not found   Next office visit with prescribing clinician: Visit date not found     Additional details provided by patient: PATIENT HAS BEEN OUT OF METFORMIN FOR 5 DAYS    Does the patient have less than a 3 day supply:  [x] Yes  [] No    Would you like a call back once the refill request has been completed: [] Yes [x] No    If the office needs to give you a call back, can they leave a voicemail: [] Yes [x] No    Martha Kaba Rep   08/28/24 10:07 EDT

## 2024-08-28 NOTE — TELEPHONE ENCOUNTER
PATIENT NO LONGER HAS INSURANCE AND WOULD LIKE TO SPEAK TO SOMEONE WHO CAN HELP HER GET AN APPT AND MEDICATION PAID FOR. IS HOMELESS SLEEPING ON HER DAUGHTERS COUCH.     OUT OF METFORMIN FOR 5 DAYS AND SUGAR IS IN THE UPPER 130s

## 2024-09-02 ENCOUNTER — TELEPHONE (OUTPATIENT)
Dept: INTERNAL MEDICINE | Facility: CLINIC | Age: 79
End: 2024-09-02
Payer: MEDICARE

## 2024-09-02 NOTE — TELEPHONE ENCOUNTER
Please call patient and have her return to the clinic to repeat her labs.  Her kidney function needs to be rechecked the medication she is taking.

## 2024-09-05 NOTE — TELEPHONE ENCOUNTER
Pt notified of message from PCP Please call patient and have her return to the clinic to repeat her labs.  Her kidney function needs to be rechecked the medication she is taking.   Pt confirmed she will come in tomorrow to complete.

## 2024-09-10 DIAGNOSIS — G62.9 NEUROPATHY: ICD-10-CM

## 2024-09-10 RX ORDER — GABAPENTIN 400 MG/1
400 CAPSULE ORAL 3 TIMES DAILY
Qty: 120 CAPSULE | Refills: 2 | Status: CANCELLED | OUTPATIENT
Start: 2024-09-10

## 2024-09-10 RX ORDER — GABAPENTIN 400 MG/1
CAPSULE ORAL
Qty: 120 CAPSULE | Refills: 0 | Status: SHIPPED | OUTPATIENT
Start: 2024-09-10 | End: 2024-09-11 | Stop reason: SDUPTHER

## 2024-09-10 NOTE — TELEPHONE ENCOUNTER
Caller: Angelita Shay    Relationship: Self    Best call back number: [phone]    Requested Prescriptions:   Requested Prescriptions     Pending Prescriptions Disp Refills    gabapentin (NEURONTIN) 400 MG capsule 120 capsule 2     Sig: Take 1 capsule by mouth 3 (Three) Times a Day.        Pharmacy where request should be sent:    [facility] [phone]  Last office visit with prescribing clinician: 4/25/2024   Last telemedicine visit with prescribing clinician: Visit date not found   Next office visit with prescribing clinician: Visit date not found     Additional details provided by patient: PATIENT STATES SHE TAKES 1 PILL IN THE AM, 1 IN THE AFTERNOON AND 2 AT BEDTIME. PATIENT IS OUT OF MEDICATION SINCE THE DIRECTIONS ONLY STATED 3 TIMES DAILY. PLEASE ADVISE    Does the patient have less than a 3 day supply:  [x] Yes  [] No    Would you like a call back once the refill request has been completed: [x] Yes [] No    If the office needs to give you a call back, can they leave a voicemail: [x] Yes [] No    Martha Nunn   09/10/24 13:53 EDT

## 2024-09-10 NOTE — TELEPHONE ENCOUNTER
Call [facility] and cancel refills for the gabapentin 400 mg 3 times daily.  I will send in an updated prescription for 400 mg 4 times a day.

## 2024-09-11 ENCOUNTER — LAB (OUTPATIENT)
Dept: LAB | Facility: HOSPITAL | Age: 79
End: 2024-09-11
Payer: MEDICARE

## 2024-09-11 ENCOUNTER — TELEPHONE (OUTPATIENT)
Dept: INTERNAL MEDICINE | Facility: CLINIC | Age: 79
End: 2024-09-11
Payer: MEDICARE

## 2024-09-11 DIAGNOSIS — G62.9 NEUROPATHY: ICD-10-CM

## 2024-09-11 DIAGNOSIS — I10 ESSENTIAL HYPERTENSION: ICD-10-CM

## 2024-09-11 LAB
ANION GAP SERPL CALCULATED.3IONS-SCNC: 10.7 MMOL/L (ref 5–15)
BUN SERPL-MCNC: 16 MG/DL (ref 8–23)
BUN/CREAT SERPL: 14 (ref 7–25)
CALCIUM SPEC-SCNC: 9.9 MG/DL (ref 8.6–10.5)
CHLORIDE SERPL-SCNC: 105 MMOL/L (ref 98–107)
CO2 SERPL-SCNC: 25.3 MMOL/L (ref 22–29)
CREAT SERPL-MCNC: 1.14 MG/DL (ref 0.57–1)
EGFRCR SERPLBLD CKD-EPI 2021: 49.1 ML/MIN/1.73
GLUCOSE SERPL-MCNC: 146 MG/DL (ref 65–99)
POTASSIUM SERPL-SCNC: 4 MMOL/L (ref 3.5–5.2)
SODIUM SERPL-SCNC: 141 MMOL/L (ref 136–145)

## 2024-09-11 PROCEDURE — 80048 BASIC METABOLIC PNL TOTAL CA: CPT

## 2024-09-11 RX ORDER — GABAPENTIN 400 MG/1
CAPSULE ORAL
Qty: 120 CAPSULE | Refills: 0 | Status: SHIPPED | OUTPATIENT
Start: 2024-09-11

## 2024-09-14 DIAGNOSIS — I10 ESSENTIAL HYPERTENSION: Primary | ICD-10-CM

## 2024-09-16 RX ORDER — HYDRALAZINE HYDROCHLORIDE 25 MG/1
50 TABLET, FILM COATED ORAL 3 TIMES DAILY
Qty: 60 TABLET | Refills: 3 | OUTPATIENT
Start: 2024-09-16

## 2024-09-18 ENCOUNTER — TELEPHONE (OUTPATIENT)
Dept: INTERNAL MEDICINE | Facility: CLINIC | Age: 79
End: 2024-09-18
Payer: MEDICARE

## 2024-09-18 RX ORDER — HYDRALAZINE HYDROCHLORIDE 25 MG/1
50 TABLET, FILM COATED ORAL 3 TIMES DAILY
Qty: 180 TABLET | Refills: 0 | Status: SHIPPED | OUTPATIENT
Start: 2024-09-18

## 2024-09-19 DIAGNOSIS — F51.01 PRIMARY INSOMNIA: ICD-10-CM

## 2024-09-19 DIAGNOSIS — I50.22 CHRONIC SYSTOLIC CONGESTIVE HEART FAILURE: ICD-10-CM

## 2024-09-19 RX ORDER — TRAZODONE HYDROCHLORIDE 100 MG/1
100 TABLET ORAL NIGHTLY PRN
Qty: 90 TABLET | Refills: 0 | Status: SHIPPED | OUTPATIENT
Start: 2024-09-19

## 2024-09-19 RX ORDER — FUROSEMIDE 40 MG
40 TABLET ORAL DAILY
Qty: 90 TABLET | Refills: 0 | Status: SHIPPED | OUTPATIENT
Start: 2024-09-19

## 2024-09-25 ENCOUNTER — OFFICE VISIT (OUTPATIENT)
Dept: INTERNAL MEDICINE | Facility: CLINIC | Age: 79
End: 2024-09-25

## 2024-09-25 VITALS
HEIGHT: 65 IN | BODY MASS INDEX: 30.82 KG/M2 | TEMPERATURE: 97.7 F | HEART RATE: 88 BPM | DIASTOLIC BLOOD PRESSURE: 70 MMHG | RESPIRATION RATE: 16 BRPM | SYSTOLIC BLOOD PRESSURE: 98 MMHG | WEIGHT: 185 LBS

## 2024-09-25 DIAGNOSIS — F51.01 PRIMARY INSOMNIA: ICD-10-CM

## 2024-09-25 DIAGNOSIS — Z12.31 ENCOUNTER FOR SCREENING MAMMOGRAM FOR MALIGNANT NEOPLASM OF BREAST: ICD-10-CM

## 2024-09-25 DIAGNOSIS — I48.0 PAROXYSMAL ATRIAL FIBRILLATION: ICD-10-CM

## 2024-09-25 DIAGNOSIS — I50.22 CHRONIC SYSTOLIC CONGESTIVE HEART FAILURE: ICD-10-CM

## 2024-09-25 DIAGNOSIS — I10 ESSENTIAL HYPERTENSION: Primary | ICD-10-CM

## 2024-09-25 DIAGNOSIS — E11.9 TYPE 2 DIABETES MELLITUS WITHOUT COMPLICATION, WITHOUT LONG-TERM CURRENT USE OF INSULIN: ICD-10-CM

## 2024-09-25 DIAGNOSIS — E78.5 HYPERLIPIDEMIA LDL GOAL <70: ICD-10-CM

## 2024-09-25 LAB
ALBUMIN SERPL-MCNC: 4.5 G/DL (ref 3.5–5.2)
ALBUMIN/CREATININE RATIO, URINE: NORMAL
ALBUMIN/GLOB SERPL: 1.5 G/DL
ALP SERPL-CCNC: 56 U/L (ref 39–117)
ALT SERPL W P-5'-P-CCNC: 25 U/L (ref 1–33)
ANION GAP SERPL CALCULATED.3IONS-SCNC: 15 MMOL/L (ref 5–15)
AST SERPL-CCNC: 26 U/L (ref 1–32)
BILIRUB SERPL-MCNC: 0.6 MG/DL (ref 0–1.2)
BUN SERPL-MCNC: 19 MG/DL (ref 8–23)
BUN/CREAT SERPL: 14.2 (ref 7–25)
CALCIUM SPEC-SCNC: 10.3 MG/DL (ref 8.6–10.5)
CHLORIDE SERPL-SCNC: 99 MMOL/L (ref 98–107)
CO2 SERPL-SCNC: 27 MMOL/L (ref 22–29)
CREAT SERPL-MCNC: 1.34 MG/DL (ref 0.57–1)
EGFRCR SERPLBLD CKD-EPI 2021: 40.4 ML/MIN/1.73
EXPIRATION DATE: ABNORMAL
EXPIRATION DATE: NORMAL
GLOBULIN UR ELPH-MCNC: 3 GM/DL
GLUCOSE SERPL-MCNC: 138 MG/DL (ref 65–99)
HBA1C MFR BLD: 6 % (ref 4.5–5.7)
Lab: ABNORMAL
Lab: NORMAL
POC CREATININE URINE: 20
POC MICROALBUMIN URINE: 80
POTASSIUM SERPL-SCNC: 4 MMOL/L (ref 3.5–5.2)
PROT SERPL-MCNC: 7.5 G/DL (ref 6–8.5)
SODIUM SERPL-SCNC: 141 MMOL/L (ref 136–145)

## 2024-09-25 PROCEDURE — 83036 HEMOGLOBIN GLYCOSYLATED A1C: CPT | Performed by: NURSE PRACTITIONER

## 2024-09-25 PROCEDURE — 82044 UR ALBUMIN SEMIQUANTITATIVE: CPT | Performed by: NURSE PRACTITIONER

## 2024-09-25 PROCEDURE — 36415 COLL VENOUS BLD VENIPUNCTURE: CPT | Performed by: NURSE PRACTITIONER

## 2024-09-25 PROCEDURE — 99214 OFFICE O/P EST MOD 30 MIN: CPT | Performed by: NURSE PRACTITIONER

## 2024-09-25 PROCEDURE — 80053 COMPREHEN METABOLIC PANEL: CPT | Performed by: NURSE PRACTITIONER

## 2024-09-26 ENCOUNTER — TELEPHONE (OUTPATIENT)
Dept: INTERNAL MEDICINE | Facility: CLINIC | Age: 79
End: 2024-09-26
Payer: MEDICARE

## 2024-10-07 DIAGNOSIS — G62.9 NEUROPATHY: ICD-10-CM

## 2024-10-07 NOTE — TELEPHONE ENCOUNTER
Caller: Angelita Shay    Relationship: Self    Best call back number: [phone]     Requested Prescriptions:   Requested Prescriptions     Pending Prescriptions Disp Refills    gabapentin (NEURONTIN) 400 MG capsule 120 capsule 0     Sig: Take 1 capsule by mouth in a.m, then take 1 capsule by mouth in the afternoon.  Take 2 capsules by mouth nightly.        Pharmacy where request should be sent: [facility] [id number] [address] & MAN O [facility] [phone] [facility] [phone] FX     Last office visit with prescribing clinician: 9/25/2024   Last telemedicine visit with prescribing clinician: Visit date not found   Next office visit with prescribing clinician: 1/21/2025     Additional details provided by patient: HAS 3 DAYS LEFT    Does the patient have less than a 3 day supply:  [x] Yes  [] No    Would you like a call back once the refill request has been completed: [] Yes [] No    If the office needs to give you a call back, can they leave a voicemail: [] Yes [] No    Martha Atkinson Rep   10/07/24 13:45 EDT

## 2024-10-08 RX ORDER — GABAPENTIN 400 MG/1
CAPSULE ORAL
Qty: 120 CAPSULE | Refills: 0 | Status: SHIPPED | OUTPATIENT
Start: 2024-10-11

## 2024-10-16 DIAGNOSIS — I10 ESSENTIAL HYPERTENSION: ICD-10-CM

## 2024-10-16 RX ORDER — HYDRALAZINE HYDROCHLORIDE 25 MG/1
50 TABLET, FILM COATED ORAL 3 TIMES DAILY
Qty: 180 TABLET | Refills: 0 | Status: SHIPPED | OUTPATIENT
Start: 2024-10-16

## 2024-11-11 DIAGNOSIS — G62.9 NEUROPATHY: ICD-10-CM

## 2024-11-11 RX ORDER — GABAPENTIN 400 MG/1
CAPSULE ORAL
Qty: 120 CAPSULE | Refills: 0 | Status: SHIPPED | OUTPATIENT
Start: 2024-11-11

## 2024-11-11 NOTE — TELEPHONE ENCOUNTER
Caller: Angelita Shay    Relationship: Self    Best call back number: [phone]     Requested Prescriptions:   Requested Prescriptions     Pending Prescriptions Disp Refills    gabapentin (NEURONTIN) 400 MG capsule 120 capsule 0     Sig: Take 1 capsule by mouth in a.m, then take 1 capsule by mouth in the afternoon.  Take 2 capsules by mouth nightly.        Pharmacy where request should be sent: [facility] [id number] [address] & [address] O [facility] [phone] [facility] [phone] FX     Last office visit with prescribing clinician: 9/25/2024   Last telemedicine visit with prescribing clinician: Visit date not found   Next office visit with prescribing clinician: 1/21/2025     Additional details provided by patient: OUT OF MEDICATION     Does the patient have less than a 3 day supply:  [x] Yes  [] No    Would you like a call back once the refill request has been completed: [] Yes [x] No    If the office needs to give you a call back, can they leave a voicemail: [] Yes [x] No    Martha Stevens Rep   11/11/24 11:31 EST

## 2024-11-13 DIAGNOSIS — I10 ESSENTIAL HYPERTENSION: ICD-10-CM

## 2024-11-13 RX ORDER — HYDRALAZINE HYDROCHLORIDE 25 MG/1
50 TABLET, FILM COATED ORAL 3 TIMES DAILY
Qty: 180 TABLET | Refills: 0 | Status: SHIPPED | OUTPATIENT
Start: 2024-11-13

## 2024-11-26 DIAGNOSIS — E78.5 HYPERLIPIDEMIA LDL GOAL <70: ICD-10-CM

## 2024-11-26 RX ORDER — ROSUVASTATIN CALCIUM 20 MG/1
20 TABLET, COATED ORAL NIGHTLY
Qty: 90 TABLET | Refills: 1 | Status: SHIPPED | OUTPATIENT
Start: 2024-11-26

## 2024-11-26 NOTE — TELEPHONE ENCOUNTER
Caller: Angelita Shay Jigna    Relationship: Self    Best call back number: [phone]     Requested Prescriptions:   Requested Prescriptions     Pending Prescriptions Disp Refills    rosuvastatin (CRESTOR) 20 MG tablet 90 tablet 1     Sig: Take 1 tablet by mouth Every Night.        Pharmacy where request should be sent: [facility] [id number] [address] & [address] O [facility] [phone] [facility] [phone] FX     Last office visit with prescribing clinician: 9/25/2024   Last telemedicine visit with prescribing clinician: Visit date not found   Next office visit with prescribing clinician: 1/21/2025     Additional details provided by patient:     Does the patient have less than a 3 day supply:  [x] Yes  [] No    Would you like a call back once the refill request has been completed: [] Yes [x] No    If the office needs to give you a call back, can they leave a voicemail: [] Yes [x] No    Martha rPyor Rep   11/26/24 16:18 EST

## 2024-12-11 DIAGNOSIS — I10 ESSENTIAL HYPERTENSION: ICD-10-CM

## 2024-12-11 DIAGNOSIS — G62.9 NEUROPATHY: ICD-10-CM

## 2024-12-11 RX ORDER — HYDRALAZINE HYDROCHLORIDE 25 MG/1
50 TABLET, FILM COATED ORAL 3 TIMES DAILY
Qty: 180 TABLET | Refills: 0 | Status: SHIPPED | OUTPATIENT
Start: 2024-12-11

## 2024-12-11 RX ORDER — GABAPENTIN 400 MG/1
CAPSULE ORAL
Qty: 120 CAPSULE | Refills: 0 | Status: SHIPPED | OUTPATIENT
Start: 2024-12-11

## 2024-12-11 NOTE — TELEPHONE ENCOUNTER
Caller: Angelita Shay    Relationship: Self    Best call back number: [phone]     Requested Prescriptions:   Requested Prescriptions     Pending Prescriptions Disp Refills    gabapentin (NEURONTIN) 400 MG capsule 120 capsule 0     Sig: Take 1 capsule by mouth in a.m, then take 1 capsule by mouth in the afternoon.  Take 2 capsules by mouth nightly.        Pharmacy where request should be sent: [facility] [id number] [address] & [address] O [facility] [phone] [facility] [phone] FX     Last office visit with prescribing clinician: 9/25/2024   Last telemedicine visit with prescribing clinician: Visit date not found   Next office visit with prescribing clinician: 1/21/2025     Additional details provided by patient:     Does the patient have less than a 3 day supply:  [x] Yes  [] No    Would you like a call back once the refill request has been completed: [] Yes [x] No    If the office needs to give you a call back, can they leave a voicemail: [] Yes [x] No    Martha Chavarria Rep   12/11/24 12:51 EST

## 2024-12-15 DIAGNOSIS — I50.22 CHRONIC SYSTOLIC CONGESTIVE HEART FAILURE: ICD-10-CM

## 2024-12-15 DIAGNOSIS — F51.01 PRIMARY INSOMNIA: ICD-10-CM

## 2024-12-16 RX ORDER — FUROSEMIDE 40 MG/1
40 TABLET ORAL DAILY
Qty: 90 TABLET | Refills: 0 | Status: SHIPPED | OUTPATIENT
Start: 2024-12-16

## 2024-12-16 RX ORDER — TRAZODONE HYDROCHLORIDE 100 MG/1
100 TABLET ORAL NIGHTLY PRN
Qty: 90 TABLET | Refills: 0 | Status: SHIPPED | OUTPATIENT
Start: 2024-12-16

## 2025-01-02 DIAGNOSIS — G25.81 RLS (RESTLESS LEGS SYNDROME): Primary | ICD-10-CM

## 2025-01-02 RX ORDER — ROPINIROLE 2 MG/1
2 TABLET, FILM COATED ORAL NIGHTLY
Qty: 90 TABLET | Refills: 0 | Status: SHIPPED | OUTPATIENT
Start: 2025-01-02

## 2025-01-02 NOTE — TELEPHONE ENCOUNTER
Caller: Jaspal Angelita Jigna    Relationship: Self    Best call back number: [phone]     Requested Prescriptions:   Requested Prescriptions     Pending Prescriptions Disp Refills    rOPINIRole (REQUIP) 2 MG tablet       Sig: Take 1 tablet by mouth At Night As Needed.        Pharmacy where request should be sent: [facility] [id number] [address] & [address] O [facility] [phone] [facility] [phone] FX     Last office visit with prescribing clinician: 9/25/2024   Last telemedicine visit with prescribing clinician: Visit date not found   Next office visit with prescribing clinician: 1/21/2025     Additional details provided by patient: PATIENT IS OUT    Does the patient have less than a 3 day supply:  [x] Yes  [] No        Martha Quintero Rep   01/02/25 10:42 EST

## 2025-01-07 DIAGNOSIS — I10 ESSENTIAL HYPERTENSION: ICD-10-CM

## 2025-01-07 RX ORDER — HYDRALAZINE HYDROCHLORIDE 25 MG/1
50 TABLET, FILM COATED ORAL 3 TIMES DAILY
Qty: 180 TABLET | Refills: 0 | Status: SHIPPED | OUTPATIENT
Start: 2025-01-07

## 2025-01-08 DIAGNOSIS — G62.9 NEUROPATHY: ICD-10-CM

## 2025-01-08 RX ORDER — GABAPENTIN 400 MG/1
CAPSULE ORAL
Qty: 120 CAPSULE | OUTPATIENT
Start: 2025-01-08

## 2025-01-08 RX ORDER — GABAPENTIN 400 MG/1
CAPSULE ORAL
Qty: 120 CAPSULE | Refills: 0 | Status: SHIPPED | OUTPATIENT
Start: 2025-01-10

## 2025-01-08 NOTE — TELEPHONE ENCOUNTER
Caller: Jaspal Angelita Jigna    Relationship: Self    Best call back number: [phone]     Requested Prescriptions:   Requested Prescriptions     Pending Prescriptions Disp Refills    gabapentin (NEURONTIN) 400 MG capsule 120 capsule 0     Sig: Take 1 capsule by mouth in a.m, then take 1 capsule by mouth in the afternoon.  Take 2 capsules by mouth nightly.        Pharmacy where request should be sent: [facility] [id number] [address] & [address] O [facility] [phone] [facility] [phone] FX     Last office visit with prescribing clinician: 9/25/2024   Last telemedicine visit with prescribing clinician: Visit date not found   Next office visit with prescribing clinician: 1/21/2025     Additional details provided by patient:     Does the patient have less than a 3 day supply:  [x] Yes  [] No    Would you like a call back once the refill request has been completed: [] Yes [x] No    If the office needs to give you a call back, can they leave a voicemail: [] Yes [x] No    Martha Prado Rep   01/08/25 10:28 EST

## (undated) DEVICE — ST INF 2NDARY 20DRP VNT/NOVNT 30IN

## (undated) DEVICE — MEDI-VAC YANKAUER SUCTION HANDLE W/BULBOUS TIP: Brand: CARDINAL HEALTH

## (undated) DEVICE — ENDOPATH XCEL BLADELESS TROCARS WITH STABILITY SLEEVES: Brand: ENDOPATH XCEL

## (undated) DEVICE — GUIDE CATHETER: Brand: MACH1™

## (undated) DEVICE — SKIN PREP TRAY W/CHG: Brand: MEDLINE INDUSTRIES, INC.

## (undated) DEVICE — DEV INFL MONARCH 25W

## (undated) DEVICE — ANTIBACTERIAL UNDYED BRAIDED (POLYGLACTIN 910), SYNTHETIC ABSORBABLE SURGICAL SUTURE: Brand: COATED VICRYL

## (undated) DEVICE — GW PERIPH GUIDERIGHT STD/EXCHNG/J/TIP SS 0.035IN 5X260CM

## (undated) DEVICE — GW PRESS VERRATA STR 185CM 10185P

## (undated) DEVICE — CATH DIAG EXPO .056 FL3.5 6F 100CM

## (undated) DEVICE — PK LAP LASR CHOLE 10

## (undated) DEVICE — GW FC FLOP/TP .035 260CM 3MM

## (undated) DEVICE — 3M™ TEGADERM™ IV TRANSPARENT FILM DRESSING WITH BORDER 1610: Brand: 3M™ TEGADERM™

## (undated) DEVICE — PAD,NON-ADHERENT,2X3,STERILE,LF,1/PK: Brand: MEDLINE

## (undated) DEVICE — CATH DIAG EXPO M/ PK 5F FL4/FR4 PIG

## (undated) DEVICE — FLTR HME STR UNIV W/SMPL PORT

## (undated) DEVICE — MODEL BT2000 P/N 700287-012KIT CONTENTS: MANIFOLD WITH SALINE AND CONTRAST PORTS, SALINE TUBING WITH SPIKE AND HAND SYRINGE, TRANSDUCER: Brand: BT2000 AUTOMATED MANIFOLD KIT

## (undated) DEVICE — COVER,LIGHT HANDLE,FLX,1/PK: Brand: MEDLINE INDUSTRIES, INC.

## (undated) DEVICE — ENDOPATH XCEL UNIVERSAL TROCAR STABLILITY SLEEVES: Brand: ENDOPATH XCEL

## (undated) DEVICE — MODEL AT P65, P/N 701554-001KIT CONTENTS: HAND CONTROLLER, 3-WAY HIGH-PRESSURE STOPCOCK WITH ROTATING END AND PREMIUM HIGH-PRESSURE TUBING: Brand: ANGIOTOUCH® KIT

## (undated) DEVICE — DEV COMP RAD PRELUDESYNC 24CM

## (undated) DEVICE — PK CATH CARD 10

## (undated) DEVICE — INTRO SHEATH PRELUDE EASE HC .025 6F 11CM

## (undated) DEVICE — CATH DIAG EXPO .045 FL3.5 5F 100CM

## (undated) DEVICE — CATH DIAG EXPO M/ PK 6FR FL4/FR4 PIG 3PK

## (undated) DEVICE — MINI ENDOCUT SCISSOR TIP, DISPOSABLE: Brand: RENEW

## (undated) DEVICE — AIRWY 90MM NO9

## (undated) DEVICE — GLIDESHEATH SLENDER STAINLESS STEEL KIT: Brand: GLIDESHEATH SLENDER

## (undated) DEVICE — 3M™ STERI-STRIP™ REINFORCED ADHESIVE SKIN CLOSURES, R1547, 1/2 IN X 4 IN (12 MM X 100 MM), 6 STRIPS/ENVELOPE: Brand: 3M™ STERI-STRIP™

## (undated) DEVICE — MEDI-VAC NON-CONDUCTIVE SUCTION TUBING: Brand: CARDINAL HEALTH

## (undated) DEVICE — [HIGH FLOW HEATED INSUFFLATOR TUBING,  DO NOT USE IF PACKAGE IS DAMAGED]

## (undated) DEVICE — CANNULA,OXY,ADULT,SUPERSOFT,W/7'TUB,UC: Brand: MEDLINE

## (undated) DEVICE — ANTIBACTERIAL UNDYED BRAIDED (POLYGLACTIN 910), SYNTHETIC ABSORBABLE SUTURE: Brand: COATED VICRYL

## (undated) DEVICE — SKIN AFFIX SURG ADHESIVE 72/CS 0.55ML: Brand: MEDLINE